# Patient Record
Sex: FEMALE | Race: WHITE | NOT HISPANIC OR LATINO | Employment: FULL TIME | URBAN - METROPOLITAN AREA
[De-identification: names, ages, dates, MRNs, and addresses within clinical notes are randomized per-mention and may not be internally consistent; named-entity substitution may affect disease eponyms.]

---

## 2017-05-11 ENCOUNTER — OFFICE VISIT (OUTPATIENT)
Dept: OBSTETRICS AND GYNECOLOGY | Facility: CLINIC | Age: 37
End: 2017-05-11
Payer: COMMERCIAL

## 2017-05-11 VITALS
BODY MASS INDEX: 27.31 KG/M2 | DIASTOLIC BLOOD PRESSURE: 60 MMHG | HEIGHT: 64 IN | HEART RATE: 72 BPM | SYSTOLIC BLOOD PRESSURE: 118 MMHG | RESPIRATION RATE: 13 BRPM | WEIGHT: 160 LBS

## 2017-05-11 DIAGNOSIS — Z01.419 ENCOUNTER FOR GYNECOLOGICAL EXAMINATION WITHOUT ABNORMAL FINDING: Primary | ICD-10-CM

## 2017-05-11 DIAGNOSIS — R87.610 ATYPICAL SQUAMOUS CELL CHANGES OF UNDETERMINED SIGNIFICANCE (ASCUS) ON CERVICAL CYTOLOGY WITH NEGATIVE HIGH RISK HUMAN PAPILLOMA VIRUS (HPV) TEST RESULT: ICD-10-CM

## 2017-05-11 PROCEDURE — 87624 HPV HI-RISK TYP POOLED RSLT: CPT

## 2017-05-11 PROCEDURE — 99999 PR PBB SHADOW E&M-EST. PATIENT-LVL III: CPT | Mod: PBBFAC,,, | Performed by: OBSTETRICS & GYNECOLOGY

## 2017-05-11 PROCEDURE — 99395 PREV VISIT EST AGE 18-39: CPT | Mod: S$GLB,,, | Performed by: OBSTETRICS & GYNECOLOGY

## 2017-05-11 PROCEDURE — 88175 CYTOPATH C/V AUTO FLUID REDO: CPT

## 2017-05-11 RX ORDER — ERGOCALCIFEROL 1.25 MG/1
50000 CAPSULE ORAL WEEKLY
COMMUNITY
End: 2017-05-11 | Stop reason: SDUPTHER

## 2017-05-11 NOTE — PROGRESS NOTES
Subjective:       Patient ID: Elham Rodas is a 36 y.o. female.    Chief Complaint:  Well Woman      History of Present Illness  Patient presents for annual exam.  She is currently without any GYN complaints.  Patient has had tubal ligation for contraception    Menstrual History:  OB History      Para Term  AB TAB SAB Ectopic Multiple Living    4 4        4         Menarche age:   Patient's last menstrual period was 2017 (approximate).         Review of Systems  Review of Systems   Constitutional: Negative for activity change, appetite change, chills, diaphoresis, fatigue, fever and unexpected weight change.   HENT: Negative for congestion, dental problem, drooling, ear discharge, ear pain, facial swelling, hearing loss, mouth sores, nosebleeds, postnasal drip, rhinorrhea, sinus pressure, sneezing, sore throat, tinnitus, trouble swallowing and voice change.    Eyes: Negative for photophobia, pain, discharge, redness, itching and visual disturbance.   Respiratory: Negative for apnea, cough, choking, chest tightness, shortness of breath, wheezing and stridor.    Cardiovascular: Positive for palpitations. Negative for chest pain and leg swelling.   Gastrointestinal: Negative for abdominal distention, abdominal pain, anal bleeding, blood in stool, constipation, diarrhea, nausea, rectal pain and vomiting.   Endocrine: Negative for cold intolerance, heat intolerance, polydipsia, polyphagia and polyuria.   Genitourinary: Negative for decreased urine volume, difficulty urinating, dyspareunia, dysuria, enuresis, flank pain, frequency, genital sores, hematuria, menstrual problem, pelvic pain, urgency, vaginal bleeding, vaginal discharge and vaginal pain.   Musculoskeletal: Negative for arthralgias, back pain, gait problem, joint swelling, myalgias, neck pain and neck stiffness.   Skin: Negative for color change, pallor, rash and wound.   Allergic/Immunologic: Negative for environmental allergies, food  allergies and immunocompromised state.   Neurological: Negative for dizziness, tremors, seizures, syncope, facial asymmetry, speech difficulty, weakness, light-headedness, numbness and headaches.   Hematological: Negative for adenopathy. Does not bruise/bleed easily.   Psychiatric/Behavioral: Negative for agitation, behavioral problems, confusion, decreased concentration, dysphoric mood, hallucinations, self-injury, sleep disturbance and suicidal ideas. The patient is not nervous/anxious and is not hyperactive.            Objective:    Physical Exam   Constitutional: She is oriented to person, place, and time. She appears well-developed and well-nourished.   Neck: No thyromegaly present.   Cardiovascular: Normal rate and regular rhythm.    Pulmonary/Chest: Effort normal and breath sounds normal. Right breast exhibits no inverted nipple, no mass, no nipple discharge, no skin change and no tenderness. Left breast exhibits no inverted nipple, no mass, no nipple discharge, no skin change and no tenderness. Breasts are symmetrical.   Abdominal: Soft. Bowel sounds are normal. She exhibits no mass. There is no tenderness. Hernia confirmed negative in the right inguinal area and confirmed negative in the left inguinal area.   Genitourinary: Vagina normal and uterus normal. Rectal exam shows no external hemorrhoid. No breast tenderness or discharge. Uterus is not enlarged and not tender. Cervix exhibits no motion tenderness, no discharge and no friability. Right adnexum displays no mass, no tenderness and no fullness. Left adnexum displays no mass, no tenderness and no fullness. No tenderness in the vagina. No foreign body in the vagina. No vaginal discharge found.   Musculoskeletal: Normal range of motion.   Lymphadenopathy:        Right: No inguinal adenopathy present.        Left: No inguinal adenopathy present.   Neurological: She is alert and oriented to person, place, and time. She has normal reflexes.   Skin: Skin is  dry.   Psychiatric: She has a normal mood and affect. Her behavior is normal. Judgment and thought content normal.   Nursing note and vitals reviewed.        Assessment:        1. Encounter for gynecological examination without abnormal finding    2. Atypical squamous cell changes of undetermined significance (ASCUS) on cervical cytology with negative high risk human papilloma virus (HPV) test result                Plan:        Elham was seen today for well woman.    Diagnoses and all orders for this visit:    Encounter for gynecological examination without abnormal finding    Atypical squamous cell changes of undetermined significance (ASCUS) on cervical cytology with negative high risk human papilloma virus (HPV) test result  -     Liquid-based pap smear, diagnostic  -     HPV High Risk Genotypes, PCR

## 2017-05-11 NOTE — MR AVS SNAPSHOT
[facility]  [address] [place] [id number]  Phone: [phone]                  Elham Clark   2017 10:00 AM   Office Visit    Description:  Female : 1980   Provider:  Yoni Oscar MD   Department:  [facility]           Reason for Visit     Well Woman           Diagnoses this Visit        Comments    Encounter for gynecological examination without abnormal finding    -  Primary     Atypical squamous cell changes of undetermined significance (ASCUS) on cervical cytology with negative high risk human papilloma virus (HPV) test result                To Do List           Goals (5 Years of Data)     None      Follow-Up and Disposition     Return in about 1 year (around 2018).      [facility]     [facility] Nurse Care Line -  Assistance  Unless otherwise directed by your provider, please contact [facility], our nurse care line that is available for  assistance.     Registered nurses in the [facility] provide: appointment scheduling, clinical advisement, health education, and other advisory services.  Call: [phone] (toll free)               Medications           Message regarding Medications     Verify the changes and/or additions to your medication regime listed below are the same as discussed with your clinician today.  If any of these changes or additions are incorrect, please notify your healthcare provider.        STOP taking these medications     ergocalciferol (ERGOCALCIFEROL) 50,000 unit Cap Take 50,000 Units by mouth once a week.           Verify that the below list of medications is an accurate representation of the medications you are currently taking.  If none reported, the list may be blank. If incorrect, please contact your healthcare provider. Carry this list with you in case of emergency.           Current Medications     calcium carbonate (TUMS ULTRA) 400 mg (1,000 mg) Chew Take 1 tablet by mouth daily as  "needed.     cholecalciferol, vitamin D3, 5,000 unit Tab Take 10,000 Units by mouth once daily.    hydrochlorothiazide (HYDRODIURIL) 12.5 MG Tab TAKE 1 TABLET BY MOUTH EVERY DAY    levothyroxine (SYNTHROID) 88 MCG tablet Take 1 tablet (88 mcg total) by mouth before breakfast.           Clinical Reference Information           Your Vitals Were     BP Pulse Resp Height Weight Last Period    118/60 72 13 5' 4" (1.626 m) 72.6 kg (160 lb) 04/03/2017 (Approximate)    BMI                27.46 kg/m2          Blood Pressure          Most Recent Value    BP  118/60      Allergies as of 5/11/2017     No Known Allergies      Immunizations Administered on Date of Encounter - 5/11/2017     None      Orders Placed During Today's Visit      Normal Orders This Visit    HPV High Risk Genotypes, PCR     Liquid-based pap smear, diagnostic       Language Assistance Services     ATTENTION: Language assistance services are available, free of charge. Please call [phone].      ATENCIÓN: Si habla dericknelson, tiene a vitale disposición servicios gratuitos de asistencia lingüística. Llame al [phone].     [facility] Ý: N?u b?n nói Ti?ng Vi?t, có các d?ch v? h? tr? ngôn ng? mi?n phí dành cho b?n. G?i s? [phone].         [facility] complies with applicable Federal civil rights laws and does not discriminate on the basis of race, color, national origin, age, disability, or sex.        "

## 2017-05-15 ENCOUNTER — TELEPHONE (OUTPATIENT)
Dept: OBSTETRICS AND GYNECOLOGY | Facility: CLINIC | Age: 37
End: 2017-05-15

## 2017-05-15 NOTE — TELEPHONE ENCOUNTER
----- Message from Huong Holder sent at 5/15/2017 11:44 AM CDT -----  Contact: self  Elham Rodas  MRN: [id number]  : 1980  PCP: Mookie Rausch  Home Phone      [phone]  Work Phone      Not on file.  Mobile          [phone]      MESSAGE:    Needs work excuse for 17.  Please fax to her at [phone].    Phone:  [phone]

## 2017-05-18 LAB
HPV16 DNA SPEC QL NAA+PROBE: NEGATIVE
HPV16+18+H RISK 12 DNA CVX-IMP: NEGATIVE
HPV18 DNA SPEC QL NAA+PROBE: NEGATIVE

## 2018-01-22 ENCOUNTER — OFFICE VISIT (OUTPATIENT)
Dept: URGENT CARE | Facility: CLINIC | Age: 38
End: 2018-01-22
Payer: COMMERCIAL

## 2018-01-22 VITALS
HEIGHT: 64 IN | WEIGHT: 150 LBS | HEART RATE: 74 BPM | RESPIRATION RATE: 18 BRPM | SYSTOLIC BLOOD PRESSURE: 116 MMHG | DIASTOLIC BLOOD PRESSURE: 77 MMHG | TEMPERATURE: 98 F | OXYGEN SATURATION: 98 % | BODY MASS INDEX: 25.61 KG/M2

## 2018-01-22 DIAGNOSIS — R52 BODY ACHES: ICD-10-CM

## 2018-01-22 DIAGNOSIS — J11.1 INFLUENZA: Primary | ICD-10-CM

## 2018-01-22 PROCEDURE — 99203 OFFICE O/P NEW LOW 30 MIN: CPT | Mod: S$GLB,,, | Performed by: PHYSICIAN ASSISTANT

## 2018-01-22 RX ORDER — ONDANSETRON 4 MG/1
4 TABLET, FILM COATED ORAL EVERY 8 HOURS PRN
Qty: 30 TABLET | Refills: 0 | Status: SHIPPED | OUTPATIENT
Start: 2018-01-22 | End: 2018-07-02 | Stop reason: ALTCHOICE

## 2018-01-22 RX ORDER — OSELTAMIVIR PHOSPHATE 75 MG/1
75 CAPSULE ORAL 2 TIMES DAILY
Qty: 10 CAPSULE | Refills: 0 | Status: SHIPPED | OUTPATIENT
Start: 2018-01-22 | End: 2018-01-27

## 2018-01-22 NOTE — PROGRESS NOTES
"Subjective:       Patient ID: Elham Rodas is a 37 y.o. female.    Vitals:  height is 5' 4" (1.626 m) and weight is 68 kg (150 lb). Her temperature is 98.4 °F (36.9 °C). Her blood pressure is 116/77 and her pulse is 74. Her respiration is 18 and oxygen saturation is 98%.     Chief Complaint: Sinus Problem and Generalized Body Aches    Sinus Problem   This is a new problem. The current episode started today. There has been no fever. She is experiencing no pain. Associated symptoms include chills and a sore throat. Pertinent negatives include no coughing, ear pain, headaches, hoarse voice or shortness of breath. Past treatments include nothing.     Review of Systems   Constitution: Positive for chills, weakness and malaise/fatigue. Negative for fever.   HENT: Positive for sore throat. Negative for ear pain and hoarse voice.    Eyes: Negative for discharge and redness.   Cardiovascular: Negative for chest pain, dyspnea on exertion and leg swelling.   Respiratory: Negative for cough, shortness of breath, sputum production and wheezing.    Musculoskeletal: Negative for myalgias.   Gastrointestinal: Negative for abdominal pain and nausea.   Neurological: Negative for headaches.       Objective:      Physical Exam   Constitutional: She is oriented to person, place, and time. She appears well-developed and well-nourished. She is cooperative.  Non-toxic appearance. She does not appear ill. No distress.   HENT:   Head: Normocephalic and atraumatic.   Right Ear: Hearing, tympanic membrane, external ear and ear canal normal.   Left Ear: Hearing, tympanic membrane, external ear and ear canal normal.   Nose: Rhinorrhea present. No mucosal edema or nasal deformity. No epistaxis. Right sinus exhibits no maxillary sinus tenderness and no frontal sinus tenderness. Left sinus exhibits no maxillary sinus tenderness and no frontal sinus tenderness.   Mouth/Throat: Uvula is midline and mucous membranes are normal. No trismus in the jaw. " Normal dentition. No uvula swelling. Posterior oropharyngeal erythema present.   Eyes: Conjunctivae and lids are normal. No scleral icterus.   Sclera clear bilat   Neck: Trachea normal, full passive range of motion without pain and phonation normal. Neck supple.   Cardiovascular: Normal rate, regular rhythm, normal heart sounds, intact distal pulses and normal pulses.    Pulmonary/Chest: Effort normal and breath sounds normal. No respiratory distress.   Abdominal: Soft. Normal appearance and bowel sounds are normal. She exhibits no distension. There is no tenderness.   Musculoskeletal: Normal range of motion. She exhibits no edema or deformity.   Neurological: She is alert and oriented to person, place, and time. She exhibits normal muscle tone. Coordination normal.   Skin: Skin is warm, dry and intact. She is not diaphoretic. No pallor.   Psychiatric: She has a normal mood and affect. Her speech is normal and behavior is normal. Judgment and thought content normal. Cognition and memory are normal.   Nursing note and vitals reviewed.      Assessment:       1. Influenza    2. Body aches        Plan:         Influenza  -     oseltamivir (TAMIFLU) 75 MG capsule; Take 1 capsule (75 mg total) by mouth 2 (two) times daily.  Dispense: 10 capsule; Refill: 0  -     ondansetron (ZOFRAN, AS HYDROCHLORIDE,) 4 MG tablet; Take 1 tablet (4 mg total) by mouth every 8 (eight) hours as needed for Nausea.  Dispense: 30 tablet; Refill: 0    Body aches  -     POCT Influenza A/B        Influenza (Adult)    Influenza is also called the flu. It is a viral illness that affects the air passages of your lungs. It is different from the common cold. The flu can easily be passed from one to person to another. It may be spread through the air by coughing and sneezing. Or it can be spread by touching the sick person and then touching your own eyes, nose, or mouth.  The flu starts 1 to 3 days after you are exposed to the flu virus. It may last for  1 to 2 weeks but many people feel tired or fatigued for many weeks afterward. You usually dont need to take antibiotics unless you have a complication. This might be an ear or sinus infection or pneumonia.  Symptoms of the flu may be mild or severe. They can include extreme tiredness (wanting to stay in bed all day), chills, fevers, muscle aches, soreness with eye movement, headache, and a dry, hacking cough.  Home care  Follow these guidelines when caring for yourself at home:  · Avoid being around cigarette smoke, whether yours or other peoples.  · Acetaminophen or ibuprofen will help ease your fever, muscle aches, and headache. Dont give aspirin to anyone younger than 18 who has the flu. Aspirin can harm the liver.  · Nausea and loss of appetite are common with the flu. Eat light meals. Drink 6 to 8 glasses of liquids every day. Good choices are water, sport drinks, soft drinks without caffeine, juices, tea, and soup. Extra fluids will also help loosen secretions in your nose and lungs.  · Over-the-counter cold medicines will not make the flu go away faster. But the medicines may help with coughing, sore throat, and congestion in your nose and sinuses. Dont use a decongestant if you have high blood pressure.  · Stay home until your fever has been gone for at least 24 hours without using medicine to reduce fever.  Follow-up care  Follow up with your healthcare provider, or as advised, if you are not getting better over the next week.  If you are age 65 or older, talk with your provider about getting a pneumococcal vaccine every 5 years. You should also get this vaccine if you have chronic asthma or COPD. All adults should get a flu vaccine every fall. Ask your provider about this.  When to seek medical advice  Call your healthcare provider right away if any of these occur:  · Cough with lots of colored mucus (sputum) or blood in your mucus  · Chest pain, shortness of breath, wheezing, or trouble  breathing  · Severe headache, or face, neck, or ear pain  · New rash with fever  · Fever of 100.4°F (38°C) or higher, or as directed by your healthcare provider  · Confusion, behavior change, or seizure  · Severe weakness or dizziness  · You get a new fever or cough after getting better for a few days  Date Last Reviewed: 1/1/2017  © 4112-3960 [employer]. [address] [place] [address]. All rights reserved. This information is not intended as a substitute for professional medical care. Always follow your healthcare professional's instructions.      Please follow up with your Primary care provider within 2-5 days if your signs and symptoms have not resolved or worsen.     If your condition worsens or fails to improve we recommend that you receive another evaluation at the emergency room immediately or contact your primary medical clinic to discuss your concerns.   You must understand that you have received an Urgent Care treatment only and that you may be released before all of your medical problems are known or treated. You, the patient, will arrange for follow up care as instructed.

## 2018-01-22 NOTE — PATIENT INSTRUCTIONS
Influenza (Adult)    Influenza is also called the flu. It is a viral illness that affects the air passages of your lungs. It is different from the common cold. The flu can easily be passed from one to person to another. It may be spread through the air by coughing and sneezing. Or it can be spread by touching the sick person and then touching your own eyes, nose, or mouth.  The flu starts 1 to 3 days after you are exposed to the flu virus. It may last for 1 to 2 weeks but many people feel tired or fatigued for many weeks afterward. You usually dont need to take antibiotics unless you have a complication. This might be an ear or sinus infection or pneumonia.  Symptoms of the flu may be mild or severe. They can include extreme tiredness (wanting to stay in bed all day), chills, fevers, muscle aches, soreness with eye movement, headache, and a dry, hacking cough.  Home care  Follow these guidelines when caring for yourself at home:  · Avoid being around cigarette smoke, whether yours or other peoples.  · Acetaminophen or ibuprofen will help ease your fever, muscle aches, and headache. Dont give aspirin to anyone younger than 18 who has the flu. Aspirin can harm the liver.  · Nausea and loss of appetite are common with the flu. Eat light meals. Drink 6 to 8 glasses of liquids every day. Good choices are water, sport drinks, soft drinks without caffeine, juices, tea, and soup. Extra fluids will also help loosen secretions in your nose and lungs.  · Over-the-counter cold medicines will not make the flu go away faster. But the medicines may help with coughing, sore throat, and congestion in your nose and sinuses. Dont use a decongestant if you have high blood pressure.  · Stay home until your fever has been gone for at least 24 hours without using medicine to reduce fever.  Follow-up care  Follow up with your healthcare provider, or as advised, if you are not getting better over the next week.  If you are age 65 or  older, talk with your provider about getting a pneumococcal vaccine every 5 years. You should also get this vaccine if you have chronic asthma or COPD. All adults should get a flu vaccine every fall. Ask your provider about this.  When to seek medical advice  Call your healthcare provider right away if any of these occur:  · Cough with lots of colored mucus (sputum) or blood in your mucus  · Chest pain, shortness of breath, wheezing, or trouble breathing  · Severe headache, or face, neck, or ear pain  · New rash with fever  · Fever of 100.4°F (38°C) or higher, or as directed by your healthcare provider  · Confusion, behavior change, or seizure  · Severe weakness or dizziness  · You get a new fever or cough after getting better for a few days  Date Last Reviewed: 1/1/2017  © 4944-8150 [employer]. [address] [place] [address]. All rights reserved. This information is not intended as a substitute for professional medical care. Always follow your healthcare professional's instructions.      Please follow up with your Primary care provider within 2-5 days if your signs and symptoms have not resolved or worsen.     If your condition worsens or fails to improve we recommend that you receive another evaluation at the emergency room immediately or contact your primary medical clinic to discuss your concerns.   You must understand that you have received an Urgent Care treatment only and that you may be released before all of your medical problems are known or treated. You, the patient, will arrange for follow up care as instructed.

## 2018-01-25 ENCOUNTER — TELEPHONE (OUTPATIENT)
Dept: URGENT CARE | Facility: CLINIC | Age: 38
End: 2018-01-25

## 2018-03-16 ENCOUNTER — HOSPITAL ENCOUNTER (EMERGENCY)
Facility: HOSPITAL | Age: 38
Discharge: HOME | End: 2018-03-17
Attending: EMERGENCY MEDICINE
Payer: COMMERCIAL

## 2018-03-16 DIAGNOSIS — R10.13 EPIGASTRIC ABDOMINAL PAIN: Primary | ICD-10-CM

## 2018-03-16 LAB
B-HCG UR QL: NEGATIVE
CTP QC/QA: YES

## 2018-03-16 PROCEDURE — 81025 URINE PREGNANCY TEST: CPT | Performed by: EMERGENCY MEDICINE

## 2018-03-16 PROCEDURE — 99284 EMERGENCY DEPT VISIT MOD MDM: CPT | Mod: 25

## 2018-03-16 PROCEDURE — 96375 TX/PRO/DX INJ NEW DRUG ADDON: CPT

## 2018-03-16 PROCEDURE — 96374 THER/PROPH/DIAG INJ IV PUSH: CPT

## 2018-03-16 RX ORDER — MORPHINE SULFATE 2 MG/ML
4 INJECTION, SOLUTION INTRAMUSCULAR; INTRAVENOUS
Status: COMPLETED | OUTPATIENT
Start: 2018-03-17 | End: 2018-03-17

## 2018-03-17 VITALS
BODY MASS INDEX: 25.61 KG/M2 | OXYGEN SATURATION: 100 % | WEIGHT: 150 LBS | HEART RATE: 67 BPM | HEIGHT: 64 IN | SYSTOLIC BLOOD PRESSURE: 104 MMHG | TEMPERATURE: 98 F | RESPIRATION RATE: 18 BRPM | DIASTOLIC BLOOD PRESSURE: 59 MMHG

## 2018-03-17 LAB
ALBUMIN SERPL BCP-MCNC: 3.9 G/DL
ALP SERPL-CCNC: 74 U/L
ALT SERPL W/O P-5'-P-CCNC: 20 U/L
ANION GAP SERPL CALC-SCNC: 9 MMOL/L
AST SERPL-CCNC: 17 U/L
BASOPHILS # BLD AUTO: 0.02 K/UL
BASOPHILS NFR BLD: 0.3 %
BILIRUB SERPL-MCNC: 0.3 MG/DL
BILIRUB UR QL STRIP: NEGATIVE
BUN SERPL-MCNC: 11 MG/DL
CALCIUM SERPL-MCNC: 9.1 MG/DL
CHLORIDE SERPL-SCNC: 104 MMOL/L
CLARITY UR: CLEAR
CO2 SERPL-SCNC: 25 MMOL/L
COLOR UR: YELLOW
CREAT SERPL-MCNC: 0.7 MG/DL
DIFFERENTIAL METHOD: ABNORMAL
EOSINOPHIL # BLD AUTO: 0.1 K/UL
EOSINOPHIL NFR BLD: 1.8 %
ERYTHROCYTE [DISTWIDTH] IN BLOOD BY AUTOMATED COUNT: 14.8 %
EST. GFR  (AFRICAN AMERICAN): >60 ML/MIN/1.73 M^2
EST. GFR  (NON AFRICAN AMERICAN): >60 ML/MIN/1.73 M^2
GLUCOSE SERPL-MCNC: 94 MG/DL
GLUCOSE UR QL STRIP: NEGATIVE
HCT VFR BLD AUTO: 35.7 %
HGB BLD-MCNC: 11.7 G/DL
HGB UR QL STRIP: NEGATIVE
KETONES UR QL STRIP: NEGATIVE
LEUKOCYTE ESTERASE UR QL STRIP: NEGATIVE
LIPASE SERPL-CCNC: 47 U/L
LYMPHOCYTES # BLD AUTO: 2.8 K/UL
LYMPHOCYTES NFR BLD: 45.8 %
MCH RBC QN AUTO: 27.4 PG
MCHC RBC AUTO-ENTMCNC: 32.8 G/DL
MCV RBC AUTO: 84 FL
MONOCYTES # BLD AUTO: 0.4 K/UL
MONOCYTES NFR BLD: 6.8 %
NEUTROPHILS # BLD AUTO: 2.7 K/UL
NEUTROPHILS NFR BLD: 45.1 %
NITRITE UR QL STRIP: NEGATIVE
PH UR STRIP: 6 [PH] (ref 5–8)
PLATELET # BLD AUTO: 241 K/UL
PMV BLD AUTO: 11.4 FL
POTASSIUM SERPL-SCNC: 3.5 MMOL/L
PROT SERPL-MCNC: 6.9 G/DL
PROT UR QL STRIP: NEGATIVE
RBC # BLD AUTO: 4.27 M/UL
SODIUM SERPL-SCNC: 138 MMOL/L
SP GR UR STRIP: <=1.005 (ref 1–1.03)
URN SPEC COLLECT METH UR: ABNORMAL
UROBILINOGEN UR STRIP-ACNC: NEGATIVE EU/DL
WBC # BLD AUTO: 6.03 K/UL

## 2018-03-17 PROCEDURE — 25500020 PHARM REV CODE 255: Performed by: EMERGENCY MEDICINE

## 2018-03-17 PROCEDURE — 80053 COMPREHEN METABOLIC PANEL: CPT

## 2018-03-17 PROCEDURE — 25000003 PHARM REV CODE 250: Performed by: EMERGENCY MEDICINE

## 2018-03-17 PROCEDURE — 83690 ASSAY OF LIPASE: CPT

## 2018-03-17 PROCEDURE — 81003 URINALYSIS AUTO W/O SCOPE: CPT

## 2018-03-17 PROCEDURE — 85025 COMPLETE CBC W/AUTO DIFF WBC: CPT

## 2018-03-17 PROCEDURE — 63600175 PHARM REV CODE 636 W HCPCS: Performed by: EMERGENCY MEDICINE

## 2018-03-17 RX ADMIN — PROMETHAZINE HYDROCHLORIDE 12.5 MG: 25 INJECTION INTRAMUSCULAR; INTRAVENOUS at 12:03

## 2018-03-17 RX ADMIN — IOHEXOL 75 ML: 350 INJECTION, SOLUTION INTRAVENOUS at 01:03

## 2018-03-17 RX ADMIN — Medication 4 MG: at 12:03

## 2018-03-17 NOTE — ED PROVIDER NOTES
Encounter Date: 3/16/2018       History     Chief Complaint   Patient presents with    Abdominal Pain     mid abdominal/umbilical abdominal pain/tenderness worsening for last 5hrs. pt. also has palpable nodule to upper umbilical area, very tender to palpation.      Patient is 38 y/o female here with acute onset epigastric abdominal pain that started 5 hours ago.  States that she feels something in her upper abdomen.  Pain is brought on by palpation only.  No radiation, she has not tried to take anything for the pain.  Denies any N/V/D, fever, chills, UTI sx, vaginal sx.            Review of patient's allergies indicates:  No Known Allergies  Past Medical History:   Diagnosis Date    Abnormal Pap smear of cervix 05/2016    ASCUS, - HPV    Hypothyroidism     Palpitations     Syncope and collapse     Thyroid disease     Vitamin D deficiency disease      Past Surgical History:   Procedure Laterality Date    CHOLECYSTECTOMY      THYROIDECTOMY      TUBAL LIGATION       Family History   Problem Relation Age of Onset    Hypertension Mother     Heart attack Mother     Hypertension Father     Breast cancer Neg Hx     Colon cancer Neg Hx     Ovarian cancer Neg Hx      Social History   Substance Use Topics    Smoking status: Never Smoker    Smokeless tobacco: Never Used    Alcohol use 0.0 oz/week      Comment: occ     Review of Systems   Constitutional: Negative for chills and fever.   HENT: Negative for congestion, rhinorrhea and sore throat.    Eyes: Negative for pain and visual disturbance.   Respiratory: Negative for cough and shortness of breath.    Cardiovascular: Negative for chest pain and leg swelling.   Gastrointestinal: Positive for abdominal pain. Negative for diarrhea, nausea and vomiting.   Genitourinary: Negative for dysuria, hematuria, vaginal bleeding and vaginal discharge.   Musculoskeletal: Negative for back pain and neck pain.   Skin: Negative for rash.   Neurological: Negative for  weakness and headaches.   Psychiatric/Behavioral: Negative for confusion.       Physical Exam     Initial Vitals [03/16/18 2251]   BP Pulse Resp Temp SpO2   128/74 70 20 98.6 °F (37 °C) 97 %      MAP       92         Physical Exam    Nursing note and vitals reviewed.  Constitutional: She appears well-developed and well-nourished. She is not diaphoretic. No distress.   HENT:   Head: Normocephalic and atraumatic.   Right Ear: External ear normal.   Left Ear: External ear normal.   Nose: Nose normal.   Eyes: Conjunctivae and EOM are normal. Right eye exhibits no discharge. Left eye exhibits no discharge.   Neck: Normal range of motion. Neck supple.   Cardiovascular: Normal rate, regular rhythm, normal heart sounds and intact distal pulses. Exam reveals no gallop and no friction rub.    No murmur heard.  Pulmonary/Chest: Breath sounds normal. No respiratory distress. She has no wheezes. She has no rhonchi. She has no rales. She exhibits no tenderness.   Abdominal: Soft. Bowel sounds are normal. She exhibits no distension and no mass. There is tenderness. There is no rebound and no guarding.   + TTP in epigastric area, no masses or hernia palpated   Musculoskeletal: Normal range of motion.   Neurological: She is alert and oriented to person, place, and time. No cranial nerve deficit.   Skin: Skin is warm and dry. Capillary refill takes less than 2 seconds.   Psychiatric: She has a normal mood and affect. Thought content normal.         ED Course   Procedures  Labs Reviewed   CBC W/ AUTO DIFFERENTIAL - Abnormal; Notable for the following:        Result Value    Hemoglobin 11.7 (*)     Hematocrit 35.7 (*)     RDW 14.8 (*)     All other components within normal limits   URINALYSIS - Abnormal; Notable for the following:     Specific Gravity, UA <=1.005 (*)     All other components within normal limits   COMPREHENSIVE METABOLIC PANEL   LIPASE   POCT URINE PREGNANCY             Medical Decision Making:   Initial Assessment:    Patient with epigastric pain  Differential Diagnosis:   Pancreatitis, gastritis, hernia, hepatitis  Clinical Tests:   Lab Tests: Ordered and Reviewed  The following lab test(s) were unremarkable: CBC, CMP, UPT, Urinalysis and Lipase  ED Management:  Pain relieved in ED    Nothing acute seen on CT scan.  Pain improved in ED   Patient nontoxic appearing and tolerating PO well.  Stable for discharge.                    ED Course as of Mar 17 0158   Sat Mar 17, 2018   0154 Patient states that pain is better and she is tolerating PO well  [LD]      ED Course User Index  [LD] Maribel Hobbs MD     Clinical Impression:   The encounter diagnosis was Epigastric abdominal pain.    Disposition:   Disposition: Discharged  Condition: Stable                        Maribel Hobbs MD  03/17/18 0202

## 2018-04-01 ENCOUNTER — OFFICE VISIT (OUTPATIENT)
Dept: URGENT CARE | Facility: CLINIC | Age: 38
End: 2018-04-01
Payer: COMMERCIAL

## 2018-04-01 VITALS
RESPIRATION RATE: 18 BRPM | HEIGHT: 64 IN | HEART RATE: 85 BPM | TEMPERATURE: 98 F | OXYGEN SATURATION: 98 % | BODY MASS INDEX: 25.61 KG/M2 | DIASTOLIC BLOOD PRESSURE: 81 MMHG | SYSTOLIC BLOOD PRESSURE: 128 MMHG | WEIGHT: 150 LBS

## 2018-04-01 DIAGNOSIS — J01.90 ACUTE SINUSITIS, RECURRENCE NOT SPECIFIED, UNSPECIFIED LOCATION: Primary | ICD-10-CM

## 2018-04-01 DIAGNOSIS — R05.9 COUGH: ICD-10-CM

## 2018-04-01 DIAGNOSIS — J02.9 ACUTE PHARYNGITIS, UNSPECIFIED ETIOLOGY: ICD-10-CM

## 2018-04-01 PROCEDURE — 99214 OFFICE O/P EST MOD 30 MIN: CPT | Mod: 25,S$GLB,, | Performed by: EMERGENCY MEDICINE

## 2018-04-01 PROCEDURE — 96372 THER/PROPH/DIAG INJ SC/IM: CPT | Mod: S$GLB,,, | Performed by: EMERGENCY MEDICINE

## 2018-04-01 RX ORDER — AZITHROMYCIN 250 MG/1
TABLET, FILM COATED ORAL
Qty: 6 TABLET | Refills: 0 | Status: SHIPPED | OUTPATIENT
Start: 2018-04-01 | End: 2018-05-14

## 2018-04-01 RX ORDER — BETAMETHASONE SODIUM PHOSPHATE AND BETAMETHASONE ACETATE 3; 3 MG/ML; MG/ML
6 INJECTION, SUSPENSION INTRA-ARTICULAR; INTRALESIONAL; INTRAMUSCULAR; SOFT TISSUE
Status: COMPLETED | OUTPATIENT
Start: 2018-04-01 | End: 2018-04-01

## 2018-04-01 RX ORDER — CODEINE PHOSPHATE AND GUAIFENESIN 10; 100 MG/5ML; MG/5ML
5-10 SOLUTION ORAL 3 TIMES DAILY PRN
Qty: 120 ML | Refills: 0 | Status: SHIPPED | OUTPATIENT
Start: 2018-04-01 | End: 2018-04-06

## 2018-04-01 RX ADMIN — BETAMETHASONE SODIUM PHOSPHATE AND BETAMETHASONE ACETATE 6 MG: 3; 3 INJECTION, SUSPENSION INTRA-ARTICULAR; INTRALESIONAL; INTRAMUSCULAR; SOFT TISSUE at 04:04

## 2018-04-01 NOTE — LETTER
April 1, 2018      [facility]  [address] [address], [address]  [place] [address]  Phone: [phone]  Fax: [phone]       Patient: Elham Rodas   YOB: 1980  Date of Visit: 04/01/2018    To Whom It May Concern:    Lorrie Rodas  was at [facility] on 04/01/2018. She may return to work/school on 4/3/18 with no restrictions. If you have any questions or concerns, or if I can be of further assistance, please do not hesitate to contact me.    Sincerely,            Jaylon Kinsey MD

## 2018-04-01 NOTE — PATIENT INSTRUCTIONS
Jaylon Kinsey MD  Go to the Emergency Department for any problems  Call your PCP for follow up next available.    Sinusitis (No Antibiotics)    The sinuses are air-filled spaces within the bones of the face. They connect to the inside of the nose. Sinusitis is an inflammation of the tissue lining the sinus cavity. Sinus inflammation can occur during a cold. It can also be due to allergies to pollens and other particles in the air. It can cause symptoms such as sinus congestion, headache, sore throat, facial swelling and fullness. It may also cause a low-grade fever. No infection is present, and no antibiotic treatment is needed.  Home care  · Drink plenty of water, hot tea, and other liquids. This may help thin mucus. It also may promote sinus drainage.  · Heat may help soothe painful areas of the face. Use a towel soaked in hot water. Or,  the shower and direct the hot spray onto your face. Using a vaporizer along with a menthol rub at night may also help.   · An expectorant containing guaifenesin may help thin the mucus and promote drainage from the sinuses.  · Over-the-counter decongestants may be used unless a similar medicine was prescribed. Nasal sprays work the fastest. Use one that contains phenylephrine or oxymetazoline. First blow the nose gently. Then use the spray. Do not use these medicines more often than directed on the label or symptoms may get worse. You may also use tablets containing pseudoephedrine. Avoid products that combine ingredients, because side effects may be increased. Read labels. You can also ask the pharmacist for help. (NOTE: Persons with high blood pressure should not use decongestants. They can raise blood pressure.)  · Over-the-counter antihistamines may help if allergies contributed to your sinusitis.    · Use acetaminophen or ibuprofen to control pain, unless another pain medicine was prescribed. (If you have chronic liver or kidney disease or ever had a stomach ulcer,  talk with your doctor before using these medicines. Aspirin should never be used in anyone under 18 years of age who is ill with a fever. It may cause severe liver damage.)  · Use nasal rinses or irrigation as instructed by your health care provider.  · Don't smoke. This can worsen symptoms.  Follow-up care  Follow up with your healthcare provider or our staff if you are not improving within the next week.  When to seek medical advice  Call your healthcare provider if any of these occur:  · Green or yellow discharge from the nose or into the throat  · Facial pain or headache becoming more severe  · Stiff neck  · Unusual drowsiness or confusion  · Swelling of the forehead or eyelids  · Vision problems, including blurred or double vision  · Fever of 100.4ºF (38ºC) or higher, or as directed by your healthcare provider  · Seizure  · Breathing problems  · Symptoms not resolving within 10 days  Date Last Reviewed: 4/13/2015  © 0611-1274 [employer]. [address] [place] [address]. All rights reserved. This information is not intended as a substitute for professional medical care. Always follow your healthcare professional's instructions.        Self-Care for Sore Throats    Sore throats happen for many reasons, such as colds, allergies, and infections caused by viruses or bacteria. In any case, your throat becomes red and sore. Your goal for self-care is to reduce your discomfort while giving your throat a chance to heal.  Moisten and soothe your throat  Tips include the following:  · Try a sip of water first thing after waking up.  · Keep your throat moist by drinking 6 or more glasses of clear liquids every day.  · Run a cool-air humidifier in your room overnight.  · Avoid cigarette smoke.   · Suck on throat lozenges, cough drops, hard candy, ice chips, or frozen fruit-juice bars. Use the sugar-free versions if your diet or medical condition requires them.  Gargle to ease irritation  Gargling  every hour or 2 can ease irritation. Try gargling with 1 of these solutions:  · 1/4 teaspoon of salt in 1/2 cup of warm water  · An over-the-counter anesthetic gargle  Use medicine for more relief  Over-the-counter medicine can reduce sore throat symptoms. Ask your pharmacist if you have questions about which medicine to use:  · Ease pain with anesthetic sprays. Aspirin or an aspirin substitute also helps. Remember, never give aspirin to anyone 18 or younger, or if you are already taking blood thinners.   · For sore throats caused by allergies, try antihistamines to block the allergic reaction.  · Remember: unless a sore throat is caused by a bacterial infection, antibiotics wont help you.  Prevent future sore throats  Prevention tips include the following:  · Stop smoking or reduce contact with secondhand smoke. Smoke irritates the tender throat lining.  · Limit contact with pets and with allergy-causing substances, such as pollen and mold.  · When youre around someone with a sore throat or cold, wash your hands often to keep viruses or bacteria from spreading.  · Dont strain your vocal cords.  Call your healthcare provider  Contact your healthcare provider if you have:  · A temperature over 101°F (38.3°C)  · White spots on the throat  · Great difficulty swallowing  · Trouble breathing  · A skin rash  · Recent exposure to someone else with strep bacteria  · Severe hoarseness and swollen glands in the neck or jaw   Date Last Reviewed: 8/1/2016  © 8907-3652 [employer]. [address] [place] [address]. All rights reserved. This information is not intended as a substitute for professional medical care. Always follow your healthcare professional's instructions.        Viral Upper Respiratory Illness (Adult)  You have a viral upper respiratory illness (URI), which is another term for the common cold. This illness is contagious during the first few days. It is spread through the air by coughing  and sneezing. It may also be spread by direct contact (touching the sick person and then touching your own eyes, nose, or mouth). Frequent handwashing will decrease risk of spread. Most viral illnesses go away within 7 to 10 days with rest and simple home remedies. Sometimes the illness may last for several weeks. Antibiotics will not kill a virus, and they are generally not prescribed for this condition.    Home care  · If symptoms are severe, rest at home for the first 2 to 3 days. When you resume activity, don't let yourself get too tired.  · Avoid being exposed to cigarette smoke (yours or others).  · You may use acetaminophen or ibuprofen to control pain and fever, unless another medicine was prescribed. (Note: If you have chronic liver or kidney disease, have ever had a stomach ulcer or gastrointestinal bleeding, or are taking blood-thinning medicines, talk with your healthcare provider before using these medicines.) Aspirin should never be given to anyone under 18 years of age who is ill with a viral infection or fever. It may cause severe liver or brain damage.  · Your appetite may be poor, so a light diet is fine. Avoid dehydration by drinking 6 to 8 glasses of fluids per day (water, soft drinks, juices, tea, or soup). Extra fluids will help loosen secretions in the nose and lungs.  · Over-the-counter cold medicines will not shorten the length of time youre sick, but they may be helpful for the following symptoms: cough, sore throat, and nasal and sinus congestion. (Note: Do not use decongestants if you have high blood pressure.)  Follow-up care  Follow up with your healthcare provider, or as advised.  When to seek medical advice  Call your healthcare provider right away if any of these occur:  · Cough with lots of colored sputum (mucus)  · Severe headache; face, neck, or ear pain  · Difficulty swallowing due to throat pain  · Fever of 100.4°F (38°C)  Call 911, or get immediate medical care  Call  emergency services right away if any of these occur:  · Chest pain, shortness of breath, wheezing, or difficulty breathing  · Coughing up blood  · Inability to swallow due to throat pain  Date Last Reviewed: 9/13/2015  © 8475-3002 [employer]. [address] [place] [address], [place] [address]. All rights reserved. This information is not intended as a substitute for professional medical care. Always follow your healthcare professional's instructions.

## 2018-04-01 NOTE — PROGRESS NOTES
"Subjective:       Patient ID: Elham Rodas is a 37 y.o. female.    Vitals:  height is 5' 4" (1.626 m) and weight is 68 kg (150 lb). Her oral temperature is 98.1 °F (36.7 °C). Her blood pressure is 128/81 and her pulse is 85. Her respiration is 18 and oxygen saturation is 98%.     Chief Complaint: Sinus Problem    3 d hx sinus drainage/congestion/post nasal drip and recent sore throat and mucus prod cough, is not pregnant.      Sinus Problem   This is a new problem. The current episode started in the past 7 days. The problem has been gradually worsening since onset. There has been no fever. Her pain is at a severity of 7/10. The pain is moderate. Associated symptoms include congestion, coughing, headaches and sinus pressure. Pertinent negatives include no chills, ear pain, hoarse voice, shortness of breath or sore throat. Past treatments include oral decongestants. The treatment provided no relief.     Review of Systems   Constitution: Negative for chills, fever and malaise/fatigue.   HENT: Positive for congestion and sinus pressure. Negative for ear pain, hoarse voice and sore throat.    Eyes: Negative for discharge and redness.   Cardiovascular: Negative for chest pain, dyspnea on exertion and leg swelling.   Respiratory: Positive for cough. Negative for shortness of breath, sputum production and wheezing.    Musculoskeletal: Negative for myalgias.   Gastrointestinal: Negative for abdominal pain and nausea.   Neurological: Positive for headaches.       Objective:      Physical Exam   Constitutional: She is oriented to person, place, and time. She appears well-developed and well-nourished.   Occas nonprod cough, sinus congested sounding   HENT:   Head: Normocephalic and atraumatic.   Right Ear: Tympanic membrane, external ear and ear canal normal.   Left Ear: Tympanic membrane, external ear and ear canal normal.   Nose: Mucosal edema and rhinorrhea present. Right sinus exhibits no maxillary sinus tenderness and " no frontal sinus tenderness. Left sinus exhibits no maxillary sinus tenderness and no frontal sinus tenderness.   Mouth/Throat: Uvula is midline and mucous membranes are normal. Posterior oropharyngeal erythema present. No oropharyngeal exudate or posterior oropharyngeal edema.   Eyes: EOM are normal. Pupils are equal, round, and reactive to light.   Neck: Normal range of motion. Neck supple.   Cardiovascular: Normal rate, regular rhythm and normal heart sounds.    Pulmonary/Chest: Breath sounds normal.   Musculoskeletal: Normal range of motion.   Lymphadenopathy:     She has no cervical adenopathy.   Neurological: She is alert and oriented to person, place, and time.   Skin: Skin is warm and dry.   Psychiatric: She has a normal mood and affect. Her behavior is normal.       Assessment:       1. Acute sinusitis, recurrence not specified, unspecified location    2. Acute pharyngitis, unspecified etiology    3. Cough        Plan:         Acute sinusitis, recurrence not specified, unspecified location  -     betamethasone acetate-betamethasone sodium phosphate injection 6 mg; Inject 1 mL (6 mg total) into the muscle one time.  -     azithromycin (ZITHROMAX Z-LAKESHIA) 250 MG tablet; Take 2 tablets (500 mg) on  Day 1,  followed by 1 tablet (250 mg) once daily on Days 2 through 5.  Start in 1-2 days if not improved.  Dispense: 6 tablet; Refill: 0    Acute pharyngitis, unspecified etiology  -     betamethasone acetate-betamethasone sodium phosphate injection 6 mg; Inject 1 mL (6 mg total) into the muscle one time.  -     azithromycin (ZITHROMAX Z-LAKESHIA) 250 MG tablet; Take 2 tablets (500 mg) on  Day 1,  followed by 1 tablet (250 mg) once daily on Days 2 through 5.  Start in 1-2 days if not improved.  Dispense: 6 tablet; Refill: 0    Cough  -     guaifenesin-codeine 100-10 mg/5 ml (TUSSI-ORGANIDIN NR)  mg/5 mL syrup; Take 5-10 mLs by mouth 3 (three) times daily as needed for Cough.  Dispense: 120 mL; Refill: 0      Jaylon F  MD Am[place]ie  Go to the Emergency Department for any problems  Call your PCP for follow up next available.

## 2018-04-04 ENCOUNTER — TELEPHONE (OUTPATIENT)
Dept: OCCUPATIONAL MEDICINE | Facility: CLINIC | Age: 38
End: 2018-04-04

## 2018-05-14 ENCOUNTER — OFFICE VISIT (OUTPATIENT)
Dept: OBSTETRICS AND GYNECOLOGY | Facility: CLINIC | Age: 38
End: 2018-05-14
Payer: COMMERCIAL

## 2018-05-14 VITALS
WEIGHT: 153 LBS | DIASTOLIC BLOOD PRESSURE: 82 MMHG | RESPIRATION RATE: 13 BRPM | HEIGHT: 64 IN | HEART RATE: 76 BPM | SYSTOLIC BLOOD PRESSURE: 122 MMHG | BODY MASS INDEX: 26.12 KG/M2

## 2018-05-14 DIAGNOSIS — Z01.419 ENCOUNTER FOR GYNECOLOGICAL EXAMINATION WITHOUT ABNORMAL FINDING: Primary | ICD-10-CM

## 2018-05-14 DIAGNOSIS — Z12.4 CERVICAL CANCER SCREENING: ICD-10-CM

## 2018-05-14 PROCEDURE — 99999 PR PBB SHADOW E&M-EST. PATIENT-LVL III: CPT | Mod: PBBFAC,,, | Performed by: OBSTETRICS & GYNECOLOGY

## 2018-05-14 PROCEDURE — 99395 PREV VISIT EST AGE 18-39: CPT | Mod: S$GLB,,, | Performed by: OBSTETRICS & GYNECOLOGY

## 2018-05-14 PROCEDURE — 88175 CYTOPATH C/V AUTO FLUID REDO: CPT

## 2018-05-14 NOTE — PROGRESS NOTES
Subjective:       Patient ID: Elham Rodas is a 37 y.o. female.    Chief Complaint:  Well Woman      History of Present Illness  Patient presents for annual exam.  Patient was counseled on beginning mammograms.  She is otherwise without gyn complaints.    Menstrual History:  OB History      Para Term  AB Living    4 4       4    SAB TAB Ectopic Multiple Live Births                      Menarche age:  Patient's last menstrual period was 2018 (approximate).         Review of Systems  Review of Systems   Constitutional: Negative for activity change, appetite change, chills, diaphoresis, fatigue, fever and unexpected weight change.   HENT: Negative for congestion, dental problem, drooling, ear discharge, ear pain, facial swelling, hearing loss, mouth sores, nosebleeds, postnasal drip, rhinorrhea, sinus pressure, sneezing, sore throat, tinnitus, trouble swallowing and voice change.    Eyes: Negative for photophobia, pain, discharge, redness, itching and visual disturbance.   Respiratory: Negative for apnea, cough, choking, chest tightness, shortness of breath, wheezing and stridor.    Cardiovascular: Negative for chest pain, palpitations and leg swelling.   Gastrointestinal: Negative for abdominal distention, abdominal pain, anal bleeding, blood in stool, constipation, diarrhea, nausea, rectal pain and vomiting.   Endocrine: Negative for cold intolerance, heat intolerance, polydipsia, polyphagia and polyuria.   Genitourinary: Negative for decreased urine volume, difficulty urinating, dyspareunia, dysuria, enuresis, flank pain, frequency, genital sores, hematuria, menstrual problem, pelvic pain, urgency, vaginal bleeding, vaginal discharge and vaginal pain.   Musculoskeletal: Negative for arthralgias, back pain, gait problem, joint swelling, myalgias, neck pain and neck stiffness.   Skin: Negative for color change, pallor, rash and wound.   Allergic/Immunologic: Negative for environmental allergies,  food allergies and immunocompromised state.   Neurological: Negative for dizziness, tremors, seizures, syncope, facial asymmetry, speech difficulty, weakness, light-headedness, numbness and headaches.   Hematological: Negative for adenopathy. Does not bruise/bleed easily.   Psychiatric/Behavioral: Negative for agitation, behavioral problems, confusion, decreased concentration, dysphoric mood, hallucinations, self-injury, sleep disturbance and suicidal ideas. The patient is not nervous/anxious and is not hyperactive.            Objective:    Physical Exam   Constitutional: She is oriented to person, place, and time. She appears well-developed and well-nourished.   Neck: No thyromegaly present.   Cardiovascular: Normal rate and regular rhythm.    Pulmonary/Chest: Effort normal and breath sounds normal. Right breast exhibits no inverted nipple, no mass, no nipple discharge, no skin change and no tenderness. Left breast exhibits no inverted nipple, no mass, no nipple discharge, no skin change and no tenderness. Breasts are symmetrical.   Abdominal: Soft. Bowel sounds are normal. She exhibits no mass. There is no tenderness. Hernia confirmed negative in the right inguinal area and confirmed negative in the left inguinal area.   Genitourinary: Vagina normal and uterus normal. Rectal exam shows no external hemorrhoid. No breast tenderness or discharge. Uterus is not enlarged and not tender. Cervix exhibits no motion tenderness, no discharge and no friability. Right adnexum displays no mass, no tenderness and no fullness. Left adnexum displays no mass, no tenderness and no fullness. No tenderness in the vagina. No foreign body in the vagina. No vaginal discharge found.   Musculoskeletal: Normal range of motion.   Lymphadenopathy:        Right: No inguinal adenopathy present.        Left: No inguinal adenopathy present.   Neurological: She is alert and oriented to person, place, and time. She has normal reflexes.   Skin:  Skin is dry.   Psychiatric: She has a normal mood and affect. Her behavior is normal. Judgment and thought content normal.   Nursing note and vitals reviewed.        Assessment:        1. Encounter for gynecological examination without abnormal finding    2. Cervical cancer screening                Plan:         Elham was seen today for well woman.    Diagnoses and all orders for this visit:    Encounter for gynecological examination without abnormal finding    Cervical cancer screening  -     Liquid-based pap smear, screening

## 2018-07-02 ENCOUNTER — HOSPITAL ENCOUNTER (EMERGENCY)
Facility: HOSPITAL | Age: 38
Discharge: HOME | End: 2018-07-02
Attending: EMERGENCY MEDICINE
Payer: COMMERCIAL

## 2018-07-02 VITALS
WEIGHT: 150 LBS | DIASTOLIC BLOOD PRESSURE: 78 MMHG | RESPIRATION RATE: 18 BRPM | SYSTOLIC BLOOD PRESSURE: 136 MMHG | BODY MASS INDEX: 25.61 KG/M2 | OXYGEN SATURATION: 100 % | HEIGHT: 64 IN | TEMPERATURE: 99 F | HEART RATE: 78 BPM

## 2018-07-02 DIAGNOSIS — R20.0 NUMBNESS AND TINGLING IN BOTH HANDS: ICD-10-CM

## 2018-07-02 DIAGNOSIS — R20.2 NUMBNESS AND TINGLING IN BOTH HANDS: ICD-10-CM

## 2018-07-02 PROBLEM — J01.90 ACUTE SINUSITIS: Status: RESOLVED | Noted: 2018-04-01 | Resolved: 2018-07-02

## 2018-07-02 LAB
ALBUMIN SERPL BCP-MCNC: 4 G/DL
ALP SERPL-CCNC: 75 U/L
ALT SERPL W/O P-5'-P-CCNC: 14 U/L
ANION GAP SERPL CALC-SCNC: 7 MMOL/L
AST SERPL-CCNC: 16 U/L
B-HCG UR QL: NEGATIVE
BASOPHILS # BLD AUTO: 0.02 K/UL
BASOPHILS NFR BLD: 0.3 %
BILIRUB SERPL-MCNC: 0.5 MG/DL
BILIRUB UR QL STRIP: NEGATIVE
BUN SERPL-MCNC: 7 MG/DL
CALCIUM SERPL-MCNC: 8.9 MG/DL
CHLORIDE SERPL-SCNC: 105 MMOL/L
CLARITY UR: CLEAR
CO2 SERPL-SCNC: 28 MMOL/L
COLOR UR: YELLOW
CREAT SERPL-MCNC: 0.7 MG/DL
CTP QC/QA: YES
DIFFERENTIAL METHOD: ABNORMAL
EOSINOPHIL # BLD AUTO: 0.1 K/UL
EOSINOPHIL NFR BLD: 0.9 %
ERYTHROCYTE [DISTWIDTH] IN BLOOD BY AUTOMATED COUNT: 16 %
EST. GFR  (AFRICAN AMERICAN): >60 ML/MIN/1.73 M^2
EST. GFR  (NON AFRICAN AMERICAN): >60 ML/MIN/1.73 M^2
GLUCOSE SERPL-MCNC: 91 MG/DL
GLUCOSE UR QL STRIP: NEGATIVE
HCT VFR BLD AUTO: 36.9 %
HGB BLD-MCNC: 11.5 G/DL
HGB UR QL STRIP: ABNORMAL
KETONES UR QL STRIP: NEGATIVE
LEUKOCYTE ESTERASE UR QL STRIP: NEGATIVE
LYMPHOCYTES # BLD AUTO: 2.1 K/UL
LYMPHOCYTES NFR BLD: 36.3 %
MAGNESIUM SERPL-MCNC: 1.9 MG/DL
MCH RBC QN AUTO: 26 PG
MCHC RBC AUTO-ENTMCNC: 31.2 G/DL
MCV RBC AUTO: 83 FL
MONOCYTES # BLD AUTO: 0.5 K/UL
MONOCYTES NFR BLD: 8.1 %
NEUTROPHILS # BLD AUTO: 3.2 K/UL
NEUTROPHILS NFR BLD: 54.2 %
NITRITE UR QL STRIP: NEGATIVE
PH UR STRIP: 7 [PH] (ref 5–8)
PHOSPHATE SERPL-MCNC: 3.5 MG/DL
PLATELET # BLD AUTO: 243 K/UL
PMV BLD AUTO: 11.8 FL
POTASSIUM SERPL-SCNC: 3.7 MMOL/L
PROT SERPL-MCNC: 7.3 G/DL
PROT UR QL STRIP: NEGATIVE
RBC # BLD AUTO: 4.43 M/UL
SODIUM SERPL-SCNC: 140 MMOL/L
SP GR UR STRIP: 1.01 (ref 1–1.03)
URN SPEC COLLECT METH UR: ABNORMAL
UROBILINOGEN UR STRIP-ACNC: NEGATIVE EU/DL
WBC # BLD AUTO: 5.81 K/UL

## 2018-07-02 PROCEDURE — 96360 HYDRATION IV INFUSION INIT: CPT

## 2018-07-02 PROCEDURE — 25000003 PHARM REV CODE 250: Performed by: NURSE PRACTITIONER

## 2018-07-02 PROCEDURE — 93005 ELECTROCARDIOGRAM TRACING: CPT

## 2018-07-02 PROCEDURE — 93010 ELECTROCARDIOGRAM REPORT: CPT | Mod: ,,, | Performed by: INTERNAL MEDICINE

## 2018-07-02 PROCEDURE — 80053 COMPREHEN METABOLIC PANEL: CPT

## 2018-07-02 PROCEDURE — 99284 EMERGENCY DEPT VISIT MOD MDM: CPT | Mod: 25

## 2018-07-02 PROCEDURE — 84100 ASSAY OF PHOSPHORUS: CPT

## 2018-07-02 PROCEDURE — 83735 ASSAY OF MAGNESIUM: CPT

## 2018-07-02 PROCEDURE — 81003 URINALYSIS AUTO W/O SCOPE: CPT

## 2018-07-02 PROCEDURE — 85025 COMPLETE CBC W/AUTO DIFF WBC: CPT

## 2018-07-02 PROCEDURE — 25000003 PHARM REV CODE 250: Performed by: EMERGENCY MEDICINE

## 2018-07-02 PROCEDURE — 81025 URINE PREGNANCY TEST: CPT | Performed by: EMERGENCY MEDICINE

## 2018-07-02 RX ORDER — SODIUM CHLORIDE 9 MG/ML
1000 INJECTION, SOLUTION INTRAVENOUS
Status: COMPLETED | OUTPATIENT
Start: 2018-07-02 | End: 2018-07-02

## 2018-07-02 RX ORDER — POTASSIUM CHLORIDE 20 MEQ/1
40 TABLET, EXTENDED RELEASE ORAL ONCE
Status: COMPLETED | OUTPATIENT
Start: 2018-07-02 | End: 2018-07-02

## 2018-07-02 RX ADMIN — POTASSIUM CHLORIDE 40 MEQ: 1500 TABLET, EXTENDED RELEASE ORAL at 12:07

## 2018-07-02 RX ADMIN — SODIUM CHLORIDE 1000 ML: 0.9 INJECTION, SOLUTION INTRAVENOUS at 10:07

## 2018-07-02 NOTE — ED NOTES
Pt is wearing a knee brace to left knee, pt states she is able to walk fine, she just wears brace for work. Pt ambulated to restroom with a steady gait.

## 2018-07-02 NOTE — ED TRIAGE NOTES
Pt states she woke up this morning with tingling to face and body. Pt states tingly feeling happened before when her electrolytes were low. Pt denies pain, changes loc, or swelling.

## 2018-07-02 NOTE — ED PROVIDER NOTES
"Encounter Date: 7/2/2018       History     Chief Complaint   Patient presents with    Tingling     Tingling around mouth since 7 am today - states similar symptoms in past when calcium and/or magnesium were low. Presents in no distress. Gait WNL     36 y/o female which presents with sudden onset of "numbness" to face, lips and arms and hands. The patient states she does NOT have weakness. She had an episode similar to this last year and her magnesium/potassium level were low. She does complain of fatigue. She denies muscle cramping,       The history is provided by the patient.     Review of patient's allergies indicates:  No Known Allergies  Past Medical History:   Diagnosis Date    Abnormal Pap smear of cervix 05/2016    ASCUS, - HPV    Hypothyroidism     Palpitations     Syncope and collapse     Thyroid disease     Vitamin D deficiency disease      Past Surgical History:   Procedure Laterality Date    CHOLECYSTECTOMY      THYROIDECTOMY      TUBAL LIGATION       Family History   Problem Relation Age of Onset    Hypertension Mother     Heart attack Mother     Hypertension Father     Breast cancer Neg Hx     Colon cancer Neg Hx     Ovarian cancer Neg Hx      Social History   Substance Use Topics    Smoking status: Never Smoker    Smokeless tobacco: Never Used    Alcohol use 0.0 oz/week      Comment: occ     Review of Systems   Constitutional: Positive for fatigue. Negative for chills, diaphoresis and fever.   HENT: Negative.    Respiratory: Negative.  Negative for shortness of breath.    Cardiovascular: Positive for chest pain. Negative for palpitations and leg swelling.   Gastrointestinal: Negative.  Negative for abdominal pain.   Endocrine: Negative for cold intolerance, heat intolerance, polydipsia, polyphagia and polyuria.   Musculoskeletal: Negative.    Skin: Negative.    Neurological: Positive for numbness (tingling to hands, face, and lips). Negative for dizziness, tremors, seizures, " syncope, facial asymmetry, speech difficulty, weakness, light-headedness and headaches.     Physical Exam     Initial Vitals [07/02/18 1021]   BP Pulse Resp Temp SpO2   (!) 145/88 67 15 98.1 °F (36.7 °C) 100 %      MAP       --         Physical Exam    Nursing note and vitals reviewed.  Constitutional: She appears well-developed and well-nourished.   HENT:   Head: Normocephalic and atraumatic.   Eyes: EOM are normal. Pupils are equal, round, and reactive to light.   Neck: Normal range of motion.   Cardiovascular: Normal rate, regular rhythm, normal heart sounds and intact distal pulses. Exam reveals no gallop and no friction rub.    No murmur heard.  Pulmonary/Chest: Breath sounds normal. No respiratory distress. She has no wheezes. She has no rhonchi. She has no rales. She exhibits no tenderness.   Abdominal: Soft. Bowel sounds are normal. She exhibits no distension and no mass. There is no tenderness. There is no rebound and no guarding.   Musculoskeletal: Normal range of motion. She exhibits no edema or tenderness.   Neurological: She is alert and oriented to person, place, and time. She has normal strength and normal reflexes. She displays normal reflexes. No cranial nerve deficit or sensory deficit.   Skin: Skin is warm and dry.   Psychiatric: She has a normal mood and affect. Thought content normal.         ED Course   Procedures  Labs Reviewed   CBC W/ AUTO DIFFERENTIAL - Abnormal; Notable for the following:        Result Value    Hemoglobin 11.5 (*)     Hematocrit 36.9 (*)     MCH 26.0 (*)     MCHC 31.2 (*)     RDW 16.0 (*)     All other components within normal limits   COMPREHENSIVE METABOLIC PANEL - Abnormal; Notable for the following:     Anion Gap 7 (*)     All other components within normal limits   URINALYSIS, REFLEX TO URINE CULTURE - Abnormal; Notable for the following:     Occult Blood UA Trace (*)     All other components within normal limits    Narrative:     Preferred Collection Type->Urine,  Clean Catch   MAGNESIUM   PHOSPHORUS   POCT URINE PREGNANCY          Imaging Results          CT Head Without Contrast (Final result)  Result time 07/02/18 12:35:21    Final result by Matthew Capps MD (07/02/18 12:35:21)                 Impression:      1. No acute intracranial abnormality.  2. Sinus disease.      Electronically signed by: Matthew Capps MD  Date:    07/02/2018  Time:    12:35             Narrative:    EXAMINATION:  CT HEAD WITHOUT CONTRAST    CLINICAL HISTORY:  tingling sensation to mouth/below eyes and hands;    TECHNIQUE:  Low dose axial images were obtained through the head.  Coronal and sagittal reformations were also performed. Contrast was not administered.    COMPARISON:  MRI 08/14/2012    FINDINGS:  The brain is normally formed and exhibits normal density throughout.  There is no evidence of acute major vascular territory infarct, hemorrhage, or mass.  There is no hydrocephalus.  There are no abnormal extra-axial fluid collections.  There are secretions within the right sphenoid sinus, otherwise the visualized paranasal sinuses and mastoid air cells are clear, and there is no evidence of calvarial fracture.  The visualized soft tissues are unremarkable.                                 Medical Decision Making:   Initial Assessment:   36 y/o female which presents with tingling to the hands, face, and mouth and fatigue. The patient had a previous episode like this when her potassium and magnesium were low. She denies any further symptoms.  Differential Diagnosis:   Hypomagnesemia, hypokalemia, allergic reaction  Clinical Tests:   Lab Tests: Ordered and Reviewed       <> Summary of Lab: Lab work unremarkable, trace hematuria in urine- pt currently menstruating; pt with chronic anemia h/h consistent with previous lab work   Radiological Study: Ordered and Reviewed  ED Management:  Pt examined and lab work along with radiological studies were negative. Pt did have hematuria on urinalysis  but is currently menstruating. Pt advised of results and educated on importance of having a high diet in mag/k. Pt voiced understanding and will follow up with PCP as needed. She has also been advised to return to ED with worsening of symptoms.                      Clinical Impression:   The encounter diagnosis was Numbness and tingling in both hands.                             Elham Mayer, SHERON  07/02/18 4811       Elham Mayer, [facility]  07/02/18 1309

## 2018-10-01 ENCOUNTER — OFFICE VISIT (OUTPATIENT)
Dept: URGENT CARE | Facility: CLINIC | Age: 38
End: 2018-10-01
Payer: COMMERCIAL

## 2018-10-01 VITALS
HEIGHT: 64 IN | DIASTOLIC BLOOD PRESSURE: 77 MMHG | HEART RATE: 63 BPM | OXYGEN SATURATION: 99 % | RESPIRATION RATE: 18 BRPM | TEMPERATURE: 98 F | WEIGHT: 153 LBS | BODY MASS INDEX: 26.12 KG/M2 | SYSTOLIC BLOOD PRESSURE: 116 MMHG

## 2018-10-01 DIAGNOSIS — M25.511 ACUTE PAIN OF RIGHT SHOULDER: Primary | ICD-10-CM

## 2018-10-01 PROCEDURE — 96372 THER/PROPH/DIAG INJ SC/IM: CPT | Mod: S$GLB,,, | Performed by: EMERGENCY MEDICINE

## 2018-10-01 PROCEDURE — 99214 OFFICE O/P EST MOD 30 MIN: CPT | Mod: 25,S$GLB,, | Performed by: EMERGENCY MEDICINE

## 2018-10-01 RX ORDER — ERGOCALCIFEROL 1.25 MG/1
50000 CAPSULE ORAL DAILY
COMMUNITY
End: 2020-05-14 | Stop reason: ALTCHOICE

## 2018-10-01 RX ORDER — KETOROLAC TROMETHAMINE 30 MG/ML
60 INJECTION, SOLUTION INTRAMUSCULAR; INTRAVENOUS
Status: COMPLETED | OUTPATIENT
Start: 2018-10-01 | End: 2018-10-01

## 2018-10-01 RX ORDER — BETAMETHASONE SODIUM PHOSPHATE AND BETAMETHASONE ACETATE 3; 3 MG/ML; MG/ML
6 INJECTION, SUSPENSION INTRA-ARTICULAR; INTRALESIONAL; INTRAMUSCULAR; SOFT TISSUE
Status: COMPLETED | OUTPATIENT
Start: 2018-10-01 | End: 2018-10-01

## 2018-10-01 RX ORDER — CALCIUM CARBONATE 200(500)MG
1 TABLET,CHEWABLE ORAL
COMMUNITY
End: 2018-10-18

## 2018-10-01 RX ADMIN — BETAMETHASONE SODIUM PHOSPHATE AND BETAMETHASONE ACETATE 6 MG: 3; 3 INJECTION, SUSPENSION INTRA-ARTICULAR; INTRALESIONAL; INTRAMUSCULAR; SOFT TISSUE at 10:10

## 2018-10-01 RX ADMIN — KETOROLAC TROMETHAMINE 60 MG: 30 INJECTION, SOLUTION INTRAMUSCULAR; INTRAVENOUS at 10:10

## 2018-10-01 NOTE — PROGRESS NOTES
"Subjective:       Patient ID: Elham Rodas is a 38 y.o. female.    Vitals:  height is 5' 4" (1.626 m) and weight is 69.4 kg (153 lb). Her temperature is 98.3 °F (36.8 °C). Her blood pressure is 116/77 and her pulse is 63. Her respiration is 18 and oxygen saturation is 99%.     Chief Complaint: Shoulder Pain    This is a 38 y.o. female who presents today with a chief complaint of right shoulder pain since Saturday, does a lot of manual work at work, no recall of trauma, hurts with movement, no pain when still, no numbness, no prior shoulder problems, NOC.  Right handed.        Shoulder Pain    The pain is present in the right shoulder. This is a new problem. The current episode started in the past 7 days. The problem occurs constantly. The problem has been unchanged. The pain is at a severity of 5/10. The pain is mild. Pertinent negatives include no fever or headaches. The symptoms are aggravated by activity. She has tried heat and NSAIDS for the symptoms. The treatment provided no relief. Family history does not include arthritis.     Review of Systems   Constitution: Negative for chills and fever.   HENT: Negative for sore throat.    Eyes: Negative for blurred vision.   Cardiovascular: Negative for chest pain.   Respiratory: Negative for shortness of breath.    Skin: Negative for rash.   Musculoskeletal: Positive for joint pain. Negative for back pain.   Gastrointestinal: Negative for abdominal pain, diarrhea, nausea and vomiting.   Neurological: Negative for headaches.   Psychiatric/Behavioral: The patient is not nervous/anxious.        Objective:      Physical Exam   Constitutional: She is oriented to person, place, and time. She appears well-developed and well-nourished.   Right shoulder discomfort   HENT:   Head: Atraumatic.   Neck: Normal range of motion. Neck supple.   Cardiovascular: Normal rate, regular rhythm, normal heart sounds and intact distal pulses.   Pulmonary/Chest: Breath sounds normal. She " exhibits no tenderness.   Musculoskeletal:   Right shoulder diffusely tender to palp and ROM, no rash noted, decrease ROM due to discomfort, distal right humerus/elbow/forearm/hand non tender, good righrt hand , remainder neck/back/chest non tender   Neurological: She is alert and oriented to person, place, and time. No sensory deficit.   Skin: Skin is warm and dry.   Psychiatric: She has a normal mood and affect. Her behavior is normal.       Assessment:       1. Acute pain of right shoulder        Plan:         Acute pain of right shoulder  -     betamethasone acetate-betamethasone sodium phosphate injection 6 mg; Inject 1 mL (6 mg total) into the muscle one time.  -     ketorolac injection 60 mg; Inject 2 mLs (60 mg total) into the muscle one time.  -     Ambulatory referral to Orthopedics        Jaylon Kinsey MD  Go to the Emergency Department for any problems  Call your PCP for follow up next available.

## 2018-10-01 NOTE — PATIENT INSTRUCTIONS
Use your sling.    Jaylon Kinsey MD  Go to the Emergency Department for any problems  Call your PCP for follow up next available.    Shoulder Pain with Uncertain Cause  Shoulder pain can have many causes. Pain often comes from the structures that surround the shoulder joint. These are the joint capsule, ligaments, tendons, muscles, and bursa. Pain can also come from cartilage in the joint. Cartilage can become worn out or injured. Its important to know whats causing your pain so the healthcare provider can use the correct treatment. But sometimes its difficult to find the exact cause of shoulder pain. You may need to see a specialist (orthopedist). You may also need special tests such as a CT scan or MRI. The provider may need to use special tools to look inside the joint (arthroscopy).  Shoulder pain can be treated with a sling or a device that keeps your shoulder from moving. You can take an anti-inflammatory medicine such as ibuprofen to ease pain. You may need to do special shoulder exercises. Follow up with a specialist if the pain is severe or doesnt go away after a few weeks.  Home care  Follow these tips when caring for yourself at home:  · If a sling was given to you, leave it in place for the time advised by your healthcare provider. If you arent sure how long to wear it, ask for advice. If the sling becomes loose, adjust it so that your forearm is level with the ground. Your shoulder should feel well supported.  · Put an ice pack on the injured area for 20 minutes every 1 to 2 hours the first day. You can make your own ice pack by putting ice cubes in a plastic bag. Wrap the bag in a thin towel. Continue with ice packs 3 to 4 times a day for the next 2 days. Then use the pack as needed to ease pain and swelling.  · You may use acetaminophen or ibuprofen to control pain, unless another pain medicine was prescribed. If you have chronic liver or kidney disease, talk with your healthcare provider before  using these medicines. Also talk with your provider if youve ever had a stomach ulcer or GI bleeding.  · Shoulder pain may seem worse at night, when there is less to distract you from the pain. If you sleep on your side, try to keep weight off your painful shoulder. Propping pillows behind you may stop you from rolling over onto that shoulder during sleep.   · Shoulder and elbow joints can become stiff if left in a sling for too long. You should start range of motion exercises about 7 to 10 days after the injury. Talk with your provider to find out what type of exercises to do and how soon to start.  · You can take the sling off to shower or bathe.  Follow-up care  Follow up with your healthcare provider if you dont start to get better in the next 5 days.  When to seek medical advice  Call your healthcare provider right away if any of these occur:  · Pain or swelling gets worse or continues for more than a few days  · Your hand or fingers become cold, blue, numb, or tingly  · Large amount of bruising on your shoulder or upper arm  · Difficulty moving your hand or fingers  · Weakness in your hand or fingers  · Your shoulder becomes stiff  · It feels like your shoulder is popping out  · You are less able to do your daily activities  Date Last Reviewed: 10/1/2016  © 8299-9685 [employer]. [address] [place] [address], [place] [address]. All rights reserved. This information is not intended as a substitute for professional medical care. Always follow your healthcare professional's instructions.        Bursitis  You have bursitis. This is an inflammation of the bursa. These are small, fluid-filled sacs that surround the larger joints of the body. The bursa help the muscles and tendons move smoothly over the joints.  Bursitis often happens in the shoulder. But it can also affect the elbows, hips, pelvis, knees, toes, and heels. Bursitis can be caused by injury, overuse of the joint, or infection of the bursa.  Symptoms include pain and tenderness over a joint. Symptoms get worse with movement.  Bursitis is treated with an anti-inflammatory medicine and by resting the joint. More severe cases require injection of medicine directly into the bursa.    Home care  · Rest the painful joint and protect it from movement. This will allow the inflammation to heal faster.  · Apply an ice pack over the injured area for no more than 15 to 20 minutes. Do this every 3 to 6 hours for the first 24 to 48 hours. Keep using ice packs 3 to 4 times a day until the pain and swelling improves.   · To make an ice pack, put ice cubes in a sealed plastic zip-lock bag. Wrap the bag in a clean, thin towel or cloth. Never put ice or an ice pack directly on the skin. As the ice melts, be careful to avoid getting any wrap or splint wet.  · You may take over-the-counter pain medicine to treat pain and inflammation, unless another medicine was prescribed. Anti-inflammatory pain medicines may be more effective. Talk with your provider beforeusing these medicines if you have chronic liver or kidney disease, or ever had a stomach ulcer or GI (gastrointestinal) bleeding.  · As your symptoms improve, slowly begin to move the joint. Do not overuse the joint. This may cause the symptoms to flare up again.  When to seek medical advice  Call your healthcare provider right away if any of these occur:  · Redness over the painful area  · Increasing pain or swelling at the joint  · Fever of 100.4°F (38°C) or above lasting for 24 to 48 hours  Date Last Reviewed: 11/21/2015  © 3981-4641 [employer]. [address] [place] [address], [place] [address]. All rights reserved. This information is not intended as a substitute for professional medical care. Always follow your healthcare professional's instructions.

## 2018-10-01 NOTE — LETTER
October 1, 2018      [facility]  [address] [address], [address]  [place] [address]  Phone: [phone]  Fax: [phone]       Patient: Elham Rodas   YOB: 1980  Date of Visit: 10/01/2018    To Whom It May Concern:    Lorrie Rodas  was at [facility] on 10/01/2018. She may return to work/school on 10/4/18, earlier if feels better with no restrictions. If you have any questions or concerns, or if I can be of further assistance, please do not hesitate to contact me.    Sincerely,          Jaylon Kinsey MD

## 2018-10-02 ENCOUNTER — OFFICE VISIT (OUTPATIENT)
Dept: OBSTETRICS AND GYNECOLOGY | Facility: CLINIC | Age: 38
End: 2018-10-02
Payer: COMMERCIAL

## 2018-10-02 VITALS
WEIGHT: 153.38 LBS | HEART RATE: 72 BPM | HEIGHT: 64 IN | SYSTOLIC BLOOD PRESSURE: 126 MMHG | BODY MASS INDEX: 26.18 KG/M2 | RESPIRATION RATE: 16 BRPM | DIASTOLIC BLOOD PRESSURE: 82 MMHG

## 2018-10-02 DIAGNOSIS — N63.10 MASS OF BREAST, RIGHT: Primary | ICD-10-CM

## 2018-10-02 DIAGNOSIS — Z12.39 BREAST CANCER SCREENING: ICD-10-CM

## 2018-10-02 PROCEDURE — 99214 OFFICE O/P EST MOD 30 MIN: CPT | Mod: S$GLB,,, | Performed by: OBSTETRICS & GYNECOLOGY

## 2018-10-02 PROCEDURE — 99999 PR PBB SHADOW E&M-EST. PATIENT-LVL III: CPT | Mod: PBBFAC,,, | Performed by: OBSTETRICS & GYNECOLOGY

## 2018-10-02 NOTE — PROGRESS NOTES
Subjective:       Patient ID: Elham Rodas is a 38 y.o. female.    Chief Complaint:  Breast Mass (right)      History of Present Illness   patient presents complaining of breast lump in her right breast which she noted 2 days ago.  Patient states that it has gotten slightly smaller.  Since its discovery.  She states it is nontender and has never been tender.  Patient has never had a mammogram.  Patient is now 38 years old.  She denies any nipple discharge or dimpling of the skin.  She denies any family history of breast cancer.    Menstrual History:  OB History      Para Term  AB Living    4 4       4    SAB TAB Ectopic Multiple Live Births                      Menarche age:   Patient's last menstrual period was 2018.         Review of Systems  Review of Systems   Constitutional: Negative for activity change, appetite change, chills, diaphoresis, fatigue, fever and unexpected weight change.   HENT: Negative for congestion, dental problem, drooling, ear discharge, ear pain, facial swelling, hearing loss, mouth sores, nosebleeds, postnasal drip, rhinorrhea, sinus pressure, sneezing, sore throat, tinnitus, trouble swallowing and voice change.    Eyes: Negative for photophobia, pain, discharge, redness, itching and visual disturbance.   Respiratory: Negative for apnea, cough, choking, chest tightness, shortness of breath, wheezing and stridor.    Cardiovascular: Negative for chest pain, palpitations and leg swelling.   Gastrointestinal: Negative for abdominal distention, abdominal pain, anal bleeding, blood in stool, constipation, diarrhea, nausea, rectal pain and vomiting.   Endocrine: Negative for cold intolerance, heat intolerance, polydipsia, polyphagia and polyuria.   Genitourinary: Negative for decreased urine volume, difficulty urinating, dyspareunia, dysuria, enuresis, flank pain, frequency, genital sores, hematuria, menstrual problem, pelvic pain, urgency, vaginal bleeding, vaginal  discharge and vaginal pain.   Musculoskeletal: Negative for arthralgias, back pain, gait problem, joint swelling, myalgias, neck pain and neck stiffness.   Skin: Negative for color change, pallor, rash and wound.   Allergic/Immunologic: Negative for environmental allergies, food allergies and immunocompromised state.   Neurological: Negative for dizziness, tremors, seizures, syncope, facial asymmetry, speech difficulty, weakness, light-headedness, numbness and headaches.   Hematological: Negative for adenopathy. Does not bruise/bleed easily.   Psychiatric/Behavioral: Negative for agitation, behavioral problems, confusion, decreased concentration, dysphoric mood, hallucinations, self-injury, sleep disturbance and suicidal ideas. The patient is not nervous/anxious and is not hyperactive.            Objective:      Physical Exam   Pulmonary/Chest:       Nursing note and vitals reviewed.          Assessment:        1. Mass of breast, right    2. Breast cancer screening                Plan:         Elham was seen today for breast mass.    Diagnoses and all orders for this visit:    Mass of breast, right    Breast cancer screening

## 2018-10-03 ENCOUNTER — HOSPITAL ENCOUNTER (OUTPATIENT)
Dept: RADIOLOGY | Facility: HOSPITAL | Age: 38
Discharge: HOME | End: 2018-10-03
Attending: OBSTETRICS & GYNECOLOGY
Payer: COMMERCIAL

## 2018-10-03 ENCOUNTER — TELEPHONE (OUTPATIENT)
Dept: RADIOLOGY | Facility: HOSPITAL | Age: 38
End: 2018-10-03

## 2018-10-03 DIAGNOSIS — N63.10 MASS OF BREAST, RIGHT: ICD-10-CM

## 2018-10-03 DIAGNOSIS — N63.0 BREAST MASS: ICD-10-CM

## 2018-10-03 DIAGNOSIS — N63.10 MASS OF BREAST, RIGHT: Primary | ICD-10-CM

## 2018-10-03 DIAGNOSIS — R92.8 ABNORMAL MAMMOGRAM: ICD-10-CM

## 2018-10-03 PROCEDURE — 77066 DX MAMMO INCL CAD BI: CPT | Mod: TC

## 2018-10-03 PROCEDURE — 77066 DX MAMMO INCL CAD BI: CPT | Mod: 26,,, | Performed by: RADIOLOGY

## 2018-10-03 PROCEDURE — 76642 ULTRASOUND BREAST LIMITED: CPT | Mod: 26,RT,, | Performed by: RADIOLOGY

## 2018-10-03 PROCEDURE — 77062 BREAST TOMOSYNTHESIS BI: CPT | Mod: 26,,, | Performed by: RADIOLOGY

## 2018-10-03 PROCEDURE — 76642 ULTRASOUND BREAST LIMITED: CPT | Mod: TC,RT

## 2018-10-03 NOTE — TELEPHONE ENCOUNTER
The radiologist has recommended the patient have a breast biopsy.  Please have your staff schedule her with a surgeon or If she chooses to go to [facility], contact Raad Barber for an appointment.  All [facility] Breast Biopsies need to be sent to [facility].      A letter is being mailed to patient explaining findings and recommendations.

## 2018-10-04 ENCOUNTER — TELEPHONE (OUTPATIENT)
Dept: RADIOLOGY | Facility: HOSPITAL | Age: 38
End: 2018-10-04

## 2018-10-04 ENCOUNTER — PATIENT MESSAGE (OUTPATIENT)
Dept: RADIOLOGY | Facility: HOSPITAL | Age: 38
End: 2018-10-04

## 2018-10-04 ENCOUNTER — TELEPHONE (OUTPATIENT)
Dept: URGENT CARE | Facility: CLINIC | Age: 38
End: 2018-10-04

## 2018-10-04 NOTE — TELEPHONE ENCOUNTER
Please cyst patient in setting up appointment with the breast center.  An order has been placed. Patient has been notified.

## 2018-10-04 NOTE — TELEPHONE ENCOUNTER
Spoke with patient. Reviewed breast biopsy procedure and reviewed instructions for breast biopsy. Patient expressed understanding and all questions were answered. Provided patient with my phone number to call for any further concerns or questions.   Patient scheduled breast biopsy at the [facility] on 10/10/18.

## 2018-10-10 ENCOUNTER — HOSPITAL ENCOUNTER (OUTPATIENT)
Dept: RADIOLOGY | Facility: HOSPITAL | Age: 38
Discharge: HOME | End: 2018-10-10
Attending: OBSTETRICS & GYNECOLOGY
Payer: COMMERCIAL

## 2018-10-10 DIAGNOSIS — N63.0 BREAST MASS: ICD-10-CM

## 2018-10-10 DIAGNOSIS — R92.8 ABNORMAL MAMMOGRAM: ICD-10-CM

## 2018-10-10 PROCEDURE — 77065 DX MAMMO INCL CAD UNI: CPT | Mod: 26,RT,, | Performed by: RADIOLOGY

## 2018-10-10 PROCEDURE — 88360 TUMOR IMMUNOHISTOCHEM/MANUAL: CPT | Performed by: PATHOLOGY

## 2018-10-10 PROCEDURE — 27201068 US BREAST BIOPSY WITH IMAGING 1ST SITE RIGHT: Mod: PO

## 2018-10-10 PROCEDURE — 88360 TUMOR IMMUNOHISTOCHEM/MANUAL: CPT | Mod: 26,,, | Performed by: PATHOLOGY

## 2018-10-10 PROCEDURE — 88342 IMHCHEM/IMCYTCHM 1ST ANTB: CPT | Mod: 26,59,, | Performed by: PATHOLOGY

## 2018-10-10 PROCEDURE — 19083 BX BREAST 1ST LESION US IMAG: CPT | Mod: RT,,, | Performed by: RADIOLOGY

## 2018-10-10 PROCEDURE — 77065 DX MAMMO INCL CAD UNI: CPT | Mod: TC,PO,RT

## 2018-10-10 PROCEDURE — 88305 TISSUE EXAM BY PATHOLOGIST: CPT | Mod: 26,,, | Performed by: PATHOLOGY

## 2018-10-10 PROCEDURE — 88341 IMHCHEM/IMCYTCHM EA ADD ANTB: CPT | Mod: 26,59,, | Performed by: PATHOLOGY

## 2018-10-15 ENCOUNTER — TELEPHONE (OUTPATIENT)
Dept: SURGERY | Facility: CLINIC | Age: 38
End: 2018-10-15

## 2018-10-15 NOTE — TELEPHONE ENCOUNTER
Called patient with the results of her breast biopsy from 10/10/18. Explained that biopsy showed invasive mammary carcinoma, we discussed what this means and that the next step is to meet with a breast surgeon. Patient would like a male surgeon, scheduled with Dr. Ayala for Thursday. Reviewed location of the [facility] breast center, patient verbalized understanding to all information

## 2018-10-18 ENCOUNTER — OFFICE VISIT (OUTPATIENT)
Dept: SURGERY | Facility: CLINIC | Age: 38
End: 2018-10-18
Payer: COMMERCIAL

## 2018-10-18 VITALS
DIASTOLIC BLOOD PRESSURE: 82 MMHG | SYSTOLIC BLOOD PRESSURE: 142 MMHG | HEART RATE: 67 BPM | HEIGHT: 64 IN | TEMPERATURE: 98 F | BODY MASS INDEX: 25.44 KG/M2 | WEIGHT: 149 LBS

## 2018-10-18 DIAGNOSIS — Z17.0 MALIGNANT NEOPLASM OF RIGHT BREAST IN FEMALE, ESTROGEN RECEPTOR POSITIVE, UNSPECIFIED SITE OF BREAST: Primary | ICD-10-CM

## 2018-10-18 DIAGNOSIS — C50.911 MALIGNANT NEOPLASM OF RIGHT BREAST IN FEMALE, ESTROGEN RECEPTOR POSITIVE, UNSPECIFIED SITE OF BREAST: Primary | ICD-10-CM

## 2018-10-18 DIAGNOSIS — Z01.818 PRE-OP EVALUATION: Primary | ICD-10-CM

## 2018-10-18 PROCEDURE — 99999 PR PBB SHADOW E&M-EST. PATIENT-LVL III: CPT | Mod: PBBFAC,,, | Performed by: SURGERY

## 2018-10-18 PROCEDURE — 99244 OFF/OP CNSLTJ NEW/EST MOD 40: CPT | Mod: S$GLB,,, | Performed by: SURGERY

## 2018-10-18 NOTE — MEDICAL/APP STUDENT
Breast Surgery  [facility]  Department of Surgery      REFERRING PROVIDER: Yoni Oscar MD  [address] [place] [id number]    Chief Complaint: Consult (New Patient New Right Breast Invasive Mammary Carcinoma .)      Subjective:      Patient ID: Elham Rodas is a 38 y.o. female who presents with Invasive Mammary Carcinoma (ER+ / OR+ / Her2 -) of Right Breast. She initially felt a lump on 9/30/18 on self exam. She then presented to her OBGYN on 10/2/18. She underwent Bilateral MMG where posterior architectural distortion of the right breast at the 9 oclock position was noted. BIRADS assessment of 5 was given. Pt received a Tyrer-Cuzick score of 8.82. This resulted in the Pt undergoing a US guided core biopsy on 10/4/18. Pathology Results were Invasive Mammary Carcinoma (ER+ / OR+/ HER2 -).        Past Medical History:   Diagnosis Date    Abnormal Pap smear of cervix 05/2016    ASCUS, - HPV    Hypothyroidism     Palpitations     Syncope and collapse     Thyroid disease     Vitamin D deficiency disease      Past Surgical History:   Procedure Laterality Date    CHOLECYSTECTOMY      THYROIDECTOMY      TUBAL LIGATION       Current Outpatient Medications on File Prior to Visit   Medication Sig Dispense Refill    ergocalciferol (ERGOCALCIFEROL) 50,000 unit Cap Take 50,000 Units by mouth.      levothyroxine (SYNTHROID) 88 MCG tablet Take 1 tablet (88 mcg total) by mouth before breakfast. Wait at least 4 hours after taking levothyroxine to take calcium. 90 tablet 3    parathyroid hormone (NATPARA) 50 mcg/dose Crtg Inject 75 mcg into the skin once daily. 4 each 5    [DISCONTINUED] calcium carbonate (TUMS ULTRA) 400 mg calcium (1,000 mg) Chew Take 1 tablet (400 mg total) by mouth daily as needed.      [DISCONTINUED] calcium carbonate (TUMS) 200 mg calcium (500 mg) chewable tablet Take 1 tablet by mouth.       No current facility-administered medications on file prior to visit.   "    Social History     Socioeconomic History    Marital status:      Spouse name: Not on file    Number of children: Not on file    Years of education: Not on file    Highest education level: Not on file   Social Needs    Financial resource strain: Not on file    Food insecurity - worry: Not on file    Food insecurity - inability: Not on file    Transportation needs - medical: Not on file    Transportation needs - non-medical: Not on file   Occupational History    Not on file   Tobacco Use    Smoking status: Never Smoker    Smokeless tobacco: Never Used   Substance and Sexual Activity    Alcohol use: Yes     Alcohol/week: 0.0 oz     Comment: occ    Drug use: No    Sexual activity: Yes     Partners: Male     Birth control/protection: See Surgical Hx     Comment:    Other Topics Concern    Not on file   Social History Narrative    Not on file     Family History   Problem Relation Age of Onset    Hypertension Mother     Heart attack Mother     Hypertension Father     Breast cancer Neg Hx     Colon cancer Neg Hx     Ovarian cancer Neg Hx         Review of Systems  Objective:   BP (!) 142/82 (BP Location: Right arm, Patient Position: Sitting, BP Method: Medium (Automatic))   Pulse 67   Temp 98.2 °F (36.8 °C) (Oral)   Ht 5' 4" (1.626 m)   Wt 67.6 kg (149 lb)   LMP 10/03/2018   BMI 25.58 kg/m²     Physical Exam    Radiology review: Images personally reviewed by me in the clinic.     Assessment:       No diagnosis found.    Plan:     Options for management were discussed with the patient and her family. We reviewed the existing data noting the equivalency of breast conserving surgery with radiation therapy and mastectomy. We also reviewed the guidelines of the National Comprehensive Cancer Network for Stage I clinical breast carcinoma. We discussed the need for lumpectomy margins to be negative for carcinoma, the necessity for postoperative radiation therapy after breast " conservation in most cases, the possibility of a failed or false negative sentinel lymph node biopsy and the potential need for complete lymphadenectomy for a failed or positive sentinel lymph node biopsy were fully discussed. In the setting of mastectomy, delayed or immediate reconstruction options are available and were discussed.     In the setting of lumpectomy, radiation therapy would be recommended majority of the time.  The duration and treatment side effects were discussed with the patient.  This will coordinated with the radiation oncologist pending final pathology.    We also discussed the role of systemic therapy in the treatment of early stage breast cancer.  We discussed that this is based on tumor biology and graeme status and will be determined based on final pathology.  We discussed that if the cancer is hormone positive, endocrine therapy would be recommended in most cases and its use can reduce the risk of recurrence as well as improve survival. Side effects of treatment were briefly discussed. We also discussed the potential role for chemotherapy based on a number of factors such as tumor phenotype (ER+ vs. triple negative vs. Qcu0jqk+) and this would be determined in coordination with the medical oncologist.    The patient, in consultation with her family, has elected to proceed with right partial mastectomy and sentinel lymph node biopsy. The operative risks of bleeding, infection, recurrence, scarring, and anesthetic complications and the possibility of requiring further surgery were all noted and informed consent obtained.      See staff dictation

## 2018-10-18 NOTE — LETTER
[facility]  [address]  [facility] [address]  Phone: [phone]  Fax: [phone] October 25, 2018      Yoni Oscar MD  [address] [place] [address]    Patient: Elham Rodas   MR Number: [id number]   YOB: 1980   Date of Visit: 10/18/2018     Dear Dr. Oscar:    Thank you for referring Elham Rodas to me for evaluation. Attached you will find relevant portions of my assessment and plan of care.    Elham Rodas is a 38-year-old female who presents with her boyfriend, daughter and aunt for initial consultation.  The patient felt a mass in her lateral right breast.  She states there was a slight indentation in the lower outer 8 to 9 o'clock region with a pulling sensation.  She felt a mass and lump in that area and saw her gynecologist who also felt the mass.  Diagnostic imaging was performed, which revealed a 13-mm mass in the posterior 9 o'clock region.  She underwent core needle biopsy, which revealed invasive infiltrating mammary carcinoma that was estrogen and progesterone receptor positive, HER-2/jad negative.      Family history is otherwise noncontributory.  Her mother had cervical cancer.      Clinical breast exam reveals the slight indentation in the lateral 9 o'clock region of the right breast.  There are no suspicious or associated skin changes.  There is no edema or erythema of the skin. There is no palpable axillary, cervical or supraclavicular lymphadenopathy.  There is approximately 1.5 cm mass underlying in the breast parenchyma beneath the area of the indentation.  There is no fixation to the chest wall.    The patient was counseled on options of breast conservation surgery and radiotherapy versus mastectomy.  We will obtain an MRI of the breast given the indentation to assess the true extent of this mass-like lesion.  She is highly motivated for breast conservation surgery.  She understands the need for adjuvant radiotherapy.   She understands the need for possible reexcision versus completion mastectomy should she be found to have more locally advanced disease.  We will refer her for genetic counseling and genetic testing since she is 38 years of age and meets criteria based on age alone.  We will await the results of the MRI and surgery has been tentatively scheduled for 11/21/2018, this will be a Magseed a right breast partial mastectomy (lumpectomy for margins with Magseed localization) and simultaneous right axillary sentinel lymph node biopsy for axillary staging.    Time spent with the patient today was greater than 45 minutes with greater than 50% of that time in counseling in terms of treatment options.  She will undoubtedly also be recommended for adjuvant endocrine therapy.     If you have questions, please do not hesitate to call me. I look forward to following Elham Rodas along with you.    Sincerely,    Jann Ayala MD  Medical Director, [facility]  Staff Attending Surgeon - Department of Surgery  [facility]  Associate Professor of Surgery  [employer]  [facility] [employer]    RLC/hcr

## 2018-10-18 NOTE — Clinical Note
October 25, 2018      Yoni Oscar MD  [address] [place] [address] [id number] [place], [place] [address]  [address] [facility] [address]  Phone: [phone]  Fax: [phone]          Patient: Elham Rodas   MR Number: [id number]   YOB: 1980   Date of Visit: 10/18/2018       Dear Dr. Yoni Oscar:    Thank you for referring Elham Rodas to me for evaluation. Attached you will find relevant portions of my assessment and plan of care.    If you have questions, please do not hesitate to call me. I look forward to following Elham Rodas along with you.    Sincerely,    Jann Ayala MD    Enclosure  CC:  No Recipients    If you would like to receive this communication electronically, please contact [email or web address] or [phone] to request more information on [employer] Link access.    For providers and/or their staff who would like to refer a patient to [facility], please contact us through our one-stop-shop provider referral line, [facility] [place], at [phone].    If you feel you have received this communication in error or would no longer like to receive these types of communications, please e-mail [email or web address]

## 2018-10-25 NOTE — PROGRESS NOTES
REFERRING PROVIDER: Yoni Oscar MD  [address] [place] [id number]     Chief Complaint: Consult (New Patient New Right Breast Invasive Mammary Carcinoma .)        Subjective:      Patient ID: Elham Rodas is a 38 y.o. female who presents with Invasive Mammary Carcinoma (ER+ / [place]+ / Her2 -) of Right Breast. She initially felt a lump on 9/30/18 on self exam. She then presented to her OBGYN on 10/2/18. She underwent Bilateral MMG where posterior architectural distortion of the right breast at the 9 oclock position was noted. BIRADS assessment of 5 was given. Pt received a Tyrer-Cuzick score of 8.82. This resulted in the Pt undergoing a US guided core biopsy on 10/4/18. Pathology Results were Invasive Mammary Carcinoma (ER+ / WY+/ HER2 -).                Past Medical History:   Diagnosis Date    Abnormal Pap smear of cervix 05/2016     ASCUS, - HPV    Hypothyroidism      Palpitations      Syncope and collapse      Thyroid disease      Vitamin D deficiency disease              Past Surgical History:   Procedure Laterality Date    CHOLECYSTECTOMY        THYROIDECTOMY        TUBAL LIGATION                 Current Outpatient Medications on File Prior to Visit   Medication Sig Dispense Refill    ergocalciferol (ERGOCALCIFEROL) 50,000 unit Cap Take 50,000 Units by mouth.        levothyroxine (SYNTHROID) 88 MCG tablet Take 1 tablet (88 mcg total) by mouth before breakfast. Wait at least 4 hours after taking levothyroxine to take calcium. 90 tablet 3    parathyroid hormone (NATPARA) 50 mcg/dose Crtg Inject 75 mcg into the skin once daily. 4 each 5    [DISCONTINUED] calcium carbonate (TUMS ULTRA) 400 mg calcium (1,000 mg) Chew Take 1 tablet (400 mg total) by mouth daily as needed.        [DISCONTINUED] calcium carbonate (TUMS) 200 mg calcium (500 mg) chewable tablet Take 1 tablet by mouth.          No current facility-administered medications on file prior to visit.       Social History     "           Socioeconomic History    Marital status:        Spouse name: Not on file    Number of children: Not on file    Years of education: Not on file    Highest education level: Not on file   Social Needs    Financial resource strain: Not on file    Food insecurity - worry: Not on file    Food insecurity - inability: Not on file    Transportation needs - medical: Not on file    Transportation needs - non-medical: Not on file   Occupational History    Not on file   Tobacco Use    Smoking status: Never Smoker    Smokeless tobacco: Never Used   Substance and Sexual Activity    Alcohol use: Yes       Alcohol/week: 0.0 oz       Comment: occ    Drug use: No    Sexual activity: Yes       Partners: Male       Birth control/protection: See Surgical Hx       Comment:    Other Topics Concern    Not on file   Social History Narrative    Not on file               Family History   Problem Relation Age of Onset    Hypertension Mother      Heart attack Mother      Hypertension Father      Breast cancer Neg Hx      Colon cancer Neg Hx      Ovarian cancer Neg Hx           Review of Systems  Objective:   BP (!) 142/82 (BP Location: Right arm, Patient Position: Sitting, BP Method: Medium (Automatic))   Pulse 67   Temp 98.2 °F (36.8 °C) (Oral)   Ht 5' 4" (1.626 m)   Wt 67.6 kg (149 lb)   LMP 10/03/2018   BMI 25.58 kg/m²      Physical Exam     Radiology review: Images personally reviewed by me in the clinic.     Assessment:       No diagnosis found.    Plan:           Elham Rodas is a 38-year-old female who presents with her boyfriend, daughter   and aunt for initial consultation.    The patient felt a mass in her lateral right breast.  She states there was a   slight indentation in the lower outer 8 to 9 o'clock region with a pulling   sensation.  She felt a mass and lump in that area and saw her gynecologist who   also felt the mass.  Diagnostic imaging was performed, which revealed a " 13-mm   mass in the posterior 9 o'clock region.  She underwent core needle biopsy, which   revealed invasive infiltrating mammary carcinoma that was estrogen and   progesterone receptor positive, HER-2/jad negative.  Family history is otherwise   noncontributory.  Her mother had cervical cancer.  Clinical breast exam reveals   the slight indentation in the lateral 9 o'clock region of the right breast.    There are no suspicious or associated skin changes.  There is no edema or   erythema of the skin.  There is no palpable axillary, cervical or   supraclavicular lymphadenopathy.  There is approximately 1.5 cm mass underlying   in the breast parenchyma beneath the area of the indentation.  There is no   fixation to the chest wall.    ASSESSMENT AND PLAN:  The patient was counseled on options of breast   conservation surgery and radiotherapy versus mastectomy.  We will obtain an MRI   of the breast given the indentation to assess the true extent of this mass-like   lesion.  She is highly motivated for breast conservation surgery.  She   understands the need for adjuvant radiotherapy.  She understands the need for   possible reexcision versus completion mastectomy should she be found to have   more locally advanced disease.  We will refer her for genetic counseling and   genetic testing since she is 38 years of age and meets criteria based on age   alone.  We will await the results of the MRI and surgery has been tentatively   scheduled for 11/21/2018, this will be a Magseed a right breast partial   mastectomy (lumpectomy for margins with Magseed localization) and simultaneous   right axillary sentinel lymph node biopsy for axillary staging.    Time spent with the patient today was greater than 45 minutes with greater than   50% of that time in counseling in terms of treatment options.  She will   undoubtedly also be recommended for adjuvant endocrine therapy.  We discussed   ____, which will be ordered based on the  axillary status following the surgery.      RLC/HN  dd: 10/25/2018 09:47:16 (CDT)  td: 10/26/2018 05:40:19 (CDT)  Doc ID   #[id number]  Job ID #[id number]    CC:     Job # [id number]

## 2018-10-29 DIAGNOSIS — C50.911 MALIGNANT NEOPLASM OF RIGHT BREAST IN FEMALE, ESTROGEN RECEPTOR POSITIVE, UNSPECIFIED SITE OF BREAST: ICD-10-CM

## 2018-10-29 DIAGNOSIS — Z91.89 AT RISK FOR LYMPHEDEMA: Primary | ICD-10-CM

## 2018-10-29 DIAGNOSIS — Z17.0 MALIGNANT NEOPLASM OF RIGHT BREAST IN FEMALE, ESTROGEN RECEPTOR POSITIVE, UNSPECIFIED SITE OF BREAST: ICD-10-CM

## 2018-10-31 ENCOUNTER — TELEPHONE (OUTPATIENT)
Dept: SURGERY | Facility: CLINIC | Age: 38
End: 2018-10-31

## 2018-10-31 NOTE — TELEPHONE ENCOUNTER
Called patient to let her know her disability paperwork is ready. Left voicemail explaining it will be at the  of the [facility] for her to . Provided my callback number if she has any questions

## 2018-11-05 ENCOUNTER — CLINICAL SUPPORT (OUTPATIENT)
Dept: REHABILITATION | Facility: HOSPITAL | Age: 38
End: 2018-11-05
Payer: COMMERCIAL

## 2018-11-05 DIAGNOSIS — C50.911 MALIGNANT NEOPLASM OF RIGHT FEMALE BREAST, UNSPECIFIED ESTROGEN RECEPTOR STATUS, UNSPECIFIED SITE OF BREAST: ICD-10-CM

## 2018-11-05 DIAGNOSIS — Z91.89 AT RISK FOR LYMPHEDEMA: ICD-10-CM

## 2018-11-05 PROCEDURE — 97161 PT EVAL LOW COMPLEX 20 MIN: CPT | Mod: PO

## 2018-11-05 NOTE — LETTER
November 5, 2018       [facility]  [phone]       Patient: Elham Rodas   YOB: 1980  Date of Visit: 11/05/2018    To Whom It May Concern:    Lorrie Rodas  was at [facility] for [facility] therapyon 11/05/2018.If you have any questions or concerns, or if I can be of further assistance, please do not hesitate to contact me at [phone].    Sincerely,    Catalina Vargas, PT

## 2018-11-05 NOTE — PLAN OF CARE
OUTPATIENT PHYSICAL THERAPY   EVALUATION    Name: Elham Rodas  Clinic Number: [id number]    Therapy Diagnosis:   Encounter Diagnoses   Name Primary?    At risk for lymphedema     Malignant neoplasm of right female breast, unspecified estrogen receptor status, unspecified site of breast      Physician: Jann Ayala MD    Physician Orders: PT Eval and Treat   Medical Diagnosis: R Breast Ca  Evaluation Date: 11/5/2018  Authorization period Expiration: 10/29/18  Plan of Care Certification Period: 11/5/18  Insurance: Generic Commercial    Visit #: 1/ Visits authorized: 1  Time In:10:15 (pt with late arrival)  Time Out: 11:00  Total Billable Time: 45 minutes    Precautions: Standard  cancer    History   History of Present Illness: Elham is a 38 y.o. female that presents to  [facility] at the [facility] secondary to dx of right breast cancer.    Dx: Invasive Mammary Carcinoma (ER+ / DC+/ HER2 -).  Surgery date: 11/21/18- pt to have a R partial mastectomy with SLNB and likely adjuvant radiation.      Pt presents today for baseline measurements to aid in the early detection of lymphedema, UE muscle testing, postural and ROM assessment along with education of risk of lymphedema and surgical precautions post surgery. Circumferential measurements will be taken today of BL UEs for early detection of lymphedema post surgery. Pt will also be instructed in exercises to perform pre and post-surgery to insure best outcomes.     Past Medical History:   Past Medical History:   Diagnosis Date    Abnormal Pap smear of cervix 05/2016    ASCUS, - HPV    Hypothyroidism     Palpitations     Syncope and collapse     Thyroid disease     Vitamin D deficiency disease        Past Surgical History:   Elham Rodas  has a past surgical history that includes Thyroidectomy; Tubal ligation; and Cholecystectomy.    Medications:  Elham has a current medication list which includes the  "following prescription(s): ergocalciferol, levothyroxine, and parathyroid hormone.    Allergies:  Review of patient's allergies indicates:  No Known Allergies       Hand dominance: Right  Prior Therapy: none  Nutrition:  Normal  Social History: pt lives with her 2 boys (15 and 17)  Place of Residence (Steps/Adaptations): private home, a few stairs to enter  Current functional status:  Completely independent  Exercise routine prior to onset : none  DME owned: none  Work:  Pt works in a warehouse, lots of pushing and pulling                         Subjective   Pt states: pt without complaints   Pain: 0/10 on VAS.     Objective   Mental status :A+O x3, pleasant, appropriate    Posture/Alignment   Postural examination/scapula alignment: rounded shoulders, slight forward head posture  Joint integrity: WFLs  Skin integrity: intact  Edema: none noted    Sensation: Light Touch: Intact           Proprioception: Intact  - appearance: well groomed     ROM:   UPPER EXTREMITY--AROM/PROM  (R) UE: WNLs  (L) UE: WNLs     Shoulder Range of Motion:   ACTIVE ROM LEFT RIGHT   Flexion 180 174   Abduction 176 177   Extension 54 50   IR/90deg 90 90   ER/90deg 78 75     Strength: manual muscle test grades below   Upper Extremity Strength   (L) UE (R) UE   Shoulder flexion: 5/5 5/5   Shoulder Abduction: 5/5 5/5   Shoulder IR 5/5 5/5   Shoulder ER 5/5 5/5   Elbow flexion: 5/5 5/5   Elbow extension: 5/5 5/5   Lower Trap: 5/5 5/5   Middle Trap: 5/5 5/5    5/5 5/5       Baseline Measurements of BL UE's for early detection of Lymphedema:     LANDMARK RIGHT UE LEFT UE DIFFERENCE   E + 8" 33 cm 33.5 cm 0.5 cm   E + 6" 32 cm 29.5 cm 2.5 cm   E + 4" 27.5 cm 27 cm 0.5 cm   E + 2" 23.5 cm 25 cm 1.5 cm   Elbow 23.5 cm 23.5 cm 0 cm   W+ 8" 25 cm 24 cm 1 cm   W +  6" 26 cm 23.5 cm 2.5 cm   W + 4" 20.5 cm 20.5 cm 0 cm   Wrist 16 cm 15 cm 1 cm   DPC 22 cm 20.5 cm 1.5 cm   IP Thumb 6.5 cm 6.5 cm 0 cm       Coordination:   - fine motor: WFL  - UE " coordination: intact     - LE coordination:  Not tested     Functional Mobility (Bed mobility, transfers)  Bed mobility: I =  independent   Roll to left: I  Roll to right: I  Supine to prone: I  Scooting to edge of bed: I  Supine to sit: I  Sit to supine: I  Transfers to bed: I  Transfers to toilet: I  Sit to stand:  I  Stand pivot:  I  Car transfers: I      ADL's:  Feeding: I = independent   Grooming: I  Hygiene: I  UB Dressing: I  LB Dressing: I  Toileting: I  Bathing: I    Gait Assessment:   - AD used: none  - Assistance: independent  - Distance: community distances       Endurance Deficit: none      Patient Education   - role of PT in multi - disciplinary team, goals for PT  - Pt was educated in lymphedema etiology and management plans.    - Pt was provided with written risk reductions and precautions for managing lymphedema.   - Reviewed ISABELA drain care instructions.     ROM/lifting Precautions post surgery discussed -  until drains have been removed:  - do not lift affected arm above 90 degrees of shoulder flexion  - do not lift over 5 lbs  - do not pull or push heavy objects  - do not sleep on your stomach or surgery side     Written Home Exercises Provided and Patient Education: Handouts given   Pt was instructed in and performed therapeutic exercise for postural correction and alignment, stretching and soft tissue mobility, and strengthening.     Exercises included: handout given    - exaggerated deep breathing and relaxation  - scapular retractions  - wrist circles  - elbow flexion/extension  -shoulder rolls      Pt was able to demonstrate and report understanding and performance    Pt has no cultural, educational or language barriers to learning provided.    Functional Limitations Reporting     Category: mobility  Tool: Strength- MMT  Score: no Limitation   Current/ : CH = 0 % impaired, limited or restricted  Goal/ : CH = 0 % impaired, limited or restricted       Modifier  Impairment Limitation  Restriction    CH  0 % impaired, limited or restricted    CI  @ least 1% but less than 20% impaired, limited or restricted    CJ  @ least 20%<40% impaired, limited or restricted    CK  @ least 40%<60% impaired, limited or restricted    CL  @ least 60% <80% impaired, limited or restricted    CM  @ least 80%<100% impaired limited or restricted    CN  100% impaired, limited or restricted     Assessment   This is a 38 y.o. female referred to outpatient physical therapy and presents with a medical diagnosis of R breast cancer and was seen today pre-operatively to assess strength and ROM of BL UEs, to take baseline circumferential measurements of BL UEs to aid in the early detection of lymphedema and provide pt education on exercises/precations post breast surgery. Pt does not exhibit any ROM impairments  Pt educated in lymphedema risks/precautions as well as ROM/lifting precautions post surgery - pt demonstrated/verbalized understanding. No goals established this visit as goals for PT will be established post surgery at follow up.      Anticipated barriers to physical therapy: none     Pt's spiritual, cultural and educational needs considered and pt agreeable to plan of care and goals as stated below:     Medical necessity is demonstrated by the following IMPAIRMENTS/PROMBLEM LIST:  History  Co-morbidities and personal factors that may impact the plan of care Examination  Body Structures and Functions, activity limitations and participation restrictions that may impact the plan of care    Clinical Presentation   Co-morbidities:   prior abdominal surgery        Personal Factors:   no deficits Body Regions:   upper extremities  trunk    Body Systems:   N/A        Participation Restrictions:   None     Activity limitations:   Mobility  no deficits    Self care  no deficits    Domestic Life  no deficits    Interactions/Relationships  no deficits    Life Areas  no deficits    Community and Social Life  no deficits          evolving clinical presentation with changing clinical characteristics                      moderate   moderate  low Decision Making/ Complexity Score:  low       Plan   Schedule patient for follow up with Physical therapy post surgery. Goals for therapy post surgery will be established at that time.     Therapist: Catalina Vargas, PT  11/5/2018

## 2018-11-05 NOTE — PATIENT INSTRUCTIONS
.                                         PRE/POST OP PATIENT EDUCATION    Post Operative Instructions     Range of Motion/lifting Precautions post surgery  The following activities should be avoided until your drain(s) have been removed  - do not lift affected arm above 90 degrees of shoulder flexion  - do not lift over 5 lbs  - do not pull or push heavy objects  - do not sleep on your stomach or surgery side       After surgery, you may begin self-care tasks including grooming, dressing, feeding and simple hygiene as soon as you feel up to it.    Schedule your post-op therapy follow-up after your drains have been removed     When to call your doctor   - if any part of your affected arm or axilla feels hot, is reddened or has increased swelling   - if you develop a temperature over 101 degrees Fahrenheit      Lymphedema - Identification and Prevention     Lymphedema - is the swelling of a body area or extremity caused by the accumulation of lymphatic fluid.  There is a risk for lymphedema with the removal of lymph nodes, trauma or radiation therapy.  Treatment of breast cancer often involves surgery: mastectomy or lumpectomy. Some of the lymph nodes in the underarm (called axillary lymph nodes) may be removed and checked to see if they contain cancer cells.     During breast surgery when axillary lymph nodes are removed (with sentinel node biopsy or axillary dissection) or are treated with radiation therapy, the lymphatic system may become impaired. This may prevent lymphatic fluid from leaving the area therefore, causing lymphedema.     Lymphatic fluid is a normal part of the circulatory system. Its function is to remove waste products and to produce cells vital to fighting infection. Swelling occurs when the vessels become restricted and the lymphatic fluid is unable to freely flow through them.  If lymphedema is left untreated, the affected limb could progressively become more swollen, which could lead to  hardening of the skin, bulkiness in the limb, infection and impaired wound healing.         There are things you can do to decrease the chance of developing lymphedema.                                          [email or web address]                                                                                                                                                  The information presented is intended for general information and educational purposes. It is not intended to replace the  advice of your health care provider. Contact your health care provider if you believe you have a health problem.                                                    POST OP EXERCISES - SAFE TO DO THE FIRST 2 WEEKS AFTER SURGERY UNTIL YOUR FOLLOW UP APPOINTMENT WITH PHYSICAL/OCCUPATIONAL THERAPY    Scapular Retraction (Standing)    With arms at sides, squeeze shoulder blades together. Do not shrug shoulders and do not hold your breath. Hold 5 seconds. Repeat 15 times 3 sessions 1-2 x day.       Exaggerated Breathing and Relaxation      Practice deep breathing frequently in the first few days following surgery even before you begin exercising. This exercise helps with tissue extensibility in the chest wall.  Inhale slowly and deeply through the nose and exhale through pursed lips. Concentrate on relaxing as you let the air out of your lungs. Repeat three (3) to four (4) times, remembering to breath in deeply and then relaxing. This exercise helps to ease the sensation of pulling and discomfort that may be experienced while exercising.      Ball Squeeze OR Hand pumps       Perform this exercise three (3) times a day for 1-3 minutes each time.    The ball squeeze or hand pumps helps to prevent or reduce temporary swelling that may occur in the affected arm. This exercise may be performed standing, sitting or while lying in bed. During this exercise the affected arm should be slightly bent and held upward. Support your arm  with a pillow if you are uncomfortable holding it up.    a. Hold a rubber ball in your hand on the affected side and lift your arm upward.  b. Alternate squeezing and relaxing the ball.            AROM: Elbow Flexion / Extension        With left hand palm up, gently bend elbow as far as possible. Then straighten arm as far as possible. Do this in standing.   Repeat 15 times per set. Do 3 sets per session. Do 1-3 sessions per day.    Shoulder Rolls      Move your shoulders in a circular pattern as  shown so that your are moving in an up,  back and down direction. Perform small  cicles if needed for comfort.    Repeat 10 Times  Hold 2 Seconds  Complete 1 Set  Perform 2 Time(s) a Day

## 2018-11-08 ENCOUNTER — PATIENT MESSAGE (OUTPATIENT)
Dept: ADMINISTRATIVE | Facility: OTHER | Age: 38
End: 2018-11-08

## 2018-11-14 ENCOUNTER — HOSPITAL ENCOUNTER (OUTPATIENT)
Dept: RADIOLOGY | Facility: HOSPITAL | Age: 38
Discharge: HOME | End: 2018-11-14
Attending: SURGERY | Admitting: SURGERY
Payer: COMMERCIAL

## 2018-11-14 ENCOUNTER — TELEPHONE (OUTPATIENT)
Dept: SURGERY | Facility: CLINIC | Age: 38
End: 2018-11-14

## 2018-11-14 DIAGNOSIS — Z01.818 PRE-OP EVALUATION: ICD-10-CM

## 2018-11-14 DIAGNOSIS — Z17.0 MALIGNANT NEOPLASM OF RIGHT BREAST IN FEMALE, ESTROGEN RECEPTOR POSITIVE, UNSPECIFIED SITE OF BREAST: ICD-10-CM

## 2018-11-14 DIAGNOSIS — C50.911 MALIGNANT NEOPLASM OF RIGHT BREAST IN FEMALE, ESTROGEN RECEPTOR POSITIVE, UNSPECIFIED SITE OF BREAST: ICD-10-CM

## 2018-11-14 DIAGNOSIS — R91.1 LUNG NODULE: ICD-10-CM

## 2018-11-14 PROCEDURE — A4648 IMPLANTABLE TISSUE MARKER: HCPCS | Mod: PO

## 2018-11-14 PROCEDURE — C1769 GUIDE WIRE: HCPCS | Mod: PO

## 2018-11-14 PROCEDURE — 77065 DX MAMMO INCL CAD UNI: CPT | Mod: TC,PO,RT

## 2018-11-14 PROCEDURE — 19285 PERQ DEV BREAST 1ST US IMAG: CPT | Mod: RT,,, | Performed by: RADIOLOGY

## 2018-11-14 PROCEDURE — 71046 X-RAY EXAM CHEST 2 VIEWS: CPT | Mod: 26,,, | Performed by: RADIOLOGY

## 2018-11-14 PROCEDURE — 71046 X-RAY EXAM CHEST 2 VIEWS: CPT | Mod: TC

## 2018-11-14 PROCEDURE — 77065 DX MAMMO INCL CAD UNI: CPT | Mod: 26,RT,, | Performed by: RADIOLOGY

## 2018-11-14 NOTE — TELEPHONE ENCOUNTER
Called patient and discussed need for chest CT before surgery if possible due to CXR findings. Patient is agreeable with coming in for test. Scheduled for Saturday at 11:15. Verbalized understanding of all information

## 2018-11-17 ENCOUNTER — HOSPITAL ENCOUNTER (OUTPATIENT)
Dept: RADIOLOGY | Facility: HOSPITAL | Age: 38
Discharge: HOME | End: 2018-11-17
Attending: SURGERY
Payer: COMMERCIAL

## 2018-11-17 DIAGNOSIS — R91.1 LUNG NODULE: ICD-10-CM

## 2018-11-17 PROCEDURE — 25500020 PHARM REV CODE 255: Performed by: SURGERY

## 2018-11-17 PROCEDURE — 71260 CT THORAX DX C+: CPT | Mod: TC

## 2018-11-17 PROCEDURE — 71260 CT THORAX DX C+: CPT | Mod: 26,,, | Performed by: RADIOLOGY

## 2018-11-17 RX ADMIN — IOHEXOL 75 ML: 350 INJECTION, SOLUTION INTRAVENOUS at 11:11

## 2018-11-20 ENCOUNTER — ANESTHESIA EVENT (OUTPATIENT)
Dept: SURGERY | Facility: HOSPITAL | Age: 38
End: 2018-11-20
Payer: COMMERCIAL

## 2018-11-20 ENCOUNTER — TELEPHONE (OUTPATIENT)
Dept: SURGERY | Facility: CLINIC | Age: 38
End: 2018-11-20

## 2018-11-20 NOTE — TELEPHONE ENCOUNTER
Called patient to go over surgery time for tomorrow. Reviewed to arrive at 0800 for 940 surgery. Also reviewed NPO at midnight, bathe with antibacterial soap the night before and morning of surgery, and location of the DOSC. Patient verbalized understanding of all information

## 2018-11-21 ENCOUNTER — HOSPITAL ENCOUNTER (OUTPATIENT)
Facility: HOSPITAL | Age: 38
Discharge: HOME | End: 2018-11-21
Attending: SURGERY | Admitting: SURGERY
Payer: COMMERCIAL

## 2018-11-21 ENCOUNTER — ANESTHESIA (OUTPATIENT)
Dept: SURGERY | Facility: HOSPITAL | Age: 38
End: 2018-11-21
Payer: COMMERCIAL

## 2018-11-21 ENCOUNTER — HOSPITAL ENCOUNTER (OUTPATIENT)
Dept: RADIOLOGY | Facility: HOSPITAL | Age: 38
Discharge: HOME | End: 2018-11-21
Attending: SURGERY | Admitting: SURGERY
Payer: COMMERCIAL

## 2018-11-21 VITALS
HEIGHT: 64 IN | DIASTOLIC BLOOD PRESSURE: 62 MMHG | BODY MASS INDEX: 25.61 KG/M2 | WEIGHT: 150 LBS | RESPIRATION RATE: 18 BRPM | OXYGEN SATURATION: 100 % | TEMPERATURE: 98 F | SYSTOLIC BLOOD PRESSURE: 110 MMHG | HEART RATE: 76 BPM

## 2018-11-21 DIAGNOSIS — Z17.0 MALIGNANT NEOPLASM OF RIGHT BREAST IN FEMALE, ESTROGEN RECEPTOR POSITIVE, UNSPECIFIED SITE OF BREAST: ICD-10-CM

## 2018-11-21 DIAGNOSIS — C50.911 MALIGNANT NEOPLASM OF RIGHT BREAST IN FEMALE, ESTROGEN RECEPTOR POSITIVE, UNSPECIFIED SITE OF BREAST: ICD-10-CM

## 2018-11-21 DIAGNOSIS — Z17.0 CARCINOMA OF RIGHT BREAST IN FEMALE, ESTROGEN RECEPTOR POSITIVE, UNSPECIFIED SITE OF BREAST: Primary | ICD-10-CM

## 2018-11-21 DIAGNOSIS — C50.911 CARCINOMA OF RIGHT BREAST IN FEMALE, ESTROGEN RECEPTOR POSITIVE, UNSPECIFIED SITE OF BREAST: Primary | ICD-10-CM

## 2018-11-21 LAB
B-HCG UR QL: NEGATIVE
CTP QC/QA: YES

## 2018-11-21 PROCEDURE — 25000003 PHARM REV CODE 250: Performed by: STUDENT IN AN ORGANIZED HEALTH CARE EDUCATION/TRAINING PROGRAM

## 2018-11-21 PROCEDURE — 38525 BIOPSY/REMOVAL LYMPH NODES: CPT | Mod: 51,RT,, | Performed by: SURGERY

## 2018-11-21 PROCEDURE — 63600175 PHARM REV CODE 636 W HCPCS: Performed by: NURSE ANESTHETIST, CERTIFIED REGISTERED

## 2018-11-21 PROCEDURE — A9520 TC99 TILMANOCEPT DIAG 0.5MCI: HCPCS

## 2018-11-21 PROCEDURE — 63600175 PHARM REV CODE 636 W HCPCS: Mod: JG | Performed by: SURGERY

## 2018-11-21 PROCEDURE — 36000706: Performed by: SURGERY

## 2018-11-21 PROCEDURE — 81025 URINE PREGNANCY TEST: CPT | Performed by: SURGERY

## 2018-11-21 PROCEDURE — 71000033 HC RECOVERY, INTIAL HOUR: Performed by: SURGERY

## 2018-11-21 PROCEDURE — 88307 TISSUE EXAM BY PATHOLOGIST: CPT | Mod: 26,,, | Performed by: PATHOLOGY

## 2018-11-21 PROCEDURE — 19301 PARTIAL MASTECTOMY: CPT | Mod: RT,,, | Performed by: SURGERY

## 2018-11-21 PROCEDURE — 25000003 PHARM REV CODE 250: Performed by: SURGERY

## 2018-11-21 PROCEDURE — 76098 X-RAY EXAM SURGICAL SPECIMEN: CPT | Mod: TC,PO

## 2018-11-21 PROCEDURE — 88307 TISSUE EXAM BY PATHOLOGIST: CPT | Performed by: PATHOLOGY

## 2018-11-21 PROCEDURE — 76098 X-RAY EXAM SURGICAL SPECIMEN: CPT | Mod: 26,,, | Performed by: RADIOLOGY

## 2018-11-21 PROCEDURE — 27201423 OPTIME MED/SURG SUP & DEVICES STERILE SUPPLY: Performed by: SURGERY

## 2018-11-21 PROCEDURE — 64520 N BLOCK LUMBAR/THORACIC: CPT | Performed by: ANESTHESIOLOGY

## 2018-11-21 PROCEDURE — C1729 CATH, DRAINAGE: HCPCS | Performed by: SURGERY

## 2018-11-21 PROCEDURE — 71000015 HC POSTOP RECOV 1ST HR: Performed by: SURGERY

## 2018-11-21 PROCEDURE — 37000009 HC ANESTHESIA EA ADD 15 MINS: Performed by: SURGERY

## 2018-11-21 PROCEDURE — 94761 N-INVAS EAR/PLS OXIMETRY MLT: CPT

## 2018-11-21 PROCEDURE — 63600175 PHARM REV CODE 636 W HCPCS: Performed by: STUDENT IN AN ORGANIZED HEALTH CARE EDUCATION/TRAINING PROGRAM

## 2018-11-21 PROCEDURE — 36000707: Performed by: SURGERY

## 2018-11-21 PROCEDURE — D9220A PRA ANESTHESIA: Mod: CRNA,,, | Performed by: NURSE ANESTHETIST, CERTIFIED REGISTERED

## 2018-11-21 PROCEDURE — 25000003 PHARM REV CODE 250: Performed by: NURSE ANESTHETIST, CERTIFIED REGISTERED

## 2018-11-21 PROCEDURE — S0028 INJECTION, FAMOTIDINE, 20 MG: HCPCS | Performed by: NURSE ANESTHETIST, CERTIFIED REGISTERED

## 2018-11-21 PROCEDURE — S0020 INJECTION, BUPIVICAINE HYDRO: HCPCS | Performed by: SURGERY

## 2018-11-21 PROCEDURE — D9220A PRA ANESTHESIA: Mod: ANES,,, | Performed by: ANESTHESIOLOGY

## 2018-11-21 PROCEDURE — 27000221 HC OXYGEN, UP TO 24 HOURS

## 2018-11-21 PROCEDURE — 37000008 HC ANESTHESIA 1ST 15 MINUTES: Performed by: SURGERY

## 2018-11-21 PROCEDURE — 38900 IO MAP OF SENT LYMPH NODE: CPT | Mod: RT,,, | Performed by: SURGERY

## 2018-11-21 PROCEDURE — 27100025 HC TUBING, SET FLUID WARMER: Performed by: NURSE ANESTHETIST, CERTIFIED REGISTERED

## 2018-11-21 PROCEDURE — 64461 PVB THORACIC SINGLE INJ SITE: CPT | Mod: 59,RT,, | Performed by: ANESTHESIOLOGY

## 2018-11-21 RX ORDER — SODIUM CHLORIDE 9 MG/ML
INJECTION, SOLUTION INTRAVENOUS CONTINUOUS
Status: DISCONTINUED | OUTPATIENT
Start: 2018-11-21 | End: 2018-11-21 | Stop reason: HOSPADM

## 2018-11-21 RX ORDER — DEXAMETHASONE SODIUM PHOSPHATE 4 MG/ML
INJECTION, SOLUTION INTRA-ARTICULAR; INTRALESIONAL; INTRAMUSCULAR; INTRAVENOUS; SOFT TISSUE
Status: DISCONTINUED | OUTPATIENT
Start: 2018-11-21 | End: 2018-11-21

## 2018-11-21 RX ORDER — LIDOCAINE HCL/PF 100 MG/5ML
SYRINGE (ML) INTRAVENOUS
Status: DISCONTINUED | OUTPATIENT
Start: 2018-11-21 | End: 2018-11-21

## 2018-11-21 RX ORDER — HYDROCODONE BITARTRATE AND ACETAMINOPHEN 5; 325 MG/1; MG/1
1 TABLET ORAL EVERY 4 HOURS PRN
Status: DISCONTINUED | OUTPATIENT
Start: 2018-11-21 | End: 2018-11-21 | Stop reason: HOSPADM

## 2018-11-21 RX ORDER — ROCURONIUM BROMIDE 10 MG/ML
INJECTION, SOLUTION INTRAVENOUS
Status: DISCONTINUED | OUTPATIENT
Start: 2018-11-21 | End: 2018-11-21

## 2018-11-21 RX ORDER — MIDAZOLAM HYDROCHLORIDE 1 MG/ML
INJECTION, SOLUTION INTRAMUSCULAR; INTRAVENOUS
Status: DISCONTINUED | OUTPATIENT
Start: 2018-11-21 | End: 2018-11-21

## 2018-11-21 RX ORDER — ISOSULFAN BLUE 50 MG/5ML
INJECTION, SOLUTION SUBCUTANEOUS
Status: DISCONTINUED | OUTPATIENT
Start: 2018-11-21 | End: 2018-11-21 | Stop reason: HOSPADM

## 2018-11-21 RX ORDER — FENTANYL CITRATE 50 UG/ML
25 INJECTION, SOLUTION INTRAMUSCULAR; INTRAVENOUS EVERY 5 MIN PRN
Status: DISCONTINUED | OUTPATIENT
Start: 2018-11-21 | End: 2018-11-21 | Stop reason: HOSPADM

## 2018-11-21 RX ORDER — FAMOTIDINE 10 MG/ML
INJECTION INTRAVENOUS
Status: DISCONTINUED | OUTPATIENT
Start: 2018-11-21 | End: 2018-11-21

## 2018-11-21 RX ORDER — BUPIVACAINE HYDROCHLORIDE 5 MG/ML
INJECTION, SOLUTION EPIDURAL; INTRACAUDAL
Status: DISCONTINUED | OUTPATIENT
Start: 2018-11-21 | End: 2018-11-21 | Stop reason: HOSPADM

## 2018-11-21 RX ORDER — ROPIVACAINE HYDROCHLORIDE 5 MG/ML
INJECTION, SOLUTION EPIDURAL; INFILTRATION; PERINEURAL
Status: COMPLETED | OUTPATIENT
Start: 2018-11-21 | End: 2018-11-21

## 2018-11-21 RX ORDER — ONDANSETRON 2 MG/ML
INJECTION INTRAMUSCULAR; INTRAVENOUS
Status: DISCONTINUED | OUTPATIENT
Start: 2018-11-21 | End: 2018-11-21

## 2018-11-21 RX ORDER — ROPIVACAINE HYDROCHLORIDE 5 MG/ML
INJECTION, SOLUTION EPIDURAL; INFILTRATION; PERINEURAL
Status: COMPLETED
Start: 2018-11-21 | End: 2018-11-21

## 2018-11-21 RX ORDER — MIDAZOLAM HYDROCHLORIDE 1 MG/ML
0.5 INJECTION INTRAMUSCULAR; INTRAVENOUS
Status: DISCONTINUED | OUTPATIENT
Start: 2018-11-21 | End: 2018-11-21 | Stop reason: HOSPADM

## 2018-11-21 RX ORDER — PHENYLEPHRINE HYDROCHLORIDE 10 MG/ML
INJECTION INTRAVENOUS
Status: DISCONTINUED | OUTPATIENT
Start: 2018-11-21 | End: 2018-11-21

## 2018-11-21 RX ORDER — HYDROMORPHONE HYDROCHLORIDE 1 MG/ML
0.2 INJECTION, SOLUTION INTRAMUSCULAR; INTRAVENOUS; SUBCUTANEOUS EVERY 5 MIN PRN
Status: DISCONTINUED | OUTPATIENT
Start: 2018-11-21 | End: 2018-11-21 | Stop reason: HOSPADM

## 2018-11-21 RX ORDER — LIDOCAINE HYDROCHLORIDE 10 MG/ML
1 INJECTION, SOLUTION EPIDURAL; INFILTRATION; INTRACAUDAL; PERINEURAL ONCE
Status: DISCONTINUED | OUTPATIENT
Start: 2018-11-21 | End: 2018-11-21 | Stop reason: HOSPADM

## 2018-11-21 RX ORDER — ONDANSETRON 2 MG/ML
4 INJECTION INTRAMUSCULAR; INTRAVENOUS ONCE
Status: COMPLETED | OUTPATIENT
Start: 2018-11-21 | End: 2018-11-21

## 2018-11-21 RX ORDER — SCOLOPAMINE TRANSDERMAL SYSTEM 1 MG/1
PATCH, EXTENDED RELEASE TRANSDERMAL
Status: DISCONTINUED | OUTPATIENT
Start: 2018-11-21 | End: 2018-11-21

## 2018-11-21 RX ORDER — HYDROCODONE BITARTRATE AND ACETAMINOPHEN 5; 325 MG/1; MG/1
1 TABLET ORAL EVERY 6 HOURS PRN
Qty: 30 TABLET | Refills: 0 | Status: ON HOLD | OUTPATIENT
Start: 2018-11-21 | End: 2019-01-02 | Stop reason: HOSPADM

## 2018-11-21 RX ORDER — SODIUM CHLORIDE 0.9 % (FLUSH) 0.9 %
3 SYRINGE (ML) INJECTION
Status: DISCONTINUED | OUTPATIENT
Start: 2018-11-21 | End: 2018-11-21 | Stop reason: HOSPADM

## 2018-11-21 RX ORDER — PROPOFOL 10 MG/ML
VIAL (ML) INTRAVENOUS
Status: DISCONTINUED | OUTPATIENT
Start: 2018-11-21 | End: 2018-11-21

## 2018-11-21 RX ORDER — ACETAMINOPHEN 10 MG/ML
INJECTION, SOLUTION INTRAVENOUS
Status: DISCONTINUED | OUTPATIENT
Start: 2018-11-21 | End: 2018-11-21

## 2018-11-21 RX ORDER — CEFAZOLIN SODIUM 1 G/3ML
2 INJECTION, POWDER, FOR SOLUTION INTRAMUSCULAR; INTRAVENOUS
Status: COMPLETED | OUTPATIENT
Start: 2018-11-21 | End: 2018-11-21

## 2018-11-21 RX ORDER — SUCCINYLCHOLINE CHLORIDE 20 MG/ML
INJECTION INTRAMUSCULAR; INTRAVENOUS
Status: DISCONTINUED | OUTPATIENT
Start: 2018-11-21 | End: 2018-11-21

## 2018-11-21 RX ADMIN — PHENYLEPHRINE HYDROCHLORIDE 100 MCG: 10 INJECTION INTRAVENOUS at 10:11

## 2018-11-21 RX ADMIN — MIDAZOLAM HYDROCHLORIDE 2 MG: 1 INJECTION, SOLUTION INTRAMUSCULAR; INTRAVENOUS at 09:11

## 2018-11-21 RX ADMIN — FAMOTIDINE 20 MG: 10 INJECTION, SOLUTION INTRAVENOUS at 09:11

## 2018-11-21 RX ADMIN — PROPOFOL 150 MG: 10 INJECTION, EMULSION INTRAVENOUS at 09:11

## 2018-11-21 RX ADMIN — ONDANSETRON 4 MG: 2 INJECTION INTRAMUSCULAR; INTRAVENOUS at 09:11

## 2018-11-21 RX ADMIN — DEXAMETHASONE SODIUM PHOSPHATE 8 MG: 4 INJECTION, SOLUTION INTRAMUSCULAR; INTRAVENOUS at 10:11

## 2018-11-21 RX ADMIN — ROPIVACAINE HYDROCHLORIDE 30 ML: 5 INJECTION, SOLUTION EPIDURAL; INFILTRATION; PERINEURAL at 09:11

## 2018-11-21 RX ADMIN — SODIUM CHLORIDE, SODIUM GLUCONATE, SODIUM ACETATE, POTASSIUM CHLORIDE, MAGNESIUM CHLORIDE, SODIUM PHOSPHATE, DIBASIC, AND POTASSIUM PHOSPHATE: .53; .5; .37; .037; .03; .012; .00082 INJECTION, SOLUTION INTRAVENOUS at 10:11

## 2018-11-21 RX ADMIN — SCOPALAMINE 1 PATCH: 1 PATCH, EXTENDED RELEASE TRANSDERMAL at 09:11

## 2018-11-21 RX ADMIN — HYDROCODONE BITARTRATE AND ACETAMINOPHEN 1 TABLET: 5; 325 TABLET ORAL at 12:11

## 2018-11-21 RX ADMIN — ROCURONIUM BROMIDE 5 MG: 10 INJECTION, SOLUTION INTRAVENOUS at 09:11

## 2018-11-21 RX ADMIN — ONDANSETRON 4 MG: 2 INJECTION INTRAMUSCULAR; INTRAVENOUS at 11:11

## 2018-11-21 RX ADMIN — SODIUM CHLORIDE: 0.9 INJECTION, SOLUTION INTRAVENOUS at 08:11

## 2018-11-21 RX ADMIN — CEFAZOLIN 2 G: 330 INJECTION, POWDER, FOR SOLUTION INTRAMUSCULAR; INTRAVENOUS at 10:11

## 2018-11-21 RX ADMIN — ACETAMINOPHEN 1000 MG: 10 INJECTION, SOLUTION INTRAVENOUS at 10:11

## 2018-11-21 RX ADMIN — LIDOCAINE HYDROCHLORIDE 60 MG: 20 INJECTION, SOLUTION INTRAVENOUS at 09:11

## 2018-11-21 RX ADMIN — FENTANYL CITRATE 50 MCG: 50 INJECTION INTRAMUSCULAR; INTRAVENOUS at 09:11

## 2018-11-21 RX ADMIN — SUCCINYLCHOLINE CHLORIDE 140 MG: 20 INJECTION, SOLUTION INTRAMUSCULAR; INTRAVENOUS at 09:11

## 2018-11-21 NOTE — ANESTHESIA PREPROCEDURE EVALUATION
11/21/2018  Elhma Rodas is a 38 y.o., female with breast cancer here for mastectomy.    Past Medical History:   Diagnosis Date    Abnormal Pap smear of cervix 05/2016    ASCUS, - HPV    Hypothyroidism     Palpitations     Syncope and collapse     Thyroid disease     Vitamin D deficiency disease      Lab Results   Component Value Date    WBC 6.04 10/23/2018    HGB 12.2 10/23/2018    HCT 37.1 10/23/2018    MCV 85 10/23/2018     10/23/2018       Chemistry        Component Value Date/Time     10/23/2018 0620    K 4.3 10/23/2018 0620     10/23/2018 0620    CO2 22 (L) 10/23/2018 0620    BUN 8 10/23/2018 0620    CREATININE 0.7 10/23/2018 0620     10/23/2018 0620        Component Value Date/Time    CALCIUM 9.5 10/23/2018 0620    ALKPHOS 64 10/23/2018 0620    AST 14 10/23/2018 0620    ALT 10 10/23/2018 0620    BILITOT 0.4 10/23/2018 0620    ESTGFRAFRICA >60.0 10/23/2018 0620    EGFRNONAA >60.0 10/23/2018 0620            Anesthesia Evaluation    I have reviewed the Patient Summary Reports.     I have reviewed the Medications.     Review of Systems  Anesthesia Hx:  Hx of Anesthetic complications PONV Personal Hx of Anesthesia complications, Post-Operative Nausea/Vomiting, with every anesthetic, treatment not known   Hematology/Oncology:        Current/Recent Cancer. Breast right   Cardiovascular:   Exercise tolerance: good    Pulmonary:  Pulmonary Normal    Endocrine:   Hypothyroidism Took synthroid today       Physical Exam  General:  Well nourished    Airway/Jaw/Neck:  Airway Findings: Mouth Opening: Normal Tongue: Normal  General Airway Assessment: Adult  Mallampati: II  Jaw/Neck Findings:  Neck ROM: Normal ROM      Dental:  Dental Findings: In tact   Chest/Lungs:  Chest/Lungs Findings: Clear to auscultation, Normal Respiratory Rate     Heart/Vascular:  Heart Findings: Rate: Normal   Rhythm: Regular Rhythm  Sounds: Normal        Mental Status:  Mental Status Findings:  Cooperative, Alert and Oriented         Anesthesia Plan  Type of Anesthesia, risks & benefits discussed:  Anesthesia Type:  general, regional  Patient's Preference:   Intra-op Monitoring Plan: standard ASA monitors  Intra-op Monitoring Plan Comments:   Post Op Pain Control Plan: multimodal analgesia  Post Op Pain Control Plan Comments:   Induction:   IV  Beta Blocker:  Patient is not currently on a Beta-Blocker (No further documentation required).       Informed Consent: Patient understands risks and agrees with Anesthesia plan.  Questions answered. Anesthesia consent signed with patient.  ASA Score: 2     Day of Surgery Review of History & Physical:    H&P update referred to the surgeon.     Anesthesia Plan Notes: Will have regional block for post-op pain. Endorses history of PONV and motion sickness. Scopolamine patch to be placed pre operatively        Ready For Surgery From Anesthesia Perspective.

## 2018-11-21 NOTE — BRIEF OP NOTE
[facility]  General Surgery  Discharge Summary    [facility]  Brief Operative Note     SUMMARY     Surgery Date: 11/21/2018     Surgeon(s) and Role:     * Jann Ayala MD - Primary    Assisting Surgeon: None    Pre-op Diagnosis:  Malignant neoplasm of right breast in female, estrogen receptor positive, unspecified site of breast [C50.911, Z17.0]    Post-op Diagnosis:  Post-Op Diagnosis Codes:     * Malignant neoplasm of right breast in female, estrogen receptor positive, unspecified site of breast [C50.911, Z17.0]    Procedure(s) (LRB):  MASTECTOMY, PARTIAL - seed localized (Right)  BIOPSY, LYMPH NODE, SENTINEL (Right)  INJECTION, FOR SENTINEL NODE IDENTIFICATION (Right)  LYMPHADENECTOMY, AXILLARY (Right)    Anesthesia: General    Description of the findings of the procedure: Right breast magseed localized lumpectomy and R axillary SLNB    Findings/Key Components: SLN hot and blue 150. Breast marked with inks with good specimen in Xray.     Estimated Blood Loss: Minimal          Specimens:   Specimen (12h ago, onward)    Start     Ordered    11/21/18 1114  Specimen to Pathology - Surgery  Once     Comments:  1. Right axillary sentinel node, hot blue 150-permanent2. Right lumpectomy, green inferior, blue superior, orange lateral, yellow anterior, black posterior, red medial-permanent3. Tissue true deep posterior margin, short stitch superior, long stitch lateral-permanet     Start Status   11/21/18 1114 In process       11/21/18 1123        Patient Name: Elham Rodas  MRN: [id number]  Admission Date: 11/21/2018  Hospital Length of Stay: 0 days  Discharge Date and Time:  11/21/2018 10:18 AM  Attending Physician: Jann Ayala MD   Discharging Provider: Seth Rucker MD  Primary Care Provider: Mookie Rausch MD     HPI: Elham Rodas is a 38 y.o. female who presents with Invasive Mammary Carcinoma (ER+ / VA+ / Her2 -) of Right Breast. She initially felt a  lump on 9/30/18 on self exam. She then presented to her OBGYN on 10/2/18. She underwent Bilateral MMG where posterior architectural distortion of the right breast at the 9 oclock position was noted. BIRADS assessment of 5 was given. Pt received a Tyrer-Cuzick score of 8.82. This resulted in the Pt undergoing a US guided core biopsy on 10/4/18. Pathology Results were Invasive Mammary Carcinoma (ER+ / AL+/ HER2 -).    Procedure(s) (LRB):  MASTECTOMY, PARTIAL - seed localized (Right)  BIOPSY, LYMPH NODE, SENTINEL (Right)  INJECTION, FOR SENTINEL NODE IDENTIFICATION (Right)  LYMPHADENECTOMY, AXILLARY (Right)     Hospital Course: Underwent procedure which she tolerated well. She was discharged home when awake and alert.     Consults: None     Significant Diagnostic Studies: None     Pending Diagnostic Studies:     None        Final Active Diagnoses:    Diagnosis Date Noted POA    PRINCIPAL PROBLEM:  Carcinoma of right breast in female, estrogen receptor positive [C50.911, Z17.0] 11/21/2018 Not Applicable      Problems Resolved During this Admission:      Discharged Condition: good    Disposition: Good     Follow Up:  Follow-up Information     Jann Ayala MD.    Specialty:  General Surgery  Contact information:  [address] [facility] [address]  [phone]                 Patient Instructions:      Diet general     Call MD for:  extreme fatigue     Call MD for:  persistent dizziness or light-headedness     Call MD for:  hives     Call MD for:  redness, tenderness, or signs of infection (pain, swelling, redness, odor or green/yellow discharge around incision site)     Call MD for:  difficulty breathing, headache or visual disturbances     Call MD for:  severe uncontrolled pain     Call MD for:  persistent nausea and vomiting     Call MD for:  temperature >100.4     Remove dressing in 48 hours   Order Comments: Remove dressing in 48 hours. May shower after dressing removed. Steristrips will fall off on  their own.     Activity as tolerated     Medications:  Reconciled Home Medications:      Medication List      START taking these medications    HYDROcodone-acetaminophen 5-325 mg per tablet  Commonly known as:  NORCO  Take 1 tablet by mouth every 6 (six) hours as needed for Pain.        CHANGE how you take these medications    parathyroid hormone 50 mcg/dose Crtg  Commonly known as:  NATPARA  Inject 75 mcg into the skin once daily.  What changed:  when to take this        CONTINUE taking these medications    ergocalciferol 50,000 unit Cap  Commonly known as:  ERGOCALCIFEROL  Take 50,000 Units by mouth once daily.     levothyroxine 88 MCG tablet  Commonly known as:  SYNTHROID  Take 1 tablet (88 mcg total) by mouth before breakfast. Wait at least 4 hours after taking levothyroxine to take calcium.            Seth Rucker MD  General Surgery  [facility]

## 2018-11-21 NOTE — INTERVAL H&P NOTE
The patient has been examined and the H&P has been reviewed:    No acute interval changes.    Anesthesia/Surgery risks, benefits and alternative options discussed and understood by patient/family.          Active Hospital Problems    Diagnosis  POA    Carcinoma of right breast in female, estrogen receptor positive [C50.911, Z17.0]  Not Applicable      Resolved Hospital Problems   No resolved problems to display.

## 2018-11-21 NOTE — H&P
Chief Complaint: Consult (New Patient New Right Breast Invasive Mammary Carcinoma .)        Subjective:      Patient ID: Elham Rodas is a 38 y.o. female who presents with Invasive Mammary Carcinoma (ER+ / WI+ / Her2 -) of Right Breast. She initially felt a lump on 9/30/18 on self exam. She then presented to her OBGYN on 10/2/18. She underwent Bilateral MMG where posterior architectural distortion of the right breast at the 9 oclock position was noted. BIRADS assessment of 5 was given. Pt received a Tyrer-Cuzick score of 8.82. This resulted in the Pt undergoing a US guided core biopsy on 10/4/18. Pathology Results were Invasive Mammary Carcinoma (ER+ / WI+/ HER2 -).                   Past Medical History:   Diagnosis Date    Abnormal Pap smear of cervix 05/2016     ASCUS, - HPV    Hypothyroidism      Palpitations      Syncope and collapse      Thyroid disease      Vitamin D deficiency disease                  Past Surgical History:   Procedure Laterality Date    CHOLECYSTECTOMY        THYROIDECTOMY        TUBAL LIGATION                      Current Outpatient Medications on File Prior to Visit   Medication Sig Dispense Refill    ergocalciferol (ERGOCALCIFEROL) 50,000 unit Cap Take 50,000 Units by mouth.        levothyroxine (SYNTHROID) 88 MCG tablet Take 1 tablet (88 mcg total) by mouth before breakfast. Wait at least 4 hours after taking levothyroxine to take calcium. 90 tablet 3    parathyroid hormone (NATPARA) 50 mcg/dose Crtg Inject 75 mcg into the skin once daily. 4 each 5    [DISCONTINUED] calcium carbonate (TUMS ULTRA) 400 mg calcium (1,000 mg) Chew Take 1 tablet (400 mg total) by mouth daily as needed.        [DISCONTINUED] calcium carbonate (TUMS) 200 mg calcium (500 mg) chewable tablet Take 1 tablet by mouth.          No current facility-administered medications on file prior to visit.           Social History                   Socioeconomic History    Marital status:         "Spouse name: Not on file    Number of children: Not on file    Years of education: Not on file    Highest education level: Not on file   Social Needs    Financial resource strain: Not on file    Food insecurity - worry: Not on file    Food insecurity - inability: Not on file    Transportation needs - medical: Not on file    Transportation needs - non-medical: Not on file   Occupational History    Not on file   Tobacco Use    Smoking status: Never Smoker    Smokeless tobacco: Never Used   Substance and Sexual Activity    Alcohol use: Yes       Alcohol/week: 0.0 oz       Comment: occ    Drug use: No    Sexual activity: Yes       Partners: Male       Birth control/protection: See Surgical Hx       Comment:    Other Topics Concern    Not on file   Social History Narrative    Not on file                    Family History   Problem Relation Age of Onset    Hypertension Mother      Heart attack Mother      Hypertension Father      Breast cancer Neg Hx      Colon cancer Neg Hx      Ovarian cancer Neg Hx           Review of Systems  Objective:   BP (!) 142/82 (BP Location: Right arm, Patient Position: Sitting, BP Method: Medium (Automatic))   Pulse 67   Temp 98.2 °F (36.8 °C) (Oral)   Ht 5' 4" (1.626 m)   Wt 67.6 kg (149 lb)   LMP 10/03/2018   BMI 25.58 kg/m²      Physical Exam     Radiology review: Images personally reviewed by me in the clinic.      Assessment:      No diagnosis found.    Plan:               Elham Rodas is a 38-year-old female who presents with her boyfriend, daughter   and aunt for initial consultation.     The patient felt a mass in her lateral right breast.  She states there was a   slight indentation in the lower outer 8 to 9 o'clock region with a pulling   sensation.  She felt a mass and lump in that area and saw her gynecologist who   also felt the mass.  Diagnostic imaging was performed, which revealed a 13-mm   mass in the posterior 9 o'clock region.  She " underwent core needle biopsy, which   revealed invasive infiltrating mammary carcinoma that was estrogen and   progesterone receptor positive, HER-2/jda negative.  Family history is otherwise   noncontributory.  Her mother had cervical cancer.  Clinical breast exam reveals   the slight indentation in the lateral 9 o'clock region of the right breast.    There are no suspicious or associated skin changes.  There is no edema or   erythema of the skin.  There is no palpable axillary, cervical or   supraclavicular lymphadenopathy.  There is approximately 1.5 cm mass underlying   in the breast parenchyma beneath the area of the indentation.  There is no   fixation to the chest wall.     ASSESSMENT AND PLAN:  The patient was counseled on options of breast   conservation surgery and radiotherapy versus mastectomy.  We will obtain an MRI   of the breast given the indentation to assess the true extent of this mass-like   lesion.  She is highly motivated for breast conservation surgery.  She   understands the need for adjuvant radiotherapy.  She understands the need for   possible reexcision versus completion mastectomy should she be found to have   more locally advanced disease.  We will refer her for genetic counseling and   genetic testing since she is 38 years of age and meets criteria based on age   alone.  Her surgery has been tentatively   scheduled for 11/21/2018, this will be a Magseed a right breast partial   mastectomy (lumpectomy for margins with Magseed localization) and simultaneous   right axillary sentinel lymph node biopsy for axillary staging.     Time spent with the patient today was greater than 45 minutes with greater than   50% of that time in counseling in terms of treatment options.  She will   undoubtedly also be recommended for adjuvant endocrine therapy.  We discussed molecular genomics which will be ordered based on the axillary status following the surgery.     No acute interval changes.

## 2018-11-21 NOTE — TRANSFER OF CARE
"Anesthesia Transfer of Care Note    Patient: Elham Roads    Procedure(s) Performed: Procedure(s) (LRB):  MASTECTOMY, PARTIAL - seed localized (Right)  BIOPSY, LYMPH NODE, SENTINEL (Right)  INJECTION, FOR SENTINEL NODE IDENTIFICATION (Right)  LYMPHADENECTOMY, AXILLARY (Right)    Patient location: PACU    Anesthesia Type: general    Transport from OR: Transported from OR on 6-10 L/min O2 by face mask with adequate spontaneous ventilation    Post pain: adequate analgesia    Post assessment: no apparent anesthetic complications    Post vital signs: stable    Level of consciousness: sedated    Nausea/Vomiting: no nausea/vomiting    Complications: none    Transfer of care protocol was followed      Last vitals:   Visit Vitals  BP (!) 91/52   Pulse 72   Temp 36.4 °C (97.5 °F) (Temporal)   Resp 16   Ht 5' 4" (1.626 m)   Wt 68 kg (150 lb)   LMP 10/20/2018   SpO2 100%   Breastfeeding? No   BMI 25.75 kg/m²     "

## 2018-11-21 NOTE — DISCHARGE INSTRUCTIONS
POSTOPERATIVE INSTRUCTIONS FOLLOWING SENTINEL   LYMPH NODE BIOPSY AND LUMPECTOMY      The following are post-operative instructions that will help you to recover from your surgery.  Please read over these instructions carefully and contact us if we can answer any of your questions or concerns.    Post-op care/dressing/breast binder (surgi-bra)  A surgical bra may be placed around your chest after your surgery.  If you are given the bra, please wear it for the first 48 hours after surgery. After 48 hours you can remove your surgical bra and dressing to shower/cleanse the breast with antibacterial soap and warm water. Do not take a tub bath and do not soak the surgical site for at least 2 weeks. Please do not remove the white strips of tape (steri-strips) that cover your incision- they will be removed at your clinic visit.    The final pathology report will be available approximately 7-10 days after your surgery.  Our office will call you with your pathology report when it becomes available.    If blue dye was used to locate your sentinel lymph nodes, your urine and stool may be blue-green in color for 1 or 2 days.    Dr. Mason patients: please wear the surgical bra as close to 24 hours a day as possible until your post-operative clinic appointment.  If the elastic around the bra irritates your skin, you may wear a soft t-shirt underneath the bra. You may shower AFTER the drains are removed.  Please sponge bathe until then. You may go without wearing the bra long enough to bath, to launder and dry the bra. If you have fluffy filler placed inside the bra, the filler should be removed whenever the bra is taken off. Please reinsert the fluffy filler, or insert the new soft filler, under the bra when you put the bra back on.  If the bra is extremely uncomfortable, you may wear a supportive sports bra instead after 2 days.    Activity    You will be able to do much of your own personal care, such as bathing, dressing,  preparing simple meals, etc.   A short walk each day will help with your recovery   You may find that you need to take rest breaks between activities, but you should not need to stay in bed for prolonged periods of time during the day. A good rule during this time is to listen to your body, do what is comfortable, and stop and rest when your feel tired.  If it hurts, dont do it.   Return to taking your daily medications as prescribed   Please avoid activities that require moderate to heavy lifting (grocery shopping) or pushing/pulling (vacuuming) and repetitive motions (such as washing windows). Do not lift anything heavier than a gallon of milk.   Following a lymph node dissection, dont avoid using your arm, but dont exercise your arm until after your first post-operative visit.  At your first post-op visit, you will be given arm exercises to regain movement and flexibility.  You may be referred to physical therapy if needed.   You may restart driving when you are no longer on narcotics and you feel safe turning the wheel and stopping quickly.   You will need to be out of work approximately 1-2 weeks depending on your particular surgery and how well you are recovering.  We will evaluate how you are doing at the first post-op appointment.  This is a good time to ask when you may return to work and what activities you may do.    Medication for pain  You may find that over the counter pain medications may be sufficient for your pain.  You will be given a prescription for pain medication for more severe pain.  You should not drive or operate machinery while taking these.  Please take prescription pain medication (narcotics) with food.  Narcotics can cause, or worsen, constipation.  You will need to increase your fluid intake, eat high fiber foods (such as fruits and bran) and make sure that you are up and walking. You may need to take an over the counter stool softener for constipation. Short term use of an  icepack may be helpful to decrease discomfort and swelling, particularly to the armpit after lymph node surgery. A small pillow positioned in the armpit may also decrease discomfort after lymph node surgery.      Please report the following:     Temperature greater than 101 degrees   Discharge or bad odor from the wound   Excessive bleeding, such as bloody dressing or extreme bruising   Redness at incision and/or drain sites   Swelling or buildup of fluid around incision   Persistent fevers, chills, nausea, vomiting, or diarrhea    Additional information  Your surgeon will see you approximately 2 weeks following your surgery.  If this follow-up appointment has not been made, please call the office.    If you have any questions or problems, please call my office or my nurse.    Dr. Shwetha Waters, PETEY Currie, PETEY Currie, PETEY Griffiths, PETEY  [phone]    Aileen Phan PA-C  [phone]  Leigha Barney RN  [phone]    After hours and on weekends, you may call the main [facility] line at [phone] and ask to have the general surgery resident paged or have me paged.  How to care for surgical Drain(s)  1) Wash hands--STRIP or milk the drainage tube as it comes out of your body toward the bulb.   a) Beginning where the drain comes out of your body, hold drainage tubing with one hand and with the other, stretch and release tubing an inch at time while moving downward with both hands toward the bulb.  b) Do this 2-3 times before emptying the bulb.  2) Remove the stopper from the bulbs port  (drainage port)  3) Pour the drainage in the measuring cup provided by the nurse  4) Flatten/squeeze the bulb to create a vacuum and replace the stopper before letting go of the bulb.  5) Record the date, time and amount of drainage in ccs (not ounces) each time bulb is emptied. If  you have more than one drain, record each separately.  6) Discard the drainage into the toilet after measuring and then wash hands.  7) Empty bulbs 2-3 times/day or as needed if it fills up before 8 hours.  8) Remember to bring the output record with you to your doctors appointment.      Lymphedema Risk Reduction    Lymphedema is a swelling of a part of the body, caused by an insufficient lymphatic system and an accumulation of fluid in the bodys tissues.  Lymphedema may occur when normal drainage of fluid is disrupted, such as an infection, injury, cancer, scar tissue, or removal of lymph nodes.    If you had a full axillary lymph node dissection procedure, you may be at greater risk for lymphedema.     For those patients having a sentinel lymph node biopsy, these risks may be smaller and the recommendations are provided for your review and consideration.    The following list contains recommendations for reducing your risk of developing lymphedema.    I. Skin Care--avoid trauma/injury to reduce infection risk   Keep the hand and arm on the side of surgery clean and dry   Pay attention to nail care and do not cut cuticles   Avoid punctures, such as injections and blood draws from you on the side of your surgery   Wear gloves while doing activities that may cause skin injury (washing dishes, gardening, etc.)   If scratches or punctures occur, wash area with soap and water, and observe for signs of infections (redness, drainage, swelling)   If a rash, itching, redness, pain, increased skin temperatures, fever, or flu-like symptoms occur, contact your physician immediately for early treatment of a possible infection  II. Activity/Lifestyle   Gradually build up the duration and intensity of any activity or exercise   Take frequent rest periods during activity to allow for arm recovery   Monitor your arm and upper body during and after activity for any change in size, shape, tissue, texture, soreness,  heaviness, or firmness  III. Avoid constriction of your arm on the side of your surgery   Avoid having blood pressure taken on the arm on the side of your surgery   Wear loose fitting jewelry and clothing   Be careful not to rest a heavy purse, luggage, or grocery bags on that arm   When you return to wearing a bra, make sure that it is well fitted and not too tight

## 2018-11-21 NOTE — OP NOTE
DATE OF PROCEDURE:  11/21/2018    PRIMARY SURGEON:  Jann Ayala M.D.    PREOPERATIVE DIAGNOSIS:  Invasive infiltrating carcinoma of the right lateral 9   o'clock region of the right breast posteriorly located approximately a T1c sized   mass.    POSTOPERATIVE DIAGNOSIS:  Invasive infiltrating carcinoma of the right lateral 9   o'clock region of the right breast posteriorly located approximately a T1c   sized mass.    PROCEDURES PERFORMED:  1.  Right breast Magseed localization, partial mastectomy (lumpectomy for   margins with Magseed localization);  2.  Injection of right breast with isosulfan Lymphazurin blue dye and   technetium-labeled radiocolloid for sentinel lymph node mapping and   identification;  3.  Identification and mapping of right deep axillary sentinel lymph nodes;  4.  Right deep axillary excisional sentinel lymph node biopsy x1.    PROCEDURE IN DETAIL:  The patient underwent informed consent.  The history and   physical examination was reviewed and updated.  The right breast was marked.    The Magseed localization films were reviewed.  The patient underwent a regional   paravertebral block by the Regional Anesthesia team in the preop holding area.    She was brought to the Operating Room under general anesthesia.  She was in a   supine position.  The right breast was injected in the anterior subareolar   region with the technetium-labeled radiocolloid using approximately 500 uCi and   it was also then injected in the anterior subareolar region with approximately 3   mL of the isosulfan Lymphazurin blue dye.  This was allowed to migrate to the   right axilla.  The right breast, anterior chest, right arm and axilla were   prepped and draped in a sterile fashion.  We noted a hot spot in the right   axilla.  We made a small transverse inferior axillary incision over the hot   spot, which had been detected by the gamma probe.  The skin was incised sharply.    The subcutaneous tissue was divided with  cautery.  The clavipectoral fascia   was opened.  We identified a blue afferent lymphatic channel leading to the hot   blue level 1 sentinel lymph node.  This was circumferentially dissected and   excised dividing the afferent lymphatic channel with the Harmonic scalpel focus   device.  The level 1 hot blue sentinel lymph node had an ex vivo count of 150.    It was sent to pathology as specimen #1 for permanent sectioning.  Once the   sentinel node was removed, the background residual radioactivity counts were   essentially absent indicating adequate and appropriate sentinel lymph node   mapping and staging and biopsy.  No further palpable nodes were noted in the   axilla and no further blue dye staining was noted in the axilla indicating   adequate and appropriate sentinel lymph node identification mapping and axillary   staging.  With the one node removed, hemostasis was achieved.  The area was   irrigated.  The clavipectoral fascia was reapproximated with 3-0 Vicryl suture.    The deep dermal and subcutaneous layers were also closed with 3-0 Vicryl suture   in a running fashion and the skin closed with a running 4-0 Monocryl   subcuticular skin closure.    Attention was then turned to the right lateral breast.  The Magseed was used to   identify the area of greatest activity in the right lateral breast.  A   periareolar incision was made within a natural skin lines in the lateral right   breast near the lateral mammary fold.  No skin was taken.  We circumferentially   dissected over the Magseed area of greatest activity, intermittently using the   Magseed detector to stay well away from the Magseed activity.  The dissection   was carried posteriorly behind the mass and the mass was taken initially leaving   just the fascia on the pectoralis musculature and the serratus anterior muscle.    The specimen was removed.  It was oriented with the multicolored inks in the   standard fashion and fixed with acetic acid.   Specimen radiograph confirmed the   clip and Magseed marker within the central aspect of the specimen.    Intraoperatively, we had reexcised the true deep posterior margin taking the   remaining thin layer of tissue off of the pectoralis muscle and serratus   anterior muscle, so that the muscle fibers were exposed having removed the   fascia.  This true deep posterior margin was oriented with a short stitch   superiorly and a long stitch laterally and submitted to pathology for permanent   sectioning.  This wound was irrigated.  It was made hemostatic.  With hemostasis   achieved, the deep dermal and subcutaneous tissue was reapproximated with a   running 3-0 Vicryl suture.  The skin was then closed with a running 4-0 Monocryl   subcuticular skin closure.  Dermabond was placed over both incision sites   followed by sterile fluff gauze and post-procedure bra.  Estimated blood loss   was minimal.  All needle, instrument and sponge counts were correct.  Specimen   radiograph confirmed the clip marker and Magseed within the specimen indicating   adequate gross excision.  The patient was turned over to Anesthesia for   extubation and transported to the recovery area in a satisfactory condition.  No   drains were placed.  Estimated blood loss was minimal.  All the specimens were   sent to Pathology for permanent sectioning.      JAVIER/ELSA  dd: 11/21/2018 15:21:57 (CST)  td: 11/21/2018 16:00:02 (CST)  Doc ID   #[id number]  Job ID #[id number]    CC:

## 2018-11-21 NOTE — ANESTHESIA POSTPROCEDURE EVALUATION
"Anesthesia Post Evaluation    Patient: Elham Rodas    Procedure(s) Performed: Procedure(s) (LRB):  MASTECTOMY, PARTIAL - seed localized (Right)  BIOPSY, LYMPH NODE, SENTINEL (Right)  INJECTION, FOR SENTINEL NODE IDENTIFICATION (Right)  LYMPHADENECTOMY, AXILLARY (Right)    Final Anesthesia Type: general  Patient location during evaluation: PACU  Patient participation: Yes- Able to Participate  Level of consciousness: awake and alert and oriented  Post-procedure vital signs: reviewed and stable  Pain management: adequate  Airway patency: patent  PONV status at discharge: No PONV  Anesthetic complications: no      Cardiovascular status: blood pressure returned to baseline and hemodynamically stable  Respiratory status: unassisted and spontaneous ventilation  Hydration status: euvolemic  Follow-up not needed.        Visit Vitals  /62   Pulse 76   Temp 36.8 °C (98.2 °F) (Temporal)   Resp 18   Ht 5' 4" (1.626 m)   Wt 68 kg (150 lb)   LMP 10/20/2018   SpO2 100%   Breastfeeding? No   BMI 25.75 kg/m²       Pain/Solis Score: Pain Assessment Performed: Yes (11/21/2018  1:15 PM)  Presence of Pain: denies (11/21/2018  1:15 PM)  Pain Rating Prior to Med Admin: 6 (11/21/2018 12:57 PM)  Pain Rating Post Med Admin: 2 (11/21/2018  1:15 PM)  Solis Score: 10 (11/21/2018  1:15 PM)  Modified Solis Score: 19 (11/21/2018  1:15 PM)        "

## 2018-11-21 NOTE — ANESTHESIA PROCEDURE NOTES
Paravertebral Single Injection Block(s)    Patient location during procedure: pre-op   Block not for primary anesthetic.  Reason for block: at surgeon's request and post-op pain management   Post-op Pain Location: right breast  Start time: 11/21/2018 9:01 AM  Timeout: 11/21/2018 9:00 AM   End time: 11/21/2018 9:10 AM  Surgery related to: right partial mastectomy  Staffing  Anesthesiologist: Agata Bloom MD  Resident/CRNA: Grover Romo MD  Performed: resident/CRNA   Preanesthetic Checklist  Completed: patient identified, site marked, surgical consent, pre-op evaluation, timeout performed, IV checked, risks and benefits discussed and monitors and equipment checked  Peripheral Block  Patient position: sitting  Prep: ChloraPrep  Patient monitoring: heart rate, cardiac monitor, continuous pulse ox, continuous capnometry and frequent blood pressure checks  Block type: paravertebral - thoracic  Laterality: right  Injection technique: single shot  Needle  Needle type: Tuohy   Needle gauge: 17 G  Needle length: 3.5 in  Needle localization: anatomical landmarks     Assessment  Injection assessment: negative aspiration and negative parasthesia  Paresthesia pain: none  Heart rate change: no  Slow fractionated injection: yes  Additional Notes  T2 os at 4 cm  T4 os at 4 cm  VSS.  [facility] RN monitoring vitals throughout procedure.  Patient tolerated procedure well.

## 2018-11-21 NOTE — PLAN OF CARE
PT is AAOx4. VSS. NAD. IV discontinued. Discharge instructions given, verbalized understanding. States pain and nausea tolerable. Discharged home with family.

## 2018-12-06 ENCOUNTER — OFFICE VISIT (OUTPATIENT)
Dept: SURGERY | Facility: CLINIC | Age: 38
End: 2018-12-06
Payer: COMMERCIAL

## 2018-12-06 ENCOUNTER — LAB VISIT (OUTPATIENT)
Dept: LAB | Facility: HOSPITAL | Age: 38
End: 2018-12-06
Payer: COMMERCIAL

## 2018-12-06 VITALS
DIASTOLIC BLOOD PRESSURE: 92 MMHG | SYSTOLIC BLOOD PRESSURE: 142 MMHG | TEMPERATURE: 98 F | WEIGHT: 156 LBS | BODY MASS INDEX: 26.63 KG/M2 | HEIGHT: 64 IN | HEART RATE: 70 BPM

## 2018-12-06 DIAGNOSIS — R91.1 LUNG NODULE: ICD-10-CM

## 2018-12-06 DIAGNOSIS — Z17.0 MALIGNANT NEOPLASM OF RIGHT BREAST IN FEMALE, ESTROGEN RECEPTOR POSITIVE, UNSPECIFIED SITE OF BREAST: ICD-10-CM

## 2018-12-06 DIAGNOSIS — C50.911 MALIGNANT NEOPLASM OF RIGHT BREAST IN FEMALE, ESTROGEN RECEPTOR POSITIVE, UNSPECIFIED SITE OF BREAST: Primary | ICD-10-CM

## 2018-12-06 DIAGNOSIS — C50.911 MALIGNANT NEOPLASM OF RIGHT BREAST IN FEMALE, ESTROGEN RECEPTOR POSITIVE, UNSPECIFIED SITE OF BREAST: ICD-10-CM

## 2018-12-06 DIAGNOSIS — Z17.0 MALIGNANT NEOPLASM OF RIGHT BREAST IN FEMALE, ESTROGEN RECEPTOR POSITIVE, UNSPECIFIED SITE OF BREAST: Primary | ICD-10-CM

## 2018-12-06 DIAGNOSIS — Z91.89 AT HIGH RISK FOR BREAST CANCER: ICD-10-CM

## 2018-12-06 PROCEDURE — 99024 POSTOP FOLLOW-UP VISIT: CPT | Mod: S$GLB,,, | Performed by: SURGERY

## 2018-12-06 PROCEDURE — 99999 PR PBB SHADOW E&M-EST. PATIENT-LVL III: CPT | Mod: PBBFAC,,, | Performed by: SURGERY

## 2018-12-06 PROCEDURE — 36415 COLL VENOUS BLD VENIPUNCTURE: CPT

## 2018-12-06 NOTE — LETTER
[facility]  [address]  [facility] [address]  Phone: [phone]  Fax: [phone] December 9, 2018      Yoni Oscar MD  [address] [place] [address]    Patient: Elham Rodas   MR Number: [id number]   YOB: 1980   Date of Visit: 12/6/2018     Dear Dr. Oscar:    Thank you for referring Elham Rodas to me for evaluation. Below are the relevant portions of my assessment and plan of care.    Elham Rodas is a very pleasant 38-year-old female who presents for her first routine postoperative visit status post right breast conservation surgery on 11/21/2018, which revealed a T1c invasive infiltrating carcinoma with greater than 1 cm margin with extra deep margin taken at the time of surgery intraoperatively.  She had a positive macrometastasis on her sentinel lymph node biopsy with a 6-mm macrometastatic deposit on her positive sentinel lymph node biopsy on 11/21/2018. She does not require any further surgery since she has surgically clear margins for her T1c breast cancer by greater than 1 cm and given the Z11 data, she will have adjuvant whole breast radiotherapy and right axillary radiation therapy included with her whole breast radiotherapy.  The patient offers no subjective complaints.  She feels better without a bra.  She has an excellent bilateral symmetrical cosmetic result thus far with good symmetry to her breast.  There are no signs of infection, hematoma or seroma.  The Dermabond is intact at the right lateral lumpectomy site as well as the right axilla, which displays no evidence of postoperative axillary seroma or fluid collection.  The symmetry is excellent and she is happy with the results.    In terms of her pathology result, we will order a MammaPrint on the primary tumor. She will be set up with both Medical and Radiation Oncology at the [facility] for consultation for adjuvant therapies.  She will certainly  require adjuvant endocrine therapy as well as radiotherapy for her estrogen receptor positive, HER-2/jad negative tumor.  Given her young age, she will likely receive adjuvant chemotherapy for her node positive disease, but we will send the MammaPrint nonetheless.  In terms of her respiratory chest x-ray and CT scan followup, we will order a followup CT scan as recommended in approximately one year from the prior CT scan of the chest.  The patient will have genetic testing and we will order the Myriad panel today and follow up on the genetic testing result. Since she was diagnosed prior to age 48, we will also use MRIs for future screening and surveillance and we will order a breast MRI in March of 2019 and subsequent bilateral mammography as part of her routine screening and surveillance followup in September of 2019.    Time spent with the patient today postop was 15 minutes discussing the pathology results and future plan.  The patient is comfortable with recommendations and we will proceed as outlined above.    If you have questions, please do not hesitate to call me. I look forward to following Elham along with you.    Sincerely,      Jann Ayala MD   Medical Director, [facility]  Staff Attending Surgeon - Department of Surgery  [facility]  Associate Professor of Surgery  [employer]  [employer]    RLC/hcr    CC  Mookie Rausch MD

## 2018-12-07 DIAGNOSIS — Z17.0 MALIGNANT NEOPLASM OF RIGHT BREAST IN FEMALE, ESTROGEN RECEPTOR POSITIVE, UNSPECIFIED SITE OF BREAST: Primary | ICD-10-CM

## 2018-12-07 DIAGNOSIS — C50.911 MALIGNANT NEOPLASM OF RIGHT BREAST IN FEMALE, ESTROGEN RECEPTOR POSITIVE, UNSPECIFIED SITE OF BREAST: Primary | ICD-10-CM

## 2018-12-09 PROBLEM — R91.1 LUNG NODULE: Status: ACTIVE | Noted: 2018-12-09

## 2018-12-09 PROBLEM — Z91.89 AT HIGH RISK FOR BREAST CANCER: Status: ACTIVE | Noted: 2018-12-09

## 2018-12-10 NOTE — PROGRESS NOTES
Elham Rodas is a very pleasant 38-year-old female who presents for her first   routine postoperative visit status post right breast conservation surgery on   11/21/2018, which revealed a T1c invasive infiltrating carcinoma with greater   than 1 cm margin with extra deep margin taken at the time of surgery   intraoperatively.  She had a positive macrometastasis on her sentinel lymph node   biopsy with a 6-mm macrometastatic deposit on her positive sentinel lymph node   biopsy on 11/21/2018.  She does not require any further surgery since she has   surgically clear margins for her T1c breast cancer by greater than 1 cm and   given the Z11 data, she will have adjuvant whole breast radiotherapy and right   axillary radiation therapy included with her whole breast radiotherapy.  The   patient offers no subjective complaints.  She feels better without a bra.  She   has an excellent bilateral symmetrical cosmetic result thus far with good   symmetry to her breast.  There are no signs of infection, hematoma or seroma.    The Dermabond is intact at the right lateral lumpectomy site as well as the   right axilla, which displays no evidence of postoperative axillary seroma or   fluid collection.  The symmetry is excellent and she is happy with the results.    In terms of her pathology result, we will order a MammaPrint on the primary   tumor.  She will be set up with both Medical and Radiation Oncology [place] [facility] for consultation for adjuvant therapies.  She will   certainly require adjuvant endocrine therapy as well as radiotherapy for her   estrogen receptor positive, HER-2/jad negative tumor.  Given her young age, she   will likely receive adjuvant chemotherapy for her node positive disease, but we   will send the MammaPrint nonetheless.  In terms of her respiratory chest x-ray   and CT scan followup, we will order a followup CT scan as recommended in   approximately one year from the prior  CT scan of the chest.  The patient will   have genetic testing and we will order the Myriad panel today and follow up on   the genetic testing result.  Since she was diagnosed prior to age 48, we will   also use MRIs for future screening and surveillance and we will order a breast   MRI in March of 2019 and subsequent bilateral mammography as part of her routine   screening and surveillance followup in September of 2019.    Time spent with the patient today postop was 15 minutes discussing the pathology   results and future plan.  The patient is comfortable with recommendations and   we will proceed as outlined above.      RLC/ELSA  dd: 12/09/2018 19:51:16 (CST)  td: 12/09/2018 22:32:21 (CST)  Doc ID   #[id number]  Job ID #[id number]    CC:     Job # [id number].

## 2018-12-14 ENCOUNTER — TELEPHONE (OUTPATIENT)
Dept: SURGERY | Facility: CLINIC | Age: 38
End: 2018-12-14

## 2018-12-14 NOTE — TELEPHONE ENCOUNTER
Returned call to patient at this time regarding disability paperwork, advised patient that medical oncologist will need to fill out this paperwork, pt verbalized understanding, all questions answered at this time

## 2018-12-18 ENCOUNTER — TUMOR BOARD CONFERENCE (OUTPATIENT)
Dept: SURGERY | Facility: CLINIC | Age: 38
End: 2018-12-18

## 2018-12-18 ENCOUNTER — OFFICE VISIT (OUTPATIENT)
Dept: SURGERY | Facility: CLINIC | Age: 38
End: 2018-12-18
Payer: COMMERCIAL

## 2018-12-18 VITALS
WEIGHT: 157.63 LBS | SYSTOLIC BLOOD PRESSURE: 132 MMHG | HEART RATE: 72 BPM | DIASTOLIC BLOOD PRESSURE: 78 MMHG | HEIGHT: 64 IN | TEMPERATURE: 98 F | BODY MASS INDEX: 26.91 KG/M2 | RESPIRATION RATE: 16 BRPM

## 2018-12-18 DIAGNOSIS — C50.611 MALIGNANT NEOPLASM OF AXILLARY TAIL OF RIGHT BREAST IN FEMALE, ESTROGEN RECEPTOR POSITIVE: Primary | ICD-10-CM

## 2018-12-18 DIAGNOSIS — Z17.0 MALIGNANT NEOPLASM OF AXILLARY TAIL OF RIGHT BREAST IN FEMALE, ESTROGEN RECEPTOR POSITIVE: Primary | ICD-10-CM

## 2018-12-18 PROCEDURE — 99205 OFFICE O/P NEW HI 60 MIN: CPT | Mod: S$GLB,,, | Performed by: INTERNAL MEDICINE

## 2018-12-18 PROCEDURE — 99999 PR PBB SHADOW E&M-EST. PATIENT-LVL III: CPT | Mod: PBBFAC,,, | Performed by: INTERNAL MEDICINE

## 2018-12-18 NOTE — PROGRESS NOTES
Subjective:       Patient ID: Elham Rodas is a 38 y.o. female.    Chief Complaint: No chief complaint on file.    HPI   REFERRING PHYSICIAN:  Jann Ayala M.D.    REASON FOR REFERRAL:  Breast cancer, consideration for adjuvant treatment.    HISTORY OF PRESENT ILLNESS:  Mrs. Rodas is a 38-year-old premenopausal female who   recently felt a mass in her breast.  That led to a mammogram and subsequently   to a biopsy that showed invasive mammary carcinoma that was ER positive, MN   positive, and HER-2 negative.  The patient was seen by Dr. Ayala and on   2018, she underwent a right lumpectomy with sentinel lymph node biopsy.    The pathology report from the procedure indicates that she had a positive   sentinel lymph node with a metastatic deposit of 6 mm.  There was no extranodal   extension.  On the right partial mastectomy specimen, she had an invasive   lobular carcinoma that was 14 mm in maximum diameter.  It was grade II and   resection margins were clear.  She has made an uneventful recovery and she is   here to discuss adjuvant treatment options.  Of note, a MammaPrint has been   sent; however, results are not currently available.    PAST MEDICAL HISTORY:  As above.  She has a history of prior thyroidectomy and   parathyroidectomy for a nodular goiter.  She also has a history of   cholecystectomy and tubal ligation.    FAMILY HISTORY:  Maternal aunt had cervical cancer.  There is no breast or   ovarian cancer in her immediate family.    SOCIAL HISTORY:  She is .  She does not smoke and does not drink   alcohol.  She works at a [employer].    GYN HISTORY:  Menarche was at 10 and she is still premenopausal.  She has   undergone tubal ligation.  She is .        Review of Systems      Overall she feels well. She is still recovering from her surgery and complains of soreness in the right breast and right axilla.  She denies any anxiety, depression, easy bruising, fevers, chills, night   sweats, weight loss, nausea, vomiting, diarrhea, constipation, diplopia, blurred vision, headache, chest pain, palpitations, shortness of breath, breast pain, abdominal pain, extremity pain, or difficulty ambulating.  The remainder of the ten-point ROS, including general, skin, lymph, H/N, cardiorespiratory, GI, , Neuro, Endocrine, and psychiatric is negative.     Objective:      Physical Exam        She is alert, oriented to time, place, person, pleasant, well      nourished, in no acute physical distress.  She is here with her boyfriend.                                  VITAL SIGNS:  Reviewed                                      HEENT:  Normal.  There are no nasal, oral, lip, gingival, auricular, lid,    or conjunctival lesions.  Mucosae are moist and pink, and there is no        thrush.  Pupils are equal, reactive to light and accommodation.              Extraocular muscle movements are intact.  Dentition is good.  There is no frontal or maxillary tenderness.                                     NECK:  Supple without JVD, adenopathy, or thyromegaly.                       LUNGS:  Clear to auscultation without wheezing, rales, or rhonchi.           CARDIOVASCULAR:  Reveals an S1, S2, no murmurs, no rubs, no gallops.         ABDOMEN:  Soft, nontender, without organomegaly.  Bowel sounds are    present.                                                                     EXTREMITIES:  No cyanosis, clubbing, or edema.                               BREASTS:  She is status post right lumpectomy with a well-healed  Incision in the lower outer quadrant of her right breast.    There are no masses in either breast.     A sentinel node biopsy scar is seen in the right axilla.  It is also well  healed.                                     LYMPHATIC:  There is no cervical, axillary, or supraclavicular adenopathy.   SKIN:  Warm and moist, without petechiae, rashes, induration, or ecchymoses.           NEUROLOGIC:  DTRs are 0-1+  bilaterally, symmetrical, motor function is 5/5,  and cranial nerves are  within normal limits.    Assessment:       1. Malignant neoplasm of axillary tail of right breast in female, estrogen receptor positive        Plan:         I had a long discussion with her; I recommended that we meet again in a week to discuss the mammaprint results.  We went over the significance of the mammaprint score.  She understands that for the mammaprint low risk patients who have positive nodes and therefore a high clinical risk, tehre is a 1.5 % chance of better DFS at 5 years.  She is very apprehensive about missing work.  In view of the above, we will wait for the mammaprint results and I will meet with her again on 12/26/2018.  It appears that she has undergone genetic councelling and testing, however, the results are not available for me to review.  Her multiple questions were answered to her satisfaction.  I spent approximately 60 minutes reviewing the available records and evaluating the patient, out of which over 50% of the time was spent face to face with the patient in counseling and coordinating this patient's care.

## 2018-12-19 ENCOUNTER — TELEPHONE (OUTPATIENT)
Dept: HEMATOLOGY/ONCOLOGY | Facility: CLINIC | Age: 38
End: 2018-12-19

## 2018-12-20 ENCOUNTER — TELEPHONE (OUTPATIENT)
Dept: HEMATOLOGY/ONCOLOGY | Facility: CLINIC | Age: 38
End: 2018-12-20

## 2018-12-20 ENCOUNTER — OFFICE VISIT (OUTPATIENT)
Dept: SURGERY | Facility: CLINIC | Age: 38
End: 2018-12-20
Payer: COMMERCIAL

## 2018-12-20 VITALS
BODY MASS INDEX: 26.91 KG/M2 | TEMPERATURE: 98 F | HEART RATE: 68 BPM | DIASTOLIC BLOOD PRESSURE: 81 MMHG | SYSTOLIC BLOOD PRESSURE: 121 MMHG | HEIGHT: 64 IN | WEIGHT: 157.63 LBS | RESPIRATION RATE: 16 BRPM

## 2018-12-20 DIAGNOSIS — C50.411 CARCINOMA OF UPPER-OUTER QUADRANT OF RIGHT BREAST IN FEMALE, ESTROGEN RECEPTOR POSITIVE: ICD-10-CM

## 2018-12-20 DIAGNOSIS — Z17.0 CARCINOMA OF UPPER-OUTER QUADRANT OF RIGHT BREAST IN FEMALE, ESTROGEN RECEPTOR POSITIVE: ICD-10-CM

## 2018-12-20 DIAGNOSIS — C50.611 MALIGNANT NEOPLASM OF AXILLARY TAIL OF RIGHT BREAST IN FEMALE, ESTROGEN RECEPTOR POSITIVE: Primary | ICD-10-CM

## 2018-12-20 DIAGNOSIS — Z17.0 MALIGNANT NEOPLASM OF AXILLARY TAIL OF RIGHT BREAST IN FEMALE, ESTROGEN RECEPTOR POSITIVE: Primary | ICD-10-CM

## 2018-12-20 PROCEDURE — 99204 OFFICE O/P NEW MOD 45 MIN: CPT | Mod: S$GLB,,, | Performed by: RADIOLOGY

## 2018-12-20 PROCEDURE — 99999 PR PBB SHADOW E&M-EST. PATIENT-LVL III: CPT | Mod: PBBFAC,,, | Performed by: RADIOLOGY

## 2018-12-20 NOTE — TELEPHONE ENCOUNTER
Patient arrived to clinic to discuss disability documents that were brought in to office on initial consult at [facility]. Pt expressed urgency that these needed to be completed asap. Explained to patient that this could not be completely filled out yet, given plan of care was still being determined. Patient provided with what has been completed thus far. Will contact disability office again today to discuss further. Patient voiced understanding.

## 2018-12-20 NOTE — TELEPHONE ENCOUNTER
Left message with Maricarmen Garcia, Employer's disability sepcialist. No answer, voicemail left requesting return call.

## 2018-12-20 NOTE — PROGRESS NOTES
REFERRING PHYSICIAN:   Jann Ayala M.D.    DIAGNOSIS:  pT1c N1a M0 invasive lobular carcinoma of the right breast    HISTORY OF PRESENT ILLNESS:   Ms. Rodas is a 38 year old female who was recently diagnosed with right breast cancer after she presented with a palpable mass in the 9 o'clock position of the right breast with associated skin/nipple retraction.  A mammogram and ultrasound in 2018 revealed an architectural distortion in the right breast at 9:00 oclock in the posterior depth correlating to the palpable mass.  This measured 13 x 9.6 x 8.7 mm on ultrasound.  A core needle biopsy of this lesion on October 10, 2018 revealed grade 1, invasive mammary carcinoma, which was ER positive (greater than 90%), RI positive (80%), and her 2 negative, with Ki-67 index of less than 10%.  On 2018, she underwent lumpectomy and sentinel node biopsy.  The pathology revealed the right breast with 14 mm, invasive lobular carcinoma grade 2, with the closest margin 1.5 mm posteriorly, with additional posterior margin taken, with a final margin of greater than 10 mm posteriorly.   sentinel lymph node is noted to have 6 mm focus of metastatic deposit without extranodal extension.  Her Mammoprint analysis is currently pending.  She is here today for recommendations regarding further treatment.    At present, she is healing from the surgery without any unexpected side effects.  She reports edema and some discomfort in the right breast without skin erythema. She denies right breast erythema, or nipple discharge. She also denies fever, night sweats, or weight loss.    REVIEW OF SYSTEMS:  As above.  In addition, patient denies headaches, visual problems, dizziness, chest pain, shortness of breath, cough, nausea, vomiting, diarrhea, or any new bony pains. Patient also denies easy bruising, skin rashes, or numbness or tingling.    GYN HISTORY:   Menarche at age 10, , LMP mid 2018. She has 2 year  history of oral contraceptive pills.    ECO    PAST MEDICAL HISTORY:  Past Medical History:   Diagnosis Date    Abnormal Pap smear of cervix 2016    ASCUS, - HPV    Hypothyroidism     Palpitations     PONV (postoperative nausea and vomiting)     Syncope and collapse     Thyroid disease     Vitamin D deficiency disease        PAST SURGICAL HISTORY:  Past Surgical History:   Procedure Laterality Date    AXILLARY NODE DISSECTION Right 2018    Procedure: LYMPHADENECTOMY, AXILLARY;  Surgeon: Jann Ayala MD;  Location: [address];  Service: General;  Laterality: Right;    BIOPSY, LYMPH NODE, SENTINEL Right 2018    Performed by Jann Ayala MD at [facility] OR [address] [place] [address]    CHOLECYSTECTOMY      INJECTION FOR SENTINEL NODE IDENTIFICATION Right 2018    Procedure: INJECTION, FOR SENTINEL NODE IDENTIFICATION;  Surgeon: Jann Ayala MD;  Location: [address];  Service: General;  Laterality: Right;    INJECTION, FOR SENTINEL NODE IDENTIFICATION Right 2018    Performed by Jann Ayala MD at [facility] OR [address] [place] [address]    LYMPHADENECTOMY, AXILLARY Right 2018    Performed by Jann Ayala MD at [facility] OR [address] [place] [address]    MASTECTOMY, PARTIAL Right 2018    Procedure: MASTECTOMY, PARTIAL - seed localized;  Surgeon: Jann Ayala MD;  Location: [facility] OR [address] [place] [address];  Service: General;  Laterality: Right;  seed placement     MASTECTOMY, PARTIAL - seed localized Right 2018    Performed by Jann Ayala MD at [facility] OR [address] [place] [address]    SENTINEL LYMPH NODE BIOPSY Right 2018    Procedure: BIOPSY, LYMPH NODE, SENTINEL;  Surgeon: Jann Ayala MD;  Location: [address];  Service: General;  Laterality: Right;    THYROIDECTOMY      TUBAL LIGATION         ALLERGIES:   Review of patient's allergies indicates:  No Known Allergies    MEDICATIONS:  Current Outpatient Medications   Medication Sig    ergocalciferol (ERGOCALCIFEROL) 50,000 unit Cap Take  "50,000 Units by mouth once daily.     levothyroxine (SYNTHROID) 88 MCG tablet Take 1 tablet (88 mcg total) by mouth before breakfast. Wait at least 4 hours after taking levothyroxine to take calcium.    parathyroid hormone (NATPARA) 50 mcg/dose Crtg Inject 75 mcg into the skin once daily. (Patient taking differently: Inject 75 mcg into the skin every evening. )    HYDROcodone-acetaminophen (NORCO) 5-325 mg per tablet Take 1 tablet by mouth every 6 (six) hours as needed for Pain.     No current facility-administered medications for this visit.        SOCIAL HISTORY:  Social History     Socioeconomic History    Marital status:      Spouse name: Not on file    Number of children: Not on file    Years of education: Not on file    Highest education level: Not on file   Social Needs    Financial resource strain: Not on file    Food insecurity - worry: Not on file    Food insecurity - inability: Not on file    Transportation needs - medical: Not on file    Transportation needs - non-medical: Not on file   Occupational History    Not on file   Tobacco Use    Smoking status: Never Smoker    Smokeless tobacco: Never Used   Substance and Sexual Activity    Alcohol use: Yes     Alcohol/week: 0.0 oz     Comment: occ    Drug use: No    Sexual activity: Yes     Partners: Male     Birth control/protection: See Surgical Hx     Comment:    Other Topics Concern    Not on file   Social History Narrative    Not on file       FAMILY HISTORY:  Family History   Problem Relation Age of Onset    Hypertension Mother     Heart attack Mother     Hypertension Father     Ovarian cancer Maternal Aunt     Cancer Paternal Grandfather     Breast cancer Neg Hx     Colon cancer Neg Hx          PHYSICAL EXAMINATION:  Vitals:    12/20/18 0824   BP: 121/81   Pulse: 68   Resp: 16   Temp: 98.1 °F (36.7 °C)   TempSrc: Oral   Weight: 71.5 kg (157 lb 10.1 oz)   Height: 5' 4" (1.626 m)   Body mass index is 27.06 " kg/m².  GENERAL: Patient is alert and oriented, in no acute distress.  HEENT:Extraocular muscles are intact.  Oropharynx is clear without lesions.  There is no cervical or supraclavicular lymphadenopathy palpated.  No thyromegaly noted.  HEART: Regular rate and rhythm.  LUNGS: Clear to auscultation bilaterally.  BREAST EXAM: There is mild edema of the right breast without skin erythema or discharge. The scar secondary to lumpectomy is noted in the lateral aspect of the right breast. The right nipple is inverted.There is also a scar in the right axilla secondary to sentinel node biopsy. No abnormal masses palpated in the right breast or right axilla. The left breast and left axilla are also without palpable masses.  ABDOMEN:Soft, nontender, nondistended, without hepatosplenomegaly.  Normoactive bowel sounds.  EXTREMITIES: No clubbing, cyanosis, or edema.  NEUROLOGICAL: Cranial nerve II through XII grossly intact.  Sensation is intact.  Strength is 5 out of 5 in the upper and lower extremities bilaterally.       ASSESSMENT:   This is a 38-year-old female with p T1c N1a M0, grade 2, invasive lobular carcinoma of the right breast who underwent lumpectomy and sentinel node biopsy on November 21, 2018 with 1.4 cm lesion, with 1/1 sentinel lymph node positive with 6 mm focus of metastatic deposit, ER/LA positive, her 2 negative, Ki-67 less than 10%.    PLAN:   After review of the available pathology and images of the radiological studies, Ms. Rodas is noted to have lobular carcinoma with involvement of 1/1 sentinel lymph node.  She was originally planned to undergo MRI of the bilateral breasts based on notes by Dr. Ayala which I do not see in [facility].  She is also undergoing mammoprint analysis to determine the benefit for chemotherapy.  To reduce her chance of locoregional recurrence, I recommend postoperative radiation to the right breast and draining lymph nodes.  I plan to deliver approximately 5040 cGy +1000 cGy boost.   Based on plans for chemotherapy, she will return for follow up and treatment planning CT.    The risks, benefits, and side effects of radiation were explained in detail to the patient and her .  All questions were answered and informed consent was signed.  I plan to see the patient back for follow up and treatment planning CT based on chemotherapy plans.    Psychosocial Distress screening score of Distress Score: 0 noted and reviewed. No intervention indicated.    I spent approximately 60 minutes reviewing the available records and evaluating the patient, out of which over 50% of the time was spent face to face with the patient in counseling and coordinating this patient's care.

## 2018-12-20 NOTE — PATIENT INSTRUCTIONS
Return for ct/sim based on chemo plans        Radiation Therapy Treatment  Radiation therapy can help you in your fight against cancer. It begins with a session to discuss treatment with your doctor. If you and your doctor decide on radiation, you will return for a simulation. The simulation is a planning session that helps the doctor target your cancer. He or she will design a radiation plan to protect normal tissues. When the simulation and plan are completed, you will begin your daily treatments. Treatment is usually once daily Monday to Friday. It takes less than a half an hour. Sometimes you may need radiation twice a day, with usually 6 hours between treatments. After the course of radiation is complete, you will be scheduled for follow-up appointments. This is to make sure the cancer is under control. The follow-ups will also make sure that any side effects from the treatment are taken care of.  Radiation therapy uses high-energy X-rays to kill cancer cells.   Your treatment planning visit: The simulation  Your radiation therapy team uses a special machine called a simulator to map out your treatment. The simulator is usually an X-ray machine (fluoroscopy), CT scanner, MRI scanner, or PET-CT scanner machine. Laser lights act as guides to help position your body accurately. During this visit:  · The team figures out the best position for your body. They make notes in your chart so youll be placed the same way each time.  · You may use special devices to keep your body correctly positioned and still during treatment. These may include molds, masks, rests, and blocks.  · The team makes ink marks on your skin. These will help you get in the same position for each treatment. Tiny permanent tattoos may also be used.   · Markers such as metal balls or wires may be put on or in your body. Sometime these are taped to the skin to help with the imaging process. These work with the X-rays to position your body. The  markers are removed when the visit is over.  After the team has the imaging and data, the information is sent into the computer planning system. Your doctor and the team of physicists and dosimetrists design a treatment field. The field will best target your cancer and how it might spread. It will also help limit radiation to nearby normal tissues.  Your treatments  Each treatment usually takes 10 to 30 minutes. You may need to change into a hospital gown. The radiation therapist puts you in the correct position on the treatment table, then leaves the room. Sometimes you may need more imaging before each treatment. The machine may take digital X-rays or a CT scan to help make sure you are lined up correctly. During treatment, lie as still as you can and breathe normally. You will hear noises coming from the machine. You can talk to the radiation therapist, who watches you from the control room on a TV monitor. After treatment, the therapist will help you off the table. You can then get dressed and go back to your normal activities.  Date Last Reviewed: 1/14/2016 © 2000-2017 [employer] [address] [place] [address]. All rights reserved. This information is not intended as a substitute for professional medical care. Always follow your healthcare professional's instructions.        Discharge Instructions for Radiation Therapy  Radiation therapy uses high-energy X-rays to kill cancer cells and help you in your fight against cancer. Radiation destroys cancer cells gradually, over time. The goal of therapy is to focus on and kill as many cancer cells as possible. Radiation can also damage or kill some of the normal cells that are closest to the tumor. Damaged normal cells can repair themselves, often within a few days.  Caring for your skin  You should ask your healthcare provider for specific products that he or she recommends for washing and bathing. In general, use a mild nondetergent soap  "and warm (not hot) water to clean the area receiving radiation. Pat the region dry rather than rubbing.  Your healthcare provider may give you products to moisturize the skin and prevent infection. The goal is to prevent cracks or breaks in the skin that may be sensitive from treatment:   · Dont be surprised if your treatment causes skin redness, and a type of "sunburn" over time. Some radiation treatments can cause this.   · Ask your therapy team what lotion to use. Also ask for directions about when and how to apply it.  · Avoid prolonged or direct sunlight on the treated area. Ask your therapy team about using a sunscreen. You do not have to avoid going outside altogether, but must take appropriate precautions.  · Dont remove ink marks unless your radiation therapist says its OK. Dont scrub or use soap on the marks when you wash. Let water run over them and pat them dry.  · Protect your skin from heat or cold. Avoid hot tubs, saunas, heating pads, or ice packs.  · Avoid clothing that causes friction or rubbing on the skin.  Fighting fatigue  Radiation therapy may cause you to feel tired. Your body is working hard to heal and repair itself. To feel better, try these things:  · Do light exercise each day. Take short walks.  · Plan tasks for the times when you tend to have the most energy. Ask for help when you need it.  · Relax before you go to bed. This will help you sleep better. Try reading or listening to soothing music.  Coping with appetite changes  Here are ways to cope:  · Tell your therapy team if you find it hard to eat or you have no appetite. You may be referred to a nutritionist to help you with meal planning.  · Radiation to certain internal sites can cause nausea, depending on the location of treatment. This can affect your appetite. Think of healthy eating as part of your treatment. Try these tips:  ¨ Eat slowly.  ¨ Eat small meals several times a day.  ¨ Eat more food when youre feeling " better.  ¨ Ask others to keep you company when you eat.  ¨ Stock up on easy-to-prepare foods.  ¨ Eat foods high in protein and calories. Your healthcare provider may recommend liquid meal supplements.  ¨ Drink plenty of water and other fluids.  ¨ Ask your healthcare provider before taking any vitamins or over-the-counter supplements. Such products are not regulated by the FDA and can sometimes interfere with your treatments.   Dealing with other side effects  Here are suggestions to deal with other side effects:   · Be prepared for hair loss in the area being treated. The hair loss may be permanent. Be sure to discuss this with your healthcare provider.  · Sip cool water if your mouth or throat becomes dry or sore. Ice chips may also help.  · Tell your healthcare provider if you have diarrhea or constipation. You may be given a special diet.  · If you have trouble swallowing liquids, tell your healthcare provider.  Follow-up  Make a follow-up appointment as directed by your healthcare provider.     When to call your healthcare provider  Call your healthcare provider right away if you have any of the following:  · Unexpected headaches  · Trouble concentrating  · Ongoing fatigue  · Wheezing, shortness of breath, or trouble breathing  · Pain that doesnt go away, especially if its always in the same place  · New or unusual lumps, bumps, or swelling  · Dizziness or lightheadedness  · Unusual rashes, bruises, or bleeding  · Fever of 100.4°F (38°C) or higher, or chills  · Nausea and vomiting  · Diarrhea that doesnt improve with time  · Skin breakdown; significant pain due to skin irritation   Date Last Reviewed: 1/13/2016  © 6309-8668 [employer] [address] [place] [address], [place] [address]. All rights reserved. This information is not intended as a substitute for professional medical care. Always follow your healthcare professional's instructions.        Radiation Therapy: Managing Short-Term Side Effects      Take short walks daily.   Radiation therapy uses high-energy X-rays or particles to kill cancer cells. Some normal cells can also be affected. This causes side effects such as dry skin, tiredness (fatigue), or changes in your appetite. Most side effects go away when your radiation therapy is over.  Having side effects of radiation therapy does not mean that your cancer is getting worse or that therapy isnt working.   Caring for your skin  Skin problems may happen where your body gets radiation. Your skin may become dry, itchy, red, and peeling. It may darken in that spot, like a tan. To care for your skin:  · Dont scrub on the treatment area. Clean that area of the skin every day. Use warm water and mild soap, or as your healthcare provider advises. Pat the skin afterward or let it air dry.  · Ask your therapy team what lotion to use and when to use it.  · Keep the treated area out of the sun. Ask your team about using a sunscreen.  · Don't remove ink marks unless your radiation therapist says you can. Dont scrub the marks when you wash. Let water run over them and pat them dry.  · Protect your skin from heat or cold. Avoid hot tubs, saunas, hot pads, and ice packs.  · Wear soft, loose clothing to avoid rubbing skin.  Fighting tiredness  The cancer itself or the radiation therapy may cause you to feel tired. Your body is working hard to heal and repair itself. To feel better:  · Try light exercise each day. Take short walks.  · Plan tasks for the times when you tend to have the most energy. Ask for help when you need it.  · Relax before you go to bed to sleep better. Try reading or listening to soothing music.  · Be sure to let your cancer care team know if you continue to have fatigue that is not getting better. They may be able to offer ways to help.   Coping with appetite changes  Tell your therapy team if you find it hard to eat or have no appetite. You may need to see a nutritionist. This is a healthcare  provider with special training in meal planning. To keep your strength up, you need to eat well and maintain your weight. Think of healthy eating as part of your treatment. Try these tips:  · Eat slowly.  · Eat small meals several times a day.  · Eat more food when youre feeling better, even if it is not mealtime.  · Ask others to keep you company when you eat.  · Stock up on easy-to-prepare foods.  · Eat foods high in protein and calories.  · Drink plenty of water and other fluids.  · Ask your healthcare provider before taking any vitamins.  Site-specific side effects  These side effects include the following:   · You may lose hair in the area being treated. The hair often grows back after treatment.  · Your mouth or throat can become dry or sore if your head or neck is being treated. Sip cool water to help ease discomfort.  · Nausea and bowel changes can happen with radiation to the pelvic region. Tell your healthcare provider if you have nausea, diarrhea, or constipation. You may need to take medicine or follow a special diet.  Talk with your healthcare team  Radiation therapy can also have other side effects, including some that might not show up until years later. Be sure to talk with your healthcare team about what to expect with the type of radiation therapy you are getting, including when you should call them with concerns.   Date Last Reviewed: 5/1/2016  © 5719-0543 [employer]. [address] [place] [address], [place] [address]. All rights reserved. This information is not intended as a substitute for professional medical care. Always follow your healthcare professional's instructions.

## 2018-12-26 ENCOUNTER — TELEPHONE (OUTPATIENT)
Dept: HEMATOLOGY/ONCOLOGY | Facility: CLINIC | Age: 38
End: 2018-12-26

## 2018-12-26 ENCOUNTER — OFFICE VISIT (OUTPATIENT)
Dept: HEMATOLOGY/ONCOLOGY | Facility: CLINIC | Age: 38
End: 2018-12-26
Payer: COMMERCIAL

## 2018-12-26 VITALS
TEMPERATURE: 98 F | DIASTOLIC BLOOD PRESSURE: 76 MMHG | HEIGHT: 64 IN | OXYGEN SATURATION: 100 % | WEIGHT: 159.63 LBS | RESPIRATION RATE: 18 BRPM | SYSTOLIC BLOOD PRESSURE: 131 MMHG | BODY MASS INDEX: 27.25 KG/M2 | HEART RATE: 81 BPM

## 2018-12-26 DIAGNOSIS — Z51.11 ENCOUNTER FOR ANTINEOPLASTIC CHEMOTHERAPY: Primary | ICD-10-CM

## 2018-12-26 DIAGNOSIS — Z17.0 CARCINOMA OF UPPER-OUTER QUADRANT OF RIGHT BREAST IN FEMALE, ESTROGEN RECEPTOR POSITIVE: Primary | ICD-10-CM

## 2018-12-26 DIAGNOSIS — C50.411 CARCINOMA OF UPPER-OUTER QUADRANT OF RIGHT BREAST IN FEMALE, ESTROGEN RECEPTOR POSITIVE: Primary | ICD-10-CM

## 2018-12-26 PROCEDURE — 99999 PR PBB SHADOW E&M-EST. PATIENT-LVL III: CPT | Mod: PBBFAC,,, | Performed by: INTERNAL MEDICINE

## 2018-12-26 PROCEDURE — 99214 OFFICE O/P EST MOD 30 MIN: CPT | Mod: S$GLB,,, | Performed by: INTERNAL MEDICINE

## 2018-12-26 NOTE — Clinical Note
Elham, we need to see her with labs and an echo on 1/4/2018 to begin chemotherapy that day.Dr. Ayala to insert a port before then.

## 2018-12-26 NOTE — H&P (VIEW-ONLY)
Subjective:       Patient ID: Elham Rodas is a 38 y.o. female.    Chief Complaint: No chief complaint on file.    HPI   Mrs. Rodas returns today for follow up.  Her mammaprint was low risk.  Briefly, she is a 38-year-old premenopausal female who was recently diagnosed with an invasive mammary carcinoma that was ER positive, NC positive, and HER-2 negative.   On 11/21/2018, she underwent a right lumpectomy with sentinel lymph node biopsy.  The pathology report from the procedure indicates that she had a positive sentinel lymph node with a metastatic deposit of 6 mm.  There was no extranodal extension.  On the right partial mastectomy specimen, she had an invasive lobular carcinoma that was 14 mm in maximum diameter.  It was grade II and resection margins were clear.        Review of Systems      Overall she feels well. She has now recovered  from her surgery and has no compalints.  She denies any anxiety, depression, easy bruising, fevers, chills, night  sweats, weight loss, nausea, vomiting, diarrhea, constipation, diplopia, blurred vision, headache, chest pain, palpitations, shortness of breath, breast pain, abdominal pain, extremity pain, or difficulty ambulating.  The remainder of the ten-point ROS, including general, skin, lymph, H/N, cardiorespiratory, GI, , Neuro, Endocrine, and psychiatric is negative.     Objective:      Physical Exam        She is alert, oriented to time, place, person, pleasant, well      nourished, in no acute physical distress.  She is here with her boyfriend.                                  VITAL SIGNS:  Reviewed                                      HEENT:  Normal.  There are no nasal, oral, lip, gingival, auricular, lid,    or conjunctival lesions.  Mucosae are moist and pink, and there is no        thrush.  Pupils are equal, reactive to light and accommodation.              Extraocular muscle movements are intact.  Dentition is good.  There is no frontal or maxillary tenderness.                                      NECK:  Supple without JVD, adenopathy, or thyromegaly.                       LUNGS:  Clear to auscultation without wheezing, rales, or rhonchi.           CARDIOVASCULAR:  Reveals an S1, S2, no murmurs, no rubs, no gallops.         ABDOMEN:  Soft, nontender, without organomegaly.  Bowel sounds are    present.                                                                     EXTREMITIES:  No cyanosis, clubbing, or edema.                               BREASTS:  She is status post right lumpectomy with a well-healed  Incision in the lower outer quadrant of her right breast.    There are no masses in either breast.     A sentinel node biopsy scar is seen in the right axilla.  It is also well  healed.                                     LYMPHATIC:  There is no cervical, axillary, or supraclavicular adenopathy.   SKIN:  Warm and moist, without petechiae, rashes, induration, or ecchymoses.           NEUROLOGIC:  DTRs are 0-1+ bilaterally, symmetrical, motor function is 5/5,  and cranial nerves are  within normal limits.    Assessment:       1. Carcinoma of upper-outer quadrant of right breast in female, estrogen receptor positive        Plan:         I had a long discussion with her.  She understands that for the mammaprint low risk patients who have positive nodes and therefore a high clinical risk, there is a 1.5 % chance of better DFS at 5 years.  She stated in no uncertain terms that for a 1.5 % benefit she would want to undergo chemotherapy.  We will obtain an echocardiogram, labs, and ask Dr. Ayala to insert a port.  [facility] 1/4/2019 to begin chemotherapy.  Her multiple questions were answered to her satisfaction.

## 2018-12-26 NOTE — TELEPHONE ENCOUNTER
----- Message from Artemio Restrepo MD sent at 12/26/2018 10:43 AM CST -----  Can we verify with pathology that it was sent, and if so when?   ----- Message -----  From: Elham Colby RN  Sent: 12/26/2018  10:28 AM  To: Artemio Restrepo MD    I requested someone look into this    ----- Message -----  From: Artemio Restrepo MD  Sent: 12/26/2018   9:35 AM  To: Elham Colby, RN    Rogers,  I do not see the mammaprint report.  Can you please check and see if it was received by the breast center? If not, there is no point seeing her today and we should reschedule for next week.

## 2018-12-26 NOTE — PROGRESS NOTES
Subjective:       Patient ID: Elham Rodas is a 38 y.o. female.    Chief Complaint: No chief complaint on file.    HPI   Mrs. Rodas returns today for follow up.  Her mammaprint was low risk.  Briefly, she is a 38-year-old premenopausal female who was recently diagnosed with an invasive mammary carcinoma that was ER positive, MI positive, and HER-2 negative.   On 11/21/2018, she underwent a right lumpectomy with sentinel lymph node biopsy.  The pathology report from the procedure indicates that she had a positive sentinel lymph node with a metastatic deposit of 6 mm.  There was no extranodal extension.  On the right partial mastectomy specimen, she had an invasive lobular carcinoma that was 14 mm in maximum diameter.  It was grade II and resection margins were clear.        Review of Systems      Overall she feels well. She has now recovered  from her surgery and has no compalints.  She denies any anxiety, depression, easy bruising, fevers, chills, night  sweats, weight loss, nausea, vomiting, diarrhea, constipation, diplopia, blurred vision, headache, chest pain, palpitations, shortness of breath, breast pain, abdominal pain, extremity pain, or difficulty ambulating.  The remainder of the ten-point ROS, including general, skin, lymph, H/N, cardiorespiratory, GI, , Neuro, Endocrine, and psychiatric is negative.     Objective:      Physical Exam        She is alert, oriented to time, place, person, pleasant, well      nourished, in no acute physical distress.  She is here with her boyfriend.                                  VITAL SIGNS:  Reviewed                                      HEENT:  Normal.  There are no nasal, oral, lip, gingival, auricular, lid,    or conjunctival lesions.  Mucosae are moist and pink, and there is no        thrush.  Pupils are equal, reactive to light and accommodation.              Extraocular muscle movements are intact.  Dentition is good.  There is no frontal or maxillary tenderness.                                      NECK:  Supple without JVD, adenopathy, or thyromegaly.                       LUNGS:  Clear to auscultation without wheezing, rales, or rhonchi.           CARDIOVASCULAR:  Reveals an S1, S2, no murmurs, no rubs, no gallops.         ABDOMEN:  Soft, nontender, without organomegaly.  Bowel sounds are    present.                                                                     EXTREMITIES:  No cyanosis, clubbing, or edema.                               BREASTS:  She is status post right lumpectomy with a well-healed  Incision in the lower outer quadrant of her right breast.    There are no masses in either breast.     A sentinel node biopsy scar is seen in the right axilla.  It is also well  healed.                                     LYMPHATIC:  There is no cervical, axillary, or supraclavicular adenopathy.   SKIN:  Warm and moist, without petechiae, rashes, induration, or ecchymoses.           NEUROLOGIC:  DTRs are 0-1+ bilaterally, symmetrical, motor function is 5/5,  and cranial nerves are  within normal limits.    Assessment:       1. Carcinoma of upper-outer quadrant of right breast in female, estrogen receptor positive        Plan:         I had a long discussion with her.  She understands that for the mammaprint low risk patients who have positive nodes and therefore a high clinical risk, there is a 1.5 % chance of better DFS at 5 years.  She stated in no uncertain terms that for a 1.5 % benefit she would want to undergo chemotherapy.  We will obtain an echocardiogram, labs, and ask Dr. Ayala to insert a port.  [facility] 1/4/2019 to begin chemotherapy.  Her multiple questions were answered to her satisfaction.

## 2018-12-26 NOTE — TELEPHONE ENCOUNTER
Contacted patient disability office (number located on documents needing to be filled). Unable to reach Maricarmen at number. Detailed voicemail left requesting that she call back for further assistance. Awaiting call back.

## 2018-12-31 DIAGNOSIS — C50.411 MALIGNANT NEOPLASM OF UPPER-OUTER QUADRANT OF RIGHT BREAST IN FEMALE, ESTROGEN RECEPTOR POSITIVE: Primary | ICD-10-CM

## 2018-12-31 DIAGNOSIS — Z17.0 MALIGNANT NEOPLASM OF UPPER-OUTER QUADRANT OF RIGHT BREAST IN FEMALE, ESTROGEN RECEPTOR POSITIVE: Primary | ICD-10-CM

## 2019-01-02 ENCOUNTER — ANESTHESIA (OUTPATIENT)
Dept: SURGERY | Facility: HOSPITAL | Age: 39
End: 2019-01-02
Payer: COMMERCIAL

## 2019-01-02 ENCOUNTER — ANESTHESIA EVENT (OUTPATIENT)
Dept: SURGERY | Facility: HOSPITAL | Age: 39
End: 2019-01-02
Payer: COMMERCIAL

## 2019-01-02 ENCOUNTER — TELEPHONE (OUTPATIENT)
Dept: HEMATOLOGY/ONCOLOGY | Facility: CLINIC | Age: 39
End: 2019-01-02

## 2019-01-02 ENCOUNTER — HOSPITAL ENCOUNTER (OUTPATIENT)
Facility: HOSPITAL | Age: 39
Discharge: HOME | End: 2019-01-02
Attending: SURGERY | Admitting: SURGERY
Payer: COMMERCIAL

## 2019-01-02 VITALS
RESPIRATION RATE: 15 BRPM | TEMPERATURE: 97 F | OXYGEN SATURATION: 97 % | BODY MASS INDEX: 26.46 KG/M2 | WEIGHT: 155 LBS | DIASTOLIC BLOOD PRESSURE: 65 MMHG | SYSTOLIC BLOOD PRESSURE: 139 MMHG | HEIGHT: 64 IN | HEART RATE: 70 BPM

## 2019-01-02 DIAGNOSIS — C50.911 BREAST CANCER, RIGHT: Primary | ICD-10-CM

## 2019-01-02 PROCEDURE — 63600175 PHARM REV CODE 636 W HCPCS: Performed by: SURGERY

## 2019-01-02 PROCEDURE — 36561 INSERT TUNNELED CV CATH: CPT | Mod: LT,,, | Performed by: SURGERY

## 2019-01-02 PROCEDURE — 37000009 HC ANESTHESIA EA ADD 15 MINS: Performed by: SURGERY

## 2019-01-02 PROCEDURE — 27200651 HC AIRWAY, LMA: Performed by: NURSE ANESTHETIST, CERTIFIED REGISTERED

## 2019-01-02 PROCEDURE — 94761 N-INVAS EAR/PLS OXIMETRY MLT: CPT

## 2019-01-02 PROCEDURE — 71000015 HC POSTOP RECOV 1ST HR: Performed by: SURGERY

## 2019-01-02 PROCEDURE — S0020 INJECTION, BUPIVICAINE HYDRO: HCPCS | Performed by: SURGERY

## 2019-01-02 PROCEDURE — 25000003 PHARM REV CODE 250: Performed by: STUDENT IN AN ORGANIZED HEALTH CARE EDUCATION/TRAINING PROGRAM

## 2019-01-02 PROCEDURE — 63600175 PHARM REV CODE 636 W HCPCS: Performed by: NURSE ANESTHETIST, CERTIFIED REGISTERED

## 2019-01-02 PROCEDURE — 77001 CHG FLUOROGUIDE CNTRL VEN ACCESS,PLACE,REPLACE,REMOVE: ICD-10-PCS | Mod: 26,,, | Performed by: SURGERY

## 2019-01-02 PROCEDURE — 25000003 PHARM REV CODE 250: Performed by: SURGERY

## 2019-01-02 PROCEDURE — 63600175 PHARM REV CODE 636 W HCPCS: Performed by: STUDENT IN AN ORGANIZED HEALTH CARE EDUCATION/TRAINING PROGRAM

## 2019-01-02 PROCEDURE — 36000707: Performed by: SURGERY

## 2019-01-02 PROCEDURE — 77001 FLUOROGUIDE FOR VEIN DEVICE: CPT | Mod: 26,,, | Performed by: SURGERY

## 2019-01-02 PROCEDURE — 36561 PR INSERT TUNNELED CV CATH WITH PORT: ICD-10-PCS | Mod: LT,,, | Performed by: SURGERY

## 2019-01-02 PROCEDURE — 71000033 HC RECOVERY, INTIAL HOUR: Performed by: SURGERY

## 2019-01-02 PROCEDURE — D9220A PRA ANESTHESIA: Mod: CRNA,,, | Performed by: NURSE ANESTHETIST, CERTIFIED REGISTERED

## 2019-01-02 PROCEDURE — D9220A PRA ANESTHESIA: Mod: ANES,,, | Performed by: ANESTHESIOLOGY

## 2019-01-02 PROCEDURE — 27000221 HC OXYGEN, UP TO 24 HOURS

## 2019-01-02 PROCEDURE — 37000008 HC ANESTHESIA 1ST 15 MINUTES: Performed by: SURGERY

## 2019-01-02 PROCEDURE — D9220A PRA ANESTHESIA: ICD-10-PCS | Mod: CRNA,,, | Performed by: NURSE ANESTHETIST, CERTIFIED REGISTERED

## 2019-01-02 PROCEDURE — 63600175 PHARM REV CODE 636 W HCPCS: Performed by: ANESTHESIOLOGY

## 2019-01-02 PROCEDURE — C1788 PORT, INDWELLING, IMP: HCPCS | Performed by: SURGERY

## 2019-01-02 PROCEDURE — 36000706: Performed by: SURGERY

## 2019-01-02 PROCEDURE — D9220A PRA ANESTHESIA: ICD-10-PCS | Mod: ANES,,, | Performed by: ANESTHESIOLOGY

## 2019-01-02 PROCEDURE — 25000003 PHARM REV CODE 250: Performed by: NURSE ANESTHETIST, CERTIFIED REGISTERED

## 2019-01-02 DEVICE — KIT POWERPORT SINGLE 8FR: Type: IMPLANTABLE DEVICE | Site: SUBCLAVIAN | Status: FUNCTIONAL

## 2019-01-02 RX ORDER — OXYCODONE HYDROCHLORIDE 5 MG/1
5 TABLET ORAL EVERY 4 HOURS PRN
Status: DISCONTINUED | OUTPATIENT
Start: 2019-01-02 | End: 2019-01-02 | Stop reason: HOSPADM

## 2019-01-02 RX ORDER — MIDAZOLAM HYDROCHLORIDE 1 MG/ML
INJECTION, SOLUTION INTRAMUSCULAR; INTRAVENOUS
Status: DISCONTINUED | OUTPATIENT
Start: 2019-01-02 | End: 2019-01-02

## 2019-01-02 RX ORDER — LIDOCAINE HCL/PF 100 MG/5ML
SYRINGE (ML) INTRAVENOUS
Status: DISCONTINUED | OUTPATIENT
Start: 2019-01-02 | End: 2019-01-02

## 2019-01-02 RX ORDER — DEXAMETHASONE SODIUM PHOSPHATE 4 MG/ML
INJECTION, SOLUTION INTRA-ARTICULAR; INTRALESIONAL; INTRAMUSCULAR; INTRAVENOUS; SOFT TISSUE
Status: DISCONTINUED | OUTPATIENT
Start: 2019-01-02 | End: 2019-01-02

## 2019-01-02 RX ORDER — HEPARIN SODIUM (PORCINE) LOCK FLUSH IV SOLN 100 UNIT/ML 100 UNIT/ML
SOLUTION INTRAVENOUS
Status: DISCONTINUED | OUTPATIENT
Start: 2019-01-02 | End: 2019-01-02 | Stop reason: HOSPADM

## 2019-01-02 RX ORDER — PROPOFOL 10 MG/ML
VIAL (ML) INTRAVENOUS
Status: DISCONTINUED | OUTPATIENT
Start: 2019-01-02 | End: 2019-01-02

## 2019-01-02 RX ORDER — SODIUM CHLORIDE 9 MG/ML
INJECTION, SOLUTION INTRAVENOUS CONTINUOUS
Status: DISCONTINUED | OUTPATIENT
Start: 2019-01-02 | End: 2019-01-25

## 2019-01-02 RX ORDER — FENTANYL CITRATE 50 UG/ML
INJECTION, SOLUTION INTRAMUSCULAR; INTRAVENOUS
Status: DISCONTINUED | OUTPATIENT
Start: 2019-01-02 | End: 2019-01-02

## 2019-01-02 RX ORDER — LIDOCAINE HYDROCHLORIDE 10 MG/ML
1 INJECTION, SOLUTION EPIDURAL; INFILTRATION; INTRACAUDAL; PERINEURAL ONCE
Status: ACTIVE | OUTPATIENT
Start: 2019-01-02

## 2019-01-02 RX ORDER — SODIUM CHLORIDE 0.9 % (FLUSH) 0.9 %
3 SYRINGE (ML) INJECTION
Status: DISCONTINUED | OUTPATIENT
Start: 2019-01-02 | End: 2019-01-02 | Stop reason: HOSPADM

## 2019-01-02 RX ORDER — FENTANYL CITRATE 50 UG/ML
25 INJECTION, SOLUTION INTRAMUSCULAR; INTRAVENOUS EVERY 5 MIN PRN
Status: DISCONTINUED | OUTPATIENT
Start: 2019-01-02 | End: 2019-01-02 | Stop reason: HOSPADM

## 2019-01-02 RX ORDER — ONDANSETRON 2 MG/ML
INJECTION INTRAMUSCULAR; INTRAVENOUS
Status: DISCONTINUED | OUTPATIENT
Start: 2019-01-02 | End: 2019-01-02

## 2019-01-02 RX ORDER — CEFAZOLIN SODIUM 1 G/3ML
2 INJECTION, POWDER, FOR SOLUTION INTRAMUSCULAR; INTRAVENOUS
Status: COMPLETED | OUTPATIENT
Start: 2019-01-02 | End: 2019-01-02

## 2019-01-02 RX ORDER — BUPIVACAINE HYDROCHLORIDE 5 MG/ML
INJECTION, SOLUTION EPIDURAL; INTRACAUDAL
Status: DISCONTINUED | OUTPATIENT
Start: 2019-01-02 | End: 2019-01-02 | Stop reason: HOSPADM

## 2019-01-02 RX ORDER — GLYCOPYRROLATE 0.2 MG/ML
INJECTION INTRAMUSCULAR; INTRAVENOUS
Status: DISCONTINUED | OUTPATIENT
Start: 2019-01-02 | End: 2019-01-02

## 2019-01-02 RX ADMIN — GLYCOPYRROLATE 0.1 MG: 0.2 INJECTION, SOLUTION INTRAMUSCULAR; INTRAVENOUS at 03:01

## 2019-01-02 RX ADMIN — ONDANSETRON 4 MG: 2 INJECTION INTRAMUSCULAR; INTRAVENOUS at 03:01

## 2019-01-02 RX ADMIN — OXYCODONE HYDROCHLORIDE 5 MG: 5 TABLET ORAL at 04:01

## 2019-01-02 RX ADMIN — PROPOFOL 100 MG: 10 INJECTION, EMULSION INTRAVENOUS at 03:01

## 2019-01-02 RX ADMIN — LIDOCAINE HYDROCHLORIDE 100 MG: 20 INJECTION, SOLUTION INTRAVENOUS at 03:01

## 2019-01-02 RX ADMIN — FENTANYL CITRATE 25 MCG: 50 INJECTION INTRAMUSCULAR; INTRAVENOUS at 04:01

## 2019-01-02 RX ADMIN — MIDAZOLAM HYDROCHLORIDE 2 MG: 1 INJECTION, SOLUTION INTRAMUSCULAR; INTRAVENOUS at 02:01

## 2019-01-02 RX ADMIN — FENTANYL CITRATE 50 MCG: 50 INJECTION, SOLUTION INTRAMUSCULAR; INTRAVENOUS at 03:01

## 2019-01-02 RX ADMIN — DEXAMETHASONE SODIUM PHOSPHATE 8 MG: 4 INJECTION, SOLUTION INTRAMUSCULAR; INTRAVENOUS at 03:01

## 2019-01-02 RX ADMIN — SODIUM CHLORIDE, SODIUM GLUCONATE, SODIUM ACETATE, POTASSIUM CHLORIDE, MAGNESIUM CHLORIDE, SODIUM PHOSPHATE, DIBASIC, AND POTASSIUM PHOSPHATE: .53; .5; .37; .037; .03; .012; .00082 INJECTION, SOLUTION INTRAVENOUS at 03:01

## 2019-01-02 RX ADMIN — SODIUM CHLORIDE: 0.9 INJECTION, SOLUTION INTRAVENOUS at 02:01

## 2019-01-02 RX ADMIN — CEFAZOLIN 2 G: 330 INJECTION, POWDER, FOR SOLUTION INTRAMUSCULAR; INTRAVENOUS at 02:01

## 2019-01-02 NOTE — TELEPHONE ENCOUNTER
Returned call to Maricarmen Wilcox with [facility] to discuss patient's disability documents. Information unable to be completed at this time given patient needing completed plan of care. Some information already submitted. Maricarmen voiced understanding and complete additional information as soon as able and submit.     New Chemo to start 1/4/2019

## 2019-01-02 NOTE — BRIEF OP NOTE
[facility]  Brief Operative Note     SUMMARY     Surgery Date: 1/2/2019     Surgeon(s) and Role:     * Jann Ayala MD - Primary     * Cain Mcgarry MD - Resident - Assisting      Pre-op Diagnosis:  Malignant neoplasm of upper-outer quadrant of right breast in female, estrogen receptor positive [C50.411, Z17.0]    Post-op Diagnosis:  Post-Op Diagnosis Codes:     * Malignant neoplasm of upper-outer quadrant of right breast in female, estrogen receptor positive [C50.411, Z17.0]    Procedure(s) (LRB):  [id number] (Left)    Anesthesia: General    Description of the findings of the procedure: left subclavian port-a-cath placement    Findings/Key Components: port placement verified with fluoroscopy; aspirated and flushed without resistance    Estimated Blood Loss: 5 mL         Specimens:   Specimen (12h ago, onward)    None          Discharge Note    SUMMARY     Admit Date: 1/2/2019    Discharge Date and Time:  01/02/2019 4:16 PM    Hospital Course (synopsis of major diagnoses, care, treatment, and services provided during the course of the hospital stay): Pt was brought to OR for a port a cath placement. Pt tolerated the procedure well and was brought to PACU for recovery. Once patient recovered from anesthesia and met discharge criteria, she was discharged home.         Final Diagnosis: Post-Op Diagnosis Codes:     * Malignant neoplasm of upper-outer quadrant of right breast in female, estrogen receptor positive [C50.411, Z17.0]    Disposition: Home or Self Care    Follow Up/Patient Instructions:     Medications:  Reconciled Home Medications:      Medication List      CONTINUE taking these medications    ergocalciferol 50,000 unit Cap  Commonly known as:  ERGOCALCIFEROL  Take 50,000 Units by mouth once daily.     levothyroxine 88 MCG tablet  Commonly known as:  SYNTHROID  Take 1 tablet (88 mcg total) by mouth before breakfast. Wait at least 4 hours after taking  levothyroxine to take calcium.     parathyroid hormone 50 mcg/dose Crtg  Commonly known as:  NATPARA  Inject 75 mcg into the skin once daily.        STOP taking these medications    HYDROcodone-acetaminophen 5-325 mg per tablet  Commonly known as:  NORCO          Discharge Procedure Orders   Diet general     Other restrictions (specify):   Order Comments: May resume diet as tolerated.   No heavy lifting or strenuous exercise until cleared by physician.  May shower in 48 hours; no baths or submerging in water (swimming,etc) for at least 2 weeks  Keep incision clean with soap and water.  If you have steri strips in place they will fall off on their own  Monitor for temp > 101.4, bleeding, redness, purulent drainage, or any extreme pain. If any occur, please call MD or go to ER.  Please resume all home meds and take newly prescribed meds.  You may find that over the counter pain medications (aleve or ibuprofen) may be sufficient for your pain. If you're taking prescription narcotics do not drive or operate heavy machinery. You should also take a stool softener with narcotics.  Follow up with Dr. Ayala as needed following port placement.     Follow-up Information     Jann Ayala MD.    Specialty:  General Surgery  Why:  As needed  Contact information:  [address]  [facility] [address] [phone]

## 2019-01-02 NOTE — INTERVAL H&P NOTE
The patient has been examined and the H&P has been reviewed:    I concur with the findings and no changes have occurred since H&P was written.    Anesthesia/Surgery risks, benefits and alternative options discussed and understood by patient/family.          Active Hospital Problems    Diagnosis  POA    Breast cancer, right [C50.911]  Yes      Resolved Hospital Problems   No resolved problems to display.

## 2019-01-02 NOTE — TELEPHONE ENCOUNTER
----- Message from Faviola Mccullough sent at 12/26/2018  3:00 PM CST -----  Regarding: pending chemo  Pending chemo      MD Vianey Kam, we need to see her with labs and an echo on 1/4/2018 to begin chemotherapy that day.   Dr. Ayala to insert a port before then.

## 2019-01-02 NOTE — DISCHARGE INSTRUCTIONS
Discharge Instructions: Caring for Your Central Line  You are going home with a central line. Its also called a central venous access device (CVAD) or central venous catheter (CVC). A small, soft tube (catheter) has been put in a vein that leads to your heart. This provides medicine during your treatment. It is taken out when you no longer need it. At home, you need to take care of your central line to keep it working. A central line has a high infection risk. So you must take extra care washing your hands and preventing the spread of germs. This sheet will help you remember what to do at home.    Understanding your role  · A nurse or other healthcare provider will teach you and your caregivers how to care for the central line. Before leaving the hospital, make sure you understand what to do at home, how long you may need the central line, and when to have a follow-up visit.  · You will likely be told to flush the central line with saline or heparin solution. You may also be told to change the catheters injection caps and change the bandage (dressing). Or, a nurse may do this for you during a follow-up visit. Only do these things if youre told to do so. Follow the instructions you were given.    Protecting the central line  If the central line gets damaged, it wont work right and could raise your chance of infection. Call your healthcare team right away if any damage occurs. To protect the central line at home:  · Prevent infection. Use good hand hygiene by following the guidelines on this sheet. Dont touch the catheter or dressing unless you need to. And always clean your hands before and after you come in contact with any part of the central line. Your caregivers, family members, and any visitors should use good hand hygiene, too.  · Keep the central line dry. The catheter and dressing must stay dry. Dont take baths, go swimming, use a hot tub, or do other activities that could get the central line wet.  Take a sponge bath to avoid getting the central line wet, unless your healthcare provider tells you otherwise. Ask your provider about the best way to keep the line dry when bathing or showering. If the dressing does get wet, change it only if you have been shown how. Otherwise, call your healthcare team right away for help.  · Avoid damage. Dont use any sharp or pointy objects around the catheter. This includes scissors, pins, knives, razors, or anything else that could cut it or put a hole in it (puncture it). Also, dont let anything pull or rub on the catheter, such as clothing.  · Watch for signs of problems. Pay attention to how much of the catheter sticks out from your skin. If this changes at all, let your healthcare provider know. Also watch for cracks, leaks, or other damage. If the dressing becomes dirty, loose, or wet, change it (if you have been instructed to). Or call your healthcare team right away.  · Avoid lowering your chest below your waist. This includes bending at the waist to do things like tying your shoes. When your chest is below your waist, especially for a long time, the catheters internal tip could slip out of place in the vein.  · Tell your healthcare team if you vomit or have severe coughing. This can also make the catheter slip out of place.    Risk of blood clot  If a blood clot forms it can block blood flow through the vein where the catheter is placed. Signs of a blood clot include pain or swelling in your neck, face, chest or arm. If you have any of these symptoms, call your healthcare provider right away. You may need an ultrasound exam to find the blood clot. You may also be treated with a blood thinner.      Prevent infection with good hand hygiene  A central line can let germs into your body. This can lead to serious and sometimes deadly infections. To prevent infection, its very important that you, your caregivers, and others around you use good hand hygiene. This means  washing your hands well with soap and water, and cleaning them with alcohol-based hand gel as directed. Never touch the central line or dressing without first using one of these methods.    To wash your hands with soap and water:  1. Wet your hands with warm water. (Avoid hot water. It can cause skin irritation when you wash your hands often.)  2. Apply enough soap to cover the entire surface of your hands, including your fingers.  3. Rub your hands together briskly for at least 15 seconds. Make sure to rub the front and back of each hand up to the wrist, your fingers and fingernails, between the fingers, and each thumb.  4. Rinse your hands with warm water.  5. Dry your hands completely with a new, unused paper towel. Dont use a cloth towel or other reusable towel. These can harbor germs.  6. Use the paper towel to turn off the faucet, then throw it away. If youre in a bathroom, also use a paper towel to open the door instead of touching the handle.    When you dont have access to soap and water: Use alcohol-based hand gel to clean your hands. The gel should have at least 60% alcohol. Follow the instructions on the package. Your healthcare team can answer any questions you have about when to use hand gel, or when its better to wash with soap and water.     When to seek medical care  Call your healthcare provider right away if you have any of the following:  · Pain or burning in your shoulder, chest, back, arm, or leg  · Fever of 100.4°F (38.0°C) or higher  · Chills  · Signs of infection at the catheter site (pain, redness, drainage, burning, or stinging)  · Coughing, wheezing, or shortness of breath  · A racing or irregular heartbeat  · Muscle stiffness or trouble moving  · Gurgling noises coming from the catheter  · The catheter falls out, breaks, cracks, leaks, or has other damage       Reducing Your Risk for Lymphedema After Breast Cancer Treatment     Be sure to have blood drawn only from the non-affected  arm (except in the case of an emergency).   After breast surgery or radiation, fluid may collect and cause swelling in the arm and chest on the side of the treatment. This is called lymphedema. The lymphatic system helps the body fight infection. It's made up of a system of small vessels throughout the body. Lymph fluid travels through these vessels. Many tiny organs called lymph nodes are scattered through the system. These nodes filter lymph fluid. Lymphedema can happen after lymph nodes and vessels under the arm are removed or the underarm area is treated with radiation therapy. The lymph nodes and vessels are damaged and the normal flow of fluids in the arm is reduced. To help make developing lymphedema less likely, follow the tips on this handout. You are at risk for lymphedema for the rest of your life, so make these tips part of your regular habits.     Be careful of how much you use the affected arm  · Carry purses and heavy packages with the arm on your non-operated side, or use both arms.  · Dont avoid using your affected arm. Use it like you normally would, but try not to over do it. Also, avoid heavy lifting, pulling, and repetitive motions.  · Build strength in the affected arm with gentle moves such as making a fist. Talk to your healthcare provider about exercises that are safe to do and what your limits should be.    Keep fluid moving to prevent swelling  · If at all possible, don't let anyone draw blood from your affected arm. Do not have injections or blood pressure tests on that arm.  · Raise your affected arm above your heart when you sit or lie down.  · Dont wear tight sleeves, elastic cuffs, bracelets, wristwatches, or rings on your affected arm or hand.    Protect your arm and skin from infection  · Wear gloves when you garden, use harsh chemicals, or handle garbage.  · Wash, treat, and cover even the smallest cut.  · Use insect repellent to help prevent bug bites.  · Use clean razor on  clean skin  if you shave under your arms.  · Dont pick at, bite,  or cut the skin around your fingernails. Use a cuticle stick to push your cuticles back.  · Be careful to prevent sunburns and burns from cooking.    When to seek medical advice  Call your healthcare provider if your arm, hand, or chest is:  · Swollen or red  · Hot  · Painful

## 2019-01-02 NOTE — TRANSFER OF CARE
"Anesthesia Transfer of Care Note    Patient: Elham Rodas    Procedure(s) Performed: Procedure(s) (LRB):  [id number] (Left)    Patient location: PACU    Anesthesia Type: general    Transport from OR: Transported from OR on 6-10 L/min O2 by face mask with adequate spontaneous ventilation    Post pain: adequate analgesia    Post vital signs: stable    Level of consciousness: awake, alert and oriented    Nausea/Vomiting: no nausea/vomiting    Complications: none    Transfer of care protocol was followed      Last vitals:   Visit Vitals  BP (!) 129/58   Pulse 85   Temp 36.5 °C (97.7 °F) (Temporal)   Resp 16   Ht 5' 4" (1.626 m)   Wt 70.3 kg (155 lb)   SpO2 100%   Breastfeeding? No   BMI 26.61 kg/m²     "

## 2019-01-02 NOTE — TELEPHONE ENCOUNTER
Called patient to confirm apt's for Friday 1/4 for echo, labs, to see Dr. Mcintosh and start her chemo. No answer left message asking pt to call back

## 2019-01-02 NOTE — ANESTHESIA PREPROCEDURE EVALUATION
01/02/2019  Elham Rodas is a 38 y.o., female.  Pre-operative evaluation for Procedure(s) (LRB):  [id number] flouro needed (Left)    Elham Rodas is a 38 y.o. female     Patient Active Problem List   Diagnosis    Postsurgical hypothyroidism    Vitamin D deficiency    Iatrogenic hypocalcemia    Syncope and collapse    Family history of diabetes mellitus    Cough    Acute pharyngitis    Numbness and tingling in both hands    Acute pain of right shoulder    At risk for lymphedema    Malignant neoplasm of right female breast    Carcinoma of right breast in female, estrogen receptor positive    At high risk for breast cancer    Lung nodule    Breast cancer, right       Review of patient's allergies indicates:  No Known Allergies    No current facility-administered medications on file prior to encounter.      Current Outpatient Medications on File Prior to Encounter   Medication Sig Dispense Refill    ergocalciferol (ERGOCALCIFEROL) 50,000 unit Cap Take 50,000 Units by mouth once daily.       HYDROcodone-acetaminophen (NORCO) 5-325 mg per tablet Take 1 tablet by mouth every 6 (six) hours as needed for Pain. 30 tablet 0    levothyroxine (SYNTHROID) 88 MCG tablet Take 1 tablet (88 mcg total) by mouth before breakfast. Wait at least 4 hours after taking levothyroxine to take calcium. 90 tablet 3    parathyroid hormone (NATPARA) 50 mcg/dose Crtg Inject 75 mcg into the skin once daily. 2 each 10       Past Surgical History:   Procedure Laterality Date    BIOPSY, LYMPH NODE, SENTINEL Right 11/21/2018    Performed by [facility] at [facility] OR 2ND FLR    CHOLECYSTECTOMY      INJECTION, FOR SENTINEL NODE IDENTIFICATION Right 11/21/2018    Performed by [facility] at [facility] OR 2ND FLR    LYMPHADENECTOMY, AXILLARY Right 11/21/2018    Performed [facility] at  [facility] OR 2ND FLR    MASTECTOMY, PARTIAL - seed localized Right 2018    Performed by [facility] at [facility] OR 2ND FLR    THYROIDECTOMY      TUBAL LIGATION         Social History     Socioeconomic History    Marital status:      Spouse name: Not on file    Number of children: Not on file    Years of education: Not on file    Highest education level: Not on file   Social Needs    Financial resource strain: Not on file    Food insecurity - worry: Not on file    Food insecurity - inability: Not on file    Transportation needs - medical: Not on file    Transportation needs - non-medical: Not on file   Occupational History    Not on file   Tobacco Use    Smoking status: Never Smoker    Smokeless tobacco: Never Used   Substance and Sexual Activity    Alcohol use: Yes     Alcohol/week: 0.0 oz     Comment: occ    Drug use: No    Sexual activity: Yes     Partners: Male     Birth control/protection: See Surgical Hx     Comment:    Other Topics Concern    Not on file   Social History Narrative    Not on file         CBC: No results for input(s): WBC, RBC, HGB, HCT, PLT, MCV, MCH, MCHC in the last 72 hours.    CMP: No results for input(s): NA, K, CL, CO2, BUN, CREATININE, GLU, MG, PHOS, CALCIUM, ALBUMIN, PROT, ALKPHOS, ALT, AST, BILITOT in the last 72 hours.    INR  No results for input(s): PT, INR, PROTIME, APTT in the last 72 hours.        Diagnostic Studies:      EKD Echo:  Results for orders placed or performed during the hospital encounter of 01/08/15   2D Echo w/ Color Flow Doppler   Result Value Ref Range    QEF 55 55 - 65    Diastolic Dysfunction No     Tricuspid Valve Regurgitation TRIVIAL          Anesthesia Evaluation    I have reviewed the Patient Summary Reports.     I have reviewed the Medications.     Review of Systems  Anesthesia Hx:  Hx of Anesthetic complications  History of prior surgery of interest to airway management or planning: Previous anesthesia:  General Denies Family Hx of Anesthesia complications.   Denies Personal Hx of Anesthesia complications.   Hematology/Oncology:        Current/Recent Cancer. Breast right axillary node dissection surgery   Cardiovascular:   Exercise tolerance: good        Physical Exam  General:  Well nourished, Obesity    Airway/Jaw/Neck:  Airway Findings: Mouth Opening: Normal Tongue: Normal  General Airway Assessment: Adult  Mallampati: II  Improves to II with phonation.  TM Distance: Normal, at least 6 cm  Jaw/Neck Findings:  Neck ROM: Normal ROM      Dental:  Dental Findings: In tact   Chest/Lungs:  Chest/Lungs Findings: Clear to auscultation, Normal Respiratory Rate     Heart/Vascular:  Heart Findings: Rate: Normal  Rhythm: Regular Rhythm  Sounds: Normal        Mental Status:  Mental Status Findings:  Cooperative, Alert and Oriented         Anesthesia Plan  Type of Anesthesia, risks & benefits discussed:  Anesthesia Type:  general  Patient's Preference:   Intra-op Monitoring Plan: standard ASA monitors  Intra-op Monitoring Plan Comments:   Post Op Pain Control Plan: multimodal analgesia  Post Op Pain Control Plan Comments:   Induction:   IV  Beta Blocker:         Informed Consent: Patient understands risks and agrees with Anesthesia plan.  Questions answered. Anesthesia consent signed with patient.  ASA Score: 3     Day of Surgery Review of History & Physical:    H&P update referred to the surgeon.         Ready For Surgery From Anesthesia Perspective.

## 2019-01-03 ENCOUNTER — TELEPHONE (OUTPATIENT)
Dept: HEMATOLOGY/ONCOLOGY | Facility: CLINIC | Age: 39
End: 2019-01-03

## 2019-01-03 NOTE — TELEPHONE ENCOUNTER
Attempted to reach patient to discuss upcoming scheduled appointments, and to check on status given recent port placement. Patient with no answer. Office number provided, requesting return call.

## 2019-01-03 NOTE — TELEPHONE ENCOUNTER
----- Message from Effie Brand sent at 1/3/2019 11:29 AM CST -----  Contact: Pt   Pt called to speak with nurse quirino have some questions   Callback#[phone]  Thank You  LESTER Brand

## 2019-01-03 NOTE — TELEPHONE ENCOUNTER
Attempted to return call to patient at this time to discuss her questions/concerns. No answer. Detailed voicemail left once again, will await return call.

## 2019-01-04 ENCOUNTER — TELEPHONE (OUTPATIENT)
Dept: HEMATOLOGY/ONCOLOGY | Facility: CLINIC | Age: 39
End: 2019-01-04

## 2019-01-04 NOTE — ANESTHESIA POSTPROCEDURE EVALUATION
"Anesthesia Post Evaluation    Patient: Elham Rodas    Procedure(s) Performed: Procedure(s) (LRB):  [id number] (Left)    Final Anesthesia Type: general  Patient location during evaluation: PACU  Patient participation: Yes- Able to Participate  Level of consciousness: awake and alert and oriented  Post-procedure vital signs: reviewed and stable  Pain management: adequate  Airway patency: patent  PONV status at discharge: No PONV  Anesthetic complications: no      Cardiovascular status: hemodynamically stable  Respiratory status: unassisted  Hydration status: euvolemic  Follow-up not needed.        Visit Vitals  /65   Pulse 70   Temp 36.1 °C (97 °F) (Temporal)   Resp 15   Ht 5' 4" (1.626 m)   Wt 70.3 kg (155 lb)   SpO2 97%   Breastfeeding? No   BMI 26.61 kg/m²       Pain/Solis Score: No Data Recorded      "

## 2019-01-04 NOTE — TELEPHONE ENCOUNTER
Contacted patient to inform that unfortunately today's appointments were being delayed for next week given since her insurance changed at the first of the year, the chemo has to be reauthorized at this time, which can take up to two weeks. Tentatively scheduled for next Friday, if chemo approved. If not approved will plan to reschedule. Patient aware of plan of care. Patient to  her disability documents and her appointment slip this afternoon with the 3rd floor .

## 2019-01-06 ENCOUNTER — HOSPITAL ENCOUNTER (EMERGENCY)
Facility: HOSPITAL | Age: 39
Discharge: HOME | End: 2019-01-06
Attending: EMERGENCY MEDICINE
Payer: COMMERCIAL

## 2019-01-06 VITALS
WEIGHT: 155 LBS | HEIGHT: 64 IN | TEMPERATURE: 98 F | BODY MASS INDEX: 26.46 KG/M2 | SYSTOLIC BLOOD PRESSURE: 133 MMHG | RESPIRATION RATE: 17 BRPM | HEART RATE: 78 BPM | OXYGEN SATURATION: 97 % | DIASTOLIC BLOOD PRESSURE: 88 MMHG

## 2019-01-06 DIAGNOSIS — M54.2 NECK PAIN ON LEFT SIDE: Primary | ICD-10-CM

## 2019-01-06 DIAGNOSIS — M25.512 LEFT SHOULDER PAIN: ICD-10-CM

## 2019-01-06 LAB
B-HCG UR QL: NEGATIVE
CTP QC/QA: YES

## 2019-01-06 PROCEDURE — 99284 PR EMERGENCY DEPT VISIT,LEVEL IV: ICD-10-PCS | Mod: ,,, | Performed by: PHYSICIAN ASSISTANT

## 2019-01-06 PROCEDURE — 99284 EMERGENCY DEPT VISIT MOD MDM: CPT | Mod: ,,, | Performed by: PHYSICIAN ASSISTANT

## 2019-01-06 PROCEDURE — 93005 ELECTROCARDIOGRAM TRACING: CPT

## 2019-01-06 PROCEDURE — 93010 EKG 12-LEAD: ICD-10-PCS | Mod: ,,, | Performed by: INTERNAL MEDICINE

## 2019-01-06 PROCEDURE — 99284 EMERGENCY DEPT VISIT MOD MDM: CPT | Mod: 25

## 2019-01-06 PROCEDURE — 63600175 PHARM REV CODE 636 W HCPCS: Performed by: PHYSICIAN ASSISTANT

## 2019-01-06 PROCEDURE — 93010 ELECTROCARDIOGRAM REPORT: CPT | Mod: ,,, | Performed by: INTERNAL MEDICINE

## 2019-01-06 PROCEDURE — 25000003 PHARM REV CODE 250: Performed by: PHYSICIAN ASSISTANT

## 2019-01-06 PROCEDURE — 81025 URINE PREGNANCY TEST: CPT | Performed by: PHYSICIAN ASSISTANT

## 2019-01-06 RX ORDER — HEPARIN 100 UNIT/ML
100 SYRINGE INTRAVENOUS
Status: COMPLETED | OUTPATIENT
Start: 2019-01-06 | End: 2019-01-06

## 2019-01-06 RX ORDER — HYDROCODONE BITARTRATE AND ACETAMINOPHEN 5; 325 MG/1; MG/1
1 TABLET ORAL
Status: COMPLETED | OUTPATIENT
Start: 2019-01-06 | End: 2019-01-06

## 2019-01-06 RX ORDER — METHOCARBAMOL 500 MG/1
500 TABLET, FILM COATED ORAL
Status: COMPLETED | OUTPATIENT
Start: 2019-01-06 | End: 2019-01-06

## 2019-01-06 RX ORDER — LIDOCAINE AND PRILOCAINE 25; 25 MG/G; MG/G
CREAM TOPICAL
Status: COMPLETED | OUTPATIENT
Start: 2019-01-06 | End: 2019-01-06

## 2019-01-06 RX ORDER — HYDROCODONE BITARTRATE AND ACETAMINOPHEN 5; 325 MG/1; MG/1
1 TABLET ORAL EVERY 4 HOURS PRN
Qty: 11 TABLET | Refills: 0 | Status: SHIPPED | OUTPATIENT
Start: 2019-01-06 | End: 2019-08-27

## 2019-01-06 RX ORDER — METHOCARBAMOL 500 MG/1
1000 TABLET, FILM COATED ORAL 3 TIMES DAILY
Qty: 20 TABLET | Refills: 0 | Status: SHIPPED | OUTPATIENT
Start: 2019-01-06 | End: 2019-01-11

## 2019-01-06 RX ADMIN — HYDROCODONE BITARTRATE AND ACETAMINOPHEN 1 TABLET: 5; 325 TABLET ORAL at 06:01

## 2019-01-06 RX ADMIN — LIDOCAINE AND PRILOCAINE: 25; 25 CREAM TOPICAL at 09:01

## 2019-01-06 RX ADMIN — HEPARIN 100 UNITS: 100 SYRINGE at 09:01

## 2019-01-06 RX ADMIN — METHOCARBAMOL TABLETS 500 MG: 500 TABLET, COATED ORAL at 09:01

## 2019-01-06 NOTE — OP NOTE
DATE OF PROCEDURE:  01/02/2019.    PRIMARY SURGEON:  Jann Ayala M.D.    ASSISTANT:  Cain Mcgarry M.D. (RES).    PREOPERATIVE DIAGNOSIS:  Invasive infiltrating carcinoma of the right breast   with a positive right axillary sentinel lymph node with need for central venous   access for anticipated adjuvant cytotoxic chemotherapy.    POSTOPERATIVE DIAGNOSIS:  Invasive infiltrating carcinoma of the right breast   with a positive right axillary sentinel lymph node with need for central venous   access for anticipated adjuvant cytotoxic chemotherapy.    PROCEDURE:  Insertion of left subclavian vein 8-Malay MRI compatible PowerPort   Port-A-Cath with intraoperative fluoroscopy by Seldinger technique.    PROCEDURE IN DETAIL:  The patient underwent informed consent.  The history and   physical examination was reviewed and updated.  The left anterior chest was   marked for Port-A-Cath insertion.  The patient was brought to the operating   room.  She was in a supine position.  A vertical roll was placed parallel to her   spine between her shoulder blades.  Both arms were tucked.  The left anterior   neck and chest were prepped and draped in a sterile fashion.  Using standard   landmarks at the midclavicular line in the infraclavicular region, we cannulated   the left subclavian vein percutaneously with good venous blood return.  The   guidewire was advanced into the central venous system and right heart system   under fluoroscopic guidance.  The tract was enlarged for 3 to 4 cm to create a   subcutaneous pocket at the percutaneous access site in the infraclavicular   region on the left chest wall.  The port was anchored to the catheter with the   locking hub and flushed with injectable saline.  The port was anchored into the   pocket with interrupted 2-0 Vicryl sutures.  The catheter tip was cut to the   appropriate length at approximately 15 cm so that the tip of the catheter would   be in the superior vena  cava.  The dilator and peel-away sheath were advanced   over the guidewire under fluoroscopic guidance.  The dilator and guidewire were   removed.  The catheter was advanced through the peel-away sheath without   difficulty.  The peel-away sheath was withdrawn.  Fluoroscopy confirmed the   catheter tip was in the appropriate position.  The port was accessed with the   Smith needle.  It flushed and aspirated easily and was flushed with a final   solution of 100 units of heparin per mL of saline using 3 mL.  The deep dermal   and subcutaneous layers were closed with 3-0 Vicryl suture.  The skin was closed   with a running 4-0 Monocryl subcuticular skin closure.  Sterile skin dressing   was applied.  Estimated blood loss was minimal.  All needle, instrument and   sponge counts were correct.  Postop chest x-ray would be pending.  The patient   tolerated the procedure well without complication.  She was turned over to   Anesthesia for transfer to the recovery area in a satisfactory condition.    Estimated blood loss was minimal.  No specimens were submitted.      JAVIER/ELSA  dd: 01/06/2019 09:28:36 (CST)  td: 01/06/2019 15:51:44 (CST)  Doc ID   #[id number]  Job ID #[id number]    CC:

## 2019-01-07 NOTE — DISCHARGE INSTRUCTIONS
Please take the prescribed Norco and Robaxin as directed for ongoing management of your pain  Please follow-up with your PCP and Oncologist for further evaluation  Please return to the ED for new, worsening, or concerning symptoms    Our goal in the emergency department is to always give you outstanding care and exceptional service. You may receive a survey by mail or e-mail in the next week regarding your experience in our ED. We would greatly appreciate your completing and returning the survey. Your feedback provides us with a way to recognize our staff who give very good care and it helps us learn how to improve when your experience was below our aspiration of excellence.

## 2019-01-07 NOTE — ED TRIAGE NOTES
Presents to ER with complaint of pain to her left shoulder and neck since having a port a cath placed last Wednesday.  Incision site, left subclavian, shows no sign of redness, swelling or drainage.  Patient's name and date of birth checked and is correct.    LOC: The patient is awake, alert, and oriented to place, time, situation. Affect is appropriate.  Speech is appropriate and clear.      APPEARANCE: Patient resting comfortably, and is  in no acute distress.  Patient is clean and well groomed.     SKIN: The skin is warm and dry; color consistent with ethnicity.  Patient has normal skin turgor and moist mucus membranes.  Skin intact; no breakdown or bruising noted.      MUSCULOSKELETAL: Patient moving lower extremities without difficulty.  Limited ROM LUE due to pain with movement.     RESPIRATORY: Airway is open and patent. Respirations spontaneous, even, easy, and non-labored.  Patient has a normal effort and rate.  No accessory muscle use noted. Denies cough.  BS clear.     CARDIAC:  No peripheral edema noted. No complaints of chest pain.       ABDOMEN: Soft and non tender to palpation.  No distention noted.      NEUROLOGIC: Eyes open spontaneously.  Behavior appropriate to situation.  Follows commands; facial expression symmetrical.  Purposeful motor response noted; normal sensation in all extremities.

## 2019-01-07 NOTE — ED PROVIDER NOTES
Encounter Date: 1/6/2019    SCRIBE #1 NOTE: I, Will Curtis, am scribing for, and in the presence of,  Junito Betancourt M.D. I have scribed the following portions of the note - the APC attestation.       History     Chief Complaint   Patient presents with    portacath proble     Had a portacath inserted 4 days ago- having pain in  leftneck radiating to rt shoulder.      38 year old female with medical history of recent diagnosis of invasive mammary carcinoma, thyroid disease presenting to the ED with the chief complaint of portacath problem. Patient recently had a left subclavian port placed 5 days ago in preparation of her chemotherapy regimen that begins next week. She reports developing left sided neck pain and left shoulder pain after returning home from having the port placed. She describes the pain as a constant, tight, pinching sensation that increases whenever she lays down. She reports taking Tylenol for pain currently. She denies overlying skin changes to the port site. She denies fever, chest pain, SOB, unilateral extremity weakness, numbness, paresthesias, lightheadedness, dizziness.           Review of patient's allergies indicates:  No Known Allergies  Past Medical History:   Diagnosis Date    Abnormal Pap smear of cervix 05/2016    ASCUS, - HPV    Cancer     Hypothyroidism     Palpitations     PONV (postoperative nausea and vomiting)     Syncope and collapse     Thyroid disease     Vitamin D deficiency disease      Past Surgical History:   Procedure Laterality Date    BIOPSY, LYMPH NODE, SENTINEL Right 11/21/2018    [facility] at [facility] 2ND FLR    CHOLECYSTECTOMY      INJECTION, FOR SENTINEL NODE IDENTIFICATION Right 11/21/2018    Performed by [facility] at [facility] OR 2ND FLR    [id number]-[id number]-I-CATH Left 1/2/2019    Performed by [facility] at [facility] OR 2ND FLR    LYMPHADENECTOMY, AXILLARY Right 11/21/2018    Performed by [facility]  at [facility] OR 2ND FLR    MASTECTOMY, PARTIAL - seed localized Right 11/21/2018    Performed by [facility] at [facility] OR 2ND FLR    THYROIDECTOMY      TUBAL LIGATION       Family History   Problem Relation Age of Onset    Hypertension Mother     Heart attack Mother     Hypertension Father     Ovarian cancer Maternal Aunt     Cancer Paternal Grandfather     Breast cancer Neg Hx     Colon cancer Neg Hx      Social History     Tobacco Use    Smoking status: Never Smoker    Smokeless tobacco: Never Used   Substance Use Topics    Alcohol use: Yes     Alcohol/week: 0.0 oz     Comment: occ    Drug use: No     Review of Systems   Constitutional: Negative for chills, diaphoresis and fever.   HENT: Negative for sore throat.    Respiratory: Negative for shortness of breath.    Cardiovascular: Negative for chest pain.   Gastrointestinal: Negative for nausea.   Genitourinary: Negative for dysuria.   Musculoskeletal: Positive for arthralgias (left shoulder) and neck pain. Negative for back pain.   Skin: Positive for wound (recent L subclavian port placed). Negative for rash.   Neurological: Negative for weakness.   Hematological: Does not bruise/bleed easily.       Physical Exam     Initial Vitals   BP Pulse Resp Temp SpO2   01/06/19 1740 01/06/19 1740 01/06/19 2330 01/06/19 1740 01/06/19 1740   126/82 83 17 99.1 °F (37.3 °C) 98 %      MAP       --                Physical Exam    Constitutional: She appears well-developed and well-nourished. She is not diaphoretic. No distress.   HENT:   Head: Normocephalic and atraumatic.   Mouth/Throat: Oropharynx is clear and moist. No oropharyngeal exudate.   Eyes: EOM are normal. Pupils are equal, round, and reactive to light.   Neck: Normal range of motion. Neck supple.   Cardiovascular: Normal rate and regular rhythm.   Pulmonary/Chest: Breath sounds normal. No respiratory distress. She has no wheezes.   Surgical incision in L upper chest from recent port placement.  Surgical incision c/d/i. No surrounding erythema, edema, or expressible discharge. Mild TTP.    Abdominal: Soft. There is no tenderness.   Musculoskeletal: Normal range of motion. She exhibits no edema or tenderness.        Back:    Neurological: She is alert and oriented to person, place, and time. She has normal strength. No cranial nerve deficit or sensory deficit.   Skin: Skin is warm and dry.       ED Course   Procedures  Labs Reviewed   POCT URINE PREGNANCY     EKG Readings: (Independently Interpreted)   Initial Reading: No STEMI. Rhythm: Normal Sinus Rhythm. Heart Rate: 63 bpm .       Imaging Results          X-Ray Chest PA And Lateral (Final result)  Result time 01/06/19 18:56:02    Final result by Leonid Christie MD (01/06/19 18:56:02)                 Impression:      Lateral left lung base subtle nodular density which could represent a nipple shadow but remains indeterminate.    Otherwise no significant change.      Electronically signed by: Leonid Christie MD  Date:    01/06/2019  Time:    18:56             Narrative:    EXAMINATION:  XR CHEST PA AND LATERAL    CLINICAL HISTORY:  Pain in left shoulder    TECHNIQUE:  PA and lateral views of the chest were performed.    COMPARISON:  Chest radiograph 01/02/2019    FINDINGS:  No detrimental change.  Left upper chest Port-A-Cath is stable.  Cardiomediastinal silhouette is midline and within normal limits.  Subtle nodular density projected over the lateral left lung base on the frontal view.  The lungs are otherwise symmetrically well expanded without large consolidation.  No pleural effusion or pneumothorax.  Osseous structures appear intact.  Cholecystectomy clips.                                       APC / Resident Notes:   38 year old female with medical history of recent diagnosis of invasive mammary carcinoma, thyroid disease presenting to the ED c/o portacatheter problem. Recently had L subclavian port placed 5 days ago for initiation of chemotherapy next  week. DDx includes but not limited to muscular strain, post-operative pain, catheter misplacement, thrombosis, cellulitis. Will obtain CXR to verify placement. Will give analgesia.     CXR shows Port-A-Cath positioning stable. No focal consolidations. Indeterminate left lateral lung base nodular density. ECG shows NSR at 63 bpm without ST ischemic changes.     Discussed patient with General Surgery who agreed port access would be appropriate to rule out thrombosis. Emla creamed applied to port and successfully accessed by nurse. Blood return achieved and flushed with Heparin.     Patient reports her pain has moderately improved on reassessment. Suspect muscular etiology as pain is reproducible with body movements and palpation. Patient stable for discharge with outpatient management. Brief RX for Norco provided.  reviewed. Instructed patient to follow-up with her oncologist and PCP for ongoing management. Return to ED precautions given for new, worsening, or concerning symptoms. I have discussed the care of this patient with my supervising physician.        Scribe Attestation:   Scribe #1: I performed the above scribed service and the documentation accurately describes the services I performed. I attest to the accuracy of the note.    Attending Attestation:     Physician Attestation Statement for NP/PA:   I discussed this assessment and plan of this patient with the NP/PA, but I did not personally examine the patient. The face to face encounter was performed by the NP/PA.          I, Dr. Maggy Betancourt, personally performed the services described in this documentation. All medical record entries made by the scribe were at my direction and in my presence.  I have reviewed the chart and agree that the record reflects my personal performance and is accurate and complete. Maggy Betancourt MD.  7:12 PM 01/11/2019             Clinical Impression:   The primary encounter diagnosis was Neck pain on left side.  Diagnoses of Left shoulder pain and Left shoulder pain were also pertinent to this visit.      Disposition:   Disposition: Discharged  Condition: Stable                        Leonid Hayward PA-C  01/07/19 0228       Junito Betancourt MD  01/11/19 3060

## 2019-01-11 ENCOUNTER — TELEPHONE (OUTPATIENT)
Dept: HEMATOLOGY/ONCOLOGY | Facility: CLINIC | Age: 39
End: 2019-01-11

## 2019-01-11 ENCOUNTER — LAB VISIT (OUTPATIENT)
Dept: LAB | Facility: HOSPITAL | Age: 39
End: 2019-01-11
Attending: INTERNAL MEDICINE
Payer: COMMERCIAL

## 2019-01-11 ENCOUNTER — OFFICE VISIT (OUTPATIENT)
Dept: HEMATOLOGY/ONCOLOGY | Facility: CLINIC | Age: 39
End: 2019-01-11
Payer: COMMERCIAL

## 2019-01-11 ENCOUNTER — HOSPITAL ENCOUNTER (OUTPATIENT)
Dept: CARDIOLOGY | Facility: CLINIC | Age: 39
Discharge: HOME | End: 2019-01-11
Attending: INTERNAL MEDICINE
Payer: COMMERCIAL

## 2019-01-11 VITALS
WEIGHT: 155 LBS | HEIGHT: 64 IN | DIASTOLIC BLOOD PRESSURE: 88 MMHG | SYSTOLIC BLOOD PRESSURE: 133 MMHG | BODY MASS INDEX: 26.46 KG/M2 | HEART RATE: 62 BPM

## 2019-01-11 VITALS
DIASTOLIC BLOOD PRESSURE: 75 MMHG | RESPIRATION RATE: 18 BRPM | OXYGEN SATURATION: 100 % | HEIGHT: 64 IN | HEART RATE: 80 BPM | TEMPERATURE: 98 F | WEIGHT: 159.19 LBS | BODY MASS INDEX: 27.18 KG/M2 | SYSTOLIC BLOOD PRESSURE: 129 MMHG

## 2019-01-11 DIAGNOSIS — C50.611 CARCINOMA OF AXILLARY TAIL OF RIGHT BREAST IN FEMALE, ESTROGEN RECEPTOR POSITIVE: Primary | ICD-10-CM

## 2019-01-11 DIAGNOSIS — Z51.11 ENCOUNTER FOR ANTINEOPLASTIC CHEMOTHERAPY: ICD-10-CM

## 2019-01-11 DIAGNOSIS — Z17.0 CARCINOMA OF UPPER-OUTER QUADRANT OF RIGHT BREAST IN FEMALE, ESTROGEN RECEPTOR POSITIVE: ICD-10-CM

## 2019-01-11 DIAGNOSIS — Z17.0 CARCINOMA OF AXILLARY TAIL OF RIGHT BREAST IN FEMALE, ESTROGEN RECEPTOR POSITIVE: Primary | ICD-10-CM

## 2019-01-11 DIAGNOSIS — C50.411 CARCINOMA OF UPPER-OUTER QUADRANT OF RIGHT BREAST IN FEMALE, ESTROGEN RECEPTOR POSITIVE: ICD-10-CM

## 2019-01-11 LAB
ALBUMIN SERPL BCP-MCNC: 3.6 G/DL
ALP SERPL-CCNC: 78 U/L
ALT SERPL W/O P-5'-P-CCNC: 14 U/L
ANION GAP SERPL CALC-SCNC: 7 MMOL/L
ASCENDING AORTA: 2.59 CM
AST SERPL-CCNC: 14 U/L
AV INDEX (PROSTH): 0.77
AV MEAN GRADIENT: 2.33 MMHG
AV PEAK GRADIENT: 4.75 MMHG
AV VALVE AREA: 3 CM2
BILIRUB SERPL-MCNC: 0.2 MG/DL
BSA FOR ECHO PROCEDURE: 1.78 M2
BUN SERPL-MCNC: 6 MG/DL
CALCIUM SERPL-MCNC: 9 MG/DL
CHLORIDE SERPL-SCNC: 111 MMOL/L
CO2 SERPL-SCNC: 24 MMOL/L
CREAT SERPL-MCNC: 0.8 MG/DL
CV ECHO LV RWT: 0.36 CM
DOP CALC AO PEAK VEL: 1.09 M/S
DOP CALC AO VTI: 20.79 CM
DOP CALC LVOT AREA: 3.9 CM2
DOP CALC LVOT DIAMETER: 2.23 CM
DOP CALC LVOT STROKE VOLUME: 62.3 CM3
DOP CALCLVOT PEAK VEL VTI: 15.96 CM
E WAVE DECELERATION TIME: 191.2 MSEC
E/A RATIO: 0.93
E/E' RATIO: 4.22
ECHO LV POSTERIOR WALL: 0.8 CM (ref 0.6–1.1)
ERYTHROCYTE [DISTWIDTH] IN BLOOD BY AUTOMATED COUNT: 14.7 %
EST. GFR  (AFRICAN AMERICAN): >60 ML/MIN/1.73 M^2
EST. GFR  (NON AFRICAN AMERICAN): >60 ML/MIN/1.73 M^2
FRACTIONAL SHORTENING: 37 % (ref 28–44)
GLUCOSE SERPL-MCNC: 122 MG/DL
HCT VFR BLD AUTO: 37.2 %
HGB BLD-MCNC: 12.1 G/DL
IMM GRANULOCYTES # BLD AUTO: 0.05 K/UL
INTERVENTRICULAR SEPTUM: 0.83 CM (ref 0.6–1.1)
IVRT: 0.09 MSEC
LA MAJOR: 5.17 CM
LA MINOR: 4.59 CM
LA WIDTH: 3.78 CM
LEFT ATRIUM SIZE: 3.94 CM
LEFT ATRIUM VOLUME INDEX: 35.1 ML/M2
LEFT ATRIUM VOLUME: 61.56 CM3
LEFT INTERNAL DIMENSION IN SYSTOLE: 2.79 CM (ref 2.1–4)
LEFT VENTRICLE DIASTOLIC VOLUME INDEX: 50.59 ML/M2
LEFT VENTRICLE DIASTOLIC VOLUME: 88.8 ML
LEFT VENTRICLE MASS INDEX: 64.4 G/M2
LEFT VENTRICLE SYSTOLIC VOLUME INDEX: 16.7 ML/M2
LEFT VENTRICLE SYSTOLIC VOLUME: 29.38 ML
LEFT VENTRICULAR INTERNAL DIMENSION IN DIASTOLE: 4.42 CM (ref 3.5–6)
LEFT VENTRICULAR MASS: 113 G
LV LATERAL E/E' RATIO: 4.07
LV SEPTAL E/E' RATIO: 4.38
MCH RBC QN AUTO: 27.6 PG
MCHC RBC AUTO-ENTMCNC: 32.5 G/DL
MCV RBC AUTO: 85 FL
MV PEAK A VEL: 0.61 M/S
MV PEAK E VEL: 0.57 M/S
NEUTROPHILS # BLD AUTO: 4.4 K/UL
PISA TR MAX VEL: 1.84 M/S
PLATELET # BLD AUTO: 240 K/UL
PMV BLD AUTO: 12.2 FL
POTASSIUM SERPL-SCNC: 4 MMOL/L
PROT SERPL-MCNC: 7.1 G/DL
PULM VEIN S/D RATIO: 1.63
PV PEAK D VEL: 0.38 M/S
PV PEAK S VEL: 0.62 M/S
RA MAJOR: 4.35 CM
RA PRESSURE: 3 MMHG
RA WIDTH: 3.51 CM
RBC # BLD AUTO: 4.38 M/UL
RETIRED EF AND QEF - SEE NOTES: 54 %
RIGHT VENTRICULAR END-DIASTOLIC DIMENSION: 3.35 CM
RV TISSUE DOPPLER FREE WALL SYSTOLIC VELOCITY 1 (APICAL 4 CHAMBER VIEW): 11.37 M/S
SINUS: 2.65 CM
SODIUM SERPL-SCNC: 142 MMOL/L
STJ: 2.29 CM
TDI LATERAL: 0.14
TDI SEPTAL: 0.13
TDI: 0.14
TR MAX PG: 13.54 MMHG
TRICUSPID ANNULAR PLANE SYSTOLIC EXCURSION: 2.28 CM
TV REST PULMONARY ARTERY PRESSURE: 17 MMHG
WBC # BLD AUTO: 7.48 K/UL

## 2019-01-11 PROCEDURE — 85027 COMPLETE CBC AUTOMATED: CPT

## 2019-01-11 PROCEDURE — 3008F BODY MASS INDEX DOCD: CPT | Mod: CPTII,S$GLB,, | Performed by: INTERNAL MEDICINE

## 2019-01-11 PROCEDURE — 99215 OFFICE O/P EST HI 40 MIN: CPT | Mod: S$GLB,,, | Performed by: INTERNAL MEDICINE

## 2019-01-11 PROCEDURE — 80053 COMPREHEN METABOLIC PANEL: CPT

## 2019-01-11 PROCEDURE — 36415 COLL VENOUS BLD VENIPUNCTURE: CPT

## 2019-01-11 PROCEDURE — 99999 PR PBB SHADOW E&M-EST. PATIENT-LVL III: ICD-10-PCS | Mod: PBBFAC,,, | Performed by: INTERNAL MEDICINE

## 2019-01-11 PROCEDURE — 99999 PR PBB SHADOW E&M-EST. PATIENT-LVL III: CPT | Mod: PBBFAC,,, | Performed by: INTERNAL MEDICINE

## 2019-01-11 PROCEDURE — 99215 PR OFFICE/OUTPT VISIT, EST, LEVL V, 40-54 MIN: ICD-10-PCS | Mod: S$GLB,,, | Performed by: INTERNAL MEDICINE

## 2019-01-11 PROCEDURE — 93306 TRANSTHORACIC ECHO (TTE) COMPLETE (CUPID ONLY): ICD-10-PCS | Mod: S$GLB,,, | Performed by: INTERNAL MEDICINE

## 2019-01-11 PROCEDURE — 3008F PR BODY MASS INDEX (BMI) DOCUMENTED: ICD-10-PCS | Mod: CPTII,S$GLB,, | Performed by: INTERNAL MEDICINE

## 2019-01-11 PROCEDURE — 93306 TTE W/DOPPLER COMPLETE: CPT | Mod: S$GLB,,, | Performed by: INTERNAL MEDICINE

## 2019-01-11 RX ORDER — SODIUM CHLORIDE 0.9 % (FLUSH) 0.9 %
10 SYRINGE (ML) INJECTION
Status: CANCELLED | OUTPATIENT
Start: 2019-01-14

## 2019-01-11 RX ORDER — HEPARIN 100 UNIT/ML
500 SYRINGE INTRAVENOUS
Status: CANCELLED | OUTPATIENT
Start: 2019-01-14

## 2019-01-11 RX ORDER — DEXAMETHASONE 4 MG/1
TABLET ORAL
Qty: 12 TABLET | Refills: 3 | Status: SHIPPED | OUTPATIENT
Start: 2019-01-11 | End: 2019-08-27 | Stop reason: ALTCHOICE

## 2019-01-11 RX ORDER — ONDANSETRON 4 MG/1
TABLET, FILM COATED ORAL
Qty: 40 TABLET | Refills: 3 | Status: SHIPPED | OUTPATIENT
Start: 2019-01-11 | End: 2019-08-27 | Stop reason: ALTCHOICE

## 2019-01-11 RX ORDER — DOXORUBICIN HYDROCHLORIDE 2 MG/ML
60 INJECTION, SOLUTION INTRAVENOUS
Status: CANCELLED | OUTPATIENT
Start: 2019-01-14

## 2019-01-11 NOTE — PROGRESS NOTES
Subjective:       Patient ID: Elham Rodas is a 38 y.o. female.    Chief Complaint: No chief complaint on file.    HPI   Mrs. Rodas returns today for follow up.  Her mammaprint was low risk, however, she has decided to proceed with chemotherapy,.  Briefly, she is a 38-year-old premenopausal female who was recently diagnosed with an invasive mammary carcinoma that was ER positive, [place] positive, and HER-2 negative.   On 11/21/2018, she underwent a right lumpectomy with sentinel lymph node biopsy.  The pathology report from the procedure indicates that she had a positive sentinel lymph node with a metastatic deposit of 6 mm.  There was no extranodal extension.  On the right partial mastectomy specimen, she had an invasive lobular carcinoma that was 14 mm in maximum diameter.  It was grade II and resection margins were clear.    We still do not have approval from her new insurance company; of note she had changed insurtance companies on January 1st, hence the lack of approval.    Her EF is normal at 58 %.   Her CBC today is normal, and so are her LFTs.       Review of Systems      Overall she feels well. She has now recovered  from her surgery and has no complaints.  She denies any anxiety, depression, easy bruising, fevers, chills, night  sweats, weight loss, nausea, vomiting, diarrhea, constipation, diplopia, blurred vision, headache, chest pain, palpitations, shortness of breath, breast pain, abdominal pain, extremity pain, or difficulty ambulating.  The remainder of the ten-point ROS, including general, skin, lymph, H/N, cardiorespiratory, GI, , Neuro, Endocrine, and psychiatric is negative.     Objective:      Physical Exam        She is alert, oriented to time, place, person, pleasant, well      nourished, in no acute physical distress.  She is here with her boyfriend.                                  VITAL SIGNS:  Reviewed                                      HEENT:  Normal.  There are no nasal, oral, lip,  gingival, auricular, lid,    or conjunctival lesions.  Mucosae are moist and pink, and there is no        thrush.  Pupils are equal, reactive to light and accommodation.              Extraocular muscle movements are intact.  Dentition is good.  There is no frontal or maxillary tenderness.                                     NECK:  Supple without JVD, adenopathy, or thyromegaly.                       LUNGS:  Clear to auscultation without wheezing, rales, or rhonchi.           CARDIOVASCULAR:  Reveals an S1, S2, no murmurs, no rubs, no gallops.         ABDOMEN:  Soft, nontender, without organomegaly.  Bowel sounds are    present.                                                                     EXTREMITIES:  No cyanosis, clubbing, or edema.                               BREASTS:  She is status post right lumpectomy with a well-healed  Incision in the lower outer quadrant of her right breast.    There are no masses in either breast.     A sentinel node biopsy scar is seen in the right axilla.  It is also well  Healed.   A left sided portacath is now identified.                                    LYMPHATIC:  There is no cervical, axillary, or supraclavicular adenopathy.   SKIN:  Warm and moist, without petechiae, rashes, induration, or ecchymoses.           NEUROLOGIC:  DTRs are 0-1+ bilaterally, symmetrical, motor function is 5/5,  and cranial nerves are  within normal limits.    Assessment:       1. Carcinoma of axillary tail of right breast in female, estrogen receptor positive    2. Encounter for antineoplastic chemotherapy        Plan:         I had a long discussion with her.  She understands that for the mammaprint low risk patients who have positive nodes and therefore a high clinical risk, there is a 1.5 % chance of better DFS at 5 years.  She has previously stated in no uncertain terms that for a 1.5 % benefit she would want to undergo chemotherapy.  She will begin with AC next week, as soon as we have  approval.  She will observe neutropenic precautions, and will return in two weeks  for cycle 2.  Consent form signed.  Her multiple questions were answered to her satisfaction.

## 2019-01-11 NOTE — TELEPHONE ENCOUNTER
----- Message from Latanya Wells sent at 1/11/2019  2:58 PM CST -----  Contact: Pt can be reached at [phone]  Pt is calling to speak with Elham please give pt a call back      Thank you!

## 2019-01-14 ENCOUNTER — INFUSION (OUTPATIENT)
Dept: INFUSION THERAPY | Facility: HOSPITAL | Age: 39
End: 2019-01-14
Attending: INTERNAL MEDICINE
Payer: COMMERCIAL

## 2019-01-14 ENCOUNTER — TELEPHONE (OUTPATIENT)
Dept: SURGERY | Facility: CLINIC | Age: 39
End: 2019-01-14

## 2019-01-14 VITALS
HEIGHT: 64 IN | RESPIRATION RATE: 18 BRPM | SYSTOLIC BLOOD PRESSURE: 120 MMHG | DIASTOLIC BLOOD PRESSURE: 68 MMHG | WEIGHT: 159.19 LBS | HEART RATE: 81 BPM | BODY MASS INDEX: 27.18 KG/M2 | TEMPERATURE: 98 F

## 2019-01-14 DIAGNOSIS — Z17.0 CARCINOMA OF AXILLARY TAIL OF RIGHT BREAST IN FEMALE, ESTROGEN RECEPTOR POSITIVE: Primary | ICD-10-CM

## 2019-01-14 DIAGNOSIS — C50.611 CARCINOMA OF AXILLARY TAIL OF RIGHT BREAST IN FEMALE, ESTROGEN RECEPTOR POSITIVE: Primary | ICD-10-CM

## 2019-01-14 PROCEDURE — 96413 CHEMO IV INFUSION 1 HR: CPT

## 2019-01-14 PROCEDURE — 63600175 PHARM REV CODE 636 W HCPCS: Performed by: INTERNAL MEDICINE

## 2019-01-14 PROCEDURE — A4216 STERILE WATER/SALINE, 10 ML: HCPCS | Performed by: INTERNAL MEDICINE

## 2019-01-14 PROCEDURE — 25000003 PHARM REV CODE 250: Performed by: INTERNAL MEDICINE

## 2019-01-14 PROCEDURE — 96411 CHEMO IV PUSH ADDL DRUG: CPT

## 2019-01-14 PROCEDURE — 96367 TX/PROPH/DG ADDL SEQ IV INF: CPT

## 2019-01-14 RX ORDER — HEPARIN 100 UNIT/ML
500 SYRINGE INTRAVENOUS
Status: DISCONTINUED | OUTPATIENT
Start: 2019-01-14 | End: 2019-01-14 | Stop reason: HOSPADM

## 2019-01-14 RX ORDER — SODIUM CHLORIDE 0.9 % (FLUSH) 0.9 %
10 SYRINGE (ML) INJECTION
Status: DISCONTINUED | OUTPATIENT
Start: 2019-01-14 | End: 2019-01-14 | Stop reason: HOSPADM

## 2019-01-14 RX ORDER — DOXORUBICIN HYDROCHLORIDE 2 MG/ML
60 INJECTION, SOLUTION INTRAVENOUS
Status: COMPLETED | OUTPATIENT
Start: 2019-01-14 | End: 2019-01-14

## 2019-01-14 RX ADMIN — Medication 10 ML: at 05:01

## 2019-01-14 RX ADMIN — CYCLOPHOSPHAMIDE 1085 MG: 1 INJECTION, POWDER, FOR SOLUTION INTRAVENOUS; ORAL at 04:01

## 2019-01-14 RX ADMIN — HEPARIN SODIUM (PORCINE) LOCK FLUSH IV SOLN 100 UNIT/ML 500 UNITS: 100 SOLUTION at 05:01

## 2019-01-14 RX ADMIN — DEXAMETHASONE SODIUM PHOSPHATE: 4 INJECTION, SOLUTION INTRA-ARTICULAR; INTRALESIONAL; INTRAMUSCULAR; INTRAVENOUS; SOFT TISSUE at 02:01

## 2019-01-14 RX ADMIN — DOXORUBICIN HYDROCHLORIDE 108 MG: 2 INJECTION, SOLUTION INTRAVENOUS at 03:01

## 2019-01-14 RX ADMIN — APREPITANT 130 MG: 130 INJECTION, EMULSION INTRAVENOUS at 02:01

## 2019-01-14 NOTE — TELEPHONE ENCOUNTER
Spoke with patient regarding results of genetic testing, [facility] genetic test results negative, all questions answered at this time

## 2019-01-14 NOTE — PLAN OF CARE
"Problem: Adult Inpatient Plan of Care  Goal: Plan of Care Review  Outcome: Ongoing (interventions implemented as appropriate)  Patient c/o "chest heaviness" at completion of Cytoxan infusion with no other reactions noted, denies any other c/o.  BP @ 125/78.  Pt stated "heaviness is gone, feeling back to normal".  No questions or concerns at this time.  Ambulated off unit unassisted in NAD.      "

## 2019-01-14 NOTE — PLAN OF CARE
Problem: Nausea and Vomiting (Chemotherapy Effects)  Goal: Fluid and Electrolyte Balance  Outcome: Ongoing (interventions implemented as appropriate)  Patient here for D1C1 of A/C.  Assessment completed and labs reviewed.  VSS.  Side effects discussed with pt/so, pt watched chemo video, handouts on chemo given, answered questions as needed, verbalized understanding.  No needs at this time.  Chair reclined and blanket offered.  Will continue to monitor.

## 2019-01-15 ENCOUNTER — INFUSION (OUTPATIENT)
Dept: INFUSION THERAPY | Facility: HOSPITAL | Age: 39
End: 2019-01-15
Attending: INTERNAL MEDICINE
Payer: COMMERCIAL

## 2019-01-15 ENCOUNTER — TELEPHONE (OUTPATIENT)
Dept: HEMATOLOGY/ONCOLOGY | Facility: CLINIC | Age: 39
End: 2019-01-15

## 2019-01-15 DIAGNOSIS — Z17.0 CARCINOMA OF AXILLARY TAIL OF RIGHT BREAST IN FEMALE, ESTROGEN RECEPTOR POSITIVE: Primary | ICD-10-CM

## 2019-01-15 DIAGNOSIS — C50.611 CARCINOMA OF AXILLARY TAIL OF RIGHT BREAST IN FEMALE, ESTROGEN RECEPTOR POSITIVE: Primary | ICD-10-CM

## 2019-01-15 PROCEDURE — 96372 THER/PROPH/DIAG INJ SC/IM: CPT

## 2019-01-15 PROCEDURE — 63600175 PHARM REV CODE 636 W HCPCS: Mod: JG | Performed by: INTERNAL MEDICINE

## 2019-01-15 RX ADMIN — PEGFILGRASTIM 6 MG: 6 INJECTION SUBCUTANEOUS at 03:01

## 2019-01-15 NOTE — TELEPHONE ENCOUNTER
Patient requesting return to work letter. Requests needed discussed. Letter made up, patient to receive when she comes to receive her neulasta injection.

## 2019-01-15 NOTE — TELEPHONE ENCOUNTER
----- Message from Diya Watson sent at 1/15/2019 12:56 PM CST -----  Contact: self  She is needing to speak to the nurse about getting a release to go back to work tomorrow. Please call her back to discuss as she wants to get this from you today.

## 2019-01-15 NOTE — NURSING
Patient here for neulasta injection-teaching to patient on use and possible side effects of neulasta-tolerated well.

## 2019-01-16 ENCOUNTER — TELEPHONE (OUTPATIENT)
Dept: HEMATOLOGY/ONCOLOGY | Facility: CLINIC | Age: 39
End: 2019-01-16

## 2019-01-16 NOTE — TELEPHONE ENCOUNTER
Returned call to patient to discuss reaction to neulasta injection. Redness/burning/hot/swollen to face and chest after neulasta injection yesterday. Took benadryl for reaction per Dr. mcintosh. Verbalizes that the redness/puffiness has resolved.     Patient reports feeling lightheaded/dizziness and shaking. Unsure if this is due to the benadryl. Evaluate patient's appetite status, in which she claims to be hydrating well, however has no appetite and gets queasy at the thought of food. Patient concerned on how to manage this. Patient also wants to know if for future she will continue with neulasta injection. Explained to patient this would have to be discussed with MD mcintosh, however she will see him before her second round. Explained that she may continue, but just may need pre medication.    Will relay information to Dr. Mcintosh.

## 2019-01-16 NOTE — TELEPHONE ENCOUNTER
----- Message from Ruba Zhang sent at 1/16/2019 10:10 AM CST -----  Contact: Pt  Pt took medicine and had a allergic reaction to it. Was advised to follow up about current status. Pt is better now. Please call [phone]

## 2019-01-18 ENCOUNTER — HOSPITAL ENCOUNTER (EMERGENCY)
Facility: HOSPITAL | Age: 39
Discharge: HOME | End: 2019-01-18
Attending: EMERGENCY MEDICINE
Payer: COMMERCIAL

## 2019-01-18 ENCOUNTER — TELEPHONE (OUTPATIENT)
Dept: HEMATOLOGY/ONCOLOGY | Facility: CLINIC | Age: 39
End: 2019-01-18

## 2019-01-18 VITALS
RESPIRATION RATE: 18 BRPM | BODY MASS INDEX: 26.46 KG/M2 | WEIGHT: 155 LBS | SYSTOLIC BLOOD PRESSURE: 136 MMHG | DIASTOLIC BLOOD PRESSURE: 72 MMHG | OXYGEN SATURATION: 99 % | HEIGHT: 64 IN | HEART RATE: 67 BPM | TEMPERATURE: 98 F

## 2019-01-18 DIAGNOSIS — G44.209 TENSION HEADACHE: Primary | ICD-10-CM

## 2019-01-18 LAB
ALBUMIN SERPL BCP-MCNC: 3.4 G/DL
ALP SERPL-CCNC: 134 U/L
ALT SERPL W/O P-5'-P-CCNC: 14 U/L
ANION GAP SERPL CALC-SCNC: 8 MMOL/L
ANISOCYTOSIS BLD QL SMEAR: SLIGHT
AST SERPL-CCNC: 14 U/L
B-HCG UR QL: NEGATIVE
BASOPHILS NFR BLD: 0 %
BILIRUB SERPL-MCNC: 0.3 MG/DL
BUN SERPL-MCNC: 11 MG/DL
CALCIUM SERPL-MCNC: 8.5 MG/DL
CHLORIDE SERPL-SCNC: 110 MMOL/L
CO2 SERPL-SCNC: 21 MMOL/L
CREAT SERPL-MCNC: 0.7 MG/DL
CTP QC/QA: YES
DIFFERENTIAL METHOD: ABNORMAL
EOSINOPHIL NFR BLD: 3 %
ERYTHROCYTE [DISTWIDTH] IN BLOOD BY AUTOMATED COUNT: 15.4 %
EST. GFR  (AFRICAN AMERICAN): >60 ML/MIN/1.73 M^2
EST. GFR  (NON AFRICAN AMERICAN): >60 ML/MIN/1.73 M^2
GLUCOSE SERPL-MCNC: 85 MG/DL
HCT VFR BLD AUTO: 35.2 %
HGB BLD-MCNC: 11.2 G/DL
HYPOCHROMIA BLD QL SMEAR: ABNORMAL
IMM GRANULOCYTES # BLD AUTO: ABNORMAL K/UL
IMM GRANULOCYTES NFR BLD AUTO: ABNORMAL %
LYMPHOCYTES NFR BLD: 8 %
MCH RBC QN AUTO: 27.5 PG
MCHC RBC AUTO-ENTMCNC: 31.8 G/DL
MCV RBC AUTO: 87 FL
MONOCYTES NFR BLD: 0 %
NEUTROPHILS NFR BLD: 89 %
NRBC BLD-RTO: 0 /100 WBC
OVALOCYTES BLD QL SMEAR: ABNORMAL
PLATELET # BLD AUTO: 185 K/UL
PLATELET BLD QL SMEAR: ABNORMAL
PMV BLD AUTO: 12.3 FL
POIKILOCYTOSIS BLD QL SMEAR: SLIGHT
POLYCHROMASIA BLD QL SMEAR: ABNORMAL
POTASSIUM SERPL-SCNC: 3.5 MMOL/L
PROT SERPL-MCNC: 6.4 G/DL
RBC # BLD AUTO: 4.07 M/UL
SODIUM SERPL-SCNC: 139 MMOL/L
SPHEROCYTES BLD QL SMEAR: ABNORMAL
WBC # BLD AUTO: 17.47 K/UL

## 2019-01-18 PROCEDURE — 99285 EMERGENCY DEPT VISIT HI MDM: CPT | Mod: 25

## 2019-01-18 PROCEDURE — 96361 HYDRATE IV INFUSION ADD-ON: CPT

## 2019-01-18 PROCEDURE — 25000003 PHARM REV CODE 250: Performed by: EMERGENCY MEDICINE

## 2019-01-18 PROCEDURE — 96374 THER/PROPH/DIAG INJ IV PUSH: CPT

## 2019-01-18 PROCEDURE — 81025 URINE PREGNANCY TEST: CPT | Performed by: EMERGENCY MEDICINE

## 2019-01-18 PROCEDURE — 85027 COMPLETE CBC AUTOMATED: CPT

## 2019-01-18 PROCEDURE — 99284 EMERGENCY DEPT VISIT MOD MDM: CPT | Mod: ,,, | Performed by: EMERGENCY MEDICINE

## 2019-01-18 PROCEDURE — 80053 COMPREHEN METABOLIC PANEL: CPT

## 2019-01-18 PROCEDURE — 99284 PR EMERGENCY DEPT VISIT,LEVEL IV: ICD-10-PCS | Mod: ,,, | Performed by: EMERGENCY MEDICINE

## 2019-01-18 PROCEDURE — 63600175 PHARM REV CODE 636 W HCPCS: Performed by: EMERGENCY MEDICINE

## 2019-01-18 PROCEDURE — 96375 TX/PRO/DX INJ NEW DRUG ADDON: CPT

## 2019-01-18 PROCEDURE — 85007 BL SMEAR W/DIFF WBC COUNT: CPT

## 2019-01-18 RX ORDER — ORPHENADRINE CITRATE 30 MG/ML
60 INJECTION INTRAMUSCULAR; INTRAVENOUS
Status: COMPLETED | OUTPATIENT
Start: 2019-01-18 | End: 2019-01-18

## 2019-01-18 RX ORDER — BUPIVACAINE HYDROCHLORIDE 5 MG/ML
5 INJECTION, SOLUTION EPIDURAL; INTRACAUDAL
Status: DISCONTINUED | OUTPATIENT
Start: 2019-01-18 | End: 2019-01-18

## 2019-01-18 RX ORDER — PROMETHAZINE HYDROCHLORIDE 25 MG/1
25 TABLET ORAL EVERY 6 HOURS PRN
Qty: 15 TABLET | Refills: 0 | Status: ON HOLD | OUTPATIENT
Start: 2019-01-18 | End: 2019-01-31 | Stop reason: HOSPADM

## 2019-01-18 RX ORDER — METHOCARBAMOL 500 MG/1
1000 TABLET, FILM COATED ORAL 3 TIMES DAILY
Qty: 31 TABLET | Refills: 0 | Status: SHIPPED | OUTPATIENT
Start: 2019-01-18 | End: 2019-01-23

## 2019-01-18 RX ORDER — KETOROLAC TROMETHAMINE 30 MG/ML
15 INJECTION, SOLUTION INTRAMUSCULAR; INTRAVENOUS
Status: COMPLETED | OUTPATIENT
Start: 2019-01-18 | End: 2019-01-18

## 2019-01-18 RX ORDER — ONDANSETRON 2 MG/ML
4 INJECTION INTRAMUSCULAR; INTRAVENOUS
Status: COMPLETED | OUTPATIENT
Start: 2019-01-18 | End: 2019-01-18

## 2019-01-18 RX ORDER — TRAZODONE HYDROCHLORIDE 50 MG/1
50 TABLET ORAL NIGHTLY PRN
Qty: 15 TABLET | Refills: 0 | Status: SHIPPED | OUTPATIENT
Start: 2019-01-18 | End: 2020-01-14

## 2019-01-18 RX ORDER — BUTALBITAL, ACETAMINOPHEN AND CAFFEINE 50; 325; 40 MG/1; MG/1; MG/1
1 TABLET ORAL EVERY 4 HOURS PRN
Qty: 16 TABLET | Refills: 0 | Status: SHIPPED | OUTPATIENT
Start: 2019-01-18 | End: 2019-02-17

## 2019-01-18 RX ADMIN — ONDANSETRON 4 MG: 2 INJECTION INTRAMUSCULAR; INTRAVENOUS at 09:01

## 2019-01-18 RX ADMIN — SODIUM CHLORIDE, SODIUM LACTATE, POTASSIUM CHLORIDE, AND CALCIUM CHLORIDE 1000 ML: .6; .31; .03; .02 INJECTION, SOLUTION INTRAVENOUS at 08:01

## 2019-01-18 RX ADMIN — ORPHENADRINE CITRATE 60 MG: 30 INJECTION INTRAMUSCULAR; INTRAVENOUS at 09:01

## 2019-01-18 RX ADMIN — KETOROLAC TROMETHAMINE 15 MG: 30 INJECTION, SOLUTION INTRAMUSCULAR at 07:01

## 2019-01-18 NOTE — TELEPHONE ENCOUNTER
Returned call to patient to discuss her symptoms. Patient reporting headache that has been persistent since Tuesday. She reports that the pain is so severe that she can't even lie on it. She also reports dizziness/lightheadedness. Recently developed a reaction to the neulasta. Patient reported that she is taking tylenol 1000 mg every 6 hours, with no response. Explained to patient that she is nearly exceeding the daily recommended dose and that if her pain is that severe that she should have this addressed in the ED. LAst head imaging completed in 7/2018, negative for any findings. Discussed case with Nohemi Barrios, who recommended that she report to ED for evaluation. Made it clear to patient that would follow up with her on Monday. Explained that this could be a reaction the cheom, potentially a reaction to the high dose steroids. Unsure, but to rule out any underlying condition and get her on a more appropriate pain regimen. She voiced understanding.

## 2019-01-18 NOTE — TELEPHONE ENCOUNTER
----- Message from Clarence Bill sent at 1/18/2019  4:23 PM CST -----  Contact: Patient  Pt has been having a headache since Tuesday, wants to know what she can take for the pain.    Contact:: [phone]

## 2019-01-19 NOTE — ED PROVIDER NOTES
Encounter Date: 1/18/2019    SCRIBE #1 NOTE: I, Will Curtis , am scribing for, and in the presence of,  Linus Clements M.D. I have scribed the entire note.       History     Chief Complaint   Patient presents with    Headache     had chemo monday, with reaction, cont headache since tues     Pt is a 39 yo F w pmhx of cancer, thyroid dz, and hypothyroidism presents to the ED for evaluation of headache. States that she had chemo infusion on Monday and afterwards began experiencing swelling of the face and chest, redness of the skin, eye redness, and nausea. After taking benadryl the symptoms went away, but returned again on Tuesday and since then the symptoms have remained. Denies any neck soreness, v/d, SOB, fever, or any other complaint at this time.       The history is provided by the patient and medical records.     Review of patient's allergies indicates:  No Known Allergies  Past Medical History:   Diagnosis Date    Abnormal Pap smear of cervix 05/2016    ASCUS, - HPV    Cancer     Hypothyroidism     Palpitations     PONV (postoperative nausea and vomiting)     Syncope and collapse     Thyroid disease     Vitamin D deficiency disease      Past Surgical History:   Procedure Laterality Date    BIOPSY, LYMPH NODE, SENTINEL Right 11/21/2018    [facility]formed [facility] at [facility] OR 2ND FLR    CHOLECYSTECTOMY      INJECTION, FOR SENTINEL NODE IDENTIFICATION Right 11/21/2018    Performed [facility] at [facility] OR 2ND FLR    [id number]-CATH Left 1/2/2019    Performed by [facility] at [facility] OR 2ND FLR    LYMPHADENECTOMY, AXILLARY Right 11/21/2018    [facility]formed [facility] at [facility] OR 2ND FLR    MASTECTOMY, PARTIAL - seed localized Right 11/21/2018    [facility] at [facility] OR 2ND FLR    THYROIDECTOMY      TUBAL LIGATION       Family History   Problem Relation Age of Onset    Hypertension Mother     Heart attack Mother      Hypertension Father     Ovarian cancer Maternal Aunt     Cancer Paternal Grandfather     Breast cancer Neg Hx     Colon cancer Neg Hx      Social History     Tobacco Use    Smoking status: Never Smoker    Smokeless tobacco: Never Used   Substance Use Topics    Alcohol use: Yes     Alcohol/week: 0.0 oz     Comment: occ    Drug use: No     Review of Systems   Constitutional: Negative for fever.   HENT: Negative for nosebleeds.    Eyes: Positive for redness.   Respiratory: Negative for shortness of breath.    Cardiovascular: Negative for chest pain.   Gastrointestinal: Positive for nausea. Negative for diarrhea and vomiting.   Genitourinary: Negative for flank pain.   Musculoskeletal: Negative for neck pain.   Skin: Positive for color change.   Neurological: Positive for headaches.   Psychiatric/Behavioral: Negative for confusion.       Physical Exam     Initial Vitals [01/18/19 1757]   BP Pulse Resp Temp SpO2   127/76 79 18 98.4 °F (36.9 °C) 98 %      MAP       --         Physical Exam    Vitals reviewed.  Constitutional: She appears well-developed and well-nourished.   38-year-old woman, no acute distress noted, mildly anxious   HENT:   Head: Normocephalic and atraumatic.   No intraoral lesions identified.   Eyes: EOM are normal. Pupils are equal, round, and reactive to light.   Neck: No tracheal deviation present. No JVD present.   There is mild-to-moderate tenderness of the occipetalis well as bilateral cervical paraspinous musculature and bilateral trapezii without midline tenderness or deformity   Cardiovascular: Normal rate, regular rhythm, normal heart sounds and intact distal pulses.   Pulmonary/Chest: Breath sounds normal. No stridor. No respiratory distress.   Abdominal: Soft. She exhibits no distension. There is no tenderness.   Musculoskeletal: She exhibits no edema.   Moving all 4 extremities   Neurological: She is alert and oriented to person, place, and time.   Skin: Skin is warm and dry.    Psychiatric: Her behavior is normal. Thought content normal.         ED Course   Procedures  Labs Reviewed   CBC W/ AUTO DIFFERENTIAL - Abnormal; Notable for the following components:       Result Value    WBC 17.47 (*)     Hemoglobin 11.2 (*)     Hematocrit 35.2 (*)     MCHC 31.8 (*)     RDW 15.4 (*)     All other components within normal limits   COMPREHENSIVE METABOLIC PANEL - Abnormal; Notable for the following components:    CO2 21 (*)     Calcium 8.5 (*)     Albumin 3.4 (*)     All other components within normal limits   POCT URINE PREGNANCY          Imaging Results          CT Head Without Contrast (Final result)  Result time 01/18/19 20:55:28    Final result by Leonid Christie MD (01/18/19 20:55:28)                 Impression:      No acute intracranial hemorrhage or major vascular distribution infarct.  No parenchymal mass, noting evaluation is limited due to lack of intravenous contrast.    Paranasal sinus disease as above.  Correlate clinically.    Electronically signed by resident: Renny Rothman  Date:    01/18/2019  Time:    20:39    Electronically signed by: Leonid Christie MD  Date:    01/18/2019  Time:    20:55             Narrative:    EXAMINATION:  CT HEAD WITHOUT CONTRAST    CLINICAL HISTORY:  headache with breast malignancy;    TECHNIQUE:  Low dose axial CT images obtained throughout the head without intravenous contrast. Sagittal and coronal reconstructions were performed.    COMPARISON:  CT head 07/02/2018.    FINDINGS:  Intracranial compartment:    Ventricles are stable in size and configuration evidence of hydrocephalus. No extra-axial blood or fluid collections.    The brain parenchyma appears normal. No parenchymal mass, hemorrhage, edema or major vascular distribution infarct.    Skull/extracranial contents (limited evaluation): No fracture.  Air-fluid level within the right sphenoid sinus and aerated secretions within the left sphenoid sinus which may reflect acute sinusitis.  Partially  opacified right anterior ethmoidal air cell.  Mastoid air cells and remaining paranasal sinuses are essentially clear.  Visualized portions of the orbits are within normal limits.                                 Medical Decision Making:   History:   Old Medical Records: I decided to obtain old medical records.  Differential Diagnosis:   Differential diagnosis includes but is not limited to:  Tension headache, migraine headache, occipital neuralgia, ICH, dehydration, neutropenia, metastatic disease  Clinical Tests:   Lab Tests: Ordered and Reviewed            Scribe Attestation:   Scribe #1: I performed the above scribed service and the documentation accurately describes the services I performed. I attest to the accuracy of the note.    Attending Attestation:             Attending ED Notes:   Imaging without evidence of acute injury. Patient reports improvement of presenting symptoms with ED therapy. Symptoms likely secondary to tension headache in setting of chemotherapy induction. Patient tolerating oral fluids in ED. Will be discharged in improved condition with Rx robaxin as well as fiorcet as needed for recurrent symptoms and Oncology follow-up.             Clinical Impression:   The encounter diagnosis was Tension headache.      Disposition:   Disposition: Discharged  Condition: Stable                        Linus Clements MD  01/22/19 0721

## 2019-01-19 NOTE — ED TRIAGE NOTES
Pt had chemo on Monday and nulesta shot on Tuesday. Pt developed a headache on Tuesday that has not gone away. Pt was advised by on call nurse to come to ER for evaluation

## 2019-01-19 NOTE — ED NOTES
Patient identifiers verified and correct for Elham Rodas.   LOC: The patient is awake, alert and aware of environment with an appropriate affect, the patient is oriented x 3 and speaking appropriately. Pt complains of a headache  APPEARANCE: Patient appears comfortable and in no acute distress, patient is clean and well groomed.  SKIN: The skin is warm and dry, color consistent with ethnicity, patient has normal skin turgor and moist mucus membranes, skin intact, no breakdown or bruising noted.   MUSCULOSKELETAL: Patient moving all extremities spontaneously, no swelling noted.  RESPIRATORY: Airway is open and patent, respirations are spontaneous, patient has a normal effort and rate, no accessory muscle use noted, pt placed on continuous pulse ox with O2 sats noted at 97% on room air.  CARDIAC: Pt placed on cardiac monitor. Patient has a normal rate and regular rhythm, no edema noted, capillary refill < 3 seconds.   GASTRO: Soft and non tender to palpation, no distention noted, normoactive bowel sounds present in all four quadrants. Pt states bowel movements have been regular.  : Pt denies any pain or frequency with urination.  NEURO: Pt opens eyes spontaneously, behavior appropriate to situation, follows commands, facial expression symmetrical, bilateral hand grasp equal and even, purposeful motor response noted, normal sensation in all extremities when touched with a finger.

## 2019-01-21 ENCOUNTER — TELEPHONE (OUTPATIENT)
Dept: HEMATOLOGY/ONCOLOGY | Facility: CLINIC | Age: 39
End: 2019-01-21

## 2019-01-21 NOTE — TELEPHONE ENCOUNTER
Returned call to patient to discuss her medications. She recently went to ED on Friday, for evaluation of severe headache. Patient called to confirm if medications that she was prescribed are safe to take. MD contributed the headache to tension. He prescribed robaxin (PRN TID), fioricet, trazodone for sleep, and phenergan. Explained to patient that all of these medications are safe to take, however advised that she does not take them simultaneously. Explained that she should take zofran initially for nausea that was prescribed by Dr. Mcintosh, since phenergan can cause drowsiness as well as trazodone and muscle relaxer. Explained to her that she should alternate the foricet and the robaxin. In addition, recommended patient establish appointment with Dr. Rausch her PCP, given CT imaging showing paranasal sinus disease, in case ENT Referral necessary. Noted patient not on any allergy reduction mediation and mentioned that she should trial some flonase. Explained that part of this headache and pressure she is experiencing may certainly be caused from this issue. She voiced understanding. No further questions or concerns at this time.

## 2019-01-25 ENCOUNTER — LAB VISIT (OUTPATIENT)
Dept: LAB | Facility: HOSPITAL | Age: 39
End: 2019-01-25
Attending: FAMILY MEDICINE
Payer: COMMERCIAL

## 2019-01-25 ENCOUNTER — TELEPHONE (OUTPATIENT)
Dept: HEMATOLOGY/ONCOLOGY | Facility: CLINIC | Age: 39
End: 2019-01-25

## 2019-01-25 ENCOUNTER — OFFICE VISIT (OUTPATIENT)
Dept: HEMATOLOGY/ONCOLOGY | Facility: CLINIC | Age: 39
End: 2019-01-25
Payer: COMMERCIAL

## 2019-01-25 VITALS
HEIGHT: 64 IN | TEMPERATURE: 98 F | BODY MASS INDEX: 27.18 KG/M2 | OXYGEN SATURATION: 99 % | SYSTOLIC BLOOD PRESSURE: 123 MMHG | HEART RATE: 101 BPM | RESPIRATION RATE: 18 BRPM | WEIGHT: 159.19 LBS | DIASTOLIC BLOOD PRESSURE: 85 MMHG

## 2019-01-25 DIAGNOSIS — Z51.11 ENCOUNTER FOR ANTINEOPLASTIC CHEMOTHERAPY: Primary | ICD-10-CM

## 2019-01-25 DIAGNOSIS — J32.9 SINUSITIS, UNSPECIFIED CHRONICITY, UNSPECIFIED LOCATION: Primary | ICD-10-CM

## 2019-01-25 DIAGNOSIS — Z17.0 CARCINOMA OF AXILLARY TAIL OF RIGHT BREAST IN FEMALE, ESTROGEN RECEPTOR POSITIVE: ICD-10-CM

## 2019-01-25 DIAGNOSIS — Z51.11 ENCOUNTER FOR ANTINEOPLASTIC CHEMOTHERAPY: ICD-10-CM

## 2019-01-25 DIAGNOSIS — J01.81 OTHER ACUTE RECURRENT SINUSITIS: ICD-10-CM

## 2019-01-25 DIAGNOSIS — C50.611 CARCINOMA OF AXILLARY TAIL OF RIGHT BREAST IN FEMALE, ESTROGEN RECEPTOR POSITIVE: ICD-10-CM

## 2019-01-25 PROBLEM — R50.9 FEVER: Status: ACTIVE | Noted: 2019-01-25

## 2019-01-25 LAB
ALBUMIN SERPL BCP-MCNC: 3.7 G/DL
ALP SERPL-CCNC: 109 U/L
ALT SERPL W/O P-5'-P-CCNC: 44 U/L
ANION GAP SERPL CALC-SCNC: 8 MMOL/L
AST SERPL-CCNC: 38 U/L
BILIRUB SERPL-MCNC: 0.1 MG/DL
BUN SERPL-MCNC: 6 MG/DL
CALCIUM SERPL-MCNC: 8.7 MG/DL
CHLORIDE SERPL-SCNC: 106 MMOL/L
CO2 SERPL-SCNC: 25 MMOL/L
CREAT SERPL-MCNC: 0.7 MG/DL
ERYTHROCYTE [DISTWIDTH] IN BLOOD BY AUTOMATED COUNT: 15.3 %
EST. GFR  (AFRICAN AMERICAN): >60 ML/MIN/1.73 M^2
EST. GFR  (NON AFRICAN AMERICAN): >60 ML/MIN/1.73 M^2
GLUCOSE SERPL-MCNC: 101 MG/DL
HCT VFR BLD AUTO: 36.5 %
HGB BLD-MCNC: 11.7 G/DL
IMM GRANULOCYTES # BLD AUTO: 1.51 K/UL
MCH RBC QN AUTO: 27.1 PG
MCHC RBC AUTO-ENTMCNC: 32.1 G/DL
MCV RBC AUTO: 85 FL
NEUTROPHILS # BLD AUTO: 4.9 K/UL
PLATELET # BLD AUTO: 187 K/UL
PMV BLD AUTO: 12.1 FL
POTASSIUM SERPL-SCNC: 4.5 MMOL/L
PROT SERPL-MCNC: 6.9 G/DL
RBC # BLD AUTO: 4.31 M/UL
SODIUM SERPL-SCNC: 139 MMOL/L
WBC # BLD AUTO: 9.05 K/UL

## 2019-01-25 PROCEDURE — 99214 PR OFFICE/OUTPT VISIT, EST, LEVL IV, 30-39 MIN: ICD-10-PCS | Mod: S$GLB,,, | Performed by: PHYSICIAN ASSISTANT

## 2019-01-25 PROCEDURE — 99999 PR PBB SHADOW E&M-EST. PATIENT-LVL III: ICD-10-PCS | Mod: PBBFAC,,, | Performed by: PHYSICIAN ASSISTANT

## 2019-01-25 PROCEDURE — 3008F PR BODY MASS INDEX (BMI) DOCUMENTED: ICD-10-PCS | Mod: CPTII,S$GLB,, | Performed by: PHYSICIAN ASSISTANT

## 2019-01-25 PROCEDURE — 80053 COMPREHEN METABOLIC PANEL: CPT

## 2019-01-25 PROCEDURE — 3008F BODY MASS INDEX DOCD: CPT | Mod: CPTII,S$GLB,, | Performed by: PHYSICIAN ASSISTANT

## 2019-01-25 PROCEDURE — 85027 COMPLETE CBC AUTOMATED: CPT

## 2019-01-25 PROCEDURE — 36415 COLL VENOUS BLD VENIPUNCTURE: CPT

## 2019-01-25 PROCEDURE — 99214 OFFICE O/P EST MOD 30 MIN: CPT | Mod: S$GLB,,, | Performed by: PHYSICIAN ASSISTANT

## 2019-01-25 PROCEDURE — 99999 PR PBB SHADOW E&M-EST. PATIENT-LVL III: CPT | Mod: PBBFAC,,, | Performed by: PHYSICIAN ASSISTANT

## 2019-01-25 RX ORDER — AZITHROMYCIN 250 MG/1
TABLET, FILM COATED ORAL
Qty: 2 TABLET | Refills: 0 | Status: ON HOLD | OUTPATIENT
Start: 2019-01-25 | End: 2019-01-31 | Stop reason: HOSPADM

## 2019-01-25 NOTE — Clinical Note
She had labs done today so you can cancel that for Monday but keep her appt for evens and chemo and neulasta, thanks

## 2019-01-25 NOTE — TELEPHONE ENCOUNTER
Contacted patient provided her complaints. Concern patient is developing a sinus infection. Patient started with constant productive cough yesterday. After sleep upon awakening this morning, reports that she is feeling bad (temp 99.9 and throat sore.) patient recently seen in ED requiring head imaging. Also had reaction to neulasta injection. Unsure what is source. Patient to come to  appointment with Lisa this morning. Requested patient to head here now, so labs could be received at 930 and appointment will be for 1030. She voiced understanding. Patient to head here now.

## 2019-01-25 NOTE — TELEPHONE ENCOUNTER
----- Message from Lisa Francisco PA-C sent at 1/25/2019  8:41 AM CST -----  Contact: Patient  Can she come for the 10:30 urgent care? Or she can come for the 3 pm urgent care (put her in that slot) but just ask her to come at 2 pm?  ----- Message -----  From: Elham Colby RN  Sent: 1/25/2019   8:27 AM  To: Lisa Francisco PA-C    Would you be okay with me offering her an urgent care clinic slot if she is able to attend. Since her first round of chemo she has had several calls with different complaints.   She was recently seen in ED on 1/18 and prescribed several medications for a tension HA, though noted on CT showing paranasal sinus disease... Not sure if this is now contributing to her throat pain. Possible sinus infection?    ----- Message -----  From: Clarence Bill  Sent: 1/25/2019   8:05 AM  To: La[facility] Ull[facility] Staff    Pt woke up this morning with a sore throat, bad cough, and a temp of 99.9. Wants to know what she is able to take for this.    Contact:: [phone]

## 2019-01-25 NOTE — PROGRESS NOTES
"Subjective:       Patient ID: Elham Rodas is a 38 y.o. female.    Chief Complaint: Carcinoma of axillary tail of right breast in female, estrog    Urgent Care visit.    38 year old female with right breast cancer who received adjuvant AC #1 on 1/14/19.    Patient reports reaction to neulasta shot, flushing of face, swelling around eyes. She took benadryl and that helped with symptoms.   Subsequently on day 3 she had recurrence of these symptoms and also had some "shakiness". She then had bad headaches.  She has also had significant indigestion, started protonix with relief of symptoms.  She has also had significant bony pain after neulasta.   That lasted for one day and was more pronounced at hips.    This past Wednesday she developed a dry cough, she also has a sore throat.  She has had a low grade fever, checked this morning at home at 99.9 F.   No rhinorrhea.  She has had some occasional nausea.    She was seen in the ED on 1/18/19 with complaints of headache.  CT brain without contrast:  No acute intracranial hemorrhage or major vascular distribution infarct.  No parenchymal mass, noting evaluation is limited due to lack of intravenous contrast.  Paranasal sinus disease as above.  Correlate clinically.      Onc History:  Briefly, she is a 38-year-old premenopausal female who was recently diagnosed with an invasive mammary carcinoma that was ER positive, [place] positive, and HER-2 negative.   On 11/21/2018, she underwent a right lumpectomy with sentinel lymph node biopsy.  The pathology report from the procedure indicates that she had a positive sentinel lymph node with a metastatic deposit of 6 mm.  There was no extranodal extension.  On the right partial mastectomy specimen, she had an invasive lobular carcinoma that was 14 mm in maximum diameter.  It was grade II and resection margins were clear.    We still do not have approval from her new insurance company; of note she had changed insurtance companies on " January 1st, hence the lack of approval.             Review of Systems   Constitutional: Positive for activity change, fatigue and fever. Negative for appetite change, chills, diaphoresis and unexpected weight change.   HENT: Positive for sinus pain and sore throat. Negative for congestion, ear discharge, ear pain, rhinorrhea and trouble swallowing.    Eyes: Negative for visual disturbance.   Respiratory: Negative for cough, choking, chest tightness and shortness of breath.    Cardiovascular: Negative for chest pain, palpitations and leg swelling.   Gastrointestinal: Negative for abdominal pain, blood in stool, constipation, diarrhea, nausea and vomiting.   Genitourinary: Negative for dysuria and hematuria.   Musculoskeletal: Positive for arthralgias (with neulasta). Negative for back pain.   Skin: Negative for pallor and rash.   Neurological: Positive for headaches. Negative for dizziness, weakness and numbness.   Hematological: Negative for adenopathy. Does not bruise/bleed easily.   Psychiatric/Behavioral: Negative for dysphoric mood. The patient is not nervous/anxious.        Objective:      Physical Exam   Constitutional: She is oriented to person, place, and time. She appears well-developed and well-nourished. No distress.   Presents with   ECOG 0   HENT:   Head: Normocephalic.   Right Ear: External ear normal.   Left Ear: External ear normal.   Mouth/Throat: Oropharynx is clear and moist. No oropharyngeal exudate.   Normal light reflex at bilateral tympanic membranes  No tenderness to palpation over frontal and maxillary sinuses     Eyes: Conjunctivae are normal. Pupils are equal, round, and reactive to light. No scleral icterus.   Neck: Normal range of motion. Neck supple. No thyromegaly present.   Cardiovascular: Normal rate and regular rhythm.   Pulmonary/Chest: Effort normal and breath sounds normal. No respiratory distress.   Lungs clear   Abdominal: Soft. Bowel sounds are normal. She exhibits no  distension and no mass. There is no tenderness.   Musculoskeletal: Normal range of motion. She exhibits no edema or tenderness.   No spinal or paraspinal tenderness to palpation     Lymphadenopathy:     She has no cervical adenopathy.   Neurological: She is alert and oriented to person, place, and time. No cranial nerve deficit.   Skin: Skin is warm and dry.   Psychiatric: She has a normal mood and affect. Her behavior is normal. Thought content normal.   Vitals reviewed.      Assessment:       1. Sinusitis, unspecified chronicity, unspecified location    2. Carcinoma of axillary tail of right breast in female, estrogen receptor positive        Plan:       1)RX for Zithromax given sinusitis seen on recent CT head and low grade fever.  2)labs today acceptable to continue with treatment on Monday. She will return to clinic at that time to see Dr. Restrepo prior to adjuvant AC #2.

## 2019-01-25 NOTE — LETTER
January 25, 2019    Elham Rodas  [address] [place] [address]             [facility]  [address]  [place] [address]  Phone: [phone] To Whom it may concern:    Ms. Rodas is under our care for breast cancer and currently undergoing chemotherapy. She has had significant side effects from this treatment which will preclude her from working during the duration of chemotherapy which will last approximately 5 months.     If you have any questions or concerns, please don't hesitate to call.    Sincerely,        Lisa Francisco PA-C

## 2019-01-26 PROBLEM — R79.89 ELEVATED LFTS: Status: ACTIVE | Noted: 2019-01-26

## 2019-01-26 PROBLEM — A41.9 SEPSIS: Status: ACTIVE | Noted: 2019-01-25

## 2019-01-28 PROBLEM — M25.511 ACUTE PAIN OF RIGHT SHOULDER: Status: RESOLVED | Noted: 2018-10-01 | Resolved: 2019-01-28

## 2019-01-30 PROBLEM — J18.9 PNEUMONIA OF RIGHT LOWER LOBE DUE TO INFECTIOUS ORGANISM: Status: ACTIVE | Noted: 2019-01-25

## 2019-01-31 ENCOUNTER — TELEPHONE (OUTPATIENT)
Dept: HEMATOLOGY/ONCOLOGY | Facility: CLINIC | Age: 39
End: 2019-01-31

## 2019-01-31 NOTE — TELEPHONE ENCOUNTER
Contacted patient to inform her that hospital follow up and missed chemo follow up with dr. horner has been rescheduled for tomorrow at 9 am for labs and visit at 10 a.m. Chemo appointment held at this time, as patient will discuss plan of care going forward with MD at appointment tomorrow. She voiced understanding.

## 2019-01-31 NOTE — PROGRESS NOTES
Subjective:       Patient ID: Elham Rodas is a 38 y.o. female.    Chief Complaint: No chief complaint on file.    HPI   Mrs. Rodas returns today for follow up.  She is due for her second cycle of AC, hwoever, earlier this week she was [facility].  A Ct scan was performed, which I had a chance to review with Dr. Hamilton.  It showed a RLL pneumonia.  She was finally discharged home yesterday and she rpesents today to receive chemotherapy     Briefly, she is a 38-year-old premenopausal female who was recently diagnosed with an invasive mammary carcinoma that was ER positive, [place] positive, and HER-2 negative.   On 11/21/2018, she underwent a right lumpectomy with sentinel lymph node biopsy.  The pathology report from the procedure indicates that she had a positive sentinel lymph node with a metastatic deposit of 6 mm.  There was no extranodal extension.  On the right partial mastectomy specimen, she had an invasive lobular carcinoma that was 14 mm in maximum diameter.  It was grade II and resection margins were clear.     Her CBC yesterday showed a WBC count of 4,200 /mm3, Hg 10.7 gr/dl, Ht 33.3 5 and platelets 231,000 /mm3.    Review of Systems      Overall she feels fair. She is being treated with oral antibiotics.    She appears anxious but she denies any depression, easy bruising, fevers, chills, night  sweats, weight loss, nausea, vomiting, diarrhea, constipation, diplopia, blurred vision, headache, chest pain, palpitations, shortness of breath, breast pain, abdominal pain, extremity pain, or difficulty ambulating.  The remainder of the ten-point ROS, including general, skin, lymph, H/N, cardiorespiratory, GI, , Neuro, Endocrine, and psychiatric is negative.     Objective:      Physical Exam        She is alert, oriented to time, place, person, pleasant, well      nourished, in no acute physical distress.  She is here with her boyfriend.                                  VITAL  SIGNS:  Reviewed                                      HEENT:  Alopecia is noted.  There are no nasal, oral, lip, gingival, auricular, lid,    or conjunctival lesions.  Mucosae are moist and pink, and there is no        thrush.  Pupils are equal, reactive to light and accommodation.              Extraocular muscle movements are intact.  Dentition is good.  There is no frontal or maxillary tenderness.                                     NECK:  Supple without JVD, adenopathy, or thyromegaly.                       LUNGS:  Clear to auscultation without wheezing, rales, with only few rhonchi at the right base..           CARDIOVASCULAR:  Reveals an S1, S2, no murmurs, no rubs, no gallops.         ABDOMEN:  Soft, nontender, without organomegaly.  Bowel sounds are    present.                                                                     EXTREMITIES:  No cyanosis, clubbing, or edema.                               BREASTS:  She is status post right lumpectomy with a well-healed  Incision in the lower outer quadrant of her right breast.    There are no masses in either breast.     A sentinel node biopsy scar is seen in the right axilla.  It is also well  Healed.   A left sided portacath is now identified.                                    LYMPHATIC:  There is no cervical, axillary, or supraclavicular adenopathy.   SKIN:  Warm and moist, without petechiae, rashes, induration, or ecchymoses.           NEUROLOGIC:  DTRs are 0-1+ bilaterally, symmetrical, motor function is 5/5,  and cranial nerves are  within normal limits.    Assessment:       1. Pneumonia of right lower lobe due to infectious organism    2. Carcinoma of axillary tail of right breast in female, estrogen receptor positive    3. Encounter for antineoplastic chemotherapy        Plan:         I had a long discussion with her.  Given her underlying infection, we will delay her second cycle, and I will see her in 6 days for follow up.  Hopefully she will receive  cycle 2 at that time.  Her multiple questions were answered to her satisfaction.

## 2019-02-01 ENCOUNTER — OFFICE VISIT (OUTPATIENT)
Dept: HEMATOLOGY/ONCOLOGY | Facility: CLINIC | Age: 39
End: 2019-02-01
Payer: COMMERCIAL

## 2019-02-01 ENCOUNTER — LAB VISIT (OUTPATIENT)
Dept: LAB | Facility: HOSPITAL | Age: 39
End: 2019-02-01
Attending: INTERNAL MEDICINE
Payer: COMMERCIAL

## 2019-02-01 VITALS
BODY MASS INDEX: 26.79 KG/M2 | RESPIRATION RATE: 18 BRPM | SYSTOLIC BLOOD PRESSURE: 116 MMHG | HEART RATE: 93 BPM | WEIGHT: 156.94 LBS | HEIGHT: 64 IN | DIASTOLIC BLOOD PRESSURE: 81 MMHG | TEMPERATURE: 98 F | OXYGEN SATURATION: 97 %

## 2019-02-01 DIAGNOSIS — Z17.0 CARCINOMA OF AXILLARY TAIL OF RIGHT BREAST IN FEMALE, ESTROGEN RECEPTOR POSITIVE: ICD-10-CM

## 2019-02-01 DIAGNOSIS — C50.611 CARCINOMA OF AXILLARY TAIL OF RIGHT BREAST IN FEMALE, ESTROGEN RECEPTOR POSITIVE: ICD-10-CM

## 2019-02-01 DIAGNOSIS — Z51.11 ENCOUNTER FOR ANTINEOPLASTIC CHEMOTHERAPY: ICD-10-CM

## 2019-02-01 DIAGNOSIS — J18.9 PNEUMONIA OF RIGHT LOWER LOBE DUE TO INFECTIOUS ORGANISM: Primary | ICD-10-CM

## 2019-02-01 LAB
ALBUMIN SERPL BCP-MCNC: 3.5 G/DL
ALP SERPL-CCNC: 93 U/L
ALT SERPL W/O P-5'-P-CCNC: 58 U/L
ANION GAP SERPL CALC-SCNC: 9 MMOL/L
AST SERPL-CCNC: 27 U/L
BILIRUB SERPL-MCNC: 0.3 MG/DL
BUN SERPL-MCNC: 6 MG/DL
CALCIUM SERPL-MCNC: 9.7 MG/DL
CHLORIDE SERPL-SCNC: 107 MMOL/L
CO2 SERPL-SCNC: 25 MMOL/L
CREAT SERPL-MCNC: 0.7 MG/DL
ERYTHROCYTE [DISTWIDTH] IN BLOOD BY AUTOMATED COUNT: 15.9 %
EST. GFR  (AFRICAN AMERICAN): >60 ML/MIN/1.73 M^2
EST. GFR  (NON AFRICAN AMERICAN): >60 ML/MIN/1.73 M^2
GLUCOSE SERPL-MCNC: 97 MG/DL
HCT VFR BLD AUTO: 35.7 %
HGB BLD-MCNC: 11.5 G/DL
IMM GRANULOCYTES # BLD AUTO: 0.24 K/UL
MCH RBC QN AUTO: 27.1 PG
MCHC RBC AUTO-ENTMCNC: 32.2 G/DL
MCV RBC AUTO: 84 FL
NEUTROPHILS # BLD AUTO: 3.3 K/UL
PLATELET # BLD AUTO: 306 K/UL
PMV BLD AUTO: 11.8 FL
POTASSIUM SERPL-SCNC: 4.4 MMOL/L
PROT SERPL-MCNC: 7.4 G/DL
RBC # BLD AUTO: 4.25 M/UL
SODIUM SERPL-SCNC: 141 MMOL/L
WBC # BLD AUTO: 6.47 K/UL

## 2019-02-01 PROCEDURE — 99999 PR PBB SHADOW E&M-EST. PATIENT-LVL IV: CPT | Mod: PBBFAC,,, | Performed by: INTERNAL MEDICINE

## 2019-02-01 PROCEDURE — 80053 COMPREHEN METABOLIC PANEL: CPT

## 2019-02-01 PROCEDURE — 99999 PR PBB SHADOW E&M-EST. PATIENT-LVL IV: ICD-10-PCS | Mod: PBBFAC,,, | Performed by: INTERNAL MEDICINE

## 2019-02-01 PROCEDURE — 3008F BODY MASS INDEX DOCD: CPT | Mod: CPTII,S$GLB,, | Performed by: INTERNAL MEDICINE

## 2019-02-01 PROCEDURE — 3008F PR BODY MASS INDEX (BMI) DOCUMENTED: ICD-10-PCS | Mod: CPTII,S$GLB,, | Performed by: INTERNAL MEDICINE

## 2019-02-01 PROCEDURE — 99214 OFFICE O/P EST MOD 30 MIN: CPT | Mod: S$GLB,,, | Performed by: INTERNAL MEDICINE

## 2019-02-01 PROCEDURE — 99214 PR OFFICE/OUTPT VISIT, EST, LEVL IV, 30-39 MIN: ICD-10-PCS | Mod: S$GLB,,, | Performed by: INTERNAL MEDICINE

## 2019-02-01 PROCEDURE — 85027 COMPLETE CBC AUTOMATED: CPT

## 2019-02-01 RX ORDER — PROMETHAZINE HYDROCHLORIDE 25 MG/1
TABLET ORAL
COMMUNITY
End: 2020-01-14

## 2019-02-01 RX ORDER — PANTOPRAZOLE SODIUM 40 MG/1
TABLET, DELAYED RELEASE ORAL
COMMUNITY
End: 2020-06-09

## 2019-02-01 RX ORDER — METHOCARBAMOL 500 MG/1
TABLET, FILM COATED ORAL
COMMUNITY
End: 2019-08-27

## 2019-02-07 ENCOUNTER — INFUSION (OUTPATIENT)
Dept: INFUSION THERAPY | Facility: OTHER | Age: 39
End: 2019-02-07
Attending: INTERNAL MEDICINE
Payer: COMMERCIAL

## 2019-02-07 ENCOUNTER — OFFICE VISIT (OUTPATIENT)
Dept: HEMATOLOGY/ONCOLOGY | Facility: CLINIC | Age: 39
End: 2019-02-07
Payer: COMMERCIAL

## 2019-02-07 VITALS
BODY MASS INDEX: 26.84 KG/M2 | HEART RATE: 101 BPM | WEIGHT: 157.19 LBS | OXYGEN SATURATION: 98 % | TEMPERATURE: 99 F | HEIGHT: 64 IN | RESPIRATION RATE: 18 BRPM | SYSTOLIC BLOOD PRESSURE: 120 MMHG | DIASTOLIC BLOOD PRESSURE: 78 MMHG

## 2019-02-07 VITALS
HEIGHT: 64 IN | HEART RATE: 94 BPM | WEIGHT: 158.94 LBS | RESPIRATION RATE: 18 BRPM | TEMPERATURE: 98 F | SYSTOLIC BLOOD PRESSURE: 118 MMHG | DIASTOLIC BLOOD PRESSURE: 77 MMHG | OXYGEN SATURATION: 97 % | BODY MASS INDEX: 27.13 KG/M2

## 2019-02-07 DIAGNOSIS — Z51.11 ENCOUNTER FOR ANTINEOPLASTIC CHEMOTHERAPY: ICD-10-CM

## 2019-02-07 DIAGNOSIS — C50.611 CARCINOMA OF AXILLARY TAIL OF RIGHT BREAST IN FEMALE, ESTROGEN RECEPTOR POSITIVE: Primary | ICD-10-CM

## 2019-02-07 DIAGNOSIS — Z17.0 CARCINOMA OF AXILLARY TAIL OF RIGHT BREAST IN FEMALE, ESTROGEN RECEPTOR POSITIVE: Primary | ICD-10-CM

## 2019-02-07 DIAGNOSIS — D64.81 ANEMIA ASSOCIATED WITH CHEMOTHERAPY: ICD-10-CM

## 2019-02-07 DIAGNOSIS — T45.1X5A ANEMIA ASSOCIATED WITH CHEMOTHERAPY: ICD-10-CM

## 2019-02-07 PROCEDURE — 99215 PR OFFICE/OUTPT VISIT, EST, LEVL V, 40-54 MIN: ICD-10-PCS | Mod: S$GLB,,, | Performed by: INTERNAL MEDICINE

## 2019-02-07 PROCEDURE — 25000003 PHARM REV CODE 250: Performed by: INTERNAL MEDICINE

## 2019-02-07 PROCEDURE — 3008F PR BODY MASS INDEX (BMI) DOCUMENTED: ICD-10-PCS | Mod: CPTII,S$GLB,, | Performed by: INTERNAL MEDICINE

## 2019-02-07 PROCEDURE — 99999 PR PBB SHADOW E&M-EST. PATIENT-LVL IV: ICD-10-PCS | Mod: PBBFAC,,, | Performed by: INTERNAL MEDICINE

## 2019-02-07 PROCEDURE — 99999 PR PBB SHADOW E&M-EST. PATIENT-LVL IV: CPT | Mod: PBBFAC,,, | Performed by: INTERNAL MEDICINE

## 2019-02-07 PROCEDURE — 99215 OFFICE O/P EST HI 40 MIN: CPT | Mod: S$GLB,,, | Performed by: INTERNAL MEDICINE

## 2019-02-07 PROCEDURE — 96413 CHEMO IV INFUSION 1 HR: CPT

## 2019-02-07 PROCEDURE — 63600175 PHARM REV CODE 636 W HCPCS: Mod: TB | Performed by: INTERNAL MEDICINE

## 2019-02-07 PROCEDURE — 96377 APPLICATON ON-BODY INJECTOR: CPT

## 2019-02-07 PROCEDURE — 96411 CHEMO IV PUSH ADDL DRUG: CPT

## 2019-02-07 PROCEDURE — 96367 TX/PROPH/DG ADDL SEQ IV INF: CPT

## 2019-02-07 PROCEDURE — 3008F BODY MASS INDEX DOCD: CPT | Mod: CPTII,S$GLB,, | Performed by: INTERNAL MEDICINE

## 2019-02-07 RX ORDER — SODIUM CHLORIDE 0.9 % (FLUSH) 0.9 %
10 SYRINGE (ML) INJECTION
Status: CANCELLED | OUTPATIENT
Start: 2019-02-07

## 2019-02-07 RX ORDER — HEPARIN 100 UNIT/ML
500 SYRINGE INTRAVENOUS
Status: CANCELLED | OUTPATIENT
Start: 2019-02-07

## 2019-02-07 RX ORDER — DOXORUBICIN HYDROCHLORIDE 2 MG/ML
60 INJECTION, SOLUTION INTRAVENOUS
Status: COMPLETED | OUTPATIENT
Start: 2019-02-07 | End: 2019-02-07

## 2019-02-07 RX ORDER — SODIUM CHLORIDE 0.9 % (FLUSH) 0.9 %
10 SYRINGE (ML) INJECTION
Status: DISCONTINUED | OUTPATIENT
Start: 2019-02-07 | End: 2019-02-07 | Stop reason: HOSPADM

## 2019-02-07 RX ORDER — HEPARIN 100 UNIT/ML
500 SYRINGE INTRAVENOUS
Status: DISCONTINUED | OUTPATIENT
Start: 2019-02-07 | End: 2019-02-07 | Stop reason: HOSPADM

## 2019-02-07 RX ORDER — DOXORUBICIN HYDROCHLORIDE 2 MG/ML
60 INJECTION, SOLUTION INTRAVENOUS
Status: CANCELLED | OUTPATIENT
Start: 2019-02-07

## 2019-02-07 RX ADMIN — PEGFILGRASTIM 6 MG: KIT SUBCUTANEOUS at 01:02

## 2019-02-07 RX ADMIN — HEPARIN SODIUM (PORCINE) LOCK FLUSH IV SOLN 100 UNIT/ML 500 UNITS: 100 SOLUTION at 01:02

## 2019-02-07 RX ADMIN — SODIUM CHLORIDE: 9 INJECTION, SOLUTION INTRAVENOUS at 11:02

## 2019-02-07 RX ADMIN — DOXORUBICIN HYDROCHLORIDE 108 MG: 2 INJECTION, SOLUTION INTRAVENOUS at 12:02

## 2019-02-07 RX ADMIN — CYCLOPHOSPHAMIDE 1085 MG: 1 INJECTION, POWDER, FOR SOLUTION INTRAVENOUS; ORAL at 12:02

## 2019-02-07 RX ADMIN — PALONOSETRON HYDROCHLORIDE: 0.25 INJECTION INTRAVENOUS at 11:02

## 2019-02-07 RX ADMIN — APREPITANT 130 MG: 130 INJECTION, EMULSION INTRAVENOUS at 12:02

## 2019-02-07 NOTE — PROGRESS NOTES
"Subjective:       Patient ID: Elham Rodas is a 38 y.o. female.    Chief Complaint: No chief complaint on file.    HPI   Mrs. Rodas returns today for follow up.  She was due for her second cycle of AC last week, however she was admitted with a RLL pneumonia.  She was discharged home on antibiotics.  She has thre more days of antibiotics to complete her course.    Briefly, she is a 38-year-old premenopausal female who was recently diagnosed with an invasive mammary carcinoma that was ER positive, [place] positive, and HER-2 negative.   On 11/21/2018, she underwent a right lumpectomy with sentinel lymph node biopsy.  The pathology report from the procedure indicates that she had a positive sentinel lymph node with a metastatic deposit of 6 mm.  There was no extranodal extension.  On the right partial mastectomy specimen, she had an invasive lobular carcinoma that was 14 mm in maximum diameter.  It was grade II and resection margins were clear.     Her CBC yesterday showed a WBC count of 4,200 /mm3, Hg 10.7 gr/dl, Ht 33.3 5 and platelets 231,000 /mm3.    Review of Systems      Overall she feels "much better". She is being treated with oral antibiotics and will complete her course in three days from now.   She denies any depression, easy bruising, fevers, chills, night  sweats, weight loss, nausea, vomiting, diarrhea, constipation, diplopia, blurred vision, headache, chest pain, palpitations, shortness of breath, breast pain, abdominal pain, extremity pain, or difficulty ambulating.  The remainder of the ten-point ROS, including general, skin, lymph, H/N, cardiorespiratory, GI, , Neuro, Endocrine, and psychiatric is negative.     Objective:      Physical Exam        She is alert, oriented to time, place, person, pleasant, well      nourished, in no acute physical distress.  She is here with her boyfriend.                                  VITAL SIGNS:  Reviewed                                      HEENT:  Alopecia is " noted.  There are no nasal, oral, lip, gingival, auricular, lid,    or conjunctival lesions.  Mucosae are moist and pink, and there is no        thrush.  Pupils are equal, reactive to light and accommodation.              Extraocular muscle movements are intact.  Dentition is good.  There is no frontal or maxillary tenderness.                                     NECK:  Supple without JVD, adenopathy, or thyromegaly.                       LUNGS:  Clear to auscultation without wheezing, rales, or ronchi.           CARDIOVASCULAR:  Reveals an S1, S2, no murmurs, no rubs, no gallops.         ABDOMEN:  Soft, nontender, without organomegaly.  Bowel sounds are    present.                                                                     EXTREMITIES:  No cyanosis, clubbing, or edema.                               BREASTS:  She is status post right lumpectomy with a well-healed  Incision in the lower outer quadrant of her right breast.    There are no masses in either breast.     A sentinel node biopsy scar is seen in the right axilla.  It is also well  Healed.   A left sided portacath is now identified.                                    LYMPHATIC:  There is no cervical, axillary, or supraclavicular adenopathy.   SKIN:  Warm and moist, without petechiae, rashes, induration, or ecchymoses.           NEUROLOGIC:  DTRs are 0-1+ bilaterally, symmetrical, motor function is 5/5,  and cranial nerves are  within normal limits.    Assessment:       1. Carcinoma of axillary tail of right breast in female, estrogen receptor positive    2. Encounter for antineoplastic chemotherapy    3. Anemia associated with chemotherapy      4.    RLL pneumonia, clinically imrpoved  Plan:         I had a long discussion with her.  She may proceed with cycle 2 today followed by Neulasta and will return to clinic in two weeks with labs for cycle 3.  She will continue her oral antibiotics for three more days.  Her multiple questions were answered to  her satisfaction.

## 2019-02-07 NOTE — PLAN OF CARE
Problem: Adult Inpatient Plan of Care  Goal: Plan of Care Review  Outcome: Ongoing (interventions implemented as appropriate)  C2 AC administered, patient tolerated well. VSS, NAD. D/C'd home with self.

## 2019-02-11 ENCOUNTER — PATIENT MESSAGE (OUTPATIENT)
Dept: SURGERY | Facility: CLINIC | Age: 39
End: 2019-02-11

## 2019-02-21 ENCOUNTER — OFFICE VISIT (OUTPATIENT)
Dept: HEMATOLOGY/ONCOLOGY | Facility: CLINIC | Age: 39
End: 2019-02-21
Payer: COMMERCIAL

## 2019-02-21 ENCOUNTER — INFUSION (OUTPATIENT)
Dept: INFUSION THERAPY | Facility: HOSPITAL | Age: 39
End: 2019-02-21
Attending: INTERNAL MEDICINE
Payer: COMMERCIAL

## 2019-02-21 VITALS
SYSTOLIC BLOOD PRESSURE: 118 MMHG | TEMPERATURE: 99 F | HEIGHT: 64 IN | DIASTOLIC BLOOD PRESSURE: 72 MMHG | BODY MASS INDEX: 27.36 KG/M2 | WEIGHT: 160.25 LBS | HEART RATE: 85 BPM | RESPIRATION RATE: 18 BRPM

## 2019-02-21 VITALS
TEMPERATURE: 99 F | BODY MASS INDEX: 27.36 KG/M2 | SYSTOLIC BLOOD PRESSURE: 110 MMHG | RESPIRATION RATE: 18 BRPM | WEIGHT: 160.25 LBS | HEART RATE: 95 BPM | OXYGEN SATURATION: 98 % | HEIGHT: 64 IN | DIASTOLIC BLOOD PRESSURE: 71 MMHG

## 2019-02-21 DIAGNOSIS — C50.611 CARCINOMA OF AXILLARY TAIL OF RIGHT BREAST IN FEMALE, ESTROGEN RECEPTOR POSITIVE: Primary | ICD-10-CM

## 2019-02-21 DIAGNOSIS — T45.1X5A ANEMIA ASSOCIATED WITH CHEMOTHERAPY: ICD-10-CM

## 2019-02-21 DIAGNOSIS — D64.81 ANEMIA ASSOCIATED WITH CHEMOTHERAPY: ICD-10-CM

## 2019-02-21 DIAGNOSIS — Z17.0 CARCINOMA OF AXILLARY TAIL OF RIGHT BREAST IN FEMALE, ESTROGEN RECEPTOR POSITIVE: Primary | ICD-10-CM

## 2019-02-21 DIAGNOSIS — Z51.11 ENCOUNTER FOR ANTINEOPLASTIC CHEMOTHERAPY: ICD-10-CM

## 2019-02-21 PROCEDURE — 3008F PR BODY MASS INDEX (BMI) DOCUMENTED: ICD-10-PCS | Mod: CPTII,S$GLB,, | Performed by: INTERNAL MEDICINE

## 2019-02-21 PROCEDURE — 99999 PR PBB SHADOW E&M-EST. PATIENT-LVL IV: CPT | Mod: PBBFAC,,, | Performed by: INTERNAL MEDICINE

## 2019-02-21 PROCEDURE — 3008F BODY MASS INDEX DOCD: CPT | Mod: CPTII,S$GLB,, | Performed by: INTERNAL MEDICINE

## 2019-02-21 PROCEDURE — 99215 PR OFFICE/OUTPT VISIT, EST, LEVL V, 40-54 MIN: ICD-10-PCS | Mod: S$GLB,,, | Performed by: INTERNAL MEDICINE

## 2019-02-21 PROCEDURE — 96377 APPLICATON ON-BODY INJECTOR: CPT

## 2019-02-21 PROCEDURE — 63600175 PHARM REV CODE 636 W HCPCS: Performed by: INTERNAL MEDICINE

## 2019-02-21 PROCEDURE — 99999 PR PBB SHADOW E&M-EST. PATIENT-LVL IV: ICD-10-PCS | Mod: PBBFAC,,, | Performed by: INTERNAL MEDICINE

## 2019-02-21 PROCEDURE — 96413 CHEMO IV INFUSION 1 HR: CPT

## 2019-02-21 PROCEDURE — 25000003 PHARM REV CODE 250: Performed by: INTERNAL MEDICINE

## 2019-02-21 PROCEDURE — 99215 OFFICE O/P EST HI 40 MIN: CPT | Mod: S$GLB,,, | Performed by: INTERNAL MEDICINE

## 2019-02-21 PROCEDURE — 96411 CHEMO IV PUSH ADDL DRUG: CPT

## 2019-02-21 PROCEDURE — A4216 STERILE WATER/SALINE, 10 ML: HCPCS | Performed by: INTERNAL MEDICINE

## 2019-02-21 PROCEDURE — 96367 TX/PROPH/DG ADDL SEQ IV INF: CPT

## 2019-02-21 RX ORDER — PROMETHAZINE HYDROCHLORIDE AND CODEINE PHOSPHATE 6.25; 1 MG/5ML; MG/5ML
SOLUTION ORAL
COMMUNITY
End: 2019-08-27 | Stop reason: ALTCHOICE

## 2019-02-21 RX ORDER — HEPARIN 100 UNIT/ML
500 SYRINGE INTRAVENOUS
Status: DISCONTINUED | OUTPATIENT
Start: 2019-02-21 | End: 2019-02-21 | Stop reason: HOSPADM

## 2019-02-21 RX ORDER — DOXORUBICIN HYDROCHLORIDE 2 MG/ML
60 INJECTION, SOLUTION INTRAVENOUS
Status: COMPLETED | OUTPATIENT
Start: 2019-02-21 | End: 2019-02-21

## 2019-02-21 RX ORDER — HEPARIN 100 UNIT/ML
500 SYRINGE INTRAVENOUS
Status: CANCELLED | OUTPATIENT
Start: 2019-02-21

## 2019-02-21 RX ORDER — SODIUM CHLORIDE 0.9 % (FLUSH) 0.9 %
10 SYRINGE (ML) INJECTION
Status: DISCONTINUED | OUTPATIENT
Start: 2019-02-21 | End: 2019-02-21 | Stop reason: HOSPADM

## 2019-02-21 RX ORDER — BENZONATATE 100 MG/1
CAPSULE ORAL
COMMUNITY
End: 2019-08-27 | Stop reason: ALTCHOICE

## 2019-02-21 RX ORDER — SODIUM CHLORIDE 0.9 % (FLUSH) 0.9 %
10 SYRINGE (ML) INJECTION
Status: CANCELLED | OUTPATIENT
Start: 2019-02-21

## 2019-02-21 RX ORDER — DOXORUBICIN HYDROCHLORIDE 2 MG/ML
60 INJECTION, SOLUTION INTRAVENOUS
Status: CANCELLED | OUTPATIENT
Start: 2019-02-21

## 2019-02-21 RX ADMIN — DEXAMETHASONE SODIUM PHOSPHATE: 4 INJECTION, SOLUTION INTRA-ARTICULAR; INTRALESIONAL; INTRAMUSCULAR; INTRAVENOUS; SOFT TISSUE at 02:02

## 2019-02-21 RX ADMIN — HEPARIN SODIUM (PORCINE) LOCK FLUSH IV SOLN 100 UNIT/ML 500 UNITS: 100 SOLUTION at 04:02

## 2019-02-21 RX ADMIN — PEGFILGRASTIM 6 MG: KIT SUBCUTANEOUS at 04:02

## 2019-02-21 RX ADMIN — DOXORUBICIN HYDROCHLORIDE 108 MG: 2 INJECTION, SOLUTION INTRAVENOUS at 03:02

## 2019-02-21 RX ADMIN — APREPITANT 130 MG: 130 INJECTION, EMULSION INTRAVENOUS at 02:02

## 2019-02-21 RX ADMIN — CYCLOPHOSPHAMIDE 1085 MG: 1 INJECTION, POWDER, FOR SOLUTION INTRAVENOUS; ORAL at 03:02

## 2019-02-21 RX ADMIN — SODIUM CHLORIDE: 0.9 INJECTION, SOLUTION INTRAVENOUS at 02:02

## 2019-02-21 RX ADMIN — Medication 10 ML: at 04:02

## 2019-02-21 NOTE — PROGRESS NOTES
Subjective:       Patient ID: Elham Rodas is a 38 y.o. female.    Chief Complaint: No chief complaint on file.    HPI   Mrs. Rodas returns today for follow up.  She was due for her third cycle of AC today.  She has now completed her course of antibiotics for her pneumonia.     Briefly, she is a 38-year-old premenopausal female who was recently diagnosed with an invasive mammary carcinoma that was ER positive, [place] positive, and HER-2 negative.   On 11/21/2018, she underwent a right lumpectomy with sentinel lymph node biopsy.  The pathology report from the procedure indicates that she had a positive sentinel lymph node with a metastatic deposit of 6 mm.  There was no extranodal extension.  On the right partial mastectomy specimen, she had an invasive lobular carcinoma that was 14 mm in maximum diameter.  It was grade II and resection margins were clear.    She is being treated in the adjuvant setting.     Her CBC yesterday showed a WBC count of 4,200 /mm3, Hg 10.7 gr/dl, Ht 33.3 5 and platelets 231,000 /mm3.    Review of Systems      Overall she feels OK.  She tolerated cycle 2 well.  ECOG PS is 1. She denies any depression, easy bruising, fevers, chills, night  sweats, weight loss, nausea, vomiting, diarrhea, constipation, diplopia, blurred vision, headache, chest pain, palpitations, shortness of breath, breast pain, abdominal pain, extremity pain, or difficulty ambulating.  The remainder of the ten-point ROS, including general, skin, lymph, H/N, cardiorespiratory, GI, , Neuro, Endocrine, and psychiatric is negative.     Objective:      Physical Exam        She is alert, oriented to time, place, person, pleasant, well      nourished, in no acute physical distress.  She is here with her boyfriend.                                  VITAL SIGNS:  Reviewed                                      HEENT:  Alopecia is noted.  There are no nasal, oral, lip, gingival, auricular, lid,    or conjunctival lesions.  Mucosae are  moist and pink, and there is no        thrush.  Pupils are equal, reactive to light and accommodation.              Extraocular muscle movements are intact.  Dentition is good.  There is no frontal or maxillary tenderness.                                     NECK:  Supple without JVD, adenopathy, or thyromegaly.                       LUNGS:  Clear to auscultation without wheezing, rales, or ronchi.           CARDIOVASCULAR:  Reveals an S1, S2, no murmurs, no rubs, no gallops.         ABDOMEN:  Soft, nontender, without organomegaly.  Bowel sounds are    present.                                                                     EXTREMITIES:  No cyanosis, clubbing, or edema.                               BREASTS:  She is status post right lumpectomy with a well-healed  Incision in the lower outer quadrant of her right breast.    There are no masses in either breast.     A sentinel node biopsy scar is seen in the right axilla.  It is also well  Healed.   A left sided portacath is now identified.                                    LYMPHATIC:  There is no cervical, axillary, or supraclavicular adenopathy.   SKIN:  Warm and moist, without petechiae, rashes, induration, or ecchymoses.           NEUROLOGIC:  DTRs are 0-1+ bilaterally, symmetrical, motor function is 5/5,  and cranial nerves are  within normal limits.    Assessment:       1. Carcinoma of axillary tail of right breast in female, estrogen receptor positive    2. Encounter for antineoplastic chemotherapy    3. Anemia associated with chemotherapy      4.    RLL pneumonia, clinically resolved  Plan:         I had a long discussion with her.  She may proceed with cycle 3 today followed by Neulasta and will return to clinic in two weeks with labs for cycle 4.    Her multiple questions were answered to her satisfaction.

## 2019-02-21 NOTE — PLAN OF CARE
Problem: Adult Inpatient Plan of Care  Goal: Plan of Care Review  Outcome: Ongoing (interventions implemented as appropriate)  Pt tolerated A/C without complications. VSS. No s/s of reaction. OBI placed to abd.  Instructed to contact MD with any questions. PAC heparin locked and de-accessed. AVS given to patient.

## 2019-03-07 ENCOUNTER — OFFICE VISIT (OUTPATIENT)
Dept: HEMATOLOGY/ONCOLOGY | Facility: CLINIC | Age: 39
End: 2019-03-07
Payer: COMMERCIAL

## 2019-03-07 ENCOUNTER — INFUSION (OUTPATIENT)
Dept: INFUSION THERAPY | Facility: HOSPITAL | Age: 39
End: 2019-03-07
Attending: INTERNAL MEDICINE
Payer: COMMERCIAL

## 2019-03-07 ENCOUNTER — LAB VISIT (OUTPATIENT)
Dept: LAB | Facility: HOSPITAL | Age: 39
End: 2019-03-07
Attending: INTERNAL MEDICINE
Payer: COMMERCIAL

## 2019-03-07 VITALS
SYSTOLIC BLOOD PRESSURE: 132 MMHG | WEIGHT: 164 LBS | RESPIRATION RATE: 18 BRPM | OXYGEN SATURATION: 99 % | TEMPERATURE: 98 F | DIASTOLIC BLOOD PRESSURE: 82 MMHG | HEART RATE: 85 BPM | BODY MASS INDEX: 28 KG/M2 | HEIGHT: 64 IN

## 2019-03-07 VITALS — HEART RATE: 87 BPM | RESPIRATION RATE: 18 BRPM | SYSTOLIC BLOOD PRESSURE: 127 MMHG | DIASTOLIC BLOOD PRESSURE: 84 MMHG

## 2019-03-07 DIAGNOSIS — Z17.0 CARCINOMA OF AXILLARY TAIL OF RIGHT BREAST IN FEMALE, ESTROGEN RECEPTOR POSITIVE: Primary | ICD-10-CM

## 2019-03-07 DIAGNOSIS — Z51.11 ENCOUNTER FOR ANTINEOPLASTIC CHEMOTHERAPY: ICD-10-CM

## 2019-03-07 DIAGNOSIS — C50.611 CARCINOMA OF AXILLARY TAIL OF RIGHT BREAST IN FEMALE, ESTROGEN RECEPTOR POSITIVE: Primary | ICD-10-CM

## 2019-03-07 DIAGNOSIS — D64.81 ANEMIA ASSOCIATED WITH CHEMOTHERAPY: ICD-10-CM

## 2019-03-07 DIAGNOSIS — T45.1X5A ANEMIA ASSOCIATED WITH CHEMOTHERAPY: ICD-10-CM

## 2019-03-07 LAB
ALBUMIN SERPL BCP-MCNC: 3.8 G/DL
ALP SERPL-CCNC: 100 U/L
ALT SERPL W/O P-5'-P-CCNC: 27 U/L
ANION GAP SERPL CALC-SCNC: 10 MMOL/L
AST SERPL-CCNC: 18 U/L
BILIRUB SERPL-MCNC: 0.3 MG/DL
BUN SERPL-MCNC: 8 MG/DL
CALCIUM SERPL-MCNC: 9.2 MG/DL
CHLORIDE SERPL-SCNC: 107 MMOL/L
CO2 SERPL-SCNC: 22 MMOL/L
CREAT SERPL-MCNC: 0.7 MG/DL
ERYTHROCYTE [DISTWIDTH] IN BLOOD BY AUTOMATED COUNT: 18.8 %
EST. GFR  (AFRICAN AMERICAN): >60 ML/MIN/1.73 M^2
EST. GFR  (NON AFRICAN AMERICAN): >60 ML/MIN/1.73 M^2
GLUCOSE SERPL-MCNC: 111 MG/DL
HCT VFR BLD AUTO: 33.2 %
HGB BLD-MCNC: 10.7 G/DL
IMM GRANULOCYTES # BLD AUTO: 0.58 K/UL
INTEGRATED BRAC ANALYSIS: NORMAL
MCH RBC QN AUTO: 27.4 PG
MCHC RBC AUTO-ENTMCNC: 32.2 G/DL
MCV RBC AUTO: 85 FL
NEUTROPHILS # BLD AUTO: 4.1 K/UL
PLATELET # BLD AUTO: 191 K/UL
PMV BLD AUTO: 12.2 FL
POTASSIUM SERPL-SCNC: 3.9 MMOL/L
PROT SERPL-MCNC: 6.9 G/DL
RBC # BLD AUTO: 3.91 M/UL
SODIUM SERPL-SCNC: 139 MMOL/L
WBC # BLD AUTO: 7.08 K/UL

## 2019-03-07 PROCEDURE — 80053 COMPREHEN METABOLIC PANEL: CPT

## 2019-03-07 PROCEDURE — 25000003 PHARM REV CODE 250: Performed by: INTERNAL MEDICINE

## 2019-03-07 PROCEDURE — 99215 PR OFFICE/OUTPT VISIT, EST, LEVL V, 40-54 MIN: ICD-10-PCS | Mod: S$GLB,,, | Performed by: INTERNAL MEDICINE

## 2019-03-07 PROCEDURE — 96377 APPLICATON ON-BODY INJECTOR: CPT

## 2019-03-07 PROCEDURE — 99999 PR PBB SHADOW E&M-EST. PATIENT-LVL IV: CPT | Mod: PBBFAC,,, | Performed by: INTERNAL MEDICINE

## 2019-03-07 PROCEDURE — 96367 TX/PROPH/DG ADDL SEQ IV INF: CPT

## 2019-03-07 PROCEDURE — 99215 OFFICE O/P EST HI 40 MIN: CPT | Mod: S$GLB,,, | Performed by: INTERNAL MEDICINE

## 2019-03-07 PROCEDURE — 99999 PR PBB SHADOW E&M-EST. PATIENT-LVL IV: ICD-10-PCS | Mod: PBBFAC,,, | Performed by: INTERNAL MEDICINE

## 2019-03-07 PROCEDURE — 36415 COLL VENOUS BLD VENIPUNCTURE: CPT

## 2019-03-07 PROCEDURE — 63600175 PHARM REV CODE 636 W HCPCS: Mod: JG | Performed by: INTERNAL MEDICINE

## 2019-03-07 PROCEDURE — 96411 CHEMO IV PUSH ADDL DRUG: CPT

## 2019-03-07 PROCEDURE — 85027 COMPLETE CBC AUTOMATED: CPT

## 2019-03-07 PROCEDURE — 96413 CHEMO IV INFUSION 1 HR: CPT

## 2019-03-07 RX ORDER — HEPARIN 100 UNIT/ML
500 SYRINGE INTRAVENOUS
Status: CANCELLED | OUTPATIENT
Start: 2019-03-07

## 2019-03-07 RX ORDER — DOXORUBICIN HYDROCHLORIDE 2 MG/ML
60 INJECTION, SOLUTION INTRAVENOUS
Status: COMPLETED | OUTPATIENT
Start: 2019-03-07 | End: 2019-03-07

## 2019-03-07 RX ORDER — DOXORUBICIN HYDROCHLORIDE 2 MG/ML
60 INJECTION, SOLUTION INTRAVENOUS
Status: CANCELLED | OUTPATIENT
Start: 2019-03-07

## 2019-03-07 RX ORDER — SODIUM CHLORIDE 0.9 % (FLUSH) 0.9 %
10 SYRINGE (ML) INJECTION
Status: DISCONTINUED | OUTPATIENT
Start: 2019-03-07 | End: 2019-03-07 | Stop reason: HOSPADM

## 2019-03-07 RX ORDER — CALCIUM CARBONATE 200(500)MG
1 TABLET,CHEWABLE ORAL
COMMUNITY

## 2019-03-07 RX ORDER — HEPARIN 100 UNIT/ML
500 SYRINGE INTRAVENOUS
Status: DISCONTINUED | OUTPATIENT
Start: 2019-03-07 | End: 2019-03-07 | Stop reason: HOSPADM

## 2019-03-07 RX ORDER — SODIUM CHLORIDE 0.9 % (FLUSH) 0.9 %
10 SYRINGE (ML) INJECTION
Status: CANCELLED | OUTPATIENT
Start: 2019-03-07

## 2019-03-07 RX ORDER — DEXAMETHASONE 4 MG/1
TABLET ORAL
Qty: 40 TABLET | Refills: 0 | Status: SHIPPED | OUTPATIENT
Start: 2019-03-07 | End: 2019-08-27 | Stop reason: SDUPTHER

## 2019-03-07 RX ADMIN — PEGFILGRASTIM 6 MG: KIT SUBCUTANEOUS at 04:03

## 2019-03-07 RX ADMIN — DEXAMETHASONE SODIUM PHOSPHATE: 4 INJECTION, SOLUTION INTRA-ARTICULAR; INTRALESIONAL; INTRAMUSCULAR; INTRAVENOUS; SOFT TISSUE at 02:03

## 2019-03-07 RX ADMIN — APREPITANT 130 MG: 130 INJECTION, EMULSION INTRAVENOUS at 02:03

## 2019-03-07 RX ADMIN — CYCLOPHOSPHAMIDE 1085 MG: 1 INJECTION, POWDER, FOR SOLUTION INTRAVENOUS; ORAL at 03:03

## 2019-03-07 RX ADMIN — DOXORUBICIN HYDROCHLORIDE 108 MG: 2 INJECTION, SOLUTION INTRAVENOUS at 03:03

## 2019-03-07 NOTE — PROGRESS NOTES
Subjective:       Patient ID: Elham Rodas is a 38 y.o. female.    Chief Complaint: No chief complaint on file.    HPI   Mrs. Rodas returns today for follow up.  She was due for her fourth cycle of AC today.      Briefly, she is a 38-year-old premenopausal female who was recently diagnosed with an invasive mammary carcinoma that was ER positive, IA positive, and HER-2 negative.   On 11/21/2018, she underwent a right lumpectomy with sentinel lymph node biopsy.  The pathology report from the procedure indicates that she had a positive sentinel lymph node with a metastatic deposit of 6 mm.  There was no extranodal extension.  On the right partial mastectomy specimen, she had an invasive lobular carcinoma that was 14 mm in maximum diameter.  It was grade II and resection margins were clear.    She is being treated in the adjuvant setting.     Her CBC today shows a WBC count of 7,000 /mm3, Hg 10.7 gr/dl, Ht 33.2 % and platelets 191,000 /mm3.  Bilirubin is 0.3 mg/dl.    Review of Systems      Overall she feels OK.  She tolerated cycle 3 well.  ECOG PS is 1.  She denies any depression, easy bruising, fevers, chills, night  sweats, weight loss, nausea, vomiting, diarrhea, constipation, diplopia, blurred vision, headache, chest pain, palpitations, shortness of breath, breast pain, abdominal pain, extremity pain, or difficulty ambulating.  The remainder of the ten-point ROS, including general, skin, lymph, H/N, cardiorespiratory, GI, , Neuro, Endocrine, and psychiatric is negative.     Objective:      Physical Exam        She is alert, oriented to time, place, person, pleasant, well      nourished, in no acute physical distress.  She is here with her boyfriend.                                  VITAL SIGNS:  Reviewed                                      HEENT:  Alopecia is again noted.  There are no nasal, oral, lip, gingival, auricular, lid,    or conjunctival lesions.  Mucosae are moist and pink, and there is no         thrush.  Pupils are equal, reactive to light and accommodation.              Extraocular muscle movements are intact.  Dentition is good.  There is no frontal or maxillary tenderness.                                     NECK:  Supple without JVD, adenopathy, or thyromegaly.                       LUNGS:  Clear to auscultation without wheezing, rales, or ronchi.           CARDIOVASCULAR:  Reveals an S1, S2, no murmurs, no rubs, no gallops.         ABDOMEN:  Soft, nontender, without organomegaly.  Bowel sounds are    present.                                                                     EXTREMITIES:  No cyanosis, clubbing, or edema.                               BREASTS:  She is status post right lumpectomy with a well-healed  Incision in the lower outer quadrant of her right breast.    There are no masses in either breast.     A sentinel node biopsy scar is seen in the right axilla.  It is also well  Healed.   A left sided portacath is now identified.                                    LYMPHATIC:  There is no cervical, axillary, or supraclavicular adenopathy.   SKIN:  Warm and moist, without petechiae, rashes, induration, or ecchymoses.           NEUROLOGIC:  DTRs are 0-1+ bilaterally, symmetrical, motor function is 5/5,  and cranial nerves are  within normal limits.    Assessment:       1. Carcinoma of axillary tail of right breast in female, estrogen receptor positive    2. Encounter for antineoplastic chemotherapy    3. Anemia associated with chemotherapy       Plan:         I had a long discussion with her.  She may proceed with cycle 4 today followed by Neulasta and will return to clinic in two weeks with labs for cycle 1 of taxol.  She will be premedicated with dexamethasone.  Upon completion of her chemotherapy she will be referred for XRT    Her multiple questions were answered to her satisfaction.

## 2019-03-07 NOTE — PLAN OF CARE
Problem: Adult Inpatient Plan of Care  Goal: Plan of Care Review  Outcome: Ongoing (interventions implemented as appropriate)  Pt tolerated AC well.  No s/s of reaction. Vitals stable, NAD.

## 2019-03-21 ENCOUNTER — INFUSION (OUTPATIENT)
Dept: INFUSION THERAPY | Facility: HOSPITAL | Age: 39
End: 2019-03-21
Attending: INTERNAL MEDICINE
Payer: COMMERCIAL

## 2019-03-21 ENCOUNTER — OFFICE VISIT (OUTPATIENT)
Dept: HEMATOLOGY/ONCOLOGY | Facility: CLINIC | Age: 39
End: 2019-03-21
Payer: COMMERCIAL

## 2019-03-21 VITALS
RESPIRATION RATE: 18 BRPM | HEIGHT: 64 IN | BODY MASS INDEX: 28.17 KG/M2 | DIASTOLIC BLOOD PRESSURE: 80 MMHG | HEART RATE: 88 BPM | TEMPERATURE: 98 F | SYSTOLIC BLOOD PRESSURE: 135 MMHG | WEIGHT: 165 LBS

## 2019-03-21 VITALS
TEMPERATURE: 98 F | HEART RATE: 93 BPM | HEIGHT: 64 IN | OXYGEN SATURATION: 98 % | WEIGHT: 165.56 LBS | RESPIRATION RATE: 18 BRPM | BODY MASS INDEX: 28.27 KG/M2 | DIASTOLIC BLOOD PRESSURE: 90 MMHG | SYSTOLIC BLOOD PRESSURE: 140 MMHG

## 2019-03-21 DIAGNOSIS — Z51.11 ENCOUNTER FOR ANTINEOPLASTIC CHEMOTHERAPY: Primary | ICD-10-CM

## 2019-03-21 DIAGNOSIS — C50.611 CARCINOMA OF AXILLARY TAIL OF RIGHT BREAST IN FEMALE, ESTROGEN RECEPTOR POSITIVE: ICD-10-CM

## 2019-03-21 DIAGNOSIS — Z17.0 CARCINOMA OF AXILLARY TAIL OF RIGHT BREAST IN FEMALE, ESTROGEN RECEPTOR POSITIVE: Primary | ICD-10-CM

## 2019-03-21 DIAGNOSIS — Z17.0 CARCINOMA OF AXILLARY TAIL OF RIGHT BREAST IN FEMALE, ESTROGEN RECEPTOR POSITIVE: ICD-10-CM

## 2019-03-21 DIAGNOSIS — T45.1X5A ANEMIA ASSOCIATED WITH CHEMOTHERAPY: ICD-10-CM

## 2019-03-21 DIAGNOSIS — D64.81 ANEMIA ASSOCIATED WITH CHEMOTHERAPY: ICD-10-CM

## 2019-03-21 DIAGNOSIS — C50.611 CARCINOMA OF AXILLARY TAIL OF RIGHT BREAST IN FEMALE, ESTROGEN RECEPTOR POSITIVE: Primary | ICD-10-CM

## 2019-03-21 PROCEDURE — A4216 STERILE WATER/SALINE, 10 ML: HCPCS | Performed by: INTERNAL MEDICINE

## 2019-03-21 PROCEDURE — 96415 CHEMO IV INFUSION ADDL HR: CPT

## 2019-03-21 PROCEDURE — 99999 PR PBB SHADOW E&M-EST. PATIENT-LVL IV: CPT | Mod: PBBFAC,,, | Performed by: INTERNAL MEDICINE

## 2019-03-21 PROCEDURE — 99215 PR OFFICE/OUTPT VISIT, EST, LEVL V, 40-54 MIN: ICD-10-PCS | Mod: S$GLB,,, | Performed by: INTERNAL MEDICINE

## 2019-03-21 PROCEDURE — 96377 APPLICATON ON-BODY INJECTOR: CPT

## 2019-03-21 PROCEDURE — 25000003 PHARM REV CODE 250: Performed by: INTERNAL MEDICINE

## 2019-03-21 PROCEDURE — 99215 OFFICE O/P EST HI 40 MIN: CPT | Mod: S$GLB,,, | Performed by: INTERNAL MEDICINE

## 2019-03-21 PROCEDURE — 96375 TX/PRO/DX INJ NEW DRUG ADDON: CPT

## 2019-03-21 PROCEDURE — 96367 TX/PROPH/DG ADDL SEQ IV INF: CPT

## 2019-03-21 PROCEDURE — 96413 CHEMO IV INFUSION 1 HR: CPT

## 2019-03-21 PROCEDURE — 99999 PR PBB SHADOW E&M-EST. PATIENT-LVL IV: ICD-10-PCS | Mod: PBBFAC,,, | Performed by: INTERNAL MEDICINE

## 2019-03-21 PROCEDURE — 3008F BODY MASS INDEX DOCD: CPT | Mod: CPTII,S$GLB,, | Performed by: INTERNAL MEDICINE

## 2019-03-21 PROCEDURE — 63600175 PHARM REV CODE 636 W HCPCS: Performed by: INTERNAL MEDICINE

## 2019-03-21 PROCEDURE — S0028 INJECTION, FAMOTIDINE, 20 MG: HCPCS | Performed by: INTERNAL MEDICINE

## 2019-03-21 PROCEDURE — 3008F PR BODY MASS INDEX (BMI) DOCUMENTED: ICD-10-PCS | Mod: CPTII,S$GLB,, | Performed by: INTERNAL MEDICINE

## 2019-03-21 RX ORDER — FAMOTIDINE 10 MG/ML
20 INJECTION INTRAVENOUS
Status: COMPLETED | OUTPATIENT
Start: 2019-03-21 | End: 2019-03-21

## 2019-03-21 RX ORDER — HEPARIN 100 UNIT/ML
500 SYRINGE INTRAVENOUS
Status: DISCONTINUED | OUTPATIENT
Start: 2019-03-21 | End: 2019-03-21 | Stop reason: HOSPADM

## 2019-03-21 RX ORDER — SODIUM CHLORIDE 0.9 % (FLUSH) 0.9 %
10 SYRINGE (ML) INJECTION
Status: CANCELLED | OUTPATIENT
Start: 2019-03-21

## 2019-03-21 RX ORDER — EPINEPHRINE 0.3 MG/.3ML
0.3 INJECTION SUBCUTANEOUS ONCE AS NEEDED
Status: DISCONTINUED | OUTPATIENT
Start: 2019-03-21 | End: 2019-03-21 | Stop reason: HOSPADM

## 2019-03-21 RX ORDER — HEPARIN 100 UNIT/ML
500 SYRINGE INTRAVENOUS
Status: CANCELLED | OUTPATIENT
Start: 2019-03-21

## 2019-03-21 RX ORDER — SODIUM CHLORIDE 0.9 % (FLUSH) 0.9 %
10 SYRINGE (ML) INJECTION
Status: DISCONTINUED | OUTPATIENT
Start: 2019-03-21 | End: 2019-03-21 | Stop reason: HOSPADM

## 2019-03-21 RX ORDER — DIPHENHYDRAMINE HYDROCHLORIDE 50 MG/ML
50 INJECTION INTRAMUSCULAR; INTRAVENOUS ONCE AS NEEDED
Status: CANCELLED | OUTPATIENT
Start: 2019-03-21 | End: 2019-03-21

## 2019-03-21 RX ORDER — DIPHENHYDRAMINE HYDROCHLORIDE 50 MG/ML
50 INJECTION INTRAMUSCULAR; INTRAVENOUS ONCE AS NEEDED
Status: DISCONTINUED | OUTPATIENT
Start: 2019-03-21 | End: 2019-03-21 | Stop reason: HOSPADM

## 2019-03-21 RX ORDER — EPINEPHRINE 0.3 MG/.3ML
0.3 INJECTION SUBCUTANEOUS ONCE AS NEEDED
Status: CANCELLED | OUTPATIENT
Start: 2019-03-21 | End: 2019-03-21

## 2019-03-21 RX ORDER — FAMOTIDINE 10 MG/ML
20 INJECTION INTRAVENOUS
Status: CANCELLED | OUTPATIENT
Start: 2019-03-21

## 2019-03-21 RX ADMIN — HEPARIN SODIUM (PORCINE) LOCK FLUSH IV SOLN 100 UNIT/ML 500 UNITS: 100 SOLUTION at 01:03

## 2019-03-21 RX ADMIN — DIPHENHYDRAMINE HYDROCHLORIDE 50 MG: 50 INJECTION, SOLUTION INTRAMUSCULAR; INTRAVENOUS at 09:03

## 2019-03-21 RX ADMIN — SODIUM CHLORIDE: 9 INJECTION, SOLUTION INTRAVENOUS at 09:03

## 2019-03-21 RX ADMIN — PEGFILGRASTIM 6 MG: KIT SUBCUTANEOUS at 01:03

## 2019-03-21 RX ADMIN — PACLITAXEL 318 MG: 6 INJECTION, SOLUTION INTRAVENOUS at 09:03

## 2019-03-21 RX ADMIN — FAMOTIDINE 20 MG: 10 INJECTION, SOLUTION INTRAVENOUS at 09:03

## 2019-03-21 RX ADMIN — Medication 10 ML: at 01:03

## 2019-03-21 RX ADMIN — DEXAMETHASONE SODIUM PHOSPHATE 20 MG: 4 INJECTION, SOLUTION INTRAMUSCULAR; INTRAVENOUS at 09:03

## 2019-03-21 NOTE — PLAN OF CARE
Problem: Adult Inpatient Plan of Care  Goal: Plan of Care Review  Outcome: Ongoing (interventions implemented as appropriate)  Pt tolerated taxol infusion without issue, pt has no upcoming appts scheduled at this time, no distress noted upon d/c to home

## 2019-03-21 NOTE — PLAN OF CARE
Problem: Adult Inpatient Plan of Care  Goal: Plan of Care Review  Outcome: Ongoing (interventions implemented as appropriate)  OBI placed to left arm and blinking green prior to d/c

## 2019-03-21 NOTE — PROGRESS NOTES
Subjective:       Patient ID: Elham Rodas is a 38 y.o. female.    Chief Complaint: Carcinoma of axillary tail of right breast in female, estrog    HPI   Mrs. Rodas returns today for follow up.  She was due for her first cycle of taxol today.  She ahs been rpemedicated withd examethasone.    Briefly, she is a 38-year-old premenopausal female who was recently diagnosed with an invasive mammary carcinoma that was ER positive, IL positive, and HER-2 negative.   On 11/21/2018, she underwent a right lumpectomy with sentinel lymph node biopsy.  The pathology report from the procedure indicates that she had a positive sentinel lymph node with a metastatic deposit of 6 mm.  There was no extranodal extension.  On the right partial mastectomy specimen, she had an invasive lobular carcinoma that was 14 mm in maximum diameter.  It was grade II and resection margins were clear.    She is being treated in the adjuvant setting.     Her CBC today shows a WBC count of13,5000 /mm3, Hg 10.6 gr/dl, Ht 32.6 % and platelets 179,000 /mm3.  Bilirubin is 0.3 mg/dl.    Review of Systems      Overall she feels OK.  She tolerated cycle 4 well.  ECOG PS is 1.  She denies any depression, easy bruising, fevers, chills, night  sweats, weight loss, nausea, vomiting, diarrhea, constipation, diplopia, blurred vision, headache, chest pain, palpitations, shortness of breath, breast pain, abdominal pain, extremity pain, or difficulty ambulating.  The remainder of the ten-point ROS, including general, skin, lymph, H/N, cardiorespiratory, GI, , Neuro, Endocrine, and psychiatric is negative.     Objective:      Physical Exam        She is alert, oriented to time, place, person, pleasant, well      nourished, in no acute physical distress.  She is here with her boyfriend.                                  VITAL SIGNS:  Reviewed                                      HEENT:  Alopecia is again noted.  There are no nasal, oral, lip, gingival, auricular, lid,     or conjunctival lesions.  Mucosae are moist and pink, and there is no        thrush.  Pupils are equal, reactive to light and accommodation.              Extraocular muscle movements are intact.  Dentition is good.  There is no frontal or maxillary tenderness.                                     NECK:  Supple without JVD, adenopathy, or thyromegaly.                       LUNGS:  Clear to auscultation without wheezing, rales, or ronchi.           CARDIOVASCULAR:  Reveals an S1, S2, no murmurs, no rubs, no gallops.         ABDOMEN:  Soft, nontender, without organomegaly.  Bowel sounds are    present.                                                                     EXTREMITIES:  No cyanosis, clubbing, or edema.                               BREASTS:  She is status post right lumpectomy with a well-healed  Incision in the lower outer quadrant of her right breast.    There are no masses in either breast.     A sentinel node biopsy scar is seen in the right axilla.  It is also well  Healed.   A left sided portacath is now identified.                                    LYMPHATIC:  There is no cervical, axillary, or supraclavicular adenopathy.   SKIN:  Warm and moist, without petechiae, rashes, induration, or ecchymoses.           NEUROLOGIC:  DTRs are 0-1+ bilaterally, symmetrical, motor function is 5/5,  and cranial nerves are  within normal limits.    Assessment:       1. Encounter for antineoplastic chemotherapy    2. Carcinoma of axillary tail of right breast in female, estrogen receptor positive    3. Anemia associated with chemotherapy       Plan:         I had a long discussion with her.  She may proceed with cycle 1 of taxol today followed by Neulasta and will return to clinic in two weeks with labs for cycle 2.    Upon completion of her chemotherapy she will be referred for XRT    Her multiple questions were answered to her satisfaction.

## 2019-03-21 NOTE — PLAN OF CARE
Problem: Adult Inpatient Plan of Care  Goal: Plan of Care Review  Outcome: Ongoing (interventions implemented as appropriate)  Pt here for Taxol (D1C1), labs, hx, meds, allergies reviewed, pt with no complaints at this time, reclined in chair, continue to monitor

## 2019-03-25 ENCOUNTER — TELEPHONE (OUTPATIENT)
Dept: HEMATOLOGY/ONCOLOGY | Facility: CLINIC | Age: 39
End: 2019-03-25

## 2019-03-25 DIAGNOSIS — R52 PAIN: Primary | ICD-10-CM

## 2019-03-25 RX ORDER — OXYCODONE HYDROCHLORIDE 5 MG/1
5 TABLET ORAL EVERY 4 HOURS PRN
Qty: 30 TABLET | Refills: 0 | Status: SHIPPED | OUTPATIENT
Start: 2019-03-25 | End: 2019-08-27 | Stop reason: SDUPTHER

## 2019-03-25 NOTE — TELEPHONE ENCOUNTER
----- Message from Effie Brand sent at 3/25/2019  3:12 PM CDT -----  Contact: pt   Pt called to speak with nurse Elham have some questions about some pain meds   Callback#[phone]  Thank You  LESTER Brand

## 2019-03-25 NOTE — TELEPHONE ENCOUNTER
Contacted patient to discuss her pains. Patient reports that she is experiencing bone pains that started since the neulasta. Patient says they are generalized but specifically from waist down.   Patient currently taking claritin, tylenol and ibuprofen 800 mg without adequate response. Explained to patient that pain should resolve in a couple days. However, in the meantime plan to discuss if Dr. Mcintosh could call in some short acting pain management. She expressed understanding. Pharmacy confirmed.

## 2019-04-01 ENCOUNTER — TELEPHONE (OUTPATIENT)
Dept: HEMATOLOGY/ONCOLOGY | Facility: CLINIC | Age: 39
End: 2019-04-01

## 2019-04-01 NOTE — TELEPHONE ENCOUNTER
Contacted patient to confirm that her labs were stable and she was cleared to proceed with the tooth filing for the next 3-4 days. She expressed understanding.

## 2019-04-01 NOTE — TELEPHONE ENCOUNTER
Returned call to patient to let her know that per Dr. Mcintosh he would prefer anything elective be held off until completion of chemo. She states that her back tooth was chipped and there is a sharp edge that is rubbing against and irritating her tongue. She was requesting if she could go have it filed today possibly. Explained to her that prior to proceeding MD would like her to have a pre clearance CBC. [facility] Will trend results and if adequate notify her.     Also at this time addressed insurance. Patient did not realize that her insurance recently . Patient in the process of getting new insurance through [employer].. Unsure if this is accepted here. Advised patient that as soon as she has any information including a medical number if she can give us the information needed to assure that her insurance is accepted and to reauthorize her chemo. Explained to her that this would most likely delay her chemotherapy treatment.   She expressed understanding, and stated that she is still waiting to receive information regarding the insurance.

## 2019-04-01 NOTE — TELEPHONE ENCOUNTER
----- Message from Artemio Restrepo MD sent at 4/1/2019  1:02 PM CDT -----  She is cleared for dental work for the next 3-4 days

## 2019-04-01 NOTE — TELEPHONE ENCOUNTER
----- Message from Ruba Zhang sent at 4/1/2019 10:31 AM CDT -----  Contact: Pt   Pt calling she has questions about rather or not she could have a dental procedure done. Please call her at [phone]. Thank you

## 2019-04-03 NOTE — PROGRESS NOTES
Subjective:       Patient ID: Elham Rodas is a 38 y.o. female.    Chief Complaint: No chief complaint on file.    HPI   Mrs. Rodas returns today for follow up.  She was due for her second cycle of taxol today.  She has been premedicated with dexamethasone.    Briefly, she is a 38-year-old premenopausal female who was recently diagnosed with an invasive mammary carcinoma that was ER positive, ME positive, and HER-2 negative.   On 11/21/2018, she underwent a right lumpectomy with sentinel lymph node biopsy.  The pathology report from the procedure indicates that she had a positive sentinel lymph node with a metastatic deposit of 6 mm.  There was no extranodal extension.  On the right partial mastectomy specimen, she had an invasive lobular carcinoma that was 14 mm in maximum diameter.  It was grade II and resection margins were clear.    She is being treated in the adjuvant setting.     Her CBC today shows a WBC count of 18,7000 /mm3, Hg 10.8 gr/dl, Ht 33.0 % and platelets 163,000 /mm3.  Bilirubin is 0.5 mg/dl.    Review of Systems      Overall she feels OK.  She tolerated cycle 1 of taxol rather poorly and experienced significant bone pains that lasted for 4-5 days.  She took NSAIDs without relief. today she feels well, and other than mild fatigue she has no additional complaints.  ECOG PS is 1.  She denies any depression, easy bruising, fevers, chills, night  sweats, weight loss, nausea, vomiting, diarrhea, constipation, diplopia, blurred vision, headache, chest pain, palpitations, shortness of breath, breast pain, abdominal pain, extremity pain, or difficulty ambulating.  The remainder of the ten-point ROS, including general, skin, lymph, H/N, cardiorespiratory, GI, , Neuro, Endocrine, and psychiatric is negative.     Objective:      Physical Exam        She is alert, oriented to time, place, person, pleasant, well      nourished, in no acute physical distress.  She is here with her boyfriend.                                   VITAL SIGNS:  Reviewed                                      HEENT:  Alopecia is again noted.  There are no nasal, oral, lip, gingival, auricular, lid,    or conjunctival lesions.  Mucosae are moist and pink, and there is no        thrush.  Pupils are equal, reactive to light and accommodation.              Extraocular muscle movements are intact.  Dentition is good.  There is no frontal or maxillary tenderness.                                     NECK:  Supple without JVD, adenopathy, or thyromegaly.                       LUNGS:  Clear to auscultation without wheezing, rales, or ronchi.           CARDIOVASCULAR:  Reveals an S1, S2, no murmurs, no rubs, no gallops.         ABDOMEN:  Soft, nontender, without organomegaly.  Bowel sounds are    present.                                                                     EXTREMITIES:  No cyanosis, clubbing, or edema.                               BREASTS:  She is status post right lumpectomy with a well-healed  Incision in the lower outer quadrant of her right breast.    There are no masses in either breast.     A sentinel node biopsy scar is seen in the right axilla.  It is also well  Healed.   A left sided portacath is now identified.                                    LYMPHATIC:  There is no cervical, axillary, or supraclavicular adenopathy.   SKIN:  Warm and moist, without petechiae, rashes, induration, or ecchymoses.           NEUROLOGIC:  DTRs are 0-1+ bilaterally, symmetrical, motor function is 5/5,  and cranial nerves are  within normal limits.    Assessment:       1. Carcinoma of axillary tail of right breast in female, estrogen receptor positive    2. Encounter for antineoplastic chemotherapy    3. Anemia associated with chemotherapy       Plan:         I had a long discussion with her.  She may proceed with cycle 2 of taxol today followed by Neulasta and will return to clinic in 13 days with labs for cycle 2.    She was given a prescription for  oxycodone to take prn for pain after her chemotherapy.  Upon completion of 4 cycles of taxol she will be referred for XRT.    Her multiple questions were answered to her satisfaction.

## 2019-04-05 ENCOUNTER — LAB VISIT (OUTPATIENT)
Dept: LAB | Facility: HOSPITAL | Age: 39
End: 2019-04-05
Attending: INTERNAL MEDICINE
Payer: MEDICAID

## 2019-04-05 ENCOUNTER — OFFICE VISIT (OUTPATIENT)
Dept: HEMATOLOGY/ONCOLOGY | Facility: CLINIC | Age: 39
End: 2019-04-05
Payer: COMMERCIAL

## 2019-04-05 ENCOUNTER — INFUSION (OUTPATIENT)
Dept: INFUSION THERAPY | Facility: HOSPITAL | Age: 39
End: 2019-04-05
Attending: INTERNAL MEDICINE
Payer: MEDICAID

## 2019-04-05 VITALS
TEMPERATURE: 98 F | HEART RATE: 93 BPM | DIASTOLIC BLOOD PRESSURE: 76 MMHG | RESPIRATION RATE: 18 BRPM | BODY MASS INDEX: 28.46 KG/M2 | SYSTOLIC BLOOD PRESSURE: 129 MMHG | HEIGHT: 64 IN | WEIGHT: 166.69 LBS

## 2019-04-05 VITALS
WEIGHT: 166.69 LBS | RESPIRATION RATE: 20 BRPM | HEART RATE: 90 BPM | DIASTOLIC BLOOD PRESSURE: 89 MMHG | TEMPERATURE: 98 F | BODY MASS INDEX: 28.61 KG/M2 | OXYGEN SATURATION: 96 % | SYSTOLIC BLOOD PRESSURE: 131 MMHG

## 2019-04-05 DIAGNOSIS — D64.81 ANEMIA ASSOCIATED WITH CHEMOTHERAPY: ICD-10-CM

## 2019-04-05 DIAGNOSIS — C50.611 CARCINOMA OF AXILLARY TAIL OF RIGHT BREAST IN FEMALE, ESTROGEN RECEPTOR POSITIVE: Primary | ICD-10-CM

## 2019-04-05 DIAGNOSIS — Z17.0 CARCINOMA OF AXILLARY TAIL OF RIGHT BREAST IN FEMALE, ESTROGEN RECEPTOR POSITIVE: ICD-10-CM

## 2019-04-05 DIAGNOSIS — T45.1X5A ANEMIA ASSOCIATED WITH CHEMOTHERAPY: ICD-10-CM

## 2019-04-05 DIAGNOSIS — Z17.0 CARCINOMA OF AXILLARY TAIL OF RIGHT BREAST IN FEMALE, ESTROGEN RECEPTOR POSITIVE: Primary | ICD-10-CM

## 2019-04-05 DIAGNOSIS — Z51.11 ENCOUNTER FOR ANTINEOPLASTIC CHEMOTHERAPY: ICD-10-CM

## 2019-04-05 DIAGNOSIS — C50.611 CARCINOMA OF AXILLARY TAIL OF RIGHT BREAST IN FEMALE, ESTROGEN RECEPTOR POSITIVE: ICD-10-CM

## 2019-04-05 LAB
ALBUMIN SERPL BCP-MCNC: 4 G/DL (ref 3.5–5.2)
ALP SERPL-CCNC: 170 U/L (ref 55–135)
ALT SERPL W/O P-5'-P-CCNC: 40 U/L (ref 10–44)
ANION GAP SERPL CALC-SCNC: 10 MMOL/L (ref 8–16)
AST SERPL-CCNC: 19 U/L (ref 10–40)
BILIRUB SERPL-MCNC: 0.5 MG/DL (ref 0.1–1)
BUN SERPL-MCNC: 7 MG/DL (ref 6–20)
CALCIUM SERPL-MCNC: 9.7 MG/DL (ref 8.7–10.5)
CHLORIDE SERPL-SCNC: 106 MMOL/L (ref 95–110)
CO2 SERPL-SCNC: 21 MMOL/L (ref 23–29)
CREAT SERPL-MCNC: 0.8 MG/DL (ref 0.5–1.4)
ERYTHROCYTE [DISTWIDTH] IN BLOOD BY AUTOMATED COUNT: 20.3 % (ref 11.5–14.5)
EST. GFR  (AFRICAN AMERICAN): >60 ML/MIN/1.73 M^2
EST. GFR  (NON AFRICAN AMERICAN): >60 ML/MIN/1.73 M^2
GLUCOSE SERPL-MCNC: 215 MG/DL (ref 70–110)
HCT VFR BLD AUTO: 33 % (ref 37–48.5)
HGB BLD-MCNC: 10.8 G/DL (ref 12–16)
IMM GRANULOCYTES # BLD AUTO: 0.87 K/UL (ref 0–0.04)
MCH RBC QN AUTO: 27.8 PG (ref 27–31)
MCHC RBC AUTO-ENTMCNC: 32.7 G/DL (ref 32–36)
MCV RBC AUTO: 85 FL (ref 82–98)
NEUTROPHILS # BLD AUTO: 16.5 K/UL (ref 1.8–7.7)
PLATELET # BLD AUTO: 163 K/UL (ref 150–350)
PMV BLD AUTO: 12.3 FL (ref 9.2–12.9)
POTASSIUM SERPL-SCNC: 4.3 MMOL/L (ref 3.5–5.1)
PROT SERPL-MCNC: 7.4 G/DL (ref 6–8.4)
RBC # BLD AUTO: 3.89 M/UL (ref 4–5.4)
SODIUM SERPL-SCNC: 137 MMOL/L (ref 136–145)
WBC # BLD AUTO: 18.73 K/UL (ref 3.9–12.7)

## 2019-04-05 PROCEDURE — 96413 CHEMO IV INFUSION 1 HR: CPT

## 2019-04-05 PROCEDURE — 80053 COMPREHEN METABOLIC PANEL: CPT

## 2019-04-05 PROCEDURE — 3008F BODY MASS INDEX DOCD: CPT | Mod: CPTII,S$GLB,, | Performed by: INTERNAL MEDICINE

## 2019-04-05 PROCEDURE — 96375 TX/PRO/DX INJ NEW DRUG ADDON: CPT

## 2019-04-05 PROCEDURE — 99215 PR OFFICE/OUTPT VISIT, EST, LEVL V, 40-54 MIN: ICD-10-PCS | Mod: S$GLB,,, | Performed by: INTERNAL MEDICINE

## 2019-04-05 PROCEDURE — 85027 COMPLETE CBC AUTOMATED: CPT

## 2019-04-05 PROCEDURE — 96367 TX/PROPH/DG ADDL SEQ IV INF: CPT

## 2019-04-05 PROCEDURE — 99999 PR PBB SHADOW E&M-EST. PATIENT-LVL IV: CPT | Mod: PBBFAC,,, | Performed by: INTERNAL MEDICINE

## 2019-04-05 PROCEDURE — 96415 CHEMO IV INFUSION ADDL HR: CPT

## 2019-04-05 PROCEDURE — 96377 APPLICATON ON-BODY INJECTOR: CPT

## 2019-04-05 PROCEDURE — 36415 COLL VENOUS BLD VENIPUNCTURE: CPT

## 2019-04-05 PROCEDURE — A4216 STERILE WATER/SALINE, 10 ML: HCPCS | Performed by: INTERNAL MEDICINE

## 2019-04-05 PROCEDURE — 99999 PR PBB SHADOW E&M-EST. PATIENT-LVL IV: ICD-10-PCS | Mod: PBBFAC,,, | Performed by: INTERNAL MEDICINE

## 2019-04-05 PROCEDURE — S0028 INJECTION, FAMOTIDINE, 20 MG: HCPCS | Performed by: INTERNAL MEDICINE

## 2019-04-05 PROCEDURE — 3008F PR BODY MASS INDEX (BMI) DOCUMENTED: ICD-10-PCS | Mod: CPTII,S$GLB,, | Performed by: INTERNAL MEDICINE

## 2019-04-05 PROCEDURE — 25000003 PHARM REV CODE 250: Performed by: INTERNAL MEDICINE

## 2019-04-05 PROCEDURE — 63600175 PHARM REV CODE 636 W HCPCS: Performed by: INTERNAL MEDICINE

## 2019-04-05 PROCEDURE — 99215 OFFICE O/P EST HI 40 MIN: CPT | Mod: S$GLB,,, | Performed by: INTERNAL MEDICINE

## 2019-04-05 RX ORDER — DIPHENHYDRAMINE HYDROCHLORIDE 50 MG/ML
50 INJECTION INTRAMUSCULAR; INTRAVENOUS ONCE AS NEEDED
Status: CANCELLED | OUTPATIENT
Start: 2019-04-05

## 2019-04-05 RX ORDER — EPINEPHRINE 0.3 MG/.3ML
0.3 INJECTION SUBCUTANEOUS ONCE AS NEEDED
Status: CANCELLED | OUTPATIENT
Start: 2019-04-05

## 2019-04-05 RX ORDER — SODIUM CHLORIDE 0.9 % (FLUSH) 0.9 %
10 SYRINGE (ML) INJECTION
Status: CANCELLED | OUTPATIENT
Start: 2019-04-05

## 2019-04-05 RX ORDER — FAMOTIDINE 10 MG/ML
20 INJECTION INTRAVENOUS
Status: COMPLETED | OUTPATIENT
Start: 2019-04-05 | End: 2019-04-05

## 2019-04-05 RX ORDER — FAMOTIDINE 10 MG/ML
20 INJECTION INTRAVENOUS
Status: CANCELLED | OUTPATIENT
Start: 2019-04-05

## 2019-04-05 RX ORDER — HEPARIN 100 UNIT/ML
500 SYRINGE INTRAVENOUS
Status: CANCELLED | OUTPATIENT
Start: 2019-04-05

## 2019-04-05 RX ORDER — OXYCODONE HYDROCHLORIDE 5 MG/1
5 TABLET ORAL EVERY 8 HOURS PRN
Qty: 40 TABLET | Refills: 0 | Status: SHIPPED | OUTPATIENT
Start: 2019-04-05 | End: 2020-01-14

## 2019-04-05 RX ORDER — DIPHENHYDRAMINE HYDROCHLORIDE 50 MG/ML
50 INJECTION INTRAMUSCULAR; INTRAVENOUS ONCE AS NEEDED
Status: DISCONTINUED | OUTPATIENT
Start: 2019-04-05 | End: 2019-04-05 | Stop reason: HOSPADM

## 2019-04-05 RX ORDER — EPINEPHRINE 0.3 MG/.3ML
0.3 INJECTION SUBCUTANEOUS ONCE AS NEEDED
Status: DISCONTINUED | OUTPATIENT
Start: 2019-04-05 | End: 2019-04-05 | Stop reason: HOSPADM

## 2019-04-05 RX ORDER — HEPARIN 100 UNIT/ML
500 SYRINGE INTRAVENOUS
Status: DISCONTINUED | OUTPATIENT
Start: 2019-04-05 | End: 2019-04-05 | Stop reason: HOSPADM

## 2019-04-05 RX ORDER — SODIUM CHLORIDE 0.9 % (FLUSH) 0.9 %
10 SYRINGE (ML) INJECTION
Status: DISCONTINUED | OUTPATIENT
Start: 2019-04-05 | End: 2019-04-05 | Stop reason: HOSPADM

## 2019-04-05 RX ADMIN — PEGFILGRASTIM 6 MG: KIT SUBCUTANEOUS at 01:04

## 2019-04-05 RX ADMIN — FAMOTIDINE 20 MG: 10 INJECTION, SOLUTION INTRAVENOUS at 09:04

## 2019-04-05 RX ADMIN — PACLITAXEL 318 MG: 6 INJECTION, SOLUTION INTRAVENOUS at 10:04

## 2019-04-05 RX ADMIN — DIPHENHYDRAMINE HYDROCHLORIDE 50 MG: 50 INJECTION, SOLUTION INTRAMUSCULAR; INTRAVENOUS at 09:04

## 2019-04-05 RX ADMIN — SODIUM CHLORIDE: 0.9 INJECTION, SOLUTION INTRAVENOUS at 09:04

## 2019-04-05 RX ADMIN — DEXAMETHASONE SODIUM PHOSPHATE 20 MG: 4 INJECTION, SOLUTION INTRA-ARTICULAR; INTRALESIONAL; INTRAMUSCULAR; INTRAVENOUS; SOFT TISSUE at 09:04

## 2019-04-05 RX ADMIN — Medication 10 ML: at 01:04

## 2019-04-05 RX ADMIN — HEPARIN 500 UNITS: 100 SYRINGE at 01:04

## 2019-04-05 NOTE — PLAN OF CARE
Problem: Adult Inpatient Plan of Care  Goal: Patient-Specific Goal (Individualization)  Outcome: Ongoing (interventions implemented as appropriate)  0930-Labs , hx, and medications reviewed, patient was seen by Md prior to arrival. Assessment completed. Discussed plan of care with patient. Patient in agreement. Chair reclined and warm blanket and snack offered.

## 2019-04-05 NOTE — PLAN OF CARE
Problem: Adult Inpatient Plan of Care  Goal: Plan of Care Review  Outcome: Ongoing (interventions implemented as appropriate)  1320-Patient tolerated treatment well. Discharged without complaints or S/S of adverse event. AVS given.  Instructed to call provider for any questions or concerns. Patient has neulasta onbody device applied to back of L arm, verbalized understanding monitoring and use of device prior to discharge.

## 2019-04-20 NOTE — PROGRESS NOTES
Subjective:       Patient ID: Elham Rodas is a 38 y.o. female.    Chief Complaint: No chief complaint on file.    HPI   Mrs. Rodas returns today for follow up.  She was due for her third cycle of dose dense adjuvant taxol today.  She has been premedicated with dexamethasone.    Briefly, she is a 38-year-old premenopausal female who was recently diagnosed with an invasive mammary carcinoma that was ER positive, CA positive, and HER-2 negative.   On 11/21/2018, she underwent a right lumpectomy with sentinel lymph node biopsy.  The pathology report from the procedure indicates that she had a positive sentinel lymph node with a metastatic deposit of 6 mm.  There was no extranodal extension.  On the right partial mastectomy specimen, she had an invasive lobular carcinoma that was 14 mm in maximum diameter.  It was grade II and resection margins were clear.    She is being treated in the adjuvant setting.     Her CBC today shows a WBC count of 3,800 /mm3, Hg 10.9 gr/dl, Ht 34.5 % and platelets 2892919 /mm3.  Bilirubin is 0.7 mg/dl, while her blood glucose is 219 mg/dl.    Review of Systems      Overall she feels OK.  She tolerated cycle 2 of taxol somewhat better but she experienced significant bone pains that lasted for 4-5 days.  She took NSAIDs without relief. today she feels well, and other than mild fatigue she has no additional complaints.  ECOG PS is 1.  She denies any depression, easy bruising, fevers, chills, night  sweats, weight loss, nausea, vomiting, diarrhea, constipation, diplopia, blurred vision, headache, chest pain, palpitations, shortness of breath, breast pain, abdominal pain, extremity pain, or difficulty ambulating.  The remainder of the ten-point ROS, including general, skin, lymph, H/N, cardiorespiratory, GI, , Neuro, Endocrine, and psychiatric is negative.     Objective:      Physical Exam        She is alert, oriented to time, place, person, pleasant, well      nourished, in no acute  physical distress.  She is here with her boyfriend.                                  VITAL SIGNS:  Reviewed                                      HEENT:  Alopecia is again noted.  There are no nasal, oral, lip, gingival, auricular, lid,    or conjunctival lesions.  Mucosae are moist and pink, and there is no        thrush.  Pupils are equal, reactive to light and accommodation.              Extraocular muscle movements are intact.  Dentition is good.  There is no frontal or maxillary tenderness.                                     NECK:  Supple without JVD, adenopathy, or thyromegaly.                       LUNGS:  Clear to auscultation without wheezing, rales, or ronchi.           CARDIOVASCULAR:  Reveals an S1, S2, no murmurs, no rubs, no gallops.         ABDOMEN:  Soft, nontender, without organomegaly.  Bowel sounds are    present.                                                                     EXTREMITIES:  No cyanosis, clubbing, or edema.                               BREASTS:  She is status post right lumpectomy with a well-healed  Incision in the lower outer quadrant of her right breast.    There are no masses in either breast.     A sentinel node biopsy scar is seen in the right axilla.  It is also well  Healed.   A left sided portacath is now identified.                                    LYMPHATIC:  There is no cervical, axillary, or supraclavicular adenopathy.   SKIN:  Warm and moist, without petechiae, rashes, induration, or ecchymoses.           NEUROLOGIC:  DTRs are 0-1+ bilaterally, symmetrical, motor function is 5/5,  and cranial nerves are  within normal limits.    Assessment:       1. Carcinoma of lower-inner quadrant of right breast in female, estrogen receptor positive    2. Encounter for antineoplastic chemotherapy    3. Anemia associated with chemotherapy       Plan:         I had a long discussion with her.  She may proceed with cycle 3 of taxol today followed by Neulasta and will return to  clinic in 14 days with labs for cycle 4.    She will take oxycodone prn for pain after her chemotherapy.  Upon completion of 4 cycles of taxol she will be referred for XRT.    Her multiple questions were answered to her satisfaction.

## 2019-04-22 ENCOUNTER — INFUSION (OUTPATIENT)
Dept: INFUSION THERAPY | Facility: HOSPITAL | Age: 39
End: 2019-04-22
Attending: INTERNAL MEDICINE
Payer: MEDICAID

## 2019-04-22 ENCOUNTER — OFFICE VISIT (OUTPATIENT)
Dept: HEMATOLOGY/ONCOLOGY | Facility: CLINIC | Age: 39
End: 2019-04-22
Payer: MEDICAID

## 2019-04-22 VITALS
WEIGHT: 165.13 LBS | SYSTOLIC BLOOD PRESSURE: 133 MMHG | BODY MASS INDEX: 28.19 KG/M2 | OXYGEN SATURATION: 99 % | HEART RATE: 107 BPM | DIASTOLIC BLOOD PRESSURE: 82 MMHG | RESPIRATION RATE: 18 BRPM | HEIGHT: 64 IN | TEMPERATURE: 98 F

## 2019-04-22 VITALS
DIASTOLIC BLOOD PRESSURE: 79 MMHG | HEIGHT: 64 IN | TEMPERATURE: 98 F | RESPIRATION RATE: 18 BRPM | SYSTOLIC BLOOD PRESSURE: 134 MMHG | HEART RATE: 84 BPM | BODY MASS INDEX: 28.17 KG/M2 | WEIGHT: 165 LBS

## 2019-04-22 DIAGNOSIS — Z51.11 ENCOUNTER FOR ANTINEOPLASTIC CHEMOTHERAPY: ICD-10-CM

## 2019-04-22 DIAGNOSIS — T45.1X5A ANEMIA ASSOCIATED WITH CHEMOTHERAPY: ICD-10-CM

## 2019-04-22 DIAGNOSIS — C50.611 CARCINOMA OF AXILLARY TAIL OF RIGHT BREAST IN FEMALE, ESTROGEN RECEPTOR POSITIVE: Primary | ICD-10-CM

## 2019-04-22 DIAGNOSIS — D64.81 ANEMIA ASSOCIATED WITH CHEMOTHERAPY: ICD-10-CM

## 2019-04-22 DIAGNOSIS — Z17.0 CARCINOMA OF AXILLARY TAIL OF RIGHT BREAST IN FEMALE, ESTROGEN RECEPTOR POSITIVE: Primary | ICD-10-CM

## 2019-04-22 DIAGNOSIS — C50.311 CARCINOMA OF LOWER-INNER QUADRANT OF RIGHT BREAST IN FEMALE, ESTROGEN RECEPTOR POSITIVE: Primary | ICD-10-CM

## 2019-04-22 DIAGNOSIS — Z17.0 CARCINOMA OF LOWER-INNER QUADRANT OF RIGHT BREAST IN FEMALE, ESTROGEN RECEPTOR POSITIVE: Primary | ICD-10-CM

## 2019-04-22 PROCEDURE — 99215 OFFICE O/P EST HI 40 MIN: CPT | Mod: S$PBB,,, | Performed by: INTERNAL MEDICINE

## 2019-04-22 PROCEDURE — 96377 APPLICATON ON-BODY INJECTOR: CPT | Mod: 59

## 2019-04-22 PROCEDURE — A4216 STERILE WATER/SALINE, 10 ML: HCPCS | Performed by: INTERNAL MEDICINE

## 2019-04-22 PROCEDURE — S0028 INJECTION, FAMOTIDINE, 20 MG: HCPCS | Performed by: INTERNAL MEDICINE

## 2019-04-22 PROCEDURE — 99213 OFFICE O/P EST LOW 20 MIN: CPT | Mod: PBBFAC,25 | Performed by: INTERNAL MEDICINE

## 2019-04-22 PROCEDURE — 99215 PR OFFICE/OUTPT VISIT, EST, LEVL V, 40-54 MIN: ICD-10-PCS | Mod: S$PBB,,, | Performed by: INTERNAL MEDICINE

## 2019-04-22 PROCEDURE — 96415 CHEMO IV INFUSION ADDL HR: CPT

## 2019-04-22 PROCEDURE — 99999 PR PBB SHADOW E&M-EST. PATIENT-LVL III: ICD-10-PCS | Mod: PBBFAC,,, | Performed by: INTERNAL MEDICINE

## 2019-04-22 PROCEDURE — 25000003 PHARM REV CODE 250: Performed by: INTERNAL MEDICINE

## 2019-04-22 PROCEDURE — 96367 TX/PROPH/DG ADDL SEQ IV INF: CPT

## 2019-04-22 PROCEDURE — 96413 CHEMO IV INFUSION 1 HR: CPT

## 2019-04-22 PROCEDURE — 99999 PR PBB SHADOW E&M-EST. PATIENT-LVL III: CPT | Mod: PBBFAC,,, | Performed by: INTERNAL MEDICINE

## 2019-04-22 PROCEDURE — 63600175 PHARM REV CODE 636 W HCPCS: Performed by: INTERNAL MEDICINE

## 2019-04-22 PROCEDURE — 96375 TX/PRO/DX INJ NEW DRUG ADDON: CPT

## 2019-04-22 RX ORDER — DIPHENHYDRAMINE HYDROCHLORIDE 50 MG/ML
50 INJECTION INTRAMUSCULAR; INTRAVENOUS ONCE AS NEEDED
Status: CANCELLED | OUTPATIENT
Start: 2019-04-22

## 2019-04-22 RX ORDER — HEPARIN 100 UNIT/ML
500 SYRINGE INTRAVENOUS
Status: CANCELLED | OUTPATIENT
Start: 2019-04-22

## 2019-04-22 RX ORDER — EPINEPHRINE 0.3 MG/.3ML
0.3 INJECTION SUBCUTANEOUS ONCE AS NEEDED
Status: DISCONTINUED | OUTPATIENT
Start: 2019-04-22 | End: 2019-04-22 | Stop reason: HOSPADM

## 2019-04-22 RX ORDER — SODIUM CHLORIDE 0.9 % (FLUSH) 0.9 %
10 SYRINGE (ML) INJECTION
Status: CANCELLED | OUTPATIENT
Start: 2019-04-22

## 2019-04-22 RX ORDER — FAMOTIDINE 10 MG/ML
20 INJECTION INTRAVENOUS
Status: COMPLETED | OUTPATIENT
Start: 2019-04-22 | End: 2019-04-22

## 2019-04-22 RX ORDER — EPINEPHRINE 0.3 MG/.3ML
0.3 INJECTION SUBCUTANEOUS ONCE AS NEEDED
Status: CANCELLED | OUTPATIENT
Start: 2019-04-22

## 2019-04-22 RX ORDER — SODIUM CHLORIDE 0.9 % (FLUSH) 0.9 %
10 SYRINGE (ML) INJECTION
Status: DISCONTINUED | OUTPATIENT
Start: 2019-04-22 | End: 2019-04-22 | Stop reason: HOSPADM

## 2019-04-22 RX ORDER — FAMOTIDINE 10 MG/ML
20 INJECTION INTRAVENOUS
Status: CANCELLED | OUTPATIENT
Start: 2019-04-22

## 2019-04-22 RX ORDER — DIPHENHYDRAMINE HYDROCHLORIDE 50 MG/ML
50 INJECTION INTRAMUSCULAR; INTRAVENOUS ONCE AS NEEDED
Status: DISCONTINUED | OUTPATIENT
Start: 2019-04-22 | End: 2019-04-22 | Stop reason: HOSPADM

## 2019-04-22 RX ORDER — HEPARIN 100 UNIT/ML
500 SYRINGE INTRAVENOUS
Status: DISCONTINUED | OUTPATIENT
Start: 2019-04-22 | End: 2019-04-22 | Stop reason: HOSPADM

## 2019-04-22 RX ADMIN — FAMOTIDINE 20 MG: 10 INJECTION, SOLUTION INTRAVENOUS at 12:04

## 2019-04-22 RX ADMIN — PACLITAXEL 318 MG: 6 INJECTION, SOLUTION INTRAVENOUS at 01:04

## 2019-04-22 RX ADMIN — HEPARIN 500 UNITS: 100 SYRINGE at 04:04

## 2019-04-22 RX ADMIN — DIPHENHYDRAMINE HYDROCHLORIDE 50 MG: 50 INJECTION, SOLUTION INTRAMUSCULAR; INTRAVENOUS at 12:04

## 2019-04-22 RX ADMIN — Medication 10 ML: at 04:04

## 2019-04-22 RX ADMIN — SODIUM CHLORIDE: 0.9 INJECTION, SOLUTION INTRAVENOUS at 12:04

## 2019-04-22 RX ADMIN — DEXAMETHASONE SODIUM PHOSPHATE 20 MG: 4 INJECTION, SOLUTION INTRAMUSCULAR; INTRAVENOUS at 12:04

## 2019-04-22 RX ADMIN — PEGFILGRASTIM 6 MG: KIT SUBCUTANEOUS at 04:04

## 2019-04-22 NOTE — PLAN OF CARE
Problem: Adult Inpatient Plan of Care  Goal: Plan of Care Review  Outcome: Ongoing (interventions implemented as appropriate)  Pt tolerated taxol infusion without issue, pt to rtc 5/6/19, OBI placed to left upper arm activated blinking green prior to d/c, no distress noted upon d/c to home

## 2019-04-22 NOTE — PLAN OF CARE
Problem: Adult Inpatient Plan of Care  Goal: Plan of Care Review  Outcome: Ongoing (interventions implemented as appropriate)  Pt here for Taxol infusion, labs, hx, meds, allergies reviewed, pt with no complaints at this time, reclined in chair, continue to monitor

## 2019-04-29 ENCOUNTER — TELEPHONE (OUTPATIENT)
Dept: HEMATOLOGY/ONCOLOGY | Facility: CLINIC | Age: 39
End: 2019-04-29

## 2019-04-29 NOTE — TELEPHONE ENCOUNTER
Returned call, spoke with patient and explained Dr. Mcintosh unfortunately did not have anything earlier on 5/6 as he is on service and will be in the hospital during the morning. She voiced understanding

## 2019-04-29 NOTE — TELEPHONE ENCOUNTER
----- Message from Clarence Bill sent at 4/29/2019  3:01 PM CDT -----  Contact: Patient  Pt would like to reschedule 05/06 lab, chemo, and appt with dr Mcintosh, wants to move the times up if avail.      Contact:: [phone]

## 2019-05-06 ENCOUNTER — TELEPHONE (OUTPATIENT)
Dept: RADIATION ONCOLOGY | Facility: CLINIC | Age: 39
End: 2019-05-06

## 2019-05-06 ENCOUNTER — LAB VISIT (OUTPATIENT)
Dept: LAB | Facility: HOSPITAL | Age: 39
End: 2019-05-06
Attending: INTERNAL MEDICINE
Payer: MEDICAID

## 2019-05-06 ENCOUNTER — OFFICE VISIT (OUTPATIENT)
Dept: HEMATOLOGY/ONCOLOGY | Facility: CLINIC | Age: 39
End: 2019-05-06
Payer: MEDICAID

## 2019-05-06 ENCOUNTER — INFUSION (OUTPATIENT)
Dept: INFUSION THERAPY | Facility: HOSPITAL | Age: 39
End: 2019-05-06
Attending: INTERNAL MEDICINE
Payer: MEDICAID

## 2019-05-06 ENCOUNTER — TELEPHONE (OUTPATIENT)
Dept: SURGERY | Facility: CLINIC | Age: 39
End: 2019-05-06

## 2019-05-06 VITALS
DIASTOLIC BLOOD PRESSURE: 79 MMHG | SYSTOLIC BLOOD PRESSURE: 123 MMHG | WEIGHT: 161.81 LBS | RESPIRATION RATE: 20 BRPM | BODY MASS INDEX: 27.78 KG/M2 | HEART RATE: 97 BPM | OXYGEN SATURATION: 99 %

## 2019-05-06 VITALS
TEMPERATURE: 98 F | SYSTOLIC BLOOD PRESSURE: 127 MMHG | DIASTOLIC BLOOD PRESSURE: 80 MMHG | HEART RATE: 95 BPM | RESPIRATION RATE: 18 BRPM

## 2019-05-06 DIAGNOSIS — D64.81 ANEMIA ASSOCIATED WITH CHEMOTHERAPY: ICD-10-CM

## 2019-05-06 DIAGNOSIS — C50.611 CARCINOMA OF AXILLARY TAIL OF RIGHT BREAST IN FEMALE, ESTROGEN RECEPTOR POSITIVE: Primary | ICD-10-CM

## 2019-05-06 DIAGNOSIS — Z51.11 ENCOUNTER FOR ANTINEOPLASTIC CHEMOTHERAPY: ICD-10-CM

## 2019-05-06 DIAGNOSIS — T45.1X5A ANEMIA ASSOCIATED WITH CHEMOTHERAPY: ICD-10-CM

## 2019-05-06 DIAGNOSIS — Z17.0 CARCINOMA OF AXILLARY TAIL OF RIGHT BREAST IN FEMALE, ESTROGEN RECEPTOR POSITIVE: Primary | ICD-10-CM

## 2019-05-06 LAB
ALBUMIN SERPL BCP-MCNC: 4.5 G/DL (ref 3.5–5.2)
ALP SERPL-CCNC: 135 U/L (ref 55–135)
ALT SERPL W/O P-5'-P-CCNC: 47 U/L (ref 10–44)
ANION GAP SERPL CALC-SCNC: 12 MMOL/L (ref 8–16)
AST SERPL-CCNC: 22 U/L (ref 10–40)
BILIRUB SERPL-MCNC: 0.7 MG/DL (ref 0.1–1)
BUN SERPL-MCNC: 10 MG/DL (ref 6–20)
CALCIUM SERPL-MCNC: 10 MG/DL (ref 8.7–10.5)
CHLORIDE SERPL-SCNC: 107 MMOL/L (ref 95–110)
CO2 SERPL-SCNC: 19 MMOL/L (ref 23–29)
CREAT SERPL-MCNC: 0.7 MG/DL (ref 0.5–1.4)
ERYTHROCYTE [DISTWIDTH] IN BLOOD BY AUTOMATED COUNT: 18.5 % (ref 11.5–14.5)
EST. GFR  (AFRICAN AMERICAN): >60 ML/MIN/1.73 M^2
EST. GFR  (NON AFRICAN AMERICAN): >60 ML/MIN/1.73 M^2
GLUCOSE SERPL-MCNC: 136 MG/DL (ref 70–110)
HCT VFR BLD AUTO: 35.6 % (ref 37–48.5)
HGB BLD-MCNC: 11.6 G/DL (ref 12–16)
IMM GRANULOCYTES # BLD AUTO: 0.05 K/UL (ref 0–0.04)
MCH RBC QN AUTO: 28.2 PG (ref 27–31)
MCHC RBC AUTO-ENTMCNC: 32.6 G/DL (ref 32–36)
MCV RBC AUTO: 86 FL (ref 82–98)
NEUTROPHILS # BLD AUTO: 6.2 K/UL (ref 1.8–7.7)
PLATELET # BLD AUTO: 203 K/UL (ref 150–350)
PMV BLD AUTO: 12.8 FL (ref 9.2–12.9)
POTASSIUM SERPL-SCNC: 4.2 MMOL/L (ref 3.5–5.1)
PROT SERPL-MCNC: 7.8 G/DL (ref 6–8.4)
RBC # BLD AUTO: 4.12 M/UL (ref 4–5.4)
SODIUM SERPL-SCNC: 138 MMOL/L (ref 136–145)
WBC # BLD AUTO: 6.98 K/UL (ref 3.9–12.7)

## 2019-05-06 PROCEDURE — 25000003 PHARM REV CODE 250: Performed by: INTERNAL MEDICINE

## 2019-05-06 PROCEDURE — 96415 CHEMO IV INFUSION ADDL HR: CPT

## 2019-05-06 PROCEDURE — 96367 TX/PROPH/DG ADDL SEQ IV INF: CPT

## 2019-05-06 PROCEDURE — 99215 OFFICE O/P EST HI 40 MIN: CPT | Mod: S$PBB,,, | Performed by: INTERNAL MEDICINE

## 2019-05-06 PROCEDURE — 36415 COLL VENOUS BLD VENIPUNCTURE: CPT

## 2019-05-06 PROCEDURE — 99215 PR OFFICE/OUTPT VISIT, EST, LEVL V, 40-54 MIN: ICD-10-PCS | Mod: S$PBB,,, | Performed by: INTERNAL MEDICINE

## 2019-05-06 PROCEDURE — 63600175 PHARM REV CODE 636 W HCPCS: Performed by: INTERNAL MEDICINE

## 2019-05-06 PROCEDURE — 99999 PR PBB SHADOW E&M-EST. PATIENT-LVL IV: ICD-10-PCS | Mod: PBBFAC,,, | Performed by: INTERNAL MEDICINE

## 2019-05-06 PROCEDURE — 96413 CHEMO IV INFUSION 1 HR: CPT

## 2019-05-06 PROCEDURE — 96375 TX/PRO/DX INJ NEW DRUG ADDON: CPT

## 2019-05-06 PROCEDURE — 96377 APPLICATON ON-BODY INJECTOR: CPT | Mod: 59

## 2019-05-06 PROCEDURE — 99214 OFFICE O/P EST MOD 30 MIN: CPT | Mod: PBBFAC,25 | Performed by: INTERNAL MEDICINE

## 2019-05-06 PROCEDURE — S0028 INJECTION, FAMOTIDINE, 20 MG: HCPCS | Performed by: INTERNAL MEDICINE

## 2019-05-06 PROCEDURE — 80053 COMPREHEN METABOLIC PANEL: CPT

## 2019-05-06 PROCEDURE — 99999 PR PBB SHADOW E&M-EST. PATIENT-LVL IV: CPT | Mod: PBBFAC,,, | Performed by: INTERNAL MEDICINE

## 2019-05-06 PROCEDURE — 85027 COMPLETE CBC AUTOMATED: CPT

## 2019-05-06 RX ORDER — HEPARIN 100 UNIT/ML
500 SYRINGE INTRAVENOUS
Status: CANCELLED | OUTPATIENT
Start: 2019-05-06

## 2019-05-06 RX ORDER — SODIUM CHLORIDE 0.9 % (FLUSH) 0.9 %
10 SYRINGE (ML) INJECTION
Status: DISCONTINUED | OUTPATIENT
Start: 2019-05-06 | End: 2019-05-06 | Stop reason: HOSPADM

## 2019-05-06 RX ORDER — DIPHENHYDRAMINE HYDROCHLORIDE 50 MG/ML
50 INJECTION INTRAMUSCULAR; INTRAVENOUS ONCE AS NEEDED
Status: DISCONTINUED | OUTPATIENT
Start: 2019-05-06 | End: 2019-05-06 | Stop reason: HOSPADM

## 2019-05-06 RX ORDER — EPINEPHRINE 0.3 MG/.3ML
0.3 INJECTION SUBCUTANEOUS ONCE AS NEEDED
Status: CANCELLED | OUTPATIENT
Start: 2019-05-06

## 2019-05-06 RX ORDER — FAMOTIDINE 10 MG/ML
20 INJECTION INTRAVENOUS
Status: COMPLETED | OUTPATIENT
Start: 2019-05-06 | End: 2019-05-06

## 2019-05-06 RX ORDER — DIPHENHYDRAMINE HYDROCHLORIDE 50 MG/ML
50 INJECTION INTRAMUSCULAR; INTRAVENOUS ONCE AS NEEDED
Status: CANCELLED | OUTPATIENT
Start: 2019-05-06

## 2019-05-06 RX ORDER — FAMOTIDINE 10 MG/ML
20 INJECTION INTRAVENOUS
Status: CANCELLED | OUTPATIENT
Start: 2019-05-06

## 2019-05-06 RX ORDER — HEPARIN 100 UNIT/ML
500 SYRINGE INTRAVENOUS
Status: DISCONTINUED | OUTPATIENT
Start: 2019-05-06 | End: 2019-05-06 | Stop reason: HOSPADM

## 2019-05-06 RX ORDER — SODIUM CHLORIDE 0.9 % (FLUSH) 0.9 %
10 SYRINGE (ML) INJECTION
Status: CANCELLED | OUTPATIENT
Start: 2019-05-06

## 2019-05-06 RX ORDER — EPINEPHRINE 0.3 MG/.3ML
0.3 INJECTION SUBCUTANEOUS ONCE AS NEEDED
Status: DISCONTINUED | OUTPATIENT
Start: 2019-05-06 | End: 2019-05-06 | Stop reason: HOSPADM

## 2019-05-06 RX ADMIN — HEPARIN 500 UNITS: 100 SYRINGE at 06:05

## 2019-05-06 RX ADMIN — DIPHENHYDRAMINE HYDROCHLORIDE 50 MG: 50 INJECTION, SOLUTION INTRAMUSCULAR; INTRAVENOUS at 02:05

## 2019-05-06 RX ADMIN — FAMOTIDINE 20 MG: 10 INJECTION, SOLUTION INTRAVENOUS at 02:05

## 2019-05-06 RX ADMIN — PEGFILGRASTIM 6 MG: KIT SUBCUTANEOUS at 06:05

## 2019-05-06 RX ADMIN — DEXAMETHASONE SODIUM PHOSPHATE 20 MG: 4 INJECTION, SOLUTION INTRA-ARTICULAR; INTRALESIONAL; INTRAMUSCULAR; INTRAVENOUS; SOFT TISSUE at 02:05

## 2019-05-06 RX ADMIN — PACLITAXEL 318 MG: 6 INJECTION, SOLUTION INTRAVENOUS at 03:05

## 2019-05-06 RX ADMIN — SODIUM CHLORIDE: 0.9 INJECTION, SOLUTION INTRAVENOUS at 02:05

## 2019-05-06 NOTE — PROGRESS NOTES
Subjective:       Patient ID: Elham Rodas is a 38 y.o. female.    Chief Complaint: No chief complaint on file.    HPI   Mrs. Rodas returns today for follow up.  She was due for her fourth cycle of dose dense adjuvant taxol today.  She has been premedicated with dexamethasone.    Briefly, she is a 38-year-old premenopausal female who was recently diagnosed with an invasive mammary carcinoma that was ER positive, HI positive, and HER-2 negative.   On 11/21/2018, she underwent a right lumpectomy with sentinel lymph node biopsy.  The pathology report from the procedure indicates that she had a positive sentinel lymph node with a metastatic deposit of 6 mm.  There was no extranodal extension.  On the right partial mastectomy specimen, she had an invasive lobular carcinoma that was 14 mm in maximum diameter.  It was grade II and resection margins were clear.    She is being treated in the adjuvant setting.     Her CBC today shows a WBC count of 6,900 /mm3, Hg 11.6 gr/dl, Ht 35.6 % and platelets 202,000 /mm3.  Bilirubin is 0.7 mg/dl, while her blood glucose is 136 mg/dl.    Review of Systems      Overall she feels OK.  She tolerated cycle 3 of taxol somewhat better but she experienced significant bone pains that lasted for 4-5 days.  She took NSAIDs without relief. today she feels well, and other than mild fatigue she has no additional complaints.  ECOG PS is 1.  She denies any depression, easy bruising, fevers, chills, night  sweats, weight loss, nausea, vomiting, diarrhea, constipation, diplopia, blurred vision, headache, chest pain, palpitations, shortness of breath, breast pain, abdominal pain, extremity pain, or difficulty ambulating.  The remainder of the ten-point ROS, including general, skin, lymph, H/N, cardiorespiratory, GI, , Neuro, Endocrine, and psychiatric is negative.     Objective:      Physical Exam        She is alert, oriented to time, place, person, pleasant, well      nourished, in no acute  physical distress.  She is here with her boyfriend.                                  VITAL SIGNS:  Reviewed                                      HEENT:  Alopecia is again noted.  There are no nasal, oral, lip, gingival, auricular, lid,    or conjunctival lesions.  Mucosae are moist and pink, and there is no        thrush.  Pupils are equal, reactive to light and accommodation.              Extraocular muscle movements are intact.  Dentition is good.  There is no frontal or maxillary tenderness.                                     NECK:  Supple without JVD, adenopathy, or thyromegaly.                       LUNGS:  Clear to auscultation without wheezing, rales, or ronchi.           CARDIOVASCULAR:  Reveals an S1, S2, no murmurs, no rubs, no gallops.         ABDOMEN:  Soft, nontender, without organomegaly.  Bowel sounds are    present.                                                                     EXTREMITIES:  No cyanosis, clubbing, or edema.                               BREASTS:  She is status post right lumpectomy with a well-healed  Incision in the lower outer quadrant of her right breast.    There are no masses in either breast.     A sentinel node biopsy scar is seen in the right axilla.  It is also well  Healed.   A left sided portacath is now identified.                                    LYMPHATIC:  There is no cervical, axillary, or supraclavicular adenopathy.   SKIN:  Warm and moist, without petechiae, rashes, induration, or ecchymoses.           NEUROLOGIC:  DTRs are 0-1+ bilaterally, symmetrical, motor function is 5/5,  and cranial nerves are  within normal limits.    Assessment:       1. Carcinoma of axillary tail of right breast in female, estrogen receptor positive    2. Anemia associated with chemotherapy    3. Encounter for antineoplastic chemotherapy       Plan:         I had a long discussion with her.  She may proceed with cycle 4 of taxol today and we will intiate a referral for XRT.  We  will also arrange for her port to be removed.  I have e mailed Surya Arce and Lucy to alert them.  I will see her again in early July.  Her multiple questions were answered to her satisfaction.

## 2019-05-06 NOTE — TELEPHONE ENCOUNTER
----- Message from Elham Colby RN sent at 5/6/2019  1:00 PM CDT -----  Patient is completing her chemotherapy today and will be due for port removal. Can you all reach out to the patient and let me know when this is coordinated    -PETEY Lizarraga

## 2019-05-06 NOTE — PLAN OF CARE
Problem: Anemia (Chemotherapy Effects)  Goal: Anemia Symptom Improvement    Intervention: Monitor and Manage Anemia  Tolerated taxol infusion well no adverse reactions, vitals stable, neulasta obi applied to EZE, PAC flushed hep locked de accessed, site covered, leaves clinic ambulatory accompanied by  in stable condition, instructed to contact MD office with questions or concerns avs printed.

## 2019-05-06 NOTE — TELEPHONE ENCOUNTER
Made appt., pt. Agreed to same.      ----- Message from Marci Arce MD sent at 5/6/2019  1:28 PM CDT -----  We will get her scheduled for xrt     April Pickett see below    ----- Message -----  From: Artemio Restrepo MD  Sent: 5/6/2019   1:08 PM  To: Marci Arce MD, MD Jann Bee,  Last cycle of adjuvant taxol is today.  She will be ready for port removal by the end of the month.Aristides, she needs XRT...  lAst cycle of taxol is today.  Thank you both,    Artemio

## 2019-05-09 ENCOUNTER — TELEPHONE (OUTPATIENT)
Dept: SURGERY | Facility: CLINIC | Age: 39
End: 2019-05-09

## 2019-05-09 NOTE — TELEPHONE ENCOUNTER
Called to schedule port removal with Dr. travis tentatively 6/21/19. Left message with call back number.

## 2019-05-13 ENCOUNTER — TELEPHONE (OUTPATIENT)
Dept: SURGERY | Facility: CLINIC | Age: 39
End: 2019-05-13

## 2019-05-13 NOTE — TELEPHONE ENCOUNTER
Spoke to patient. Scheduled and confirmed port removal in [facility] with Dr. Ayala at 2:00 on 6/17/19. Instructions given. Patient verbalized understanding.

## 2019-06-07 ENCOUNTER — OFFICE VISIT (OUTPATIENT)
Dept: OBSTETRICS AND GYNECOLOGY | Facility: CLINIC | Age: 39
End: 2019-06-07
Payer: MEDICAID

## 2019-06-07 VITALS
WEIGHT: 163 LBS | RESPIRATION RATE: 12 BRPM | DIASTOLIC BLOOD PRESSURE: 67 MMHG | HEIGHT: 64 IN | BODY MASS INDEX: 27.83 KG/M2 | HEART RATE: 79 BPM | SYSTOLIC BLOOD PRESSURE: 122 MMHG

## 2019-06-07 DIAGNOSIS — Z85.3 PERSONAL HISTORY OF BREAST CANCER: ICD-10-CM

## 2019-06-07 DIAGNOSIS — Z12.4 CERVICAL CANCER SCREENING: Primary | ICD-10-CM

## 2019-06-07 DIAGNOSIS — Z01.419 ENCOUNTER FOR GYNECOLOGICAL EXAMINATION WITHOUT ABNORMAL FINDING: ICD-10-CM

## 2019-06-07 PROCEDURE — 99396 PREV VISIT EST AGE 40-64: CPT | Mod: S$PBB,,, | Performed by: OBSTETRICS & GYNECOLOGY

## 2019-06-07 PROCEDURE — 99999 PR PBB SHADOW E&M-EST. PATIENT-LVL III: ICD-10-PCS | Mod: PBBFAC,,, | Performed by: OBSTETRICS & GYNECOLOGY

## 2019-06-07 PROCEDURE — 99213 OFFICE O/P EST LOW 20 MIN: CPT | Mod: PBBFAC | Performed by: OBSTETRICS & GYNECOLOGY

## 2019-06-07 PROCEDURE — 88175 CYTOPATH C/V AUTO FLUID REDO: CPT

## 2019-06-07 PROCEDURE — 99396 PR PREVENTIVE VISIT,EST,40-64: ICD-10-PCS | Mod: S$PBB,,, | Performed by: OBSTETRICS & GYNECOLOGY

## 2019-06-07 PROCEDURE — 99999 PR PBB SHADOW E&M-EST. PATIENT-LVL III: CPT | Mod: PBBFAC,,, | Performed by: OBSTETRICS & GYNECOLOGY

## 2019-06-07 NOTE — PROGRESS NOTES
Subjective:       Patient ID: Elham Rodas is a 38 y.o. female.    Chief Complaint:  Well Woman      History of Present Illness  Patient presents for annual exam.  Patient has a history of breast cancer in her right breast diagnosed this year.  She is currently undergoing chemotherapy.  Patient continues to have close follow-up.  Patient states she has not had a menstrual cycle since beginning chemotherapy.  She is otherwise without gyn complaints.    Menstrual History:  OB History        4    Para   4    Term   4            AB        Living   4       SAB        TAB        Ectopic        Multiple        Live Births                    Menarche age:  Patient's last menstrual period was 2019 (approximate).         Review of Systems  Review of Systems   Constitutional: Negative for activity change, appetite change, chills, diaphoresis, fatigue, fever and unexpected weight change.   HENT: Negative for congestion, dental problem, drooling, ear discharge, ear pain, facial swelling, hearing loss, mouth sores, nosebleeds, postnasal drip, rhinorrhea, sinus pressure, sneezing, sore throat, tinnitus, trouble swallowing and voice change.    Eyes: Negative for photophobia, pain, discharge, redness, itching and visual disturbance.   Respiratory: Negative for apnea, cough, choking, chest tightness, shortness of breath, wheezing and stridor.    Cardiovascular: Negative for chest pain, palpitations and leg swelling.   Gastrointestinal: Negative for abdominal distention, abdominal pain, anal bleeding, blood in stool, constipation, diarrhea, nausea, rectal pain and vomiting.   Endocrine: Negative for cold intolerance, heat intolerance, polydipsia, polyphagia and polyuria.   Genitourinary: Negative for decreased urine volume, difficulty urinating, dyspareunia, dysuria, enuresis, flank pain, frequency, genital sores, hematuria, menstrual problem, pelvic pain, urgency, vaginal bleeding, vaginal discharge and vaginal  pain.   Musculoskeletal: Negative for arthralgias, back pain, gait problem, joint swelling, myalgias, neck pain and neck stiffness.   Skin: Negative for color change, pallor, rash and wound.   Allergic/Immunologic: Negative for environmental allergies, food allergies and immunocompromised state.   Neurological: Negative for dizziness, tremors, seizures, syncope, facial asymmetry, speech difficulty, weakness, light-headedness, numbness and headaches.   Hematological: Negative for adenopathy. Does not bruise/bleed easily.   Psychiatric/Behavioral: Negative for agitation, behavioral problems, confusion, decreased concentration, dysphoric mood, hallucinations, self-injury, sleep disturbance and suicidal ideas. The patient is not nervous/anxious and is not hyperactive.            Objective:      Physical Exam   Constitutional: She is oriented to person, place, and time. She appears well-developed and well-nourished.   Neck: No thyromegaly present.   Cardiovascular: Normal rate and regular rhythm.   Pulmonary/Chest: Effort normal and breath sounds normal. No breast tenderness or discharge.   Abdominal: Soft. Bowel sounds are normal. She exhibits no mass. There is no tenderness. Hernia confirmed negative in the right inguinal area and confirmed negative in the left inguinal area.   Genitourinary: Vagina normal and uterus normal. Rectal exam shows no external hemorrhoid. No breast tenderness or discharge. Uterus is not enlarged and not tender. Cervix exhibits no motion tenderness, no discharge and no friability. Right adnexum displays no mass, no tenderness and no fullness. Left adnexum displays no mass, no tenderness and no fullness. No tenderness in the vagina. No foreign body in the vagina. No vaginal discharge found.   Musculoskeletal: Normal range of motion.   Lymphadenopathy:        Right: No inguinal adenopathy present.        Left: No inguinal adenopathy present.   Neurological: She is alert and oriented to person,  place, and time. She has normal reflexes.   Skin: Skin is dry.   Psychiatric: She has a normal mood and affect. Her behavior is normal. Judgment and thought content normal.   Nursing note and vitals reviewed.          Assessment:        1. Cervical cancer screening    2. Encounter for gynecological examination without abnormal finding                Plan:         Elham was seen today for well woman.    Diagnoses and all orders for this visit:    Cervical cancer screening  -     Liquid-based pap smear, screening    Encounter for gynecological examination without abnormal finding

## 2019-06-12 ENCOUNTER — HOSPITAL ENCOUNTER (OUTPATIENT)
Dept: RADIATION THERAPY | Facility: HOSPITAL | Age: 39
Discharge: HOME | End: 2019-06-12
Attending: RADIOLOGY
Payer: MEDICAID

## 2019-06-12 ENCOUNTER — OFFICE VISIT (OUTPATIENT)
Dept: RADIATION ONCOLOGY | Facility: CLINIC | Age: 39
End: 2019-06-12
Payer: MEDICAID

## 2019-06-12 VITALS
WEIGHT: 167 LBS | DIASTOLIC BLOOD PRESSURE: 95 MMHG | RESPIRATION RATE: 14 BRPM | HEIGHT: 64 IN | HEART RATE: 73 BPM | BODY MASS INDEX: 28.51 KG/M2 | TEMPERATURE: 98 F | SYSTOLIC BLOOD PRESSURE: 155 MMHG

## 2019-06-12 DIAGNOSIS — C50.611 CARCINOMA OF AXILLARY TAIL OF RIGHT BREAST IN FEMALE, ESTROGEN RECEPTOR POSITIVE: Primary | ICD-10-CM

## 2019-06-12 DIAGNOSIS — Z17.0 CARCINOMA OF AXILLARY TAIL OF RIGHT BREAST IN FEMALE, ESTROGEN RECEPTOR POSITIVE: Primary | ICD-10-CM

## 2019-06-12 PROCEDURE — 77334 PR  RADN TREATMENT AID(S) COMPLX: ICD-10-PCS | Mod: 26,,, | Performed by: RADIOLOGY

## 2019-06-12 PROCEDURE — 77263 THER RADIOLOGY TX PLNG CPLX: CPT | Mod: ,,, | Performed by: RADIOLOGY

## 2019-06-12 PROCEDURE — 99213 OFFICE O/P EST LOW 20 MIN: CPT | Mod: S$PBB,25,, | Performed by: RADIOLOGY

## 2019-06-12 PROCEDURE — 77334 RADIATION TREATMENT AID(S): CPT | Mod: TC | Performed by: RADIOLOGY

## 2019-06-12 PROCEDURE — 77334 RADIATION TREATMENT AID(S): CPT | Mod: 26,,, | Performed by: RADIOLOGY

## 2019-06-12 PROCEDURE — 99213 OFFICE O/P EST LOW 20 MIN: CPT | Mod: PBBFAC,25 | Performed by: RADIOLOGY

## 2019-06-12 PROCEDURE — 77290 THER RAD SIMULAJ FIELD CPLX: CPT | Mod: TC | Performed by: RADIOLOGY

## 2019-06-12 PROCEDURE — 99999 PR PBB SHADOW E&M-EST. PATIENT-LVL III: ICD-10-PCS | Mod: PBBFAC,,, | Performed by: RADIOLOGY

## 2019-06-12 PROCEDURE — 77263 PR  RADIATION THERAPY PLAN COMPLEX: ICD-10-PCS | Mod: ,,, | Performed by: RADIOLOGY

## 2019-06-12 PROCEDURE — 77014 HC CT GUIDANCE RADIATION THERAPY FLDS PLACEMENT: CPT | Mod: TC | Performed by: RADIOLOGY

## 2019-06-12 PROCEDURE — 77290 PR  SET RADN THERAPY FIELD COMPLEX: ICD-10-PCS | Mod: 26,,, | Performed by: RADIOLOGY

## 2019-06-12 PROCEDURE — 77290 THER RAD SIMULAJ FIELD CPLX: CPT | Mod: 26,,, | Performed by: RADIOLOGY

## 2019-06-12 PROCEDURE — 99999 PR PBB SHADOW E&M-EST. PATIENT-LVL III: CPT | Mod: PBBFAC,,, | Performed by: RADIOLOGY

## 2019-06-12 PROCEDURE — 99213 PR OFFICE/OUTPT VISIT, EST, LEVL III, 20-29 MIN: ICD-10-PCS | Mod: S$PBB,25,, | Performed by: RADIOLOGY

## 2019-06-12 NOTE — PROGRESS NOTES
REFERRING PHYSICIAN: Jann Ayala M.D., Artemio Restrepo MD    DIAGNOSIS: pT1c N1a M0 invasive lobular carcinoma of the right breast    INTERVAL HISTORY:  Ms. Rodas is a 38 year old female who is well known to me after consultation on December 20, 2018.  Please see full consultation note on that date for details.  She returns today after completion of adjuvant chemotherapy to initiate postoperative radiation.    Briefly, she was diagnosed with right breast cancer after she presented with a palpable mass in the 9 o'clock position of the right breast with associated skin/nipple retraction. A mammogram and ultrasound in October 2018 revealed an architectural distortion in the right breast at 9:00 oclock in the posterior depth correlating to the palpable mass. This measured 13 x 9.6 x 8.7 mm on ultrasound. A core needle biopsy of this lesion on October 10, 2018 revealed grade 1, invasive mammary carcinoma, which was ER positive (greater than 90%), MA positive (80%), and her 2 negative, with Ki-67 index of less than 10%. On November 21, 2018, she underwent lumpectomy and sentinel node biopsy. The pathology revealed the right breast with 14 mm, invasive lobular carcinoma grade 2, with the closest margin 1.5 mm posteriorly, with additional posterior margin taken, with a final margin of greater than 10 mm posteriorly. 1/1 sentinel lymph node is noted to have 6 mm focus of metastatic deposit without extranodal extension. Her Mammoprint analysis revealed low risk of recurrence.  However, after discussion with Dr. Restrepo, patient elected to undergo adjuvant chemotherapy.  She returns today for treatment planning CT prior to start of radiation.      Since I saw her last, she completed 4 cycles of AC followed by 4 cycles of Taxol which ended on May 6, 2019.  She reports nausea and fatigue associated with that.  She denies right breast pain, edema, erythema, or nipple discharge. She also denies fever, night sweats, or  "weight loss.    PHYSICAL EXAMINATION:  VITAL SINGS: BP (!) 155/95   Pulse 73   Temp 97.9 °F (36.6 °C) (Oral)   Resp 14   Ht 5' 4" (1.626 m)   Wt 75.8 kg (167 lb)   BMI 28.67 kg/m²   GENERAL: Patient is alert and oriented, in no acute distress.  HEENT:  Alopecia noted.  Extraocular muscles are intact.  Oropharynx is clear without lesions.  There is no cervical or supraclavicular adenopathy palpated.    CHEST: Breath sounds clear bilaterally.  No rales.  No rhonchi.  Unlabored respirations.  CARDIOVASCULAR: Normal S1, S2.  Normal rate.  Regular rhythm.  BREAST EXAM: The scar secondary to lumpectomy is noted in the lateral aspect of the right breast. The right nipple is inverted and pulled laterally secondary to surgery.There is also a scar in the right axilla secondary to sentinel node biopsy. No abnormal masses palpated in the right breast or right axilla. The left breast and left axilla are also without palpable masses.  ABDOMEN: Bowel sounds normal.  No tenderness.  No abdominal distention.  No hepatomegaly.  No splenomegaly.  EXTREMITIES:  There is mild edema of the right arm.  No clubbing or cyanosis.  NEUROLOGIC: Cranial nerves II through XII are grossly intact.  Sensation is intact.  Strength is 5 out of 5 in the upper and lower extremities bilaterally.    ASSESSMENT:   This is a 38-year-old female with p T1c N1a M0, grade 2, invasive lobular carcinoma of the right breast who underwent lumpectomy and sentinel node biopsy on November 21, 2018 followed by adjuvant chemotherapy, with 1.4 cm lesion, with 1/1 sentinel lymph node positive with 6 mm focus of metastatic deposit, ER/OK positive, her 2 negative, Ki-67 less than 10%.    PLAN:   Ms. Rodas is recommended to undergo postoperative radiation to the right breast and draining lymph nodes including the right axillary, right supraclavicular, and right internal mammary nodes to reduce her chance of recurrence.  I plan to deliver approximately 5040 cGy +1000 " cGy boost.  The risks, benefits, and side effects of radiation were again explained in detail to the patient.  All of her questions were answered.  She is noted to have edema of the right arm.  She has already seen physical therapy after surgery.  She is recommended to follow up with them prior to start of radiation.  She is scheduled for treatment planning CT today.

## 2019-06-13 DIAGNOSIS — Z91.89 AT RISK FOR LYMPHEDEMA: Primary | ICD-10-CM

## 2019-06-13 DIAGNOSIS — C50.411 MALIGNANT NEOPLASM OF UPPER-OUTER QUADRANT OF RIGHT BREAST IN FEMALE, ESTROGEN RECEPTOR POSITIVE: ICD-10-CM

## 2019-06-13 DIAGNOSIS — Z17.0 MALIGNANT NEOPLASM OF UPPER-OUTER QUADRANT OF RIGHT BREAST IN FEMALE, ESTROGEN RECEPTOR POSITIVE: ICD-10-CM

## 2019-06-14 PROCEDURE — 77300 RADIATION THERAPY DOSE PLAN: CPT | Mod: 26,,, | Performed by: RADIOLOGY

## 2019-06-14 PROCEDURE — 77334 PR  RADN TREATMENT AID(S) COMPLX: ICD-10-PCS | Mod: 26,,, | Performed by: RADIOLOGY

## 2019-06-14 PROCEDURE — 77295 3-D RADIOTHERAPY PLAN: CPT | Mod: TC | Performed by: RADIOLOGY

## 2019-06-14 PROCEDURE — 77300 RADIATION THERAPY DOSE PLAN: CPT | Mod: TC | Performed by: RADIOLOGY

## 2019-06-14 PROCEDURE — 77295 PR 3D RADIOTHERAPY PLAN: ICD-10-PCS | Mod: 26,,, | Performed by: RADIOLOGY

## 2019-06-14 PROCEDURE — 77334 RADIATION TREATMENT AID(S): CPT | Mod: 26,,, | Performed by: RADIOLOGY

## 2019-06-14 PROCEDURE — 77334 RADIATION TREATMENT AID(S): CPT | Mod: TC | Performed by: RADIOLOGY

## 2019-06-14 PROCEDURE — 77300 PR RADIATION THERAPY,DOSIMETRY PLAN: ICD-10-PCS | Mod: 26,,, | Performed by: RADIOLOGY

## 2019-06-14 PROCEDURE — 77295 3-D RADIOTHERAPY PLAN: CPT | Mod: 26,,, | Performed by: RADIOLOGY

## 2019-06-17 ENCOUNTER — PROCEDURE VISIT (OUTPATIENT)
Dept: SURGERY | Facility: CLINIC | Age: 39
End: 2019-06-17
Payer: MEDICAID

## 2019-06-17 VITALS
HEIGHT: 64 IN | TEMPERATURE: 98 F | HEART RATE: 91 BPM | BODY MASS INDEX: 27.21 KG/M2 | WEIGHT: 159.38 LBS | DIASTOLIC BLOOD PRESSURE: 80 MMHG | SYSTOLIC BLOOD PRESSURE: 121 MMHG

## 2019-06-17 DIAGNOSIS — Z17.0 MALIGNANT NEOPLASM OF NIPPLE OF RIGHT BREAST IN FEMALE, ESTROGEN RECEPTOR POSITIVE: ICD-10-CM

## 2019-06-17 DIAGNOSIS — C50.011 MALIGNANT NEOPLASM OF NIPPLE OF RIGHT BREAST IN FEMALE, ESTROGEN RECEPTOR POSITIVE: ICD-10-CM

## 2019-06-17 DIAGNOSIS — Z95.828 PORT-A-CATH IN PLACE: Primary | ICD-10-CM

## 2019-06-17 PROCEDURE — 36590 REMOVAL TUNNELED CV CATH: CPT | Mod: PBBFAC | Performed by: SURGERY

## 2019-06-17 PROCEDURE — 36590 REMOVAL TUNNELED CV CATH: CPT | Mod: S$PBB,,, | Performed by: SURGERY

## 2019-06-17 PROCEDURE — 36590 PR REMOVAL TUNNELED CV CATH W SUBQ PORT OR PUMP: ICD-10-PCS | Mod: S$PBB,,, | Performed by: SURGERY

## 2019-06-17 NOTE — PROGRESS NOTES
"[facility]  Physical Therapy Initial Evaluation    Name: Elham Rodas  Clinic Number: [id number]    Therapy Diagnosis:   Encounter Diagnoses   Name Primary?   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 female that presents to  [facility] at the [facility] s/p right breast surgery.   Diagnosis: Right breast IMC and ILC, Grade II, ER (+), NE (+), HER2 (-), 1/1 (+) Lymph node  Chief complaint: "I think my right arm looks like it is swollen"   Surgical History:1/2/19 insertion of Port-A Cath; 11/21/18 right breast lumpectomy with SLNB  Elham Rodas  has a past surgical history that includes Thyroidectomy; Tubal ligation; Cholecystectomy; Mastectomy, partial (Right, 11/21/2018); [place] lymph node biopsy (Right, 11/21/2018); Injection for sentinel node identification (Right, 11/21/2018); Axillary node dissection (Right, 11/21/2018); Insertion of tunneled central venous catheter (CVC) with subcutaneous port (Left, 1/2/2019); and Breast lumpectomy (Right).    Chemotherapy: Adjuvant chemotherapy with AC and Taxol from 1/19 to 5/19  Radiation: pending--starts 6/19/19 to right breast and right axilla    Past Medical " "History:   Diagnosis Date    Abnormal Pap smear of cervix 05/2016    ASCUS, - HPV    Breast cancer 10/2018    right    Hypothyroidism     Palpitations     PONV (postoperative nausea and vomiting)     Syncope and collapse     Thyroid disease     Vitamin D deficiency disease           Medications:  Elham has a current medication list which includes the following prescription(s): benzonatate, calcium carbonate, dexamethasone, dexamethasone, ergocalciferol, fluticasone propionate, hydrocodone-acetaminophen, levothyroxine, methocarbamol, ondansetron, oxycodone, oxycodone, pantoprazole, parathyroid hormone, promethazine, promethazine-codeine 6.25-10 mg/5 ml, and trazodone, and the following Facility-Administered Medications: lidocaine (pf) 10 mg/ml (1%).    Allergies:  Review of patient's allergies indicates:  No Known Allergies     Prior Therapy: None  Social History:  lives with their family  Occupation: Works in [employer]  Prior Level of Function: Independent  Current Level of Function: Independent with all ADL's    Other Past Medical History: See above    Patient's Goals: "I want to be sure I don't have lymphedema"    Hand dominance: Right handed    Subjective   Pt states: not having any pain  Pain: 0/10 on VAS currently.   Best: 0/10  Worst: 0/10   Pain location: NA   Objective   Mental status :oriented    Postural examination/scapula alignment: Rounded shoulder and Head forward    Skin Integrity:   Scar Location: right breast and left anterior chest  Appearance: healed, healing  Signs of infection: No  Drainage:None  Color:NA    Edema: None  Location: NA    Axillary Web Syndrome/Cording:   Location: None  Degree of Cording: NA (mild, moderate etc...)   Number of cords present: NA    Sensation: WNL RUE         Range of Motion:      Shoulder Range of Motion:   Active /Passive ROM Right Left   Flexion 170 170   Abduction 180 180   Extension 70 70   IR/90deg 85 85   ER/90deg 90 90          Strength: manual " "muscle test grades below   Upper Extremity Strength   (R) UE (L) UE   Shoulder flexion: 5/5 5/5   Shoulder Abduction: 5/5 5/5   Shoulder IR 5/5 5/5   Shoulder ER 5/5 5/5   Elbow flexion: 5/5 5/5   Elbow extension: 5/5 5/5   Wrist flexion: 5/5 5/5   Wrist extension: 5/5 5/5    5/5 5/5       Baseline Measurements of BL UE's for early detection of Lymphedema:     LANDMARK RIGHT UE LEFT UE DIFFERENCE   E + 8" 34 cm 34 cm 0 cm   E + 6" 30.5 cm 30.5 cm 0 cm   E + 4" 27.5 cm 27.5 cm 0 cm   E + 2" 26 cm 25.5 cm 0.5 cm   Elbow 24 cm 24 cm 0 cm   W+ 8" 25.5 cm 25 cm 0.5 cm   W +  6" 24.5 cm 24 cm 0.5 cm   W + 4" 21.5 cm 21.5 cm 0 cm   Wrist 16 cm 15.5 cm 0.5 cm   DPC 20.5 cm 20.5 cm 0 cm   IP Thumb 7 cm 6.5 cm 0.5 cm       Baseline Measurements of BL UE's for early detection of Lymphedema: 11/5/18     LANDMARK RIGHT UE LEFT UE DIFFERENCE   E + 8" 33 cm 33.5 cm 0.5 cm   E + 6" 32 cm 29.5 cm 2.5 cm   E + 4" 27.5 cm 27 cm 0.5 cm   E + 2" 23.5 cm 25 cm 1.5 cm   Elbow 23.5 cm 23.5 cm 0 cm   W+ 8" 25 cm 24 cm 1 cm   W +  6" 26 cm 23.5 cm 2.5 cm   W + 4" 20.5 cm 20.5 cm 0 cm   Wrist 16 cm 15 cm 1 cm   DPC 22 cm 20.5 cm 1.5 cm   IP Thumb 6.5 cm 6.5 cm 0 cm            Functional Mobility (Bed mobility, transfers)  Independent    ADL's:  Independent with all ADL's    Gait Assessment:   - AD used: none  - Assistance: independent  - Distance: community distances     Patient Education Provided   - role of therapy in multi - disciplinary team, goals for therapy  - Pt was educated in lymphedema etiology and management plans--given pre-op visit 11/5/18   - Pt was provided with written risk reductions and precautions for managing lymphedema--given pre-op visit 11/5/18     Pt has no cultural, educational or language barriers to learning provided.    Treatment and Instruction of Home Exercise Program      Patient seen for initial evaluation only today.      Functional Limitations Reporting      CMS Impairment/Limitation/Restriction for FOTO " Outcomes Shoulder Survey    Therapist reviewed FOTO scores for Elham Rodas on 6/18/2019.   FOTO documents entered into [email or web address] - see Media section.    Limitations Score: 0%  Category: Carrying           Assessment   This is a 38 y.o. female referred to outpatient physical therapy secondary to diagnosis of right breast cancer and was seen today post-surgery and post-adjuvant chemotherapy.  Patient displays AROM and Strength WNL for RUE and does not display swelling/lymphedema of RUE.  No physical therapy required at this time.    Anticipated barriers to physical therapy: None     Pt's spiritual, cultural and educational needs considered and pt agreeable to plan of care and goals as stated below:     Medical necessity is demonstrated by the following IMPAIRMENTS/PROMBLEM LIST:    History  Co-morbidities and personal factors that may impact the plan of care Co-morbidities:   history of cancer    Personal Factors:   no deficits     moderate   Examination  Body Structures and Functions, activity limitations and participation restrictions that may impact the plan of care Body Regions:   None    Body Systems:    None    Participation Restrictions:   No Deficits    Activity limitations:   Learning and applying knowledge  no deficits    General Tasks and Commands  no deficits    Communication  no deficits    Mobility  no deficits    Self care  no deficits    Domestic Life  no deficits    Interactions/Relationships  no deficits    Life Areas  no deficits    Community and Social Life  no deficits         low   Clinical Presentation stable and uncomplicated low   Decision Making/ Complexity Score: low       No Goals as patient does not require physical therapy at this time.      Plan   Outpatient physical therapy is not required at this time  Patient is discharged from physical therapy.    Plan of care Certification Period: 6/18/2019 to 6/18/19.    Therapist: Nohemi Meléndez, PT  6/18/2019

## 2019-06-17 NOTE — PROCEDURES
"Elham Rodas is a 38 y.o. female patient.    Temp: 98.3 °F (36.8 °C) (06/17/19 1345)  Pulse: 91 (06/17/19 1345)  BP: 121/80 (06/17/19 1345)  Weight: 72.3 kg (159 lb 6.3 oz) (06/17/19 1345)  Height: 5' 4" (162.6 cm) (06/17/19 1345)       Procedures     Port removal    Resident: Malena    Indication: completed chemotherapy treated via left subclavian port     Procedure:  Patient was brought back to the minors room. Patient was prepped and draped in sterile fashion. She was anesthestized with intradermal lidocaine, 6 cc. Prior left sided chest incision was opened. Minimal adhesions to port and catheter. Taken down with bovey cautery. Easily removed with figure of 8 3-0 vicryl around the catheter tract. 3 point of fixation clsoure with 3-0 vicryl deep dermal sutures. Running 4-0 subcuticular monocryl to close skin. Dermabond applied. No immediate complications. Dr. Ayala was present for all key portions and immediately available otherwise.     Fatou Wallace  6/17/2019    "

## 2019-06-18 ENCOUNTER — CLINICAL SUPPORT (OUTPATIENT)
Dept: REHABILITATION | Facility: HOSPITAL | Age: 39
End: 2019-06-18
Attending: SURGERY
Payer: MEDICAID

## 2019-06-18 DIAGNOSIS — M25.611 DECREASED SHOULDER MOBILITY, RIGHT: ICD-10-CM

## 2019-06-18 DIAGNOSIS — C50.011 MALIGNANT NEOPLASM OF NIPPLE OF RIGHT BREAST IN FEMALE, ESTROGEN RECEPTOR POSITIVE: ICD-10-CM

## 2019-06-18 DIAGNOSIS — C50.211 CARCINOMA OF UPPER-INNER QUADRANT OF RIGHT BREAST IN FEMALE, ESTROGEN RECEPTOR POSITIVE: ICD-10-CM

## 2019-06-18 DIAGNOSIS — Z85.3 PERSONAL HISTORY OF BREAST CANCER: Primary | ICD-10-CM

## 2019-06-18 DIAGNOSIS — Z91.89 AT RISK FOR LYMPHEDEMA: ICD-10-CM

## 2019-06-18 DIAGNOSIS — I89.0 LYMPHEDEMA OF ARM: ICD-10-CM

## 2019-06-18 DIAGNOSIS — Z17.0 CARCINOMA OF UPPER-INNER QUADRANT OF RIGHT BREAST IN FEMALE, ESTROGEN RECEPTOR POSITIVE: ICD-10-CM

## 2019-06-18 DIAGNOSIS — Z17.0 MALIGNANT NEOPLASM OF NIPPLE OF RIGHT BREAST IN FEMALE, ESTROGEN RECEPTOR POSITIVE: ICD-10-CM

## 2019-06-18 PROCEDURE — 97161 PT EVAL LOW COMPLEX 20 MIN: CPT | Mod: PO | Performed by: PHYSICAL MEDICINE & REHABILITATION

## 2019-06-18 NOTE — PLAN OF CARE
"[facility]  Physical Therapy Initial Evaluation    Name: Elham Rodas  Clinic Number: [id number]    Therapy Diagnosis:   Encounter Diagnoses   Name Primary?   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 female that presents to  [facility] at the [facility] s/p right breast surgery.   Diagnosis: Right breast IMC and ILC, Grade II, ER (+), NJ (+), HER2 (-), 1/1 (+) Lymph node  Chief complaint: "I think my right arm looks like it is swollen"   Surgical History:1/2/19 insertion of Port-A Cath; 11/21/18 right breast lumpectomy with SLNB  Elham Rodas  has a past surgical history that includes Thyroidectomy; Tubal ligation; Cholecystectomy; Mastectomy, partial (Right, 11/21/2018); [place] lymph node biopsy (Right, 11/21/2018); Injection for sentinel node identification (Right, 11/21/2018); Axillary node dissection (Right, 11/21/2018); Insertion of tunneled central venous catheter (CVC) with subcutaneous port (Left, 1/2/2019); and Breast lumpectomy (Right).    Chemotherapy: Adjuvant chemotherapy with AC and Taxol from 1/19 to 5/19  Radiation: pending--starts 6/19/19 to right breast and right axilla    Past Medical " "History:   Diagnosis Date    Abnormal Pap smear of cervix 05/2016    ASCUS, - HPV    Breast cancer 10/2018    right    Hypothyroidism     Palpitations     PONV (postoperative nausea and vomiting)     Syncope and collapse     Thyroid disease     Vitamin D deficiency disease           Medications:  Elham has a current medication list which includes the following prescription(s): benzonatate, calcium carbonate, dexamethasone, dexamethasone, ergocalciferol, fluticasone propionate, hydrocodone-acetaminophen, levothyroxine, methocarbamol, ondansetron, oxycodone, oxycodone, pantoprazole, parathyroid hormone, promethazine, promethazine-codeine 6.25-10 mg/5 ml, and trazodone, and the following Facility-Administered Medications: lidocaine (pf) 10 mg/ml (1%).    Allergies:  Review of patient's allergies indicates:  No Known Allergies     Prior Therapy: None  Social History:  lives with their family  Occupation: Works in [employer]  Prior Level of Function: Independent  Current Level of Function: Independent with all ADL's    Other Past Medical History: See above    Patient's Goals: "I want to be sure I don't have lymphedema"    Hand dominance: Right handed    Subjective   Pt states: not having any pain  Pain: 0/10 on VAS currently.   Best: 0/10  Worst: 0/10   Pain location: NA   Objective   Mental status :oriented    Postural examination/scapula alignment: Rounded shoulder and Head forward    Skin Integrity:   Scar Location: right breast and left anterior chest  Appearance: healed, healing  Signs of infection: No  Drainage:None  Color:NA    Edema: None  Location: NA    Axillary Web Syndrome/Cording:   Location: None  Degree of Cording: NA (mild, moderate etc...)   Number of cords present: NA    Sensation: WNL RUE         Range of Motion:      Shoulder Range of Motion:   Active /Passive ROM Right Left   Flexion 170 170   Abduction 180 180   Extension 70 70   IR/90deg 85 85   ER/90deg 90 90          Strength: manual " "muscle test grades below   Upper Extremity Strength   (R) UE (L) UE   Shoulder flexion: 5/5 5/5   Shoulder Abduction: 5/5 5/5   Shoulder IR 5/5 5/5   Shoulder ER 5/5 5/5   Elbow flexion: 5/5 5/5   Elbow extension: 5/5 5/5   Wrist flexion: 5/5 5/5   Wrist extension: 5/5 5/5    5/5 5/5       Baseline Measurements of BL UE's for early detection of Lymphedema:     LANDMARK RIGHT UE LEFT UE DIFFERENCE   E + 8" 34 cm 34 cm 0 cm   E + 6" 30.5 cm 30.5 cm 0 cm   E + 4" 27.5 cm 27.5 cm 0 cm   E + 2" 26 cm 25.5 cm 0.5 cm   Elbow 24 cm 24 cm 0 cm   W+ 8" 25.5 cm 25 cm 0.5 cm   W +  6" 24.5 cm 24 cm 0.5 cm   W + 4" 21.5 cm 21.5 cm 0 cm   Wrist 16 cm 15.5 cm 0.5 cm   DPC 20.5 cm 20.5 cm 0 cm   IP Thumb 7 cm 6.5 cm 0.5 cm       Baseline Measurements of BL UE's for early detection of Lymphedema: 11/5/18     LANDMARK RIGHT UE LEFT UE DIFFERENCE   E + 8" 33 cm 33.5 cm 0.5 cm   E + 6" 32 cm 29.5 cm 2.5 cm   E + 4" 27.5 cm 27 cm 0.5 cm   E + 2" 23.5 cm 25 cm 1.5 cm   Elbow 23.5 cm 23.5 cm 0 cm   W+ 8" 25 cm 24 cm 1 cm   W +  6" 26 cm 23.5 cm 2.5 cm   W + 4" 20.5 cm 20.5 cm 0 cm   Wrist 16 cm 15 cm 1 cm   DPC 22 cm 20.5 cm 1.5 cm   IP Thumb 6.5 cm 6.5 cm 0 cm            Functional Mobility (Bed mobility, transfers)  Independent    ADL's:  Independent with all ADL's    Gait Assessment:   - AD used: none  - Assistance: independent  - Distance: community distances     Patient Education Provided   - role of therapy in multi - disciplinary team, goals for therapy  - Pt was educated in lymphedema etiology and management plans--given pre-op visit 11/5/18   - Pt was provided with written risk reductions and precautions for managing lymphedema--given pre-op visit 11/5/18     Pt has no cultural, educational or language barriers to learning provided.    Treatment and Instruction of Home Exercise Program      Patient seen for initial evaluation only today.      Functional Limitations Reporting      CMS Impairment/Limitation/Restriction for FOTO " Outcomes Shoulder Survey    Therapist reviewed FOTO scores for Elham Rodas on 6/18/2019.   FOTO documents entered into [employer] - see Media section.    Limitations Score: 0%  Category: Carrying           Assessment   This is a 38 y.o. female referred to outpatient physical therapy secondary to diagnosis of right breast cancer and was seen today post-surgery and post-adjuvant chemotherapy.  Patient displays AROM and Strength WNL for RUE and does not display swelling/lymphedema of RUE.  No physical therapy required at this time.    Anticipated barriers to physical therapy: None     Pt's spiritual, cultural and educational needs considered and pt agreeable to plan of care and goals as stated below:     Medical necessity is demonstrated by the following IMPAIRMENTS/PROMBLEM LIST:    History  Co-morbidities and personal factors that may impact the plan of care Co-morbidities:   history of cancer    Personal Factors:   no deficits     moderate   Examination  Body Structures and Functions, activity limitations and participation restrictions that may impact the plan of care Body Regions:   None    Body Systems:    None    Participation Restrictions:   No Deficits    Activity limitations:   Learning and applying knowledge  no deficits    General Tasks and Commands  no deficits    Communication  no deficits    Mobility  no deficits    Self care  no deficits    Domestic Life  no deficits    Interactions/Relationships  no deficits    Life Areas  no deficits    Community and Social Life  no deficits         low   Clinical Presentation stable and uncomplicated low   Decision Making/ Complexity Score: low       No Goals as patient does not require physical therapy at this time.      Plan   Outpatient physical therapy is not required at this time  Patient is discharged from physical therapy.    Plan of care Certification Period: 6/18/2019 to 6/18/19.    Therapist: Nohemi Meléndez, PT  6/18/2019

## 2019-06-19 PROCEDURE — 77280 THER RAD SIMULAJ FIELD SMPL: CPT | Mod: 26,,, | Performed by: RADIOLOGY

## 2019-06-19 PROCEDURE — 77280 PR  SET RADN THERAPY FIELD SIMPLE: ICD-10-PCS | Mod: 26,,, | Performed by: RADIOLOGY

## 2019-06-19 PROCEDURE — 77280 THER RAD SIMULAJ FIELD SMPL: CPT | Mod: TC | Performed by: RADIOLOGY

## 2019-06-19 NOTE — PROCEDURES
Procedures  Date: 06-17-19     Procedures   PREOPERATIVE DIAGNOSIS:  Hx of right invasive breast cancer s/p IV chemotherapy treatment.     PROCEDURE: Removal of left subclavian vein anterior chest wall, 8-Swiss PowerPort Port-A-Cath.     POSTOPERATIVE DIAGNOSIS: Same       PRIMARY SURGEON: Jann Ayala M.D.     ASSISTANT SURGEON: Fatou Guy M.D.     ANESTHESIA: Local anesthesia.     PROCEDURE IN DETAIL: The patient was seen in the clinic. She underwent    informed consent for right-sided Port-A-Cath removal.  She was brought to the    minor procedure room.  She was placed in a supine position. The left anterior    chest was prepped and draped in a sterile fashion after the area was marked.    Local anesthesia was infiltrated at the prior Port-A-Cath insertion site. Skin    was incised sharply with a 15 blade scalpel. The subcutaneous tissue was    dissected with electrocautery to open the fibrous capsule from around the port    and catheter system. The catheter tract was identified. A figure-of-eight 3-0    Vicryl suture was placed around the catheter tract and this was ligated as the    catheter was pulled while the patient underwent Valsalva maneuver to minimize    risk of back bleeding or air embolism. The port and catheter system were    removed intact. The port and catheter were shown for gross identification. The    deep dermal and subcutaneous layers were reapproximated with interrupted deep    three-point fixation sutures down to the posterior fibrous capsule to obliterate  any potential dead space to minimize risk of post-procedure fluid or seroma    buildup. This was done with three interrupted sutures of this kind. With the    skin reapproximated, the skin itself was closed with a running 4-0 Monocryl    subcuticular skin closure. Sterile topical dressing was applied using dermabond.   Postop wound care and analgesic instructions    were provided. The patient will follow up in clinic prn.   She will monitor for any    signs of infection.  She tolerated the procedure well without complication.  No specimens were sent.     Jann Ayala     06-17-19

## 2019-06-19 NOTE — PROCEDURES
"Elham Rodas is a 38 y.o. female patient.    Temp: 98.3 °F (36.8 °C) (06/17/19 1345)  Pulse: 91 (06/17/19 1345)  BP: 121/80 (06/17/19 1345)  Weight: 72.3 kg (159 lb 6.3 oz) (06/17/19 1345)  Height: 5' 4" (162.6 cm) (06/17/19 1345)       Procedures    Jann Ayala  6/19/2019    "

## 2019-06-19 NOTE — PATIENT INSTRUCTIONS
Keep wound covered for 48 hours.  May shower directly over Tegaderm (plastic).  Remove Tegaderm after 48 hours or sooner if underlying gauze gets wet while washing/showering.  After 48 hours you may shower directly over steri-strips which you can remove after 7-10 days or allow them to fall off on their own. If they fall off sooner, that is ok, and you may shower directly over wound.  May use Tylenol or ibuprofen as directed for pain.  You should have a follow up appointment in 2-3 weeks for wound check and pathology review.  You may need to follow up sooner if you have sutures that require removal.           Please call clinic at [phone] for any questions or signs of infection such as redness, swelling, abnormal drainage,pain, tenderness, or fever.

## 2019-06-20 PROCEDURE — 77412 RADIATION TX DELIVERY LVL 3: CPT | Performed by: RADIOLOGY

## 2019-06-20 PROCEDURE — 77417 THER RADIOLOGY PORT IMAGE(S): CPT | Performed by: RADIOLOGY

## 2019-06-21 PROCEDURE — 77412 RADIATION TX DELIVERY LVL 3: CPT | Performed by: RADIOLOGY

## 2019-06-24 PROCEDURE — 77412 RADIATION TX DELIVERY LVL 3: CPT | Performed by: RADIOLOGY

## 2019-06-25 ENCOUNTER — TELEPHONE (OUTPATIENT)
Dept: HEMATOLOGY/ONCOLOGY | Facility: CLINIC | Age: 39
End: 2019-06-25

## 2019-06-25 PROCEDURE — 77412 RADIATION TX DELIVERY LVL 3: CPT | Performed by: RADIOLOGY

## 2019-06-25 NOTE — TELEPHONE ENCOUNTER
----- Message from Effie Brand sent at 6/25/2019 10:46 AM CDT -----  Contact: Pt  Pt called to speak with nurse quirino  have some questions   Callback#[phone]  Thank You  LESTER Brand

## 2019-06-25 NOTE — TELEPHONE ENCOUNTER
Returned call to patient to discuss her questions and concerns. Patient reports diffused generalized bone pain through out her entire body that has been going on for about a month now.   In addition, it has been about a month since she has started working.   Patient has tried different pain medications, such as tylenol, ibuprofen aleve with no relief.   She also plans to reach out to her endocrinologist to discuss these new pains, as recently her thyroid hormones have been extremely elevated and she is concerned about rapid bone decay per endocrinology. They have been trending her levels and attempting to make medication adjustments. Explained to patient that this is unlikely caused to the chemo, 2 months out.   Discussed with Dr. Mcintosh to assure no imaging or labs on our end would be necessary. Per Dr. Mcintosh he recommends the possibility of a bone scan. Plan to discuss and offer to patient pending her endocrinology response.

## 2019-06-26 ENCOUNTER — DOCUMENTATION ONLY (OUTPATIENT)
Dept: RADIATION ONCOLOGY | Facility: CLINIC | Age: 39
End: 2019-06-26

## 2019-06-26 PROCEDURE — 77417 THER RADIOLOGY PORT IMAGE(S): CPT | Performed by: RADIOLOGY

## 2019-06-26 PROCEDURE — 77412 RADIATION TX DELIVERY LVL 3: CPT | Performed by: RADIOLOGY

## 2019-06-26 NOTE — PLAN OF CARE
Problem: Adult Inpatient Plan of Care  Goal: Plan of Care Review  Outcome: Ongoing (interventions implemented as appropriate)  Pt. On day 4 of outpt. xrt to breast.  Pt. Doing well.  Using cream.  Just started.

## 2019-06-27 PROCEDURE — 77336 RADIATION PHYSICS CONSULT: CPT | Performed by: RADIOLOGY

## 2019-06-27 PROCEDURE — 77412 RADIATION TX DELIVERY LVL 3: CPT | Performed by: RADIOLOGY

## 2019-06-28 PROCEDURE — 77412 RADIATION TX DELIVERY LVL 3: CPT | Performed by: RADIOLOGY

## 2019-07-01 ENCOUNTER — HOSPITAL ENCOUNTER (OUTPATIENT)
Dept: RADIATION THERAPY | Facility: HOSPITAL | Age: 39
Discharge: HOME | End: 2019-07-01
Attending: RADIOLOGY
Payer: MEDICAID

## 2019-07-01 PROCEDURE — 77412 RADIATION TX DELIVERY LVL 3: CPT | Performed by: RADIOLOGY

## 2019-07-02 PROCEDURE — 77412 RADIATION TX DELIVERY LVL 3: CPT | Performed by: RADIOLOGY

## 2019-07-03 ENCOUNTER — DOCUMENTATION ONLY (OUTPATIENT)
Dept: RADIATION ONCOLOGY | Facility: CLINIC | Age: 39
End: 2019-07-03

## 2019-07-03 PROCEDURE — 77412 RADIATION TX DELIVERY LVL 3: CPT | Performed by: RADIOLOGY

## 2019-07-03 PROCEDURE — 77336 RADIATION PHYSICS CONSULT: CPT | Performed by: RADIOLOGY

## 2019-07-03 NOTE — PLAN OF CARE
Problem: Adult Inpatient Plan of Care  Goal: Plan of Care Review  Outcome: Ongoing (interventions implemented as appropriate)  Pt. On day 9 of outpt. xrt to breast.  Pt. Doing well.  No c/o.  Using cream.

## 2019-07-05 PROCEDURE — 77417 THER RADIOLOGY PORT IMAGE(S): CPT | Performed by: RADIOLOGY

## 2019-07-05 PROCEDURE — 77412 RADIATION TX DELIVERY LVL 3: CPT | Performed by: RADIOLOGY

## 2019-07-08 PROCEDURE — 77412 RADIATION TX DELIVERY LVL 3: CPT | Performed by: RADIOLOGY

## 2019-07-09 PROCEDURE — 77412 RADIATION TX DELIVERY LVL 3: CPT | Performed by: RADIOLOGY

## 2019-07-10 ENCOUNTER — DOCUMENTATION ONLY (OUTPATIENT)
Dept: RADIATION ONCOLOGY | Facility: CLINIC | Age: 39
End: 2019-07-10

## 2019-07-10 PROCEDURE — 77412 RADIATION TX DELIVERY LVL 3: CPT | Performed by: RADIOLOGY

## 2019-07-10 NOTE — PLAN OF CARE
Problem: Adult Inpatient Plan of Care  Goal: Plan of Care Review  Outcome: Ongoing (interventions implemented as appropriate)  Pt. On day 13 of outpt. xrt to breast.  Skin intact.  Using miaderm.  Mild erythema.

## 2019-07-11 PROCEDURE — 77412 RADIATION TX DELIVERY LVL 3: CPT | Performed by: RADIOLOGY

## 2019-07-11 PROCEDURE — 77336 RADIATION PHYSICS CONSULT: CPT | Performed by: RADIOLOGY

## 2019-07-12 PROCEDURE — 77417 THER RADIOLOGY PORT IMAGE(S): CPT | Performed by: RADIOLOGY

## 2019-07-12 PROCEDURE — 77412 RADIATION TX DELIVERY LVL 3: CPT | Performed by: RADIOLOGY

## 2019-07-15 ENCOUNTER — TELEPHONE (OUTPATIENT)
Dept: HEMATOLOGY/ONCOLOGY | Facility: CLINIC | Age: 39
End: 2019-07-15

## 2019-07-15 DIAGNOSIS — Z85.3 HISTORY OF BREAST CANCER IN FEMALE: Primary | ICD-10-CM

## 2019-07-15 PROCEDURE — 77412 RADIATION TX DELIVERY LVL 3: CPT | Performed by: RADIOLOGY

## 2019-07-15 NOTE — TELEPHONE ENCOUNTER
----- Message from Clarence Bill sent at 7/15/2019  3:22 PM CDT -----  Contact: Patient  Pt would like to speak with a nurse, has questions regarding appt scheduling.      Contact:: [phone]

## 2019-07-15 NOTE — TELEPHONE ENCOUNTER
Returned call to patient to discuss her concerns.   Patient reports that she is still experiencing the diffuse generalized bone aches. She spoke with her endocrinologist who from her end, only suggested repeated blood work in  September, and patient did not feel comfortable waiting until that long until something was addressed.   Dr. horner was at one point considering ordering a bone scan.   Reviewed with MD, who will place orders. Will reach out to our schedulers to have test coordinated.

## 2019-07-16 ENCOUNTER — TELEPHONE (OUTPATIENT)
Dept: HEMATOLOGY/ONCOLOGY | Facility: CLINIC | Age: 39
End: 2019-07-16

## 2019-07-16 ENCOUNTER — DOCUMENTATION ONLY (OUTPATIENT)
Dept: RADIATION ONCOLOGY | Facility: CLINIC | Age: 39
End: 2019-07-16

## 2019-07-16 PROCEDURE — 77412 RADIATION TX DELIVERY LVL 3: CPT | Performed by: RADIOLOGY

## 2019-07-16 NOTE — PLAN OF CARE
Problem: Adult Inpatient Plan of Care  Goal: Plan of Care Review  Outcome: Ongoing (interventions implemented as appropriate)  Pt. On day 18 of outpt. xrt to breast.  Brisk erythema.  And mild hyperpigmentation.  Given gel packs for intermittent discomfort.

## 2019-07-16 NOTE — TELEPHONE ENCOUNTER
----- Message from Vianey Jones sent at 7/15/2019  4:29 PM CDT -----  nuc med gone for the day, call patient and nuc med in AM     Patient Calls     Elham Colby RN  You; [facility] Just now (4:28 PM)     Can you reach out to patient and help her arrange a bone scan.   Routing comment     Elham Colby RN 3 minutes ago (4:25 PM)          Returned call to patient to discuss her concerns.   Patient reports that she is still experiencing the diffuse generalized bone aches. She spoke with her endocrinologist who from her end, only suggested repeated blood work in  September, and patient did not feel comfortable waiting until that long until something was addressed.   Dr. horner was at one point considering ordering a bone scan.   Reviewed with MD, who will place orders. Will reach out to our schedulers to have test coordinated.          Documentation     PETEY Fleming, Elham Bar 3 minutes ago (4:25 PM)    Elham Colby RN 3 minutes ago (4:25 PM)          ----- Message from Clarence Bill sent at 7/15/2019  3:22 PM CDT -----  Contact: Patient  Pt would like to speak with a nurse, has questions regarding appt scheduling.        Contact:: [phone]

## 2019-07-17 PROCEDURE — 77412 RADIATION TX DELIVERY LVL 3: CPT | Performed by: RADIOLOGY

## 2019-07-18 PROCEDURE — 77336 RADIATION PHYSICS CONSULT: CPT | Performed by: RADIOLOGY

## 2019-07-18 PROCEDURE — 77412 RADIATION TX DELIVERY LVL 3: CPT | Performed by: RADIOLOGY

## 2019-07-19 PROCEDURE — 77412 RADIATION TX DELIVERY LVL 3: CPT | Performed by: RADIOLOGY

## 2019-07-19 PROCEDURE — 77417 THER RADIOLOGY PORT IMAGE(S): CPT | Performed by: RADIOLOGY

## 2019-07-22 ENCOUNTER — HOSPITAL ENCOUNTER (OUTPATIENT)
Dept: RADIOLOGY | Facility: HOSPITAL | Age: 39
Discharge: HOME | End: 2019-07-22
Attending: INTERNAL MEDICINE
Payer: MEDICAID

## 2019-07-22 DIAGNOSIS — Z85.3 HISTORY OF BREAST CANCER IN FEMALE: ICD-10-CM

## 2019-07-22 PROCEDURE — 78306 BONE IMAGING WHOLE BODY: CPT | Mod: 26,,, | Performed by: RADIOLOGY

## 2019-07-22 PROCEDURE — 77412 RADIATION TX DELIVERY LVL 3: CPT | Performed by: RADIOLOGY

## 2019-07-22 PROCEDURE — 78306 NM BONE SCAN WHOLE BODY: ICD-10-PCS | Mod: 26,,, | Performed by: RADIOLOGY

## 2019-07-22 PROCEDURE — A9503 TC99M MEDRONATE: HCPCS

## 2019-07-23 ENCOUNTER — DOCUMENTATION ONLY (OUTPATIENT)
Dept: RADIATION ONCOLOGY | Facility: CLINIC | Age: 39
End: 2019-07-23

## 2019-07-23 ENCOUNTER — TELEPHONE (OUTPATIENT)
Dept: HEMATOLOGY/ONCOLOGY | Facility: CLINIC | Age: 39
End: 2019-07-23

## 2019-07-23 PROCEDURE — 77321 SPECIAL TELETX PORT PLAN: CPT | Mod: TC | Performed by: RADIOLOGY

## 2019-07-23 PROCEDURE — 77321 PR  TELETHER ISO-PORT PLAN: ICD-10-PCS | Mod: 26,,, | Performed by: RADIOLOGY

## 2019-07-23 PROCEDURE — 77334 RADIATION TREATMENT AID(S): CPT | Mod: 26,,, | Performed by: RADIOLOGY

## 2019-07-23 PROCEDURE — 77321 SPECIAL TELETX PORT PLAN: CPT | Mod: 26,,, | Performed by: RADIOLOGY

## 2019-07-23 PROCEDURE — 77334 PR  RADN TREATMENT AID(S) COMPLX: ICD-10-PCS | Mod: 26,,, | Performed by: RADIOLOGY

## 2019-07-23 PROCEDURE — 77334 RADIATION TREATMENT AID(S): CPT | Mod: TC | Performed by: RADIOLOGY

## 2019-07-23 PROCEDURE — 77412 RADIATION TX DELIVERY LVL 3: CPT | Performed by: RADIOLOGY

## 2019-07-23 NOTE — TELEPHONE ENCOUNTER
----- Message from Artemio Restrepo MD sent at 7/23/2019  6:18 AM CDT -----  Please let her know that her bone scan was OK..  Thanks,    CT

## 2019-07-23 NOTE — TELEPHONE ENCOUNTER
Contacted patient to discuss recent bone scan results. After review per Dr. Mcintosh, explained to patient that there is no evidence of bone lesions or metastatic disease. Patient states the pains are still present though slightly improved. Encouraged patient to continue follow up with her endocrinologist and PCP for any additional work up. She expressed understanding.   Patient scheduled for follow up in early august s/p XRT completion. appt slip mailed out.   She voiced understanding.

## 2019-07-24 PROCEDURE — 77412 RADIATION TX DELIVERY LVL 3: CPT | Performed by: RADIOLOGY

## 2019-07-25 PROCEDURE — 77336 RADIATION PHYSICS CONSULT: CPT | Performed by: RADIOLOGY

## 2019-07-25 PROCEDURE — 77412 RADIATION TX DELIVERY LVL 3: CPT | Performed by: RADIOLOGY

## 2019-07-26 PROCEDURE — 77412 RADIATION TX DELIVERY LVL 3: CPT | Performed by: RADIOLOGY

## 2019-07-29 PROCEDURE — 77412 RADIATION TX DELIVERY LVL 3: CPT | Performed by: RADIOLOGY

## 2019-07-30 PROCEDURE — 77412 RADIATION TX DELIVERY LVL 3: CPT | Performed by: RADIOLOGY

## 2019-07-31 PROCEDURE — 77412 RADIATION TX DELIVERY LVL 3: CPT | Performed by: RADIOLOGY

## 2019-08-01 ENCOUNTER — HOSPITAL ENCOUNTER (OUTPATIENT)
Dept: RADIATION THERAPY | Facility: HOSPITAL | Age: 39
Discharge: HOME | End: 2019-08-01
Attending: RADIOLOGY
Payer: MEDICAID

## 2019-08-01 PROCEDURE — 77412 RADIATION TX DELIVERY LVL 3: CPT | Performed by: RADIOLOGY

## 2019-08-01 PROCEDURE — 77336 RADIATION PHYSICS CONSULT: CPT | Performed by: RADIOLOGY

## 2019-08-02 ENCOUNTER — DOCUMENTATION ONLY (OUTPATIENT)
Dept: RADIATION ONCOLOGY | Facility: CLINIC | Age: 39
End: 2019-08-02

## 2019-08-02 PROCEDURE — 77412 RADIATION TX DELIVERY LVL 3: CPT | Performed by: RADIOLOGY

## 2019-08-02 NOTE — PLAN OF CARE
Problem: Adult Inpatient Plan of Care  Goal: Plan of Care Review  Outcome: Ongoing (interventions implemented as appropriate)  Pt. On day 30 of outpt. xrt to breast.  Pt. With brisk erythema. No desquamation.  Using cream.  On boost.

## 2019-08-05 PROCEDURE — 77412 RADIATION TX DELIVERY LVL 3: CPT | Performed by: RADIOLOGY

## 2019-08-06 ENCOUNTER — DOCUMENTATION ONLY (OUTPATIENT)
Dept: RADIATION ONCOLOGY | Facility: CLINIC | Age: 39
End: 2019-08-06

## 2019-08-06 PROCEDURE — 77412 RADIATION TX DELIVERY LVL 3: CPT | Performed by: RADIOLOGY

## 2019-08-06 PROCEDURE — 77336 RADIATION PHYSICS CONSULT: CPT | Performed by: RADIOLOGY

## 2019-08-07 ENCOUNTER — OFFICE VISIT (OUTPATIENT)
Dept: HEMATOLOGY/ONCOLOGY | Facility: CLINIC | Age: 39
End: 2019-08-07
Payer: MEDICAID

## 2019-08-07 VITALS
RESPIRATION RATE: 16 BRPM | BODY MASS INDEX: 28.04 KG/M2 | SYSTOLIC BLOOD PRESSURE: 121 MMHG | DIASTOLIC BLOOD PRESSURE: 84 MMHG | HEIGHT: 64 IN | TEMPERATURE: 99 F | OXYGEN SATURATION: 97 % | WEIGHT: 164.25 LBS | HEART RATE: 88 BPM

## 2019-08-07 DIAGNOSIS — C50.611 CARCINOMA OF AXILLARY TAIL OF RIGHT BREAST IN FEMALE, ESTROGEN RECEPTOR POSITIVE: Primary | ICD-10-CM

## 2019-08-07 DIAGNOSIS — Z17.0 CARCINOMA OF AXILLARY TAIL OF RIGHT BREAST IN FEMALE, ESTROGEN RECEPTOR POSITIVE: Primary | ICD-10-CM

## 2019-08-07 PROCEDURE — 99999 PR PBB SHADOW E&M-EST. PATIENT-LVL IV: ICD-10-PCS | Mod: PBBFAC,,, | Performed by: INTERNAL MEDICINE

## 2019-08-07 PROCEDURE — 99214 OFFICE O/P EST MOD 30 MIN: CPT | Mod: PBBFAC | Performed by: INTERNAL MEDICINE

## 2019-08-07 PROCEDURE — 99214 PR OFFICE/OUTPT VISIT, EST, LEVL IV, 30-39 MIN: ICD-10-PCS | Mod: S$PBB,,, | Performed by: INTERNAL MEDICINE

## 2019-08-07 PROCEDURE — 99214 OFFICE O/P EST MOD 30 MIN: CPT | Mod: S$PBB,,, | Performed by: INTERNAL MEDICINE

## 2019-08-07 PROCEDURE — 99999 PR PBB SHADOW E&M-EST. PATIENT-LVL IV: CPT | Mod: PBBFAC,,, | Performed by: INTERNAL MEDICINE

## 2019-08-07 NOTE — PROGRESS NOTES
Subjective:       Patient ID: Elham Rodas is a 38 y.o. female.    Chief Complaint: No chief complaint on file.    HPI   Mrs. Rodas returns today for follow up.  She was due for her fourth cycle of dose dense adjuvant taxol today.  She has been premedicated with dexamethasone.    Briefly, she is a 38-year-old premenopausal female who was recently diagnosed with an invasive mammary carcinoma that was ER positive, WI positive, and HER-2 negative.  On 11/21/2018, she underwent a right lumpectomy with sentinel lymph node biopsy.  The pathology report from the procedure indicates that she had a positive sentinel lymph node with a metastatic deposit of 6 mm.  There was no extranodal extension.  On the right partial mastectomy specimen, she had an invasive lobular carcinoma that was 14 mm in maximum diameter.  It was grade II and resection margins were clear.    She is being treated in the adjuvant setting.    Review of Systems      Overall she feels OK.  She just completed her XRT and her right breast is sire. She has sustained 2nd degree burns.   ECOG PS is 1.  She denies any depression, easy bruising, fevers, chills, night  sweats, weight loss, nausea, vomiting, diarrhea, constipation, diplopia, blurred vision, headache, chest pain, palpitations, shortness of breath, left breast pain, abdominal pain, extremity pain, or difficulty ambulating.  The remainder of the ten-point ROS, including general, skin, lymph, H/N, cardiorespiratory, GI, , Neuro, Endocrine, and psychiatric is negative.     Objective:      Physical Exam        She is alert, oriented to time, place, person, pleasant, well      nourished, in no acute physical distress.                               VITAL SIGNS:  Reviewed                                      HEENT:  Alopecia is no longer noted.  There are no nasal, oral, lip, gingival, auricular, lid,    or conjunctival lesions.  Mucosae are moist and pink, and there is no        thrush.  Pupils are  equal, reactive to light and accommodation.              Extraocular muscle movements are intact.  Dentition is good.  There is no frontal or maxillary tenderness.                                     NECK:  Supple without JVD, adenopathy, or thyromegaly.                       LUNGS:  Clear to auscultation without wheezing, rales, or ronchi.           CARDIOVASCULAR:  Reveals an S1, S2, no murmurs, no rubs, no gallops.         ABDOMEN:  Soft, nontender, without organomegaly.  Bowel sounds are    present.                                                                     EXTREMITIES:  No cyanosis, clubbing, or edema.                               BREASTS:  She is status post right lumpectomy with a well-healed  Incision in the lower outer quadrant of her right breast.    There are no masses in either breast.   The right nipple is retracted.  There is erythema within the radiation field, and she has a healing second degree burn in the right axilla.  A sentinel node biopsy scar is seen in the right axilla.  It is also well  Healed.                                  LYMPHATIC:  There is no cervical, axillary, or supraclavicular adenopathy.   SKIN:  Warm and moist, without petechiae, rashes, induration, or ecchymoses.           NEUROLOGIC:  DTRs are 0-1+ bilaterally, symmetrical, motor function is 5/5,  and cranial nerves are  within normal limits.    Assessment:       1. Carcinoma of axillary tail of right breast in female, estrogen receptor positive     2.    Radiation induced burns  Plan:         I had a long discussion with her.  I recommended that she begin ovarian suppression with zoladex and she be started on AIs in 3-4 weeks.  The pros and cons of such an approach were discussed with her.  We also discussed the option of bilateral oophorectomies.  She will receive her first zoladex injection in a week, and see me in 3 weeks to begin AIs.  Her multiple questions were answered to her satisfaction.

## 2019-08-16 ENCOUNTER — TELEPHONE (OUTPATIENT)
Dept: RADIATION ONCOLOGY | Facility: CLINIC | Age: 39
End: 2019-08-16

## 2019-08-19 ENCOUNTER — TELEPHONE (OUTPATIENT)
Dept: HEMATOLOGY/ONCOLOGY | Facility: CLINIC | Age: 39
End: 2019-08-19

## 2019-08-19 NOTE — TELEPHONE ENCOUNTER
Called and spoke with patient and assisted with scheduling her injection. Apt made for Wednesday per her request

## 2019-08-19 NOTE — TELEPHONE ENCOUNTER
----- Message from Elham Colby, RN sent at 8/19/2019  1:32 PM CDT -----  Yes. Please  thanks  ----- Message -----  From: Vianey Jones  Sent: 8/19/2019  11:41 AM  To: Lauro Ulloa Staff    Injection is approved, should get asap ?    Message Contents   MD Vianey Kam     Zoladex in a week; please obtain authorization.   See me on August 30th

## 2019-08-21 ENCOUNTER — INFUSION (OUTPATIENT)
Dept: INFUSION THERAPY | Facility: HOSPITAL | Age: 39
End: 2019-08-21
Attending: INTERNAL MEDICINE
Payer: MEDICAID

## 2019-08-21 DIAGNOSIS — Z17.0 CARCINOMA OF UPPER-INNER QUADRANT OF RIGHT BREAST IN FEMALE, ESTROGEN RECEPTOR POSITIVE: Primary | ICD-10-CM

## 2019-08-21 DIAGNOSIS — C50.211 CARCINOMA OF UPPER-INNER QUADRANT OF RIGHT BREAST IN FEMALE, ESTROGEN RECEPTOR POSITIVE: Primary | ICD-10-CM

## 2019-08-21 PROCEDURE — 96402 CHEMO HORMON ANTINEOPL SQ/IM: CPT

## 2019-08-21 PROCEDURE — 63600175 PHARM REV CODE 636 W HCPCS: Mod: JG | Performed by: INTERNAL MEDICINE

## 2019-08-21 RX ADMIN — GOSERELIN ACETATE 3.6 MG: 3.6 IMPLANT SUBCUTANEOUS at 09:08

## 2019-08-21 NOTE — NURSING
Patient here to begin treatment with Zoladex-teaching done on use and side effects of zoladex-tolerated well.

## 2019-08-27 ENCOUNTER — OFFICE VISIT (OUTPATIENT)
Dept: HEMATOLOGY/ONCOLOGY | Facility: CLINIC | Age: 39
End: 2019-08-27
Payer: MEDICAID

## 2019-08-27 VITALS
WEIGHT: 164 LBS | OXYGEN SATURATION: 95 % | RESPIRATION RATE: 16 BRPM | HEART RATE: 94 BPM | HEIGHT: 64 IN | SYSTOLIC BLOOD PRESSURE: 122 MMHG | TEMPERATURE: 98 F | BODY MASS INDEX: 28 KG/M2 | DIASTOLIC BLOOD PRESSURE: 77 MMHG

## 2019-08-27 DIAGNOSIS — Z17.0 CARCINOMA OF AXILLARY TAIL OF RIGHT BREAST IN FEMALE, ESTROGEN RECEPTOR POSITIVE: Primary | ICD-10-CM

## 2019-08-27 DIAGNOSIS — C50.611 CARCINOMA OF AXILLARY TAIL OF RIGHT BREAST IN FEMALE, ESTROGEN RECEPTOR POSITIVE: Primary | ICD-10-CM

## 2019-08-27 PROBLEM — Z51.11 ENCOUNTER FOR ANTINEOPLASTIC CHEMOTHERAPY: Status: RESOLVED | Noted: 2019-01-11 | Resolved: 2019-08-27

## 2019-08-27 PROCEDURE — 99999 PR PBB SHADOW E&M-EST. PATIENT-LVL IV: ICD-10-PCS | Mod: PBBFAC,,, | Performed by: INTERNAL MEDICINE

## 2019-08-27 PROCEDURE — 99214 OFFICE O/P EST MOD 30 MIN: CPT | Mod: S$PBB,,, | Performed by: INTERNAL MEDICINE

## 2019-08-27 PROCEDURE — 99214 PR OFFICE/OUTPT VISIT, EST, LEVL IV, 30-39 MIN: ICD-10-PCS | Mod: S$PBB,,, | Performed by: INTERNAL MEDICINE

## 2019-08-27 PROCEDURE — 99214 OFFICE O/P EST MOD 30 MIN: CPT | Mod: PBBFAC | Performed by: INTERNAL MEDICINE

## 2019-08-27 PROCEDURE — 99999 PR PBB SHADOW E&M-EST. PATIENT-LVL IV: CPT | Mod: PBBFAC,,, | Performed by: INTERNAL MEDICINE

## 2019-08-27 NOTE — PROGRESS NOTES
Subjective:       Patient ID: Elham Rodas is a 38 y.o. female.    Chief Complaint: Carcinoma of axillary tail of right breast in female, estrog    HPI   Mrs. Rodas returns today for follow up.  She has completed four cycles of dose dense adjuvant taxol and she is ehre to discuss adjuvant hormonal treatment options.  She finallyr eceived her first injection of zoladex on 8/21/2019.  She si due for the second one on 9/18/2019.    Briefly, she is a 38-year-old premenopausal female who was recently diagnosed with an invasive mammary carcinoma that was ER positive, MI positive, and HER-2 negative.  On 11/21/2018, she underwent a right lumpectomy with sentinel lymph node biopsy.  The pathology report from the procedure indicates that she had a positive sentinel lymph node with a metastatic deposit of 6 mm.  There was no extranodal extension.  On the right partial mastectomy specimen, she had an invasive lobular carcinoma that was 14 mm in maximum diameter.  It was grade II and resection margins were clear.    She is being treated in the adjuvant setting.    Review of Systems      Overall she feels OK.  She has completed her XRT , and the 2nd degree burns she had sustained have all but resolved.   ECOG PS is 1.  She denies any depression, easy bruising, fevers, chills, night  sweats, weight loss, nausea, vomiting, diarrhea, constipation, diplopia, blurred vision, headache, chest pain, palpitations, shortness of breath, left breast pain, abdominal pain, extremity pain, or difficulty ambulating.  The remainder of the ten-point ROS, including general, skin, lymph, H/N, cardiorespiratory, GI, , Neuro, Endocrine, and psychiatric is negative.     Objective:      Physical Exam        She is alert, oriented to time, place, person, pleasant, well      nourished, in no acute physical distress.                               VITAL SIGNS:  Reviewed                                      HEENT:  Alopecia is no longer noted.  There  are no nasal, oral, lip, gingival, auricular, lid,    or conjunctival lesions.  Mucosae are moist and pink, and there is no        thrush.  Pupils are equal, reactive to light and accommodation.              Extraocular muscle movements are intact.  Dentition is good.  There is no frontal or maxillary tenderness.                                     NECK:  Supple without JVD, adenopathy, or thyromegaly.                       LUNGS:  Clear to auscultation without wheezing, rales, or ronchi.           CARDIOVASCULAR:  Reveals an S1, S2, no murmurs, no rubs, no gallops.         ABDOMEN:  Soft, nontender, without organomegaly.  Bowel sounds are    present.                                                                     EXTREMITIES:  No cyanosis, clubbing, or edema.                               BREASTS:  She is status post right lumpectomy with a well-healed incision in the lower outer quadrant of her right breast.    There are no masses in either breast.   The right nipple is retracted.    A sentinel node biopsy scar is seen in the right axilla.  It is also well healed.                                  LYMPHATIC:  There is no cervical, axillary, or supraclavicular adenopathy.   SKIN:  Warm and moist, without petechiae, rashes, induration, or ecchymoses.           NEUROLOGIC:  DTRs are 0-1+ bilaterally, symmetrical, motor function is 5/5,  and cranial nerves are  within normal limits.    Assessment:       1. Carcinoma of axillary tail of right breast in female, estrogen receptor positive     2.     Second degree burns from XRT, resolved.  Plan:         I had a long discussion with her.  She will receive her second zoladex injection on September 18th.  I will see her on that day and initiate AIs.  We had a long discussion about the option of bilateral oophorectomies; she will discuss with her GYN physician.  Her multiple questions were answered to her satisfaction.

## 2019-09-18 ENCOUNTER — OFFICE VISIT (OUTPATIENT)
Dept: HEMATOLOGY/ONCOLOGY | Facility: CLINIC | Age: 39
End: 2019-09-18
Payer: MEDICAID

## 2019-09-18 ENCOUNTER — INFUSION (OUTPATIENT)
Dept: INFUSION THERAPY | Facility: HOSPITAL | Age: 39
End: 2019-09-18
Attending: INTERNAL MEDICINE
Payer: MEDICAID

## 2019-09-18 VITALS
BODY MASS INDEX: 28.04 KG/M2 | DIASTOLIC BLOOD PRESSURE: 87 MMHG | OXYGEN SATURATION: 98 % | SYSTOLIC BLOOD PRESSURE: 127 MMHG | RESPIRATION RATE: 20 BRPM | WEIGHT: 163.38 LBS | TEMPERATURE: 98 F | HEART RATE: 77 BPM

## 2019-09-18 DIAGNOSIS — C50.611 CARCINOMA OF AXILLARY TAIL OF RIGHT BREAST IN FEMALE, ESTROGEN RECEPTOR POSITIVE: Primary | ICD-10-CM

## 2019-09-18 DIAGNOSIS — Z17.0 CARCINOMA OF AXILLARY TAIL OF RIGHT BREAST IN FEMALE, ESTROGEN RECEPTOR POSITIVE: Primary | ICD-10-CM

## 2019-09-18 DIAGNOSIS — C50.211 CARCINOMA OF UPPER-INNER QUADRANT OF RIGHT BREAST IN FEMALE, ESTROGEN RECEPTOR POSITIVE: Primary | ICD-10-CM

## 2019-09-18 DIAGNOSIS — Z17.0 CARCINOMA OF UPPER-INNER QUADRANT OF RIGHT BREAST IN FEMALE, ESTROGEN RECEPTOR POSITIVE: Primary | ICD-10-CM

## 2019-09-18 PROCEDURE — 99999 PR PBB SHADOW E&M-EST. PATIENT-LVL III: ICD-10-PCS | Mod: PBBFAC,,, | Performed by: INTERNAL MEDICINE

## 2019-09-18 PROCEDURE — 63600175 PHARM REV CODE 636 W HCPCS: Mod: JG | Performed by: INTERNAL MEDICINE

## 2019-09-18 PROCEDURE — 99213 OFFICE O/P EST LOW 20 MIN: CPT | Mod: PBBFAC,25 | Performed by: INTERNAL MEDICINE

## 2019-09-18 PROCEDURE — 96402 CHEMO HORMON ANTINEOPL SQ/IM: CPT

## 2019-09-18 PROCEDURE — 99999 PR PBB SHADOW E&M-EST. PATIENT-LVL III: CPT | Mod: PBBFAC,,, | Performed by: INTERNAL MEDICINE

## 2019-09-18 PROCEDURE — 99214 PR OFFICE/OUTPT VISIT, EST, LEVL IV, 30-39 MIN: ICD-10-PCS | Mod: S$PBB,,, | Performed by: INTERNAL MEDICINE

## 2019-09-18 PROCEDURE — 99214 OFFICE O/P EST MOD 30 MIN: CPT | Mod: S$PBB,,, | Performed by: INTERNAL MEDICINE

## 2019-09-18 RX ORDER — NITROFURANTOIN 25; 75 MG/1; MG/1
CAPSULE ORAL
Refills: 0 | COMMUNITY
Start: 2019-09-12 | End: 2019-10-28 | Stop reason: ALTCHOICE

## 2019-09-18 RX ORDER — ANASTROZOLE 1 MG/1
1 TABLET ORAL DAILY
Qty: 90 TABLET | Refills: 1 | Status: SHIPPED | OUTPATIENT
Start: 2019-09-18 | End: 2020-04-27 | Stop reason: SDUPTHER

## 2019-09-18 RX ORDER — NITROFURANTOIN 25; 75 MG/1; MG/1
CAPSULE ORAL
COMMUNITY
End: 2019-10-28 | Stop reason: ALTCHOICE

## 2019-09-18 RX ORDER — AMOXICILLIN 500 MG/1
CAPSULE ORAL
Refills: 0 | COMMUNITY
Start: 2019-09-09 | End: 2019-10-28 | Stop reason: ALTCHOICE

## 2019-09-18 RX ADMIN — GOSERELIN ACETATE 3.6 MG: 3.6 IMPLANT SUBCUTANEOUS at 02:09

## 2019-09-18 NOTE — PROGRESS NOTES
Subjective:       Patient ID: Elham Rodas is a 38 y.o. female.    Chief Complaint: Carcinoma of axillary tail of right breast in female, estrog    HPI   Mrs. Rodas returns today for follow up.  She has completed four cycles of dose dense adjuvant taxol and she is here to receive her second zoladex.  The first injection of zoladex on 8/21/2019.      Briefly, she is a 38-year-old premenopausal female who was diagnosed last year with an invasive mammary carcinoma that was ER positive, WA positive, and HER-2 negative.  On 11/21/2018, she underwent a right lumpectomy with sentinel lymph node biopsy.  The pathology report from the procedure indicates that she had a positive sentinel lymph node with a metastatic deposit of 6 mm.  There was no extranodal extension.  On the right partial mastectomy specimen, she had an invasive lobular carcinoma that was 14 mm in maximum diameter.  It was grade II and resection margins were clear.    She is being treated in the adjuvant setting.    Review of Systems      Overall she feels OK    ECOG PS is 1.  She denies any depression, easy bruising, fevers, chills, night  sweats, weight loss, nausea, vomiting, diarrhea, constipation, diplopia, blurred vision, headache, chest pain, palpitations, shortness of breath, left breast pain, abdominal pain, extremity pain, or difficulty ambulating.  The remainder of the ten-point ROS, including general, skin, lymph, H/N, cardiorespiratory, GI, , Neuro, Endocrine, and psychiatric is negative.     Objective:      Physical Exam        She is alert, oriented to time, place, person, pleasant, well      nourished, in no acute physical distress.                               VITAL SIGNS:  Reviewed                                      HEENT:  Alopecia is no longer noted.  There are no nasal, oral, lip, gingival, auricular, lid,    or conjunctival lesions.  Mucosae are moist and pink, and there is no        thrush.  Pupils are equal, reactive to light  and accommodation.              Extraocular muscle movements are intact.  Dentition is good.  There is no frontal or maxillary tenderness.                                     NECK:  Supple without JVD, adenopathy, or thyromegaly.                       LUNGS:  Clear to auscultation without wheezing, rales, or ronchi.           CARDIOVASCULAR:  Reveals an S1, S2, no murmurs, no rubs, no gallops.         ABDOMEN:  Soft, nontender, without organomegaly.  Bowel sounds are    present.                                                                     EXTREMITIES:  No cyanosis, clubbing, or edema.                               BREASTS:  She is status post right lumpectomy with a well-healed incision in the lower outer quadrant of her right breast.    There are no masses in either breast.   The right nipple is retracted.    A sentinel node biopsy scar is seen in the right axilla.  It is also well healed.                                  LYMPHATIC:  There is no cervical, axillary, or supraclavicular adenopathy.   SKIN:  Warm and moist, without petechiae, rashes, induration, or ecchymoses.           NEUROLOGIC:  DTRs are 0-1+ bilaterally, symmetrical, motor function is 5/5,  and cranial nerves are  within normal limits.    Assessment:       1. Carcinoma of axillary tail of right breast in female, estrogen receptor positive       Plan:         I had a long discussion with her.  She will receive her second zoladex injection today and initiate AIs.  We had a long discussion about the option of bilateral oophorectomies; she has not decided yet.  I will see her in 4 weeks with a DXA scan.  We will repeat her mammogram in November.  Her multiple questions were answered to her satisfaction.

## 2019-10-01 ENCOUNTER — OFFICE VISIT (OUTPATIENT)
Dept: RADIATION ONCOLOGY | Facility: CLINIC | Age: 39
End: 2019-10-01
Payer: MEDICAID

## 2019-10-01 VITALS
BODY MASS INDEX: 27.93 KG/M2 | SYSTOLIC BLOOD PRESSURE: 130 MMHG | WEIGHT: 163.63 LBS | HEIGHT: 64 IN | RESPIRATION RATE: 16 BRPM | TEMPERATURE: 98 F | DIASTOLIC BLOOD PRESSURE: 79 MMHG | HEART RATE: 69 BPM

## 2019-10-01 DIAGNOSIS — C50.611 CARCINOMA OF AXILLARY TAIL OF RIGHT BREAST IN FEMALE, ESTROGEN RECEPTOR POSITIVE: Primary | ICD-10-CM

## 2019-10-01 DIAGNOSIS — C50.411 MALIGNANT NEOPLASM OF UPPER-OUTER QUADRANT OF RIGHT BREAST IN FEMALE, ESTROGEN RECEPTOR POSITIVE: ICD-10-CM

## 2019-10-01 DIAGNOSIS — Z17.0 CARCINOMA OF AXILLARY TAIL OF RIGHT BREAST IN FEMALE, ESTROGEN RECEPTOR POSITIVE: Primary | ICD-10-CM

## 2019-10-01 DIAGNOSIS — Z17.0 MALIGNANT NEOPLASM OF UPPER-OUTER QUADRANT OF RIGHT BREAST IN FEMALE, ESTROGEN RECEPTOR POSITIVE: ICD-10-CM

## 2019-10-01 PROCEDURE — 99213 OFFICE O/P EST LOW 20 MIN: CPT | Mod: PBBFAC | Performed by: RADIOLOGY

## 2019-10-01 PROCEDURE — 99999 PR PBB SHADOW E&M-EST. PATIENT-LVL III: ICD-10-PCS | Mod: PBBFAC,,, | Performed by: RADIOLOGY

## 2019-10-01 PROCEDURE — 99999 PR PBB SHADOW E&M-EST. PATIENT-LVL III: CPT | Mod: PBBFAC,,, | Performed by: RADIOLOGY

## 2019-10-01 PROCEDURE — 99213 PR OFFICE/OUTPT VISIT, EST, LEVL III, 20-29 MIN: ICD-10-PCS | Mod: S$PBB,,, | Performed by: RADIOLOGY

## 2019-10-01 PROCEDURE — 99213 OFFICE O/P EST LOW 20 MIN: CPT | Mod: S$PBB,,, | Performed by: RADIOLOGY

## 2019-10-01 NOTE — PATIENT INSTRUCTIONS
Repeat mammogram in November 2019 as planned  Return to see me in 6 months.  Continue followup with Dr. Restrepo and Dr. Beaulieu

## 2019-10-01 NOTE — PROGRESS NOTES
REFERRING PHYSICIAN: Jann Ayala M.D., Artemio Restrepo MD.    DIAGNOSIS: pT1c N1a M0 invasive lobular carcinoma of the right breast     Radiation Treatment Summary:  Ms. Rodas completed radiation to the right breast and draining lymph nodes including the right axillary, right internal mammary, and right supraclavicular region as shown below.   Course: 1  Treatment Site Energy Dose/Fx (Gy) #Fx Total Dose (Gy) Start Date End Date Elapsed Days   BOOST 12E/16E 2 5 / 5 10 7/31/2019 8/6/2019 6   RT REAST/ IM 18X/6X 1.8 28 / 28 50.4 6/20/2019 7/30/2019 40   RT Supraclav 18X/6X 1.8 28 / 28 50.4 6/20/2019 7/30/2019 40     INTERVAL HISTORY:   Ms. Rodas is a 39 year old female who was diagnosed with right breast cancer after she presented with a palpable mass in the 9 o'clock position of the right breast with associated skin/nipple retraction. A mammogram and ultrasound in October 2018 revealed an architectural distortion in the right breast at 9:00 oclock in the posterior depth correlating to the palpable mass. This measured 13 x 9.6 x 8.7 mm on ultrasound. A core needle biopsy of this lesion on October 10, 2018 revealed grade 1, invasive mammary carcinoma, which was ER positive (greater than 90%), NJ positive (80%), and her 2 negative, with Ki-67 index of less than 10%. On November 21, 2018, she underwent lumpectomy and sentinel node biopsy. The pathology revealed the right breast with 14 mm, invasive lobular carcinoma grade 2, with the closest margin 1.5 mm posteriorly, with additional posterior margin taken, with a final margin of greater than 10 mm posteriorly. 1/1 sentinel lymph node is noted to have 6 mm focus of metastatic deposit without extranodal extension. Her Mammoprint analysis revealed low risk of recurrence. However, after discussion with Dr. Restrepo, patient elected to undergo adjuvant chemotherapy and completed 4 cycles of AC followed by 4 cycles of Taxol which ended on May 6, 2019. To reduce her  "chance of locoregional recurrence, she received postoperative radiation to the right breast and draining lymph nodes including right internal mammary, right supraclavicular, and right axillary region as above.  She is here today for a routine follow-up.    She has started Arimidex and is tolerating that without any unexpected side effects.  She has occasional shooting pains in the right breast which is resolved without intervention.  She is seeing gynecology next week to discuss possible prophylactic oophorectomy.  At present, she denies right breast pain, edema, erythema, or nipple discharge. She also denies fever, night sweats, or weight loss. She is planned to repeat bilateral mammogram in November 2019.    PHYSICAL EXAMINATION:  VITAL SINGS: /79   Pulse 69   Temp 98 °F (36.7 °C) (Oral)   Resp 16   Ht 5' 4" (1.626 m)   Wt 74.2 kg (163 lb 9.6 oz)   BMI 28.08 kg/m²   GENERAL: Patient is alert and oriented, in no acute distress.  HEENT: Extraocular muscles are intact.  Oropharynx is clear without lesions.  There is no cervical or supraclavicular adenopathy palpated.    CHEST: Breath sounds clear bilaterally.  No rales.  No rhonchi.  Unlabored respirations.  CARDIOVASCULAR: Normal S1, S2.  Normal rate.  Regular rhythm.  BREAST EXAM:  There is mild hyperpigmentation of the skin of the right breast noted.  The scar secondary to lumpectomy is noted in the lateral aspect of the right breast. The right nipple is inverted and pulled laterally secondary to surgery.There is also a scar in the right axilla secondary to sentinel node biopsy. No abnormal masses palpated in the right breast or right axilla. The left breast and left axilla are also without palpable masses.  ABDOMEN: Bowel sounds normal.  No tenderness.  No abdominal distention.  No hepatomegaly.  No splenomegaly.  EXTREMITIES: No clubbing, cyanosis, edema  NEUROLOGIC: Cranial nerves II through XII are grossly intact.  Sensation is intact.  Strength is " 5 out of 5 in the upper and lower extremities bilaterally.    ASSESSMENT:   This is a 39-year-old female with p T1c N1a M0, grade 2, invasive lobular carcinoma of the right breast who underwent lumpectomy and sentinel node biopsy on November 21, 2018 followed by adjuvant chemotherapy and postoperative radiation, with 1.4 cm lesion, with 1/1 sentinel lymph node positive with 6 mm focus of metastatic deposit, ER/NH positive, her 2 negative, Ki-67 less than 10%.    PLAN:   Ms. Rodas is recovering from the radiation without any unexpected side effects.  She will continue Arimidex as planned.  She will repeat mammogram in November 2019.  I plan to see her back in approximately 6 months, unless symptoms warrant otherwise.  A summary of her oncologic treatment was given to the patient.

## 2019-10-03 PROBLEM — R73.02 IGT (IMPAIRED GLUCOSE TOLERANCE): Status: ACTIVE | Noted: 2019-10-03

## 2019-10-15 ENCOUNTER — TELEPHONE (OUTPATIENT)
Dept: OBSTETRICS AND GYNECOLOGY | Facility: CLINIC | Age: 39
End: 2019-10-15

## 2019-10-15 ENCOUNTER — OFFICE VISIT (OUTPATIENT)
Dept: OBSTETRICS AND GYNECOLOGY | Facility: CLINIC | Age: 39
End: 2019-10-15
Payer: MEDICAID

## 2019-10-15 VITALS
HEART RATE: 72 BPM | DIASTOLIC BLOOD PRESSURE: 68 MMHG | WEIGHT: 162 LBS | RESPIRATION RATE: 13 BRPM | HEIGHT: 64 IN | SYSTOLIC BLOOD PRESSURE: 122 MMHG | BODY MASS INDEX: 27.66 KG/M2

## 2019-10-15 DIAGNOSIS — Z85.3 PERSONAL HISTORY OF BREAST CANCER: Primary | ICD-10-CM

## 2019-10-15 PROCEDURE — 99214 OFFICE O/P EST MOD 30 MIN: CPT | Mod: PBBFAC | Performed by: OBSTETRICS & GYNECOLOGY

## 2019-10-15 PROCEDURE — 99213 PR OFFICE/OUTPT VISIT, EST, LEVL III, 20-29 MIN: ICD-10-PCS | Mod: S$PBB,,, | Performed by: OBSTETRICS & GYNECOLOGY

## 2019-10-15 PROCEDURE — 99999 PR PBB SHADOW E&M-EST. PATIENT-LVL IV: CPT | Mod: PBBFAC,,, | Performed by: OBSTETRICS & GYNECOLOGY

## 2019-10-15 PROCEDURE — 99999 PR PBB SHADOW E&M-EST. PATIENT-LVL IV: ICD-10-PCS | Mod: PBBFAC,,, | Performed by: OBSTETRICS & GYNECOLOGY

## 2019-10-15 PROCEDURE — 99213 OFFICE O/P EST LOW 20 MIN: CPT | Mod: S$PBB,,, | Performed by: OBSTETRICS & GYNECOLOGY

## 2019-10-15 NOTE — TELEPHONE ENCOUNTER
[facility]SO scheduled for 11/4/19 with preop and preadmit appt's scheduled and discussed.  Printed notification with appt's given to pt.  Medicaid consent for Hysterectomy signed and placed for scanning.  Case request number [id number].  Caitlin in surgery notified of date and time.

## 2019-10-15 NOTE — PROGRESS NOTES
Subjective:       Patient ID: Elham Rodas is a 39 y.o. female.    Chief Complaint:  Consult (maximo ojeda, seeing oncologist for breast cancer ( estrogen receptor positive )  Recommends ovaries be removed)      History of Present Illness  Patient presents with a personal history of breast cancer which is estrogen receptor positive.  Patient's oncologist is recommending bilateral oophorectomy.  Patient was counseled extensively on removal of her ovaries.  Patient has not had a menstrual cycle since starting her chemotherapy.  She understands that removal of her ovaries will put her into menopause permanently and the likelihood of being able to treat with hormone replacement therapy afterward is unfavorable.  Patient was for a counseled that there would be no purpose in leaving the tubes or uterus in place.  Patient would like removal of uterus tubes and ovaries.  Patient recently reported some blood sugar management but is otherwise without any health problems.  Patient's only previous surgery was a diagnostic laparoscopy and a laparoscopic tubal ligation    Menstrual History:  OB History        4    Para   4    Term   4            AB        Living   4       SAB        TAB        Ectopic        Multiple        Live Births                    Menarche age:  Patient's last menstrual period was 2019 (approximate).         Review of Systems  Review of Systems   Constitutional: Negative for activity change, appetite change, chills, diaphoresis, fatigue, fever and unexpected weight change.   HENT: Negative for congestion, dental problem, drooling, ear discharge, ear pain, facial swelling, hearing loss, mouth sores, nosebleeds, postnasal drip, rhinorrhea, sinus pressure, sneezing, sore throat, tinnitus, trouble swallowing and voice change.    Eyes: Negative for photophobia, pain, discharge, redness, itching and visual disturbance.   Respiratory: Negative for apnea, cough, choking, chest tightness,  shortness of breath, wheezing and stridor.    Cardiovascular: Negative for chest pain, palpitations and leg swelling.   Gastrointestinal: Negative for abdominal distention, abdominal pain, anal bleeding, blood in stool, constipation, diarrhea, nausea, rectal pain and vomiting.   Endocrine: Negative for cold intolerance, heat intolerance, polydipsia, polyphagia and polyuria.   Genitourinary: Negative for decreased urine volume, difficulty urinating, dyspareunia, dysuria, enuresis, flank pain, frequency, genital sores, hematuria, menstrual problem, pelvic pain, urgency, vaginal bleeding, vaginal discharge and vaginal pain.   Musculoskeletal: Negative for arthralgias, back pain, gait problem, joint swelling, myalgias, neck pain and neck stiffness.   Skin: Negative for color change, pallor, rash and wound.   Allergic/Immunologic: Negative for environmental allergies, food allergies and immunocompromised state.   Neurological: Negative for dizziness, tremors, seizures, syncope, facial asymmetry, speech difficulty, weakness, light-headedness, numbness and headaches.   Hematological: Negative for adenopathy. Does not bruise/bleed easily.   Psychiatric/Behavioral: Negative for agitation, behavioral problems, confusion, decreased concentration, dysphoric mood, hallucinations, self-injury, sleep disturbance and suicidal ideas. The patient is not nervous/anxious and is not hyperactive.            Objective:      Physical Exam   Nursing note and vitals reviewed.          Assessment:        1. Personal history of breast cancer                Plan:         Elham was seen today for consult.    Diagnoses and all orders for this visit:    Personal history of breast cancer

## 2019-10-16 ENCOUNTER — INFUSION (OUTPATIENT)
Dept: INFUSION THERAPY | Facility: HOSPITAL | Age: 39
End: 2019-10-16
Attending: INTERNAL MEDICINE
Payer: MEDICAID

## 2019-10-16 ENCOUNTER — OFFICE VISIT (OUTPATIENT)
Dept: HEMATOLOGY/ONCOLOGY | Facility: CLINIC | Age: 39
End: 2019-10-16
Payer: MEDICAID

## 2019-10-16 ENCOUNTER — HOSPITAL ENCOUNTER (OUTPATIENT)
Dept: RADIOLOGY | Facility: CLINIC | Age: 39
Discharge: HOME | End: 2019-10-16
Attending: INTERNAL MEDICINE
Payer: MEDICAID

## 2019-10-16 VITALS
RESPIRATION RATE: 16 BRPM | HEIGHT: 64 IN | TEMPERATURE: 98 F | BODY MASS INDEX: 27.74 KG/M2 | OXYGEN SATURATION: 100 % | WEIGHT: 162.5 LBS | DIASTOLIC BLOOD PRESSURE: 86 MMHG | SYSTOLIC BLOOD PRESSURE: 151 MMHG | HEART RATE: 72 BPM

## 2019-10-16 DIAGNOSIS — Z79.811 USE OF AROMATASE INHIBITORS: ICD-10-CM

## 2019-10-16 DIAGNOSIS — C50.211 CARCINOMA OF UPPER-INNER QUADRANT OF RIGHT BREAST IN FEMALE, ESTROGEN RECEPTOR POSITIVE: Primary | ICD-10-CM

## 2019-10-16 DIAGNOSIS — Z17.0 CARCINOMA OF UPPER-INNER QUADRANT OF RIGHT BREAST IN FEMALE, ESTROGEN RECEPTOR POSITIVE: Primary | ICD-10-CM

## 2019-10-16 DIAGNOSIS — C50.611 CARCINOMA OF AXILLARY TAIL OF RIGHT BREAST IN FEMALE, ESTROGEN RECEPTOR POSITIVE: ICD-10-CM

## 2019-10-16 DIAGNOSIS — Z17.0 CARCINOMA OF AXILLARY TAIL OF RIGHT BREAST IN FEMALE, ESTROGEN RECEPTOR POSITIVE: ICD-10-CM

## 2019-10-16 DIAGNOSIS — C50.611 CARCINOMA OF AXILLARY TAIL OF RIGHT BREAST IN FEMALE, ESTROGEN RECEPTOR POSITIVE: Primary | ICD-10-CM

## 2019-10-16 DIAGNOSIS — Z17.0 CARCINOMA OF AXILLARY TAIL OF RIGHT BREAST IN FEMALE, ESTROGEN RECEPTOR POSITIVE: Primary | ICD-10-CM

## 2019-10-16 PROCEDURE — 77080 DEXA BONE DENSITY SPINE HIP: ICD-10-PCS | Mod: 26,,, | Performed by: INTERNAL MEDICINE

## 2019-10-16 PROCEDURE — 99213 OFFICE O/P EST LOW 20 MIN: CPT | Mod: PBBFAC,25 | Performed by: INTERNAL MEDICINE

## 2019-10-16 PROCEDURE — 63600175 PHARM REV CODE 636 W HCPCS: Mod: JG | Performed by: INTERNAL MEDICINE

## 2019-10-16 PROCEDURE — 77080 DXA BONE DENSITY AXIAL: CPT | Mod: 26,,, | Performed by: INTERNAL MEDICINE

## 2019-10-16 PROCEDURE — 99999 PR PBB SHADOW E&M-EST. PATIENT-LVL III: ICD-10-PCS | Mod: PBBFAC,,, | Performed by: INTERNAL MEDICINE

## 2019-10-16 PROCEDURE — 96402 CHEMO HORMON ANTINEOPL SQ/IM: CPT

## 2019-10-16 PROCEDURE — 99999 PR PBB SHADOW E&M-EST. PATIENT-LVL III: CPT | Mod: PBBFAC,,, | Performed by: INTERNAL MEDICINE

## 2019-10-16 PROCEDURE — 99214 PR OFFICE/OUTPT VISIT, EST, LEVL IV, 30-39 MIN: ICD-10-PCS | Mod: S$PBB,,, | Performed by: INTERNAL MEDICINE

## 2019-10-16 PROCEDURE — 77080 DXA BONE DENSITY AXIAL: CPT | Mod: TC

## 2019-10-16 PROCEDURE — 99214 OFFICE O/P EST MOD 30 MIN: CPT | Mod: S$PBB,,, | Performed by: INTERNAL MEDICINE

## 2019-10-16 RX ADMIN — GOSERELIN ACETATE 3.6 MG: 3.6 IMPLANT SUBCUTANEOUS at 03:10

## 2019-10-16 NOTE — PROGRESS NOTES
Subjective:       Patient ID: Elham Rodas is a 39 y.o. female.    Chief Complaint: No chief complaint on file.    HPI   Mrs. Rodas returns today for follow up.  She has completed four cycles of dose dense adjuvant taxol and she is here to receive her third zoladex injection.  The first injection of zoladex on 8/21/2019.      Briefly, she is a 39-year-old premenopausal female who was diagnosed last year with an invasive mammary carcinoma that was ER positive, OR positive, and HER-2 negative.  On 11/21/2018, she underwent a right lumpectomy with sentinel lymph node biopsy.  The pathology report from the procedure indicates that she had a positive sentinel lymph node with a metastatic deposit of 6 mm.  There was no extranodal extension.  On the right partial mastectomy specimen, she had an invasive lobular carcinoma that was 14 mm in maximum diameter.  It was grade II and resection margins were clear.    She completed her adjuvant chemotherapy and her adjuvant XRT, and she is now on zoladex plus Arimidex.    ewhr DXA scan shows normal bone density.        Review of Systems      Overall she feels OK.  She does complain of the right arm feeling stiff, but she has no additional complaints.  ECOG PS is 1.  She denies any depression, easy bruising, fevers, chills, night  sweats, weight loss, nausea, vomiting, diarrhea, constipation, diplopia, blurred vision, headache, chest pain, palpitations, shortness of breath, left breast pain, abdominal pain, extremity pain, or difficulty ambulating.  The remainder of the ten-point ROS, including general, skin, lymph, H/N, cardiorespiratory, GI, , Neuro, Endocrine, and psychiatric is negative.     Objective:      Physical Exam        She is alert, oriented to time, place, person, pleasant, well      nourished, in no acute physical distress.                               VITAL SIGNS:  Reviewed                                      HEENT:  Normal.  There are no nasal, oral, lip,  gingival, auricular, lid,    or conjunctival lesions.  Mucosae are moist and pink, and there is no        thrush.  Pupils are equal, reactive to light and accommodation.              Extraocular muscle movements are intact.  Dentition is good.  There is no frontal or maxillary tenderness.                                     NECK:  Supple without JVD, adenopathy, or thyromegaly.                       LUNGS:  Clear to auscultation without wheezing, rales, or ronchi.           CARDIOVASCULAR:  Reveals an S1, S2, no murmurs, no rubs, no gallops.         ABDOMEN:  Soft, nontender, without organomegaly.  Bowel sounds are    present.                                                                     EXTREMITIES:  No cyanosis, clubbing, or edema.                               BREASTS:  She is status post right lumpectomy with a well-healed incision in the lower outer quadrant of her right breast.    There are no masses in either breast.   The right nipple remains retracted.  There is mild erythema within the XRT field  A sentinel node biopsy scar is seen in the right axilla.  It is also well healed.                                  LYMPHATIC:  There is no cervical, axillary, or supraclavicular adenopathy.   SKIN:  Warm and moist, without petechiae, rashes, induration, or ecchymoses.           NEUROLOGIC:  DTRs are 0-1+ bilaterally, symmetrical, motor function is 5/5,  and cranial nerves are  within normal limits.    Assessment:       1. Carcinoma of axillary tail of right breast in female, estrogen receptor positive       Plan:         I had a long discussion with her.  She will receive her third zoladex injection today and continue AIs.  She is scheduled for oophorectomies on November 4th.  I will see her again in 4 weeks just in case her procedure gets cancelled and she needs another zoladex injection.  We will repeat her mammogram pruior to ehr next visit in 4 weeks..  Her multiple questions were answered to her  satisfaction.

## 2019-10-17 ENCOUNTER — PATIENT MESSAGE (OUTPATIENT)
Dept: HEMATOLOGY/ONCOLOGY | Facility: CLINIC | Age: 39
End: 2019-10-17

## 2019-10-28 ENCOUNTER — OFFICE VISIT (OUTPATIENT)
Dept: OBSTETRICS AND GYNECOLOGY | Facility: CLINIC | Age: 39
End: 2019-10-28
Payer: MEDICAID

## 2019-10-28 ENCOUNTER — HOSPITAL ENCOUNTER (OUTPATIENT)
Dept: RADIOLOGY | Facility: HOSPITAL | Age: 39
Discharge: HOME | End: 2019-10-28
Attending: OBSTETRICS & GYNECOLOGY
Payer: MEDICAID

## 2019-10-28 ENCOUNTER — HOSPITAL ENCOUNTER (OUTPATIENT)
Dept: PULMONOLOGY | Facility: HOSPITAL | Age: 39
Discharge: HOME | End: 2019-10-28
Attending: OBSTETRICS & GYNECOLOGY
Payer: MEDICAID

## 2019-10-28 ENCOUNTER — HOSPITAL ENCOUNTER (OUTPATIENT)
Dept: PREADMISSION TESTING | Facility: HOSPITAL | Age: 39
Discharge: HOME | End: 2019-10-28
Attending: OBSTETRICS & GYNECOLOGY
Payer: MEDICAID

## 2019-10-28 VITALS
HEART RATE: 76 BPM | HEIGHT: 64 IN | DIASTOLIC BLOOD PRESSURE: 80 MMHG | BODY MASS INDEX: 27.83 KG/M2 | RESPIRATION RATE: 14 BRPM | SYSTOLIC BLOOD PRESSURE: 102 MMHG | WEIGHT: 163 LBS

## 2019-10-28 DIAGNOSIS — Z01.818 PREOP TESTING: ICD-10-CM

## 2019-10-28 DIAGNOSIS — Z85.3 PERSONAL HISTORY OF BREAST CANCER: Primary | ICD-10-CM

## 2019-10-28 DIAGNOSIS — Z85.3 PERSONAL HISTORY OF BREAST CANCER: ICD-10-CM

## 2019-10-28 PROCEDURE — 93005 ELECTROCARDIOGRAM TRACING: CPT

## 2019-10-28 PROCEDURE — 99999 PR PBB SHADOW E&M-EST. PATIENT-LVL III: CPT | Mod: PBBFAC,,, | Performed by: OBSTETRICS & GYNECOLOGY

## 2019-10-28 PROCEDURE — 99499 NO LOS: ICD-10-PCS | Mod: S$PBB,,, | Performed by: OBSTETRICS & GYNECOLOGY

## 2019-10-28 PROCEDURE — 93010 EKG 12-LEAD: ICD-10-PCS | Mod: ,,, | Performed by: INTERNAL MEDICINE

## 2019-10-28 PROCEDURE — 99999 PR PBB SHADOW E&M-EST. PATIENT-LVL III: ICD-10-PCS | Mod: PBBFAC,,, | Performed by: OBSTETRICS & GYNECOLOGY

## 2019-10-28 PROCEDURE — 71045 X-RAY EXAM CHEST 1 VIEW: CPT | Mod: TC

## 2019-10-28 PROCEDURE — 99499 UNLISTED E&M SERVICE: CPT | Mod: S$PBB,,, | Performed by: OBSTETRICS & GYNECOLOGY

## 2019-10-28 PROCEDURE — 93010 ELECTROCARDIOGRAM REPORT: CPT | Mod: ,,, | Performed by: INTERNAL MEDICINE

## 2019-10-28 PROCEDURE — 99213 OFFICE O/P EST LOW 20 MIN: CPT | Mod: PBBFAC,25 | Performed by: OBSTETRICS & GYNECOLOGY

## 2019-10-28 NOTE — H&P (VIEW-ONLY)
Subjective:       Patient ID: Elham Rodas is a 39 y.o. female.    Chief Complaint:  Pre-op Exam (scheduled 19 TLH, BSO)      History of Present Illness  Patient presents for preop for T LH and BSO.Patient has a personal history of breast cancer which is estrogen receptor positive.  Patient's oncologist is recommending bilateral oophorectomy.  Patient was counseled extensively on removal of her ovaries.  Patient has not had a menstrual cycle since starting her chemotherapy.  She understands that removal of her ovaries will put her into menopause permanently and the likelihood of being able to treat with hormone replacement therapy afterward is unfavorable.  Patient was for a counseled that there would be no purpose in leaving the tubes or uterus in place.  Patient would like removal of uterus tubes and ovaries.  Patient recently reported some blood sugar management but is otherwise without any health problems.  Patient's only previous surgery was a diagnostic laparoscopy and a laparoscopic tubal ligation       Menstrual History:  OB History        4    Para   4    Term   4            AB        Living   4       SAB        TAB        Ectopic        Multiple        Live Births                    Menarche age:  Patient's last menstrual period was 2019 (approximate).         Review of Systems  Review of Systems   Constitutional: Negative for activity change, appetite change, chills, diaphoresis, fatigue, fever and unexpected weight change.   HENT: Negative for congestion, dental problem, drooling, ear discharge, ear pain, facial swelling, hearing loss, mouth sores, nosebleeds, postnasal drip, rhinorrhea, sinus pressure, sneezing, sore throat, tinnitus, trouble swallowing and voice change.    Eyes: Negative for photophobia, pain, discharge, redness, itching and visual disturbance.   Respiratory: Negative for apnea, cough, choking, chest tightness, shortness of breath, wheezing and stridor.     Cardiovascular: Negative for chest pain, palpitations and leg swelling.   Gastrointestinal: Negative for abdominal distention, abdominal pain, anal bleeding, blood in stool, constipation, diarrhea, nausea, rectal pain and vomiting.   Endocrine: Negative for cold intolerance, heat intolerance, polydipsia, polyphagia and polyuria.   Genitourinary: Negative for decreased urine volume, difficulty urinating, dyspareunia, dysuria, enuresis, flank pain, frequency, genital sores, hematuria, menstrual problem, pelvic pain, urgency, vaginal bleeding, vaginal discharge and vaginal pain.   Musculoskeletal: Negative for arthralgias, back pain, gait problem, joint swelling, myalgias, neck pain and neck stiffness.   Skin: Negative for color change, pallor, rash and wound.   Allergic/Immunologic: Negative for environmental allergies, food allergies and immunocompromised state.   Neurological: Negative for dizziness, tremors, seizures, syncope, facial asymmetry, speech difficulty, weakness, light-headedness, numbness and headaches.   Hematological: Negative for adenopathy. Does not bruise/bleed easily.   Psychiatric/Behavioral: Negative for agitation, behavioral problems, confusion, decreased concentration, dysphoric mood, hallucinations, self-injury, sleep disturbance and suicidal ideas. The patient is not nervous/anxious and is not hyperactive.            Objective:      Physical Exam   Constitutional: She is oriented to person, place, and time. She appears well-developed and well-nourished.   Neck: No thyromegaly present.   Cardiovascular: Normal rate and regular rhythm.   Pulmonary/Chest: Effort normal and breath sounds normal. No breast tenderness or discharge.   Abdominal: Soft. Bowel sounds are normal. She exhibits no mass. There is no tenderness. Hernia confirmed negative in the right inguinal area and confirmed negative in the left inguinal area.   Genitourinary: Vagina normal and uterus normal. Rectal exam shows no  external hemorrhoid. No breast tenderness or discharge. Uterus is not enlarged and not tender. Cervix exhibits no motion tenderness, no discharge and no friability. Right adnexum displays no mass, no tenderness and no fullness. Left adnexum displays no mass, no tenderness and no fullness. No tenderness in the vagina. No foreign body in the vagina. No vaginal discharge found.   Musculoskeletal: Normal range of motion.   Lymphadenopathy:        Right: No inguinal adenopathy present.        Left: No inguinal adenopathy present.   Neurological: She is alert and oriented to person, place, and time. She has normal reflexes.   Skin: Skin is dry.   Psychiatric: She has a normal mood and affect. Her behavior is normal. Judgment and thought content normal.   Nursing note and vitals reviewed.          Assessment:        1. Personal history of breast cancer    2. Preop testing                Plan:         Elham was seen today for pre-op exam.    Diagnoses and all orders for this visit:    Personal history of breast cancer  -     CBC auto differential; Future  -     EKG 12-lead; Future  -     Pregnancy, urine rapid; Future  -     Type & Screen; Future  -     Urinalysis; Future  -     X-Ray Chest 1 View Pre-OP; Future    Preop testing  -     CBC auto differential; Future  -     EKG 12-lead; Future  -     Pregnancy, urine rapid; Future  -     Type & Screen; Future  -     Urinalysis; Future  -     X-Ray Chest 1 View Pre-OP; Future

## 2019-10-28 NOTE — DISCHARGE INSTRUCTIONS
[facility]  Pre Admit Instructions    Day and Date of Procedure: Monday 11/4/19  Arrival time: 230pm      · Call your doctor if you become ill before your surgery  · Someone will call you between 1 p.m. And 5 p.m.the workday before the procedure to give you an arrival time       - 7 a.m. To 5 p.m. Enter through Patient Registration Main Lobby  · You must have a responsible  to bring you home    Do NOT eat  anything   past      6am      your procedure day  Ok for clear liquids until 12pm    Please    · Do not wear makeup, jewelry, nail polish or body piercings  · Bring containers/solution for contacts, dentures, bridges - these and hearing aids will be removed before your procedure  · Do not bring cash, jewelry or valuables the day of your procedure   · No smoking at least 24 hours before your procedure  · Wear clothing that is comfortable and easy to take off and put on  · Do NOT shave for at least 5 days before your surgery    Review skin preparation handout before using. Shower with Hibiclens the Night before and morning of the procedure.                 Information about your stay (Please Review)    Before Surgery  1. Cafeteria Meals: 7am to 10am; 11am to 1:30 pm; Dinner/Supper must may be ordered between 11:00 am and 4 pm from the [facility] After [employer] Menu. Food will be available to  between 5 pm and 6 pm. The kitchen phone extension is [phone].  2. Your doctor may order and review labs, x-rays, ECG or other tests as a pre-surgery workup and will call you if there is need for follow up.  3. No smoking inside or outside the hospital on hospital grounds.  4. Wear clothing that is easy to take off and put on.  The hospital will provide you with a gown.  5. You may bring robe, slippers, nightwear, and toiletries (toothbrush, toothpaste, makeup).  6. If your doctor orders a Fleets Enema or other prep, follow package and/or doctors orders.  7. Brush your teeth and rinse your mouth the  morning of surgery, but dont swallow the water.  8. The nurse will ask questions and check your condition.  The doctor may bao your surgical site.  9. Compression boots may be put on your calves to reduce the risk of blood clots.  10. The doctor may order medicine to help you relax before surgery.  After Surgery  1. The nurse will check your temperature, breathing, blood pressure, heart rate, IV site, and surgery site.  2. A diet will be ordered-most start with ice chips and then advance slowly to other foods.  3. If you have IV fluids the IV pump will beep to let the nurse know that she needs to check it.  4. You may have a urinary catheter and staff may measure your oral intake and urine output.  5. Pain medication may be ordered by the doctor after surgery.  If you have a pain management device tell your caretakers not to press the button because of OVERDOSE RISK.  6. When the nurse or doctor tells you it is okay to get out of bed, ask for help until you are stable.  7. The nurse may ask you to turn, cough, and deep breathe to prevent lung problems.  You can use a pillow to hold your incision when you deep breathe or cough to reduce pain.  8. The nurse will give you discharge instructions--incision care, symptoms to report to your doctor, and your follow-up appointment when you are discharged.  You cannot drive yourself home.  Goal for Discharge from One Day Surgery  · Control pain with an oral medication  · Walk without feeling dizzy or weak  · Tolerate liquids well  · Urinate without difficulty    Things you can do to  Reduce the Risk of Infections or Complications  Wash Hands and use Waterless Hand Sanitizers  · Wash hands frequently with soap and warm water for at least 15 seconds.   · Use hand sanitizers (alcohol based) often at home and in public if hands are not visibly soiled  Take Antibiotic Exactly as Prescribed  · Do not stop antibiotics too soon; you risk developing infection resistant to  antibiotics  · Take your antibiotic even if you are feeling better and even if they upset your stomach  · Call the doctor if you cant tolerate the antibiotic or you have an allergic reaction  Stay Healthy  · Take medicines as prescribed by your doctor  · Keep your diabetes under control - diet and medication  · Get enough rest, exercise and eat a healthy diet  Keep the Wound Clean and Dry  · Wash hands before and after taking care of the incision (cut)  · Wash hands when you remove a dressing, before you touch/apply a new dressing  · Shower and clean incision with antibacterial soap and rinse well if the doctor approves  · Allow the cut to dry completely before putting on a clean dressing  · Do not touch the part of the bandage that will cover the incision  · Do not use ointments unless your doctor tells you to-can promote bacterial growth  · If ordered, put ointment directly on the dressing-do not touch the end of the tube  · Do not scrub, remove scabs, or leave a damp dressing on the incision  · Do not use peroxide or alcohol to clean the incision unless the doctor tells you to   · Do not let children, pets or anyone else contaminate the incision  Stop Smoking To Prevent Infection  · Stop smoking-Centers for Disease Control recommends 30 days before surgery  · Smokers get more infections after surgery-studies have shown 6 times the risk  · Smokers have more scarring and heal slower-open wounds get infected easier  Prevent Respiratory complications  · Stop smoking  · Turn, cough, and deep breathe even if you have some pain when you do so.  · Splint your incision with a pillow when you cough/deep breath, to help control pain.  · Do not lie in one position for long periods of time.   Prevent Blood Clots  · When you wake move your legs, flex your feet, rotate your ankles, wiggle your toes  · Get up when the doctor says its ok.  Dangle your feet from the side of the bed  · Report symptoms-leg pain, redness/swelling,  warm to touch; fever; shortness of breath, chest pain, severe upper back pain.

## 2019-11-04 ENCOUNTER — ANESTHESIA (OUTPATIENT)
Dept: SURGERY | Facility: HOSPITAL | Age: 39
End: 2019-11-04
Payer: MEDICAID

## 2019-11-04 ENCOUNTER — HOSPITAL ENCOUNTER (OUTPATIENT)
Facility: HOSPITAL | Age: 39
Discharge: HOME | End: 2019-11-05
Attending: OBSTETRICS & GYNECOLOGY | Admitting: OBSTETRICS & GYNECOLOGY
Payer: MEDICAID

## 2019-11-04 ENCOUNTER — ANESTHESIA EVENT (OUTPATIENT)
Dept: SURGERY | Facility: HOSPITAL | Age: 39
End: 2019-11-04
Payer: MEDICAID

## 2019-11-04 DIAGNOSIS — Z85.3 PERSONAL HISTORY OF BREAST CANCER: ICD-10-CM

## 2019-11-04 DIAGNOSIS — Z17.0 MALIGNANT NEOPLASM OF RIGHT BREAST IN FEMALE, ESTROGEN RECEPTOR POSITIVE, UNSPECIFIED SITE OF BREAST: Primary | ICD-10-CM

## 2019-11-04 DIAGNOSIS — C50.911 MALIGNANT NEOPLASM OF RIGHT BREAST IN FEMALE, ESTROGEN RECEPTOR POSITIVE, UNSPECIFIED SITE OF BREAST: Primary | ICD-10-CM

## 2019-11-04 DIAGNOSIS — Z17.0 CARCINOMA OF AXILLARY TAIL OF RIGHT BREAST IN FEMALE, ESTROGEN RECEPTOR POSITIVE: ICD-10-CM

## 2019-11-04 DIAGNOSIS — C50.911 MALIGNANT NEOPLASM OF RIGHT FEMALE BREAST: ICD-10-CM

## 2019-11-04 DIAGNOSIS — C50.611 CARCINOMA OF AXILLARY TAIL OF RIGHT BREAST IN FEMALE, ESTROGEN RECEPTOR POSITIVE: ICD-10-CM

## 2019-11-04 LAB — POCT GLUCOSE: 83 MG/DL (ref 70–110)

## 2019-11-04 PROCEDURE — 27201423 OPTIME MED/SURG SUP & DEVICES STERILE SUPPLY: Performed by: OBSTETRICS & GYNECOLOGY

## 2019-11-04 PROCEDURE — 88307 TISSUE EXAM BY PATHOLOGIST: CPT | Performed by: PATHOLOGY

## 2019-11-04 PROCEDURE — 25000003 PHARM REV CODE 250: Performed by: NURSE ANESTHETIST, CERTIFIED REGISTERED

## 2019-11-04 PROCEDURE — 94761 N-INVAS EAR/PLS OXIMETRY MLT: CPT | Mod: 59

## 2019-11-04 PROCEDURE — 58571 TLH W/T/O 250 G OR LESS: CPT | Mod: ,,, | Performed by: OBSTETRICS & GYNECOLOGY

## 2019-11-04 PROCEDURE — 82962 GLUCOSE BLOOD TEST: CPT | Performed by: OBSTETRICS & GYNECOLOGY

## 2019-11-04 PROCEDURE — 27000221 HC OXYGEN, UP TO 24 HOURS

## 2019-11-04 PROCEDURE — 00840 ANES IPER PX LOWER ABD NOS: CPT | Performed by: OBSTETRICS & GYNECOLOGY

## 2019-11-04 PROCEDURE — 58571 PR LAPAROSCOPY W TOT HYSTERECTUTERUS <=250 GRAM  W TUBE/OVARY: ICD-10-PCS | Mod: ,,, | Performed by: OBSTETRICS & GYNECOLOGY

## 2019-11-04 PROCEDURE — 88307 TISSUE EXAM BY PATHOLOGIST: CPT | Mod: 26,,, | Performed by: PATHOLOGY

## 2019-11-04 PROCEDURE — 71000033 HC RECOVERY, INTIAL HOUR: Performed by: OBSTETRICS & GYNECOLOGY

## 2019-11-04 PROCEDURE — 00840 ANES IPER PX LOWER ABD NOS: CPT | Mod: QZ | Performed by: NURSE ANESTHETIST, CERTIFIED REGISTERED

## 2019-11-04 PROCEDURE — 88307 TISSUE SPECIMEN TO PATHOLOGY - SURGERY: ICD-10-PCS | Mod: 26,,, | Performed by: PATHOLOGY

## 2019-11-04 PROCEDURE — 36000711: Performed by: OBSTETRICS & GYNECOLOGY

## 2019-11-04 PROCEDURE — 63600175 PHARM REV CODE 636 W HCPCS: Performed by: NURSE ANESTHETIST, CERTIFIED REGISTERED

## 2019-11-04 PROCEDURE — 58571 PR LAPAROSCOPY W TOT HYSTERECTUTERUS <=250 GRAM  W TUBE/OVARY: ICD-10-PCS | Mod: 80,,, | Performed by: OBSTETRICS & GYNECOLOGY

## 2019-11-04 PROCEDURE — G0378 HOSPITAL OBSERVATION PER HR: HCPCS

## 2019-11-04 PROCEDURE — 36000710: Performed by: OBSTETRICS & GYNECOLOGY

## 2019-11-04 PROCEDURE — 58571 TLH W/T/O 250 G OR LESS: CPT | Mod: 80,,, | Performed by: OBSTETRICS & GYNECOLOGY

## 2019-11-04 PROCEDURE — 37000009 HC ANESTHESIA EA ADD 15 MINS: Performed by: OBSTETRICS & GYNECOLOGY

## 2019-11-04 PROCEDURE — 25000003 PHARM REV CODE 250: Performed by: OBSTETRICS & GYNECOLOGY

## 2019-11-04 PROCEDURE — 94760 N-INVAS EAR/PLS OXIMETRY 1: CPT

## 2019-11-04 PROCEDURE — 37000008 HC ANESTHESIA 1ST 15 MINUTES: Performed by: OBSTETRICS & GYNECOLOGY

## 2019-11-04 RX ORDER — ONDANSETRON 2 MG/ML
4 INJECTION INTRAMUSCULAR; INTRAVENOUS DAILY PRN
Status: DISCONTINUED | OUTPATIENT
Start: 2019-11-04 | End: 2019-11-05 | Stop reason: HOSPADM

## 2019-11-04 RX ORDER — PROPOFOL 10 MG/ML
VIAL (ML) INTRAVENOUS
Status: DISCONTINUED | OUTPATIENT
Start: 2019-11-04 | End: 2019-11-04

## 2019-11-04 RX ORDER — OXYCODONE AND ACETAMINOPHEN 5; 325 MG/1; MG/1
1 TABLET ORAL EVERY 4 HOURS PRN
Status: DISCONTINUED | OUTPATIENT
Start: 2019-11-04 | End: 2019-11-05 | Stop reason: HOSPADM

## 2019-11-04 RX ORDER — KETOROLAC TROMETHAMINE 15 MG/ML
15 INJECTION, SOLUTION INTRAMUSCULAR; INTRAVENOUS EVERY 8 HOURS PRN
Status: DISCONTINUED | OUTPATIENT
Start: 2019-11-04 | End: 2019-11-05 | Stop reason: HOSPADM

## 2019-11-04 RX ORDER — LIDOCAINE HYDROCHLORIDE 20 MG/ML
INJECTION, SOLUTION EPIDURAL; INFILTRATION; INTRACAUDAL; PERINEURAL
Status: DISCONTINUED | OUTPATIENT
Start: 2019-11-04 | End: 2019-11-04

## 2019-11-04 RX ORDER — ROCURONIUM BROMIDE 10 MG/ML
INJECTION, SOLUTION INTRAVENOUS
Status: DISCONTINUED | OUTPATIENT
Start: 2019-11-04 | End: 2019-11-04

## 2019-11-04 RX ORDER — FENTANYL CITRATE 50 UG/ML
INJECTION, SOLUTION INTRAMUSCULAR; INTRAVENOUS
Status: DISCONTINUED | OUTPATIENT
Start: 2019-11-04 | End: 2019-11-04

## 2019-11-04 RX ORDER — SODIUM CHLORIDE 0.9 % (FLUSH) 0.9 %
3 SYRINGE (ML) INJECTION
Status: DISCONTINUED | OUTPATIENT
Start: 2019-11-04 | End: 2019-11-05 | Stop reason: HOSPADM

## 2019-11-04 RX ORDER — SODIUM CHLORIDE, SODIUM LACTATE, POTASSIUM CHLORIDE, CALCIUM CHLORIDE 600; 310; 30; 20 MG/100ML; MG/100ML; MG/100ML; MG/100ML
INJECTION, SOLUTION INTRAVENOUS CONTINUOUS PRN
Status: DISCONTINUED | OUTPATIENT
Start: 2019-11-04 | End: 2019-11-04

## 2019-11-04 RX ORDER — ACETAMINOPHEN 10 MG/ML
INJECTION, SOLUTION INTRAVENOUS
Status: DISCONTINUED | OUTPATIENT
Start: 2019-11-04 | End: 2019-11-04

## 2019-11-04 RX ORDER — PROPOFOL 10 MG/ML
VIAL (ML) INTRAVENOUS CONTINUOUS PRN
Status: DISCONTINUED | OUTPATIENT
Start: 2019-11-04 | End: 2019-11-04

## 2019-11-04 RX ORDER — DIPHENHYDRAMINE HYDROCHLORIDE 50 MG/ML
25 INJECTION INTRAMUSCULAR; INTRAVENOUS EVERY 6 HOURS PRN
Status: DISCONTINUED | OUTPATIENT
Start: 2019-11-04 | End: 2019-11-05 | Stop reason: HOSPADM

## 2019-11-04 RX ORDER — CEFAZOLIN SODIUM 1 G/3ML
INJECTION, POWDER, FOR SOLUTION INTRAMUSCULAR; INTRAVENOUS
Status: DISCONTINUED | OUTPATIENT
Start: 2019-11-04 | End: 2019-11-04

## 2019-11-04 RX ORDER — NEOSTIGMINE METHYLSULFATE 5 MG/5 ML
SYRINGE (ML) INTRAVENOUS
Status: DISCONTINUED | OUTPATIENT
Start: 2019-11-04 | End: 2019-11-04

## 2019-11-04 RX ORDER — SODIUM CHLORIDE 0.9 % (FLUSH) 0.9 %
3 SYRINGE (ML) INJECTION EVERY 8 HOURS
Status: DISCONTINUED | OUTPATIENT
Start: 2019-11-04 | End: 2019-11-05 | Stop reason: HOSPADM

## 2019-11-04 RX ORDER — GLYCOPYRROLATE 0.2 MG/ML
INJECTION INTRAMUSCULAR; INTRAVENOUS
Status: DISCONTINUED | OUTPATIENT
Start: 2019-11-04 | End: 2019-11-04

## 2019-11-04 RX ORDER — MIDAZOLAM HYDROCHLORIDE 1 MG/ML
INJECTION INTRAMUSCULAR; INTRAVENOUS
Status: DISCONTINUED | OUTPATIENT
Start: 2019-11-04 | End: 2019-11-04

## 2019-11-04 RX ORDER — KETOROLAC TROMETHAMINE 30 MG/ML
INJECTION, SOLUTION INTRAMUSCULAR; INTRAVENOUS
Status: DISCONTINUED | OUTPATIENT
Start: 2019-11-04 | End: 2019-11-04

## 2019-11-04 RX ORDER — ONDANSETRON HYDROCHLORIDE 2 MG/ML
INJECTION, SOLUTION INTRAMUSCULAR; INTRAVENOUS
Status: DISCONTINUED | OUTPATIENT
Start: 2019-11-04 | End: 2019-11-04

## 2019-11-04 RX ORDER — DEXAMETHASONE SODIUM PHOSPHATE 4 MG/ML
INJECTION, SOLUTION INTRA-ARTICULAR; INTRALESIONAL; INTRAMUSCULAR; INTRAVENOUS; SOFT TISSUE
Status: DISCONTINUED | OUTPATIENT
Start: 2019-11-04 | End: 2019-11-04

## 2019-11-04 RX ADMIN — MIDAZOLAM HYDROCHLORIDE 2 MG: 1 INJECTION, SOLUTION INTRAMUSCULAR; INTRAVENOUS at 03:11

## 2019-11-04 RX ADMIN — DEXAMETHASONE SODIUM PHOSPHATE 8 MG: 4 INJECTION, SOLUTION INTRAMUSCULAR; INTRAVENOUS at 03:11

## 2019-11-04 RX ADMIN — PROPOFOL 150 MG: 10 INJECTION, EMULSION INTRAVENOUS at 03:11

## 2019-11-04 RX ADMIN — ROCURONIUM BROMIDE 40 MG: 10 INJECTION, SOLUTION INTRAVENOUS at 03:11

## 2019-11-04 RX ADMIN — Medication 3 MG: at 04:11

## 2019-11-04 RX ADMIN — FENTANYL CITRATE 50 MCG: 50 INJECTION, SOLUTION INTRAMUSCULAR; INTRAVENOUS at 03:11

## 2019-11-04 RX ADMIN — LIDOCAINE HYDROCHLORIDE 75 MG: 20 INJECTION, SOLUTION EPIDURAL; INFILTRATION; INTRACAUDAL; PERINEURAL at 03:11

## 2019-11-04 RX ADMIN — PROPOFOL 150 MCG/KG/MIN: 10 INJECTION, EMULSION INTRAVENOUS at 03:11

## 2019-11-04 RX ADMIN — ACETAMINOPHEN 1000 MG: 10 INJECTION, SOLUTION INTRAVENOUS at 03:11

## 2019-11-04 RX ADMIN — SODIUM CHLORIDE, SODIUM LACTATE, POTASSIUM CHLORIDE, AND CALCIUM CHLORIDE: .6; .31; .03; .02 INJECTION, SOLUTION INTRAVENOUS at 04:11

## 2019-11-04 RX ADMIN — CEFAZOLIN 2 G: 1 INJECTION, POWDER, FOR SOLUTION INTRAVENOUS at 03:11

## 2019-11-04 RX ADMIN — ONDANSETRON 8 MG: 2 INJECTION, SOLUTION INTRAMUSCULAR; INTRAVENOUS at 03:11

## 2019-11-04 RX ADMIN — FENTANYL CITRATE 100 MCG: 50 INJECTION, SOLUTION INTRAMUSCULAR; INTRAVENOUS at 03:11

## 2019-11-04 RX ADMIN — SODIUM CHLORIDE, SODIUM LACTATE, POTASSIUM CHLORIDE, AND CALCIUM CHLORIDE: .6; .31; .03; .02 INJECTION, SOLUTION INTRAVENOUS at 03:11

## 2019-11-04 RX ADMIN — KETOROLAC TROMETHAMINE 30 MG: 30 INJECTION, SOLUTION INTRAMUSCULAR; INTRAVENOUS at 04:11

## 2019-11-04 RX ADMIN — OXYCODONE HYDROCHLORIDE AND ACETAMINOPHEN 1 TABLET: 5; 325 TABLET ORAL at 07:11

## 2019-11-04 RX ADMIN — GLYCOPYRROLATE 0.4 MG: 0.2 INJECTION INTRAMUSCULAR; INTRAVENOUS at 04:11

## 2019-11-04 NOTE — OP NOTE
preop diagnosis  patient with estrogen receptor positive breast cancer.  Patient desires prophylactic hysterectomy    Postop diagnosis  Same    Procedure   TLH and BSO    Surgeon  Yoni Oscar    Asst.  Britton Mondragon MD    Specimen uterus with attached cervix bilateral fallopian tubes and ovaries    Estimated blood loss  100 cc    Anesthesia Gen.    Findings  Normal-appearing uterus and tubes and ovaries bilaterally.    Procedure in detail      After the appropriate informed consents were obtained and laboratory found to be within normal limits the patient was taken to the operating room where she was placed in a general anesthesia, she was prepped and draped in the usual sterile fashion, she was placed in the universal stirrups.  A small 10 mm midline incision was made in the umbilicus.  Through this was passed a varies needle.  Initial free flow of new fluid was noted initial low reading pressure of CO2 gas was noted.  CO2 gas was allowed to fill the belly until a pressure of 15.  The varies needle was removed.  A 10 mm trocar with the attached laprascope were placed the same incision.  Entrance into the abdomen was assured.  Right and left lateral ports were placed under direct visualization.  First beginning on the patient's left.The fallopian tube was grasped with a 5 mm grasper.   The harmonic scalpel was used to take down the ovarian support and blood supply.  It was continued all the way to the round ligament which was transected with Harmonic scalpel.  Bladder flap was created anteriorly.  Peritoneum was excised posterior.  The uterine arteries were skeletonized.  The gyrus was used to coagulate across the uterine arteries.  They were transected with the Harmonic scalpel.  Same procedure was repeated on the patient's right.  Following this a sponge stick was placed in the vagina for exposure anteriorly.  The bladder was cleared and Harmonic scalpel was used to make an incision in the anterior  vagina.  Uterus was elevated and sponge stick was placed in the posterior cuff.  The incision was again made with the harmonic scalpel posteriorly.  Following this attention was first turned the patient's right side where the broad ligament was dissected away.  The uterus and cervix were mostly  on that side attention was into the patient's right where again broad ligament was allowed to be pulled away using harmonic scalpel and the cervix and uterus were completely dissected away. Once it was freed from the left side also it was delivered through the vagina.  Hemoperitoneum was reestablished.  The cuff was closed using 2 0 Vicryl in a running fashion.  Following this the pelvis was irrigated with good hemostasis noted.  Both ureters were noted to peristalse.  Interceed was placed in the cervical stump.  The CO2 gas was allowed to escape from the abdomen.  Patient was taken out of the universal stirrups the incisions were repaired with a subcutaneous stitch of 3-0 undyed Vicryl and dressed.  The patient was awakened from anesthesia and sent to recovery room in stable condition.    Discharge Note      SUMMARY     Admit Date: 11/4/2019    Attending Physician: Yoni Oscar MD     Discharge Physician: Yoni Oscar MD    Discharge Date: 11/4/2019     Reason for Admission:  Laparoscopic hysterectomy    Final Diagnoses:   Principal Problem: Malignant neoplasm of right female breast   Secondary Diagnoses:   Active Hospital Problems    Diagnosis  POA    *Malignant neoplasm of right female breast [C50.911]  Yes      Resolved Hospital Problems   No resolved problems to display.       Disposition: Home or Self Care    Procedures Performed: Procedure(s) (LRB):  HYSTERECTOMY, TOTAL, LAPAROSCOPIC (N/A)  SALPINGO-OOPHORECTOMY, LAPAROSCOPIC (Bilateral)    Hospital Course:  Patient was admitted underwent laparoscopic hysterectomy which was uncomplicated.  Once patient is fully awake and urinating on her own and  tolerating liquids she be allowed to be discharged home    Consults: none    Discharged Condition: good    Patient Instructions:   Current Discharge Medication List      CONTINUE these medications which have NOT CHANGED    Details   anastrozole (ARIMIDEX) 1 mg Tab Take 1 tablet (1 mg total) by mouth once daily.  Qty: 90 tablet, Refills: 1    Associated Diagnoses: Carcinoma of axillary tail of right breast in female, estrogen receptor positive      ergocalciferol (ERGOCALCIFEROL) 50,000 unit Cap Take 50,000 Units by mouth once daily.       levothyroxine (SYNTHROID) 88 MCG tablet Take 1/2 1 day per week; 1 tab 6 days a week. Wait at least 4 hours after taking LT4 to take calcium.  Qty: 90 tablet, Refills: 3    Associated Diagnoses: Postsurgical hypothyroidism      metFORMIN (GLUCOPHAGE-XR) 500 MG 24 hr tablet Take 1 tablet (500 mg total) by mouth daily with breakfast.  Qty: 90 tablet, Refills: 3    Associated Diagnoses: Family history of diabetes mellitus; Long term current use of therapeutic drug; IGT (impaired glucose tolerance)      calcium carbonate (TUMS) 200 mg calcium (500 mg) chewable tablet Take 1 tablet by mouth.      fluticasone (FLONASE) 50 mcg/actuation nasal spray 2 sprays (100 mcg total) by Each Nare route once daily.  Qty: 16 g, Refills: 0      oxyCODONE (ROXICODONE) 5 MG immediate release tablet Take 1 tablet (5 mg total) by mouth every 8 (eight) hours as needed for Pain.  Qty: 40 tablet, Refills: 0    Associated Diagnoses: Encounter for antineoplastic chemotherapy; Anemia associated with chemotherapy      pantoprazole (PROTONIX) 40 MG tablet pantoprazole 40 mg tablet,delayed release   Take 1 tablet every day by oral route.      parathyroid hormone (NATPARA) 75 mcg/dose Crtg Inject 75 mcg into the skin once daily.  Qty: 2 each, Refills: 11      promethazine (PHENERGAN) 25 MG tablet promethazine 25 mg tablet      traZODone (DESYREL) 50 MG tablet Take 1 tablet (50 mg total) by mouth nightly as needed  for Insomnia.  Qty: 15 tablet, Refills: 0             Medications:  Reconciled Home Medications:      Medication List      ASK your doctor about these medications    anastrozole 1 mg Tab  Commonly known as:  ARIMIDEX  Take 1 tablet (1 mg total) by mouth once daily.     calcium carbonate 200 mg calcium (500 mg) chewable tablet  Commonly known as:  TUMS  Take 1 tablet by mouth.     ergocalciferol 50,000 unit Cap  Commonly known as:  ERGOCALCIFEROL  Take 50,000 Units by mouth once daily.     fluticasone propionate 50 mcg/actuation nasal spray  Commonly known as:  FLONASE  2 sprays (100 mcg total) by Each Nare route once daily.     levothyroxine 88 MCG tablet  Commonly known as:  SYNTHROID  Take 1/2 1 day per week; 1 tab 6 days a week. Wait at least 4 hours after taking LT4 to take calcium.     metFORMIN 500 MG 24 hr tablet  Commonly known as:  GLUCOPHAGE-XR  Take 1 tablet (500 mg total) by mouth daily with breakfast.     oxyCODONE 5 MG immediate release tablet  Commonly known as:  ROXICODONE  Take 1 tablet (5 mg total) by mouth every 8 (eight) hours as needed for Pain.     pantoprazole 40 MG tablet  Commonly known as:  PROTONIX  pantoprazole 40 mg tablet,delayed release   Take 1 tablet every day by oral route.     parathyroid hormone 75 mcg/dose Crtg  Commonly known as:  NATPARA  Inject 75 mcg into the skin once daily.     promethazine 25 MG tablet  Commonly known as:  PHENERGAN  promethazine 25 mg tablet     traZODone 50 MG tablet  Commonly known as:  DESYREL  Take 1 tablet (50 mg total) by mouth nightly as needed for Insomnia.            Discharge Procedure Orders (must include Diet, Follow-up, Activity)  No discharge procedures on file.     Follow-up Information     Yoni Oscar MD In 2 weeks.    Specialties:  Obstetrics, Obstetrics and Gynecology  Why:  post op follow up  Contact information:  [address] [place], [place] [address] [id number] [phone]

## 2019-11-04 NOTE — ANESTHESIA PREPROCEDURE EVALUATION
Elham Rodas is a 39 y.o. female     Patient Active Problem List   Diagnosis    Postsurgical hypothyroidism    Vitamin D deficiency    Iatrogenic hypocalcemia    Family history of diabetes mellitus    Cough    Acute pharyngitis    Numbness and tingling in both hands    At risk for lymphedema    Malignant neoplasm of right female breast    Carcinoma of right breast in female, estrogen receptor positive    At high risk for breast cancer    Lung nodule    Breast cancer, right    Pneumonia of right lower lobe due to infectious organism    Elevated LFTs    Anemia associated with chemotherapy    Personal history of breast cancer    Lymphedema of arm    Decreased shoulder mobility, right    IGT (impaired glucose tolerance)    BMI 28.0-28.9,adult    Use of aromatase inhibitors       Review of patient's allergies indicates:  No Known Allergies    Current Outpatient Medications on File Prior to Visit   Medication Sig Dispense Refill    anastrozole (ARIMIDEX) 1 mg Tab Take 1 tablet (1 mg total) by mouth once daily. 90 tablet 1    calcium carbonate (TUMS) 200 mg calcium (500 mg) chewable tablet Take 1 tablet by mouth.      ergocalciferol (ERGOCALCIFEROL) 50,000 unit Cap Take 50,000 Units by mouth once daily.       fluticasone (FLONASE) 50 mcg/actuation nasal spray 2 sprays (100 mcg total) by Each Nare route once daily. 16 g 0    levothyroxine (SYNTHROID) 88 MCG tablet Take 1/2 1 day per week; 1 tab 6 days a week. Wait at least 4 hours after taking LT4 to take calcium. 90 tablet 3    metFORMIN (GLUCOPHAGE-XR) 500 MG 24 hr tablet Take 1 tablet (500 mg total) by mouth daily with breakfast. 90 tablet 3    oxyCODONE (ROXICODONE) 5 MG immediate release tablet Take 1 tablet (5 mg total) by mouth every 8 (eight) hours as needed for Pain. 40 tablet 0    pantoprazole (PROTONIX) 40 MG tablet pantoprazole 40 mg tablet,delayed release   Take 1 tablet every day by oral route.      parathyroid hormone  (NATPARA) 75 mcg/dose Crtg Inject 75 mcg into the skin once daily. (Patient not taking: Reported on 10/28/2019) 2 each 11    promethazine (PHENERGAN) 25 MG tablet promethazine 25 mg tablet      traZODone (DESYREL) 50 MG tablet Take 1 tablet (50 mg total) by mouth nightly as needed for Insomnia. 15 tablet 0     Current Facility-Administered Medications on File Prior to Visit   Medication Dose Route Frequency Provider Last Rate Last Dose    lidocaine (PF) 10 mg/ml (1%) injection 10 mg  1 mL Intradermal Once Cain Mcgarry MD           Past Surgical History:   Procedure Laterality Date    AXILLARY NODE DISSECTION Right 11/21/2018    Procedure: LYMPHADENECTOMY, AXILLARY;  Surgeon: Jann Ayala MD;  [facility];  Service: General;  Laterality: Right;    BREAST LUMPECTOMY Right     CHOLECYSTECTOMY      INJECTION FOR SENTINEL NODE IDENTIFICATION Right 11/21/2018    Procedure: INJECTION, FOR SENTINEL NODE IDENTIFICATION;  Surgeon: Jann Ayala MD;  [facility];  Service: General;  Laterality: Right;    INSERTION OF TUNNELED CENTRAL VENOUS CATHETER (CVC) WITH SUBCUTANEOUS PORT Left 1/2/2019    Procedure: [id number];  Surgeon: Jann Ayala MD;  Location: [facility] OR [address] [place] [address];  Service: General;  Laterality: Left;    MASTECTOMY, PARTIAL Right 11/21/2018    Procedure: MASTECTOMY, PARTIAL - seed localized;  Surgeon: Jann Ayala MD;  [facility];  Service: General;  Laterality: Right;  seed placement 11/14    SENTINEL LYMPH NODE BIOPSY Right 11/21/2018    Procedure: BIOPSY, LYMPH NODE, SENTINEL;  Surgeon: Jann Ayala MD;  Location: [facility] OR [address] [place] [address];  Service: General;  Laterality: Right;    THYROIDECTOMY      TUBAL LIGATION         Social History     Socioeconomic History    Marital status:      Spouse name: Not on file    Number of children: Not on file    Years of education: Not on file    Highest education level:  Not on file   Occupational History    Not on file   Social Needs    Financial resource strain: Not on file    Food insecurity:     Worry: Not on file     Inability: Not on file    Transportation needs:     Medical: Not on file     Non-medical: Not on file   Tobacco Use    Smoking status: Never Smoker    Smokeless tobacco: Never Used   Substance and Sexual Activity    Alcohol use: Yes     Alcohol/week: 2.0 standard drinks     Types: 2 Cans of beer per week     Comment: occasionally    Drug use: No    Sexual activity: Yes     Partners: Male     Birth control/protection: See Surgical Hx     Comment:    Lifestyle    Physical activity:     Days per week: Not on file     Minutes per session: Not on file    Stress: Not on file   Relationships    Social connections:     Talks on phone: Not on file     Gets together: Not on file     Attends [employer] service: Not on file     Active member of club or organization: Not on file     Attends meetings of clubs or organizations: Not on file     Relationship status: Not on file   Other Topics Concern    Not on file   Social History Narrative    Not on file         CBC: No results for input(s): WBC, RBC, HGB, HCT, PLT, MCV, MCH, MCHC in the last 72 hours.    CMP: No results for input(s): NA, K, CL, CO2, BUN, CREATININE, GLU, MG, PHOS, CALCIUM, ALBUMIN, PROT, ALKPHOS, ALT, AST, BILITOT in the last 72 hours.    INR  No results for input(s): PT, INR, PROTIME, APTT in the last 72 hours.        Diagnostic Studies:      EKD Echo:  Results for orders placed or performed during the hospital encounter of 01/08/15   2D Echo w/ Color Flow Doppler   Result Value Ref Range    QEF 55 55 - 65    Diastolic Dysfunction No     Tricuspid Valve Regurgitation TRIVIAL          Pre-op Assessment    I have reviewed the Patient Summary Reports.      I have reviewed the Medications.     Review of Systems  Anesthesia Hx:  Hx of Anesthetic complications ponv History of prior  surgery of interest to airway management or planning: Previous anesthesia: General Denies Family Hx of Anesthesia complications.   Denies Personal Hx of Anesthesia complications.   Social:  Non-Smoker, No Alcohol Use    Hematology/Oncology:        Current/Recent Cancer. Breast right axillary node dissection surgery   Cardiovascular:   Exercise tolerance: good ECG has been reviewed.    Pulmonary:   Pneumonia    Renal/:  Renal/ Normal     Hepatic/GI:  Hepatic/GI Normal    Musculoskeletal:  Musculoskeletal Normal    Neurological:  Neurology Normal    Endocrine:   Hypothyroidism    Dermatological:  Skin Normal    Psych:  Psychiatric Normal           Physical Exam  General:  Well nourished, Obesity    Airway/Jaw/Neck:  Airway Findings: Mouth Opening: Normal Tongue: Normal  General Airway Assessment: Adult  Mallampati: II  Improves to II with phonation.  TM Distance: Normal, at least 6 cm  Jaw/Neck Findings:  Neck ROM: Normal ROM      Dental:  Dental Findings: In tact   Chest/Lungs:  Chest/Lungs Findings: Clear to auscultation, Normal Respiratory Rate     Heart/Vascular:  Heart Findings: Rate: Normal  Rhythm: Regular Rhythm  Sounds: Normal        Mental Status:  Mental Status Findings:  Cooperative, Alert and Oriented         Anesthesia Plan  Type of Anesthesia, risks & benefits discussed:  Anesthesia Type:  general  Patient's Preference:   Intra-op Monitoring Plan: standard ASA monitors  Intra-op Monitoring Plan Comments:   Post Op Pain Control Plan: multimodal analgesia  Post Op Pain Control Plan Comments:   Induction:   IV  Beta Blocker:  Patient is not currently on a Beta-Blocker (No further documentation required).       Informed Consent: Patient understands risks and agrees with Anesthesia plan.  Questions answered. Anesthesia consent signed with patient.  ASA Score: 3     Day of Surgery Review of History & Physical: I have interviewed and examined the patient. I have reviewed the patient's H&P dated: 11/4/19.  There are no significant changes.  H&P update referred to the surgeon.         Ready For Surgery From Anesthesia Perspective.

## 2019-11-04 NOTE — INTERVAL H&P NOTE
The patient has been examined and the H&P has been reviewed:        I concur with the findings and no changes have occurred since H&P was written.        Patient cleared for Anesthesia: General     Past Medical History:  Past Medical History:   Diagnosis Date    Abnormal Pap smear of cervix 05/2016    ASCUS, - HPV    Breast cancer 10/2018    right    Hypothyroidism     Palpitations     Syncope and collapse     Thyroid disease     Vitamin D deficiency disease        Past Surgical History:   Procedure Laterality Date    AXILLARY NODE DISSECTION Right 11/21/2018    Procedure: LYMPHADENECTOMY, AXILLARY;  Surgeon: Jann Ayala MD;  [facility];  Service: General;  Laterality: Right;    BREAST LUMPECTOMY Right     CHOLECYSTECTOMY      INJECTION FOR SENTINEL NODE IDENTIFICATION Right 11/21/2018    Procedure: INJECTION, FOR SENTINEL NODE IDENTIFICATION;  Surgeon: Jann Ayala MD;  [facility];  Service: General;  Laterality: Right;    INSERTION OF TUNNELED CENTRAL VENOUS CATHETER (CVC) WITH SUBCUTANEOUS PORT Left 1/2/2019    Procedure: [id number];  Surgeon: Jann Ayala MD;  Location: [facility] OR [address] [place] [address];  Service: General;  Laterality: Left;    MASTECTOMY, PARTIAL Right 11/21/2018    Procedure: MASTECTOMY, PARTIAL - seed localized;  Surgeon: Jann Ayala MD;  Location: [facility] OR [address] [place] [address];  Service: General;  Laterality: Right;  seed placement 11/14    SENTINEL LYMPH NODE BIOPSY Right 11/21/2018    Procedure: BIOPSY, LYMPH NODE, SENTINEL;  Surgeon: Jann Ayala MD;  Location: [facility] OR [address] [place] [address];  Service: General;  Laterality: Right;    THYROIDECTOMY      TUBAL LIGATION         Social History     Tobacco Use    Smoking status: Never Smoker    Smokeless tobacco: Never Used   Substance Use Topics    Alcohol use: Yes     Alcohol/week: 2.0 standard drinks     Types: 2 Cans of beer per week     Comment: occasionally    Drug use: No       Family  History   Problem Relation Age of Onset    Hypertension Mother     Heart attack Mother     Hypertension Father     Ovarian cancer Maternal Aunt     Cancer Paternal Grandfather     Breast cancer Neg Hx     Colon cancer Neg Hx        Outpatient Medications Marked as Taking for the 11/4/19 encounter (Hospital Encounter)   Medication Sig Dispense Refill    anastrozole (ARIMIDEX) 1 mg Tab Take 1 tablet (1 mg total) by mouth once daily. 90 tablet 1    ergocalciferol (ERGOCALCIFEROL) 50,000 unit Cap Take 50,000 Units by mouth once daily.       levothyroxine (SYNTHROID) 88 MCG tablet Take 1/2 1 day per week; 1 tab 6 days a week. Wait at least 4 hours after taking LT4 to take calcium. 90 tablet 3    metFORMIN (GLUCOPHAGE-XR) 500 MG 24 hr tablet Take 1 tablet (500 mg total) by mouth daily with breakfast. 90 tablet 3       Review of patient's allergies indicates:  No Known Allergies              Anesthesia/Surgery risks, benefits and alternative options discussed and understood by patient/family.      There are no hospital problems to display for this patient.

## 2019-11-05 VITALS
TEMPERATURE: 97 F | HEART RATE: 64 BPM | BODY MASS INDEX: 27.31 KG/M2 | DIASTOLIC BLOOD PRESSURE: 63 MMHG | SYSTOLIC BLOOD PRESSURE: 126 MMHG | OXYGEN SATURATION: 98 % | RESPIRATION RATE: 18 BRPM | HEIGHT: 64 IN | WEIGHT: 160 LBS

## 2019-11-05 PROCEDURE — 25000003 PHARM REV CODE 250: Performed by: OBSTETRICS & GYNECOLOGY

## 2019-11-05 PROCEDURE — 94761 N-INVAS EAR/PLS OXIMETRY MLT: CPT

## 2019-11-05 PROCEDURE — 99024 POSTOP FOLLOW-UP VISIT: CPT | Mod: ,,, | Performed by: ADVANCED PRACTICE MIDWIFE

## 2019-11-05 PROCEDURE — 63600175 PHARM REV CODE 636 W HCPCS: Performed by: OBSTETRICS & GYNECOLOGY

## 2019-11-05 PROCEDURE — A4216 STERILE WATER/SALINE, 10 ML: HCPCS | Performed by: OBSTETRICS & GYNECOLOGY

## 2019-11-05 PROCEDURE — 99024 PR POST-OP FOLLOW-UP VISIT: ICD-10-PCS | Mod: ,,, | Performed by: ADVANCED PRACTICE MIDWIFE

## 2019-11-05 PROCEDURE — G0378 HOSPITAL OBSERVATION PER HR: HCPCS

## 2019-11-05 PROCEDURE — 27000221 HC OXYGEN, UP TO 24 HOURS

## 2019-11-05 RX ORDER — OXYCODONE AND ACETAMINOPHEN 5; 325 MG/1; MG/1
1 TABLET ORAL EVERY 4 HOURS PRN
Qty: 20 TABLET | Refills: 0 | Status: SHIPPED | OUTPATIENT
Start: 2019-11-05 | End: 2020-06-09

## 2019-11-05 RX ORDER — OXYCODONE AND ACETAMINOPHEN 5; 325 MG/1; MG/1
1 TABLET ORAL EVERY 4 HOURS PRN
Qty: 20 TABLET | Refills: 0 | Status: SHIPPED | OUTPATIENT
Start: 2019-11-05 | End: 2019-11-05

## 2019-11-05 RX ADMIN — OXYCODONE HYDROCHLORIDE AND ACETAMINOPHEN 1 TABLET: 5; 325 TABLET ORAL at 12:11

## 2019-11-05 RX ADMIN — KETOROLAC TROMETHAMINE 15 MG: 15 INJECTION, SOLUTION INTRAMUSCULAR; INTRAVENOUS at 07:11

## 2019-11-05 RX ADMIN — Medication 10 ML: at 12:11

## 2019-11-05 RX ADMIN — ONDANSETRON 4 MG: 2 INJECTION INTRAMUSCULAR; INTRAVENOUS at 05:11

## 2019-11-05 RX ADMIN — OXYCODONE HYDROCHLORIDE AND ACETAMINOPHEN 1 TABLET: 5; 325 TABLET ORAL at 07:11

## 2019-11-05 NOTE — TRANSFER OF CARE
"Anesthesia Transfer of Care Note    Patient: Elham Rodas    Procedure(s) Performed: Procedure(s) (LRB):  HYSTERECTOMY, TOTAL, LAPAROSCOPIC (N/A)  SALPINGO-OOPHORECTOMY, LAPAROSCOPIC (Bilateral)    Patient location: PACU    Anesthesia Type: general    Transport from OR: Transported from OR on 6-10 L/min O2 by face mask with adequate spontaneous ventilation    Post pain: adequate analgesia    Post assessment: no apparent anesthetic complications and tolerated procedure well    Post vital signs: stable    Level of consciousness: sedated    Nausea/Vomiting: no nausea/vomiting    Complications: none    Transfer of care protocol was followed      Last vitals:   Visit Vitals  /70 (BP Location: Left arm, Patient Position: Lying)   Pulse 69   Temp 35.6 °C (96.1 °F) (Oral)   Resp 18   Ht 5' 4" (1.626 m)   Wt 72.6 kg (160 lb)   SpO2 96%   Breastfeeding? No   BMI 27.46 kg/m²     "

## 2019-11-05 NOTE — SUBJECTIVE & OBJECTIVE
Interval History: S/P TLH     Scheduled Meds:   sodium chloride 0.9%  3 mL Intravenous Q8H     Continuous Infusions:  PRN Meds:diphenhydrAMINE, ketorolac, ondansetron, oxyCODONE-acetaminophen, promethazine (PHENERGAN) IVPB, sodium chloride 0.9%    Review of patient's allergies indicates:  No Known Allergies    Objective:     Vital Signs (Most Recent):  Temp: 97.4 °F (36.3 °C) (11/05/19 0736)  Pulse: 64 (11/05/19 0736)  Resp: 18 (11/05/19 0736)  BP: 126/63 (11/05/19 0736)  SpO2: 98 % (11/05/19 0736) Vital Signs (24h Range):  Temp:  [96 °F (35.6 °C)-98.6 °F (37 °C)] 97.4 °F (36.3 °C)  Pulse:  [57-80] 64  Resp:  [16-19] 18  SpO2:  [90 %-100 %] 98 %  BP: (105-152)/(55-93) 126/63     Weight: 72.6 kg (160 lb)  Body mass index is 27.46 kg/m².  No LMP recorded. Patient has had an ablation.    I&O (Last 24H):    Intake/Output Summary (Last 24 hours) at 11/5/2019 0923  Last data filed at 11/5/2019 0800  Gross per 24 hour   Intake 1980 ml   Output 2350 ml   Net -370 ml       Physical Exam:   Constitutional: She is oriented to person, place, and time. She appears well-developed and well-nourished.    HENT:   Head: Normocephalic.    Eyes: Pupils are equal, round, and reactive to light.    Neck: Normal range of motion.    Cardiovascular: Normal rate.     Pulmonary/Chest: Effort normal.        Abdominal: Soft. She exhibits abdominal incision.   C/D/I WITH DERMABOND NOTED      Genitourinary: Vagina normal.   Genitourinary Comments: Small amount of vag spotting            Musculoskeletal: Normal range of motion.       Neurological: She is alert and oriented to person, place, and time.    Skin: Skin is warm and dry.    Psychiatric: She has a normal mood and affect. Her behavior is normal. Judgment and thought content normal.

## 2019-11-05 NOTE — PROGRESS NOTES
Pt is discharged instructions and education done. Awaiting Scripts from pharmacy to be put in and brought

## 2019-11-05 NOTE — DISCHARGE SUMMARY
[facility]  Obstetrics & Gynecology  Discharge Summary    Patient Name: Elham Rodas  MRN: [id number]  Admission Date: 11/4/2019  Hospital Length of Stay: 0 days  Discharge Date and Time:  11/05/2019 9:21 AM  Attending Physician: Yoni Oscar MD   Discharging Provider: Pearl Vivas CNM  Primary Care Provider: Mookie Rausch MD    HPI:  No notes on file    Hospital Course:  S/P TLH, DC to home     Procedure(s) (LRB):  HYSTERECTOMY, TOTAL, LAPAROSCOPIC (N/A)  SALPINGO-OOPHORECTOMY, LAPAROSCOPIC (Bilateral)             Pending Diagnostic Studies:     None        Final Active Diagnoses:    Diagnosis Date Noted POA    PRINCIPAL PROBLEM:  Malignant neoplasm of right female breast [C50.911] 11/05/2018 Yes      Problems Resolved During this Admission:        Discharged Condition: good    Disposition: Home or Self Care    Follow Up:  Follow-up Information     Yoni Oscar MD In 2 weeks.    Specialties:  Obstetrics, Obstetrics and Gynecology  Why:  post op follow up  appt  11/21 at 315  Contact information:  [place] [address]  [phone]             Follow up In 2 weeks.               Patient Instructions:      Notify your health care provider if you experience any of the following:  temperature >100.4     Notify your health care provider if you experience any of the following:  persistent nausea and vomiting or diarrhea     Notify your health care provider if you experience any of the following:  severe uncontrolled pain     Notify your health care provider if you experience any of the following:  difficulty breathing or increased cough     Medications:  Reconciled Home Medications:      Medication List      START taking these medications    oxyCODONE-acetaminophen 5-325 mg per tablet  Commonly known as:  PERCOCET  Take 1 tablet by mouth every 4 (four) hours as needed (moderate pain 2-5/10 pain scale).        CONTINUE taking these medications    anastrozole 1 mg  Tab  Commonly known as:  ARIMIDEX  Take 1 tablet (1 mg total) by mouth once daily.     calcium carbonate 200 mg calcium (500 mg) chewable tablet  Commonly known as:  TUMS  Take 1 tablet by mouth.     ergocalciferol 50,000 unit Cap  Commonly known as:  ERGOCALCIFEROL  Take 50,000 Units by mouth once daily.     fluticasone propionate 50 mcg/actuation nasal spray  Commonly known as:  FLONASE  2 sprays (100 mcg total) by Each Nare route once daily.     levothyroxine 88 MCG tablet  Commonly known as:  SYNTHROID  Take 1/2 1 day per week; 1 tab 6 days a week. Wait at least 4 hours after taking LT4 to take calcium.     metFORMIN 500 MG 24 hr tablet  Commonly known as:  GLUCOPHAGE-XR  Take 1 tablet (500 mg total) by mouth daily with breakfast.     oxyCODONE 5 MG immediate release tablet  Commonly known as:  ROXICODONE  Take 1 tablet (5 mg total) by mouth every 8 (eight) hours as needed for Pain.     pantoprazole 40 MG tablet  Commonly known as:  PROTONIX  pantoprazole 40 mg tablet,delayed release   Take 1 tablet every day by oral route.     parathyroid hormone 75 mcg/dose Crtg  Commonly known as:  NATPARA  Inject 75 mcg into the skin once daily.     promethazine 25 MG tablet  Commonly known as:  PHENERGAN  promethazine 25 mg tablet     traZODone 50 MG tablet  Commonly known as:  DESYREL  Take 1 tablet (50 mg total) by mouth nightly as needed for Insomnia.            Pearl Vivas CNM  Obstetrics & Gynecology  [facility]

## 2019-11-05 NOTE — ANESTHESIA POSTPROCEDURE EVALUATION
Anesthesia Post Evaluation    Patient: Elham Rodas    Procedure(s) Performed: Procedure(s) (LRB):  HYSTERECTOMY, TOTAL, LAPAROSCOPIC (N/A)  SALPINGO-OOPHORECTOMY, LAPAROSCOPIC (Bilateral)    Final Anesthesia Type: general  Patient location during evaluation: PACU  Patient participation: Yes- Able to Participate  Level of consciousness: awake and alert, oriented and awake  Post-procedure vital signs: reviewed and stable  Pain management: adequate  Airway patency: patent  PONV status at discharge: No PONV  Anesthetic complications: no      Cardiovascular status: blood pressure returned to baseline  Respiratory status: unassisted, spontaneous ventilation and room air  Hydration status: euvolemic  Follow-up not needed.  Comments: [facility]          Vitals Value Taken Time   /70 11/4/2019  6:05 PM   Temp 35.6 °C (96.1 °F) 11/4/2019  6:05 PM   Pulse 69 11/4/2019  6:05 PM   Resp 18 11/4/2019  6:05 PM   SpO2 96 % 11/4/2019  6:05 PM         Event Time     Out of Recovery 17:55:09          Pain/Solis Score: Solis Score: 9 (11/4/2019  5:40 PM)

## 2019-11-05 NOTE — PROGRESS NOTES
[facility]  Obstetrics & Gynecology  Progress Note    Patient Name: Elham Rodas  MRN: [id number]  Admission Date: 11/4/2019  Primary Care Provider: Mookie Rausch MD  Principal Problem: Malignant neoplasm of right female breast    Subjective:     HPI:  History of Present Illness  Patient presents for preop for T LH and BSO.Patient has a personal history of breast cancer which is estrogen receptor positive.  Patient's oncologist is recommending bilateral oophorectomy.  Patient was counseled extensively on removal of her ovaries.  Patient has not had a menstrual cycle since starting her chemotherapy.  She understands that removal of her ovaries will put her into menopause permanently and the likelihood of being able to treat with hormone replacement therapy afterward is unfavorable.  Patient was for a counseled that there would be no purpose in leaving the tubes or uterus in place.  Patient would like removal of uterus tubes and ovaries.  Patient recently reported some blood sugar management but is otherwise without any health problems.  Patient's only previous surgery was a diagnostic laparoscopy and a laparoscopic tubal ligation    Interval History: S/P TLH     Scheduled Meds:   sodium chloride 0.9%  3 mL Intravenous Q8H     Continuous Infusions:  PRN Meds:diphenhydrAMINE, ketorolac, ondansetron, oxyCODONE-acetaminophen, promethazine (PHENERGAN) IVPB, sodium chloride 0.9%    Review of patient's allergies indicates:  No Known Allergies    Objective:     Vital Signs (Most Recent):  Temp: 97.4 °F (36.3 °C) (11/05/19 0736)  Pulse: 64 (11/05/19 0736)  Resp: 18 (11/05/19 0736)  BP: 126/63 (11/05/19 0736)  SpO2: 98 % (11/05/19 0736) Vital Signs (24h Range):  Temp:  [96 °F (35.6 °C)-98.6 °F (37 °C)] 97.4 °F (36.3 °C)  Pulse:  [57-80] 64  Resp:  [16-19] 18  SpO2:  [90 %-100 %] 98 %  BP: (105-152)/(55-93) 126/63     Weight: 72.6 kg (160 lb)  Body mass index is 27.46 kg/m².  No LMP recorded. Patient has  had an ablation.    I&O (Last 24H):    Intake/Output Summary (Last 24 hours) at 11/5/2019 0923  Last data filed at 11/5/2019 0800  Gross per 24 hour   Intake 1980 ml   Output 2350 ml   Net -370 ml       Physical Exam:   Constitutional: She is oriented to person, place, and time. She appears well-developed and well-nourished.    HENT:   Head: Normocephalic.    Eyes: Pupils are equal, round, and reactive to light.    Neck: Normal range of motion.    Cardiovascular: Normal rate.     Pulmonary/Chest: Effort normal.        Abdominal: Soft. She exhibits abdominal incision.   C/D/I WITH DERMABOND NOTED      Genitourinary: Vagina normal.   Genitourinary Comments: Small amount of vag spotting            Musculoskeletal: Normal range of motion.       Neurological: She is alert and oriented to person, place, and time.    Skin: Skin is warm and dry.    Psychiatric: She has a normal mood and affect. Her behavior is normal. Judgment and thought content normal.           Assessment/Plan:     Will DC today with FU in 2 weeks with Dr Dayne Vivas, TAHIR  Obstetrics & Gynecology  [facility]

## 2019-11-05 NOTE — PLAN OF CARE
A/ox3, Fall Risk discussed with patient voices understanding significant other at bedside, ABD lap sites intact RAZA,Tolerating po meals without difficulty, Dangled at bedside, Mondragon catheter still in will remove in Ms. the a.m. Pain controlled with medication.

## 2019-11-13 ENCOUNTER — OFFICE VISIT (OUTPATIENT)
Dept: HEMATOLOGY/ONCOLOGY | Facility: CLINIC | Age: 39
End: 2019-11-13
Payer: MEDICAID

## 2019-11-13 VITALS
HEIGHT: 64 IN | BODY MASS INDEX: 28.12 KG/M2 | TEMPERATURE: 99 F | WEIGHT: 164.69 LBS | DIASTOLIC BLOOD PRESSURE: 76 MMHG | HEART RATE: 87 BPM | RESPIRATION RATE: 16 BRPM | SYSTOLIC BLOOD PRESSURE: 134 MMHG | OXYGEN SATURATION: 99 %

## 2019-11-13 DIAGNOSIS — Z17.0 CARCINOMA OF AXILLARY TAIL OF RIGHT BREAST IN FEMALE, ESTROGEN RECEPTOR POSITIVE: Primary | ICD-10-CM

## 2019-11-13 DIAGNOSIS — Z79.811 USE OF AROMATASE INHIBITORS: ICD-10-CM

## 2019-11-13 DIAGNOSIS — C50.611 CARCINOMA OF AXILLARY TAIL OF RIGHT BREAST IN FEMALE, ESTROGEN RECEPTOR POSITIVE: Primary | ICD-10-CM

## 2019-11-13 PROCEDURE — 99214 OFFICE O/P EST MOD 30 MIN: CPT | Mod: PBBFAC | Performed by: INTERNAL MEDICINE

## 2019-11-13 PROCEDURE — 99999 PR PBB SHADOW E&M-EST. PATIENT-LVL IV: CPT | Mod: PBBFAC,,, | Performed by: INTERNAL MEDICINE

## 2019-11-13 PROCEDURE — 99213 PR OFFICE/OUTPT VISIT, EST, LEVL III, 20-29 MIN: ICD-10-PCS | Mod: S$PBB,,, | Performed by: INTERNAL MEDICINE

## 2019-11-13 PROCEDURE — 99213 OFFICE O/P EST LOW 20 MIN: CPT | Mod: S$PBB,,, | Performed by: INTERNAL MEDICINE

## 2019-11-13 PROCEDURE — 99999 PR PBB SHADOW E&M-EST. PATIENT-LVL IV: ICD-10-PCS | Mod: PBBFAC,,, | Performed by: INTERNAL MEDICINE

## 2019-11-13 RX ORDER — ACETAMINOPHEN 500 MG
TABLET ORAL
COMMUNITY
End: 2021-01-04 | Stop reason: SDUPTHER

## 2019-11-13 NOTE — PROGRESS NOTES
Subjective:       Patient ID: Elham Rodas is a 39 y.o. female.    Chief Complaint: No chief complaint on file.    HPI   Mrs. Rodas returns today for follow up.  She has completed four cycles of dose dense adjuvant taxol, receoived XRT, and has been on zoladex plus anastrazole.  Last week she underwent a laparoscopic hysterectomy and bilateral oophorectomies.       Briefly, she is a 39-year-old premenopausal female who was diagnosed last year with an invasive mammary carcinoma that was ER positive, AL positive, and HER-2 negative.  On 11/21/2018, she underwent a right lumpectomy with sentinel lymph node biopsy.  The pathology report from the procedure indicates that she had a positive sentinel lymph node with a metastatic deposit of 6 mm.  There was no extranodal extension.  On the right partial mastectomy specimen, she had an invasive lobular carcinoma that was 14 mm in maximum diameter.  It was grade II and resection margins were clear.    She completed her adjuvant chemotherapy and her adjuvant XRT, and she is now on Arimidex.    Her DXA scan had shown normal bone density.      Review of Systems      Overall she feels OK.  She is still sore from her laparoscopic procedure last week.  She complains of frequent hot flashes but she denies any depression, easy bruising, fevers, chills, night  sweats, weight loss, nausea, vomiting, diarrhea, constipation, diplopia, blurred vision, headache, chest pain, palpitations, shortness of breath, left breast pain, abdominal pain, extremity pain, or difficulty ambulating.  The remainder of the ten-point ROS, including general, skin, lymph, H/N, cardiorespiratory, GI, , Neuro, Endocrine, and psychiatric is negative.     Objective:      Physical Exam        She is alert, oriented to time, place, person, pleasant, well      nourished, in no acute physical distress.                               VITAL SIGNS:  Reviewed                                      HEENT:  Normal.  There  are no nasal, oral, lip, gingival, auricular, lid,    or conjunctival lesions.  Mucosae are moist and pink, and there is no        thrush.  Pupils are equal, reactive to light and accommodation.              Extraocular muscle movements are intact.  Dentition is good.  There is no frontal or maxillary tenderness.                                     NECK:  Supple without JVD, adenopathy, or thyromegaly.                       LUNGS:  Clear to auscultation without wheezing, rales, or ronchi.           CARDIOVASCULAR:  Reveals an S1, S2, no murmurs, no rubs, no gallops.         ABDOMEN:  Soft, slightly tender, without organomegaly. Three scars from her laparoscopic procedure are seen.  Bowel sounds are present.                                                                     EXTREMITIES:  No cyanosis, clubbing, or edema.                               BREASTS:  She is status post right lumpectomy with a well-healed incision in the lower outer quadrant of her right breast.    There are no masses in either breast.   The right nipple remains retracted.    A sentinel node biopsy scar is seen in the right axilla.  It is also well healed.                                  LYMPHATIC:  There is no cervical, axillary, or supraclavicular adenopathy.   SKIN:  Warm and moist, without petechiae, rashes, induration, or ecchymoses.           NEUROLOGIC:  DTRs are 0-1+ bilaterally, symmetrical, motor function is 5/5,  and cranial nerves are  within normal limits.    Assessment:       1. Carcinoma of axillary tail of right breast in female, estrogen receptor positive    2. Use of aromatase inhibitors     3/      S/p recent laparoscopic oophorectomies and hysterectomy.  Plan:         I had a long discussion with her.  She will remain on anastrazole for 5 years, through August 2024.  She will have her yearly mammogram next week, and see me in three months.  Her multiple questions were answered to her satisfaction.

## 2019-11-20 ENCOUNTER — HOSPITAL ENCOUNTER (OUTPATIENT)
Dept: RADIOLOGY | Facility: HOSPITAL | Age: 39
Discharge: HOME | End: 2019-11-20
Attending: INTERNAL MEDICINE
Payer: MEDICAID

## 2019-11-20 DIAGNOSIS — C50.611 CARCINOMA OF AXILLARY TAIL OF RIGHT BREAST IN FEMALE, ESTROGEN RECEPTOR POSITIVE: ICD-10-CM

## 2019-11-20 DIAGNOSIS — Z17.0 CARCINOMA OF AXILLARY TAIL OF RIGHT BREAST IN FEMALE, ESTROGEN RECEPTOR POSITIVE: ICD-10-CM

## 2019-11-20 PROCEDURE — 77066 DX MAMMO INCL CAD BI: CPT | Mod: 26,,, | Performed by: RADIOLOGY

## 2019-11-20 PROCEDURE — 77066 MAMMO DIGITAL DIAGNOSTIC BILAT WITH TOMOSYNTHESIS_CAD: ICD-10-PCS | Mod: 26,,, | Performed by: RADIOLOGY

## 2019-11-20 PROCEDURE — 77062 BREAST TOMOSYNTHESIS BI: CPT | Mod: 26,,, | Performed by: RADIOLOGY

## 2019-11-20 PROCEDURE — 77062 BREAST TOMOSYNTHESIS BI: CPT | Mod: TC,PO

## 2019-11-20 PROCEDURE — 77062 MAMMO DIGITAL DIAGNOSTIC BILAT WITH TOMOSYNTHESIS_CAD: ICD-10-PCS | Mod: 26,,, | Performed by: RADIOLOGY

## 2019-11-21 ENCOUNTER — OFFICE VISIT (OUTPATIENT)
Dept: OBSTETRICS AND GYNECOLOGY | Facility: CLINIC | Age: 39
End: 2019-11-21
Payer: MEDICAID

## 2019-11-21 VITALS
BODY MASS INDEX: 27.83 KG/M2 | SYSTOLIC BLOOD PRESSURE: 108 MMHG | HEART RATE: 80 BPM | DIASTOLIC BLOOD PRESSURE: 78 MMHG | HEIGHT: 64 IN | WEIGHT: 163 LBS | RESPIRATION RATE: 16 BRPM

## 2019-11-21 DIAGNOSIS — Z09 POSTOPERATIVE FOLLOW-UP: Primary | ICD-10-CM

## 2019-11-21 PROCEDURE — 99024 PR POST-OP FOLLOW-UP VISIT: ICD-10-PCS | Mod: ,,, | Performed by: OBSTETRICS & GYNECOLOGY

## 2019-11-21 PROCEDURE — 99024 POSTOP FOLLOW-UP VISIT: CPT | Mod: ,,, | Performed by: OBSTETRICS & GYNECOLOGY

## 2019-11-21 PROCEDURE — 99999 PR PBB SHADOW E&M-EST. PATIENT-LVL III: ICD-10-PCS | Mod: PBBFAC,,, | Performed by: OBSTETRICS & GYNECOLOGY

## 2019-11-21 PROCEDURE — 99213 OFFICE O/P EST LOW 20 MIN: CPT | Mod: PBBFAC | Performed by: OBSTETRICS & GYNECOLOGY

## 2019-11-21 PROCEDURE — 99999 PR PBB SHADOW E&M-EST. PATIENT-LVL III: CPT | Mod: PBBFAC,,, | Performed by: OBSTETRICS & GYNECOLOGY

## 2019-11-21 NOTE — PROGRESS NOTES
Subjective:       Patient ID: Elham Rodas is a 39 y.o. female.    Chief Complaint:  Post-op Evaluation ([facility] 19)      History of Present Illness  Patient presents for postoperative follow-up from [facility] and BSO done 2 weeks ago.  Patient states she has had no bleeding and no pain. 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  She states she has a good appetite is voiding well. Counseling was done.  Patient advised not to have intercourse for 6 full weeks.    Menstrual History:  OB History        4    Para   4    Term   4            AB        Living   4       SAB        TAB        Ectopic        Multiple        Live Births                    Menarche age:  No LMP recorded. Patient has had an ablation.         Review of Systems  Review of Systems   Constitutional: Negative for activity change, appetite change, chills, diaphoresis, fatigue, fever and unexpected weight change.   HENT: Negative for congestion, dental problem, drooling, ear discharge, ear pain, facial swelling, hearing loss, mouth sores, nosebleeds, postnasal drip, rhinorrhea, sinus pressure, sneezing, sore throat, tinnitus, trouble swallowing and voice change.    Eyes: Negative for photophobia, pain, discharge, redness, itching and visual disturbance.   Respiratory: Negative for apnea, cough, choking, chest tightness, shortness of breath, wheezing and stridor.    Cardiovascular: Negative for chest pain, palpitations and leg swelling.   Gastrointestinal: Negative for abdominal distention, abdominal pain, anal bleeding, blood in stool, constipation, diarrhea, nausea, rectal pain and vomiting.   Endocrine: Negative for cold intolerance, heat intolerance, polydipsia, polyphagia and polyuria.   Genitourinary: Negative for decreased urine volume, difficulty urinating, dyspareunia, dysuria, enuresis, flank pain, frequency, genital sores, hematuria, menstrual problem, pelvic pain, urgency, vaginal bleeding, vaginal discharge and vaginal  pain.   Musculoskeletal: Negative for arthralgias, back pain, gait problem, joint swelling, myalgias, neck pain and neck stiffness.   Skin: Negative for color change, pallor, rash and wound.   Allergic/Immunologic: Negative for environmental allergies, food allergies and immunocompromised state.   Neurological: Negative for dizziness, tremors, seizures, syncope, facial asymmetry, speech difficulty, weakness, light-headedness, numbness and headaches.   Hematological: Negative for adenopathy. Does not bruise/bleed easily.   Psychiatric/Behavioral: Negative for agitation, behavioral problems, confusion, decreased concentration, dysphoric mood, hallucinations, self-injury, sleep disturbance and suicidal ideas. The patient is not nervous/anxious and is not hyperactive.            Objective:      Physical Exam   Nursing note and vitals reviewed.      incisions clean dry and intact.  Abdomen soft nontender     Assessment:        1. Postoperative follow-up                Plan:         Elham was seen today for post-op evaluation.    Diagnoses and all orders for this visit:    Postoperative follow-up

## 2020-02-09 ENCOUNTER — OFFICE VISIT (OUTPATIENT)
Dept: URGENT CARE | Facility: CLINIC | Age: 40
End: 2020-02-09
Payer: MEDICAID

## 2020-02-09 VITALS
OXYGEN SATURATION: 100 % | HEIGHT: 64 IN | BODY MASS INDEX: 27.31 KG/M2 | WEIGHT: 160 LBS | DIASTOLIC BLOOD PRESSURE: 88 MMHG | HEART RATE: 82 BPM | RESPIRATION RATE: 18 BRPM | SYSTOLIC BLOOD PRESSURE: 118 MMHG | TEMPERATURE: 97 F

## 2020-02-09 DIAGNOSIS — J40 BRONCHITIS: Primary | ICD-10-CM

## 2020-02-09 DIAGNOSIS — R05.9 COUGH: ICD-10-CM

## 2020-02-09 PROCEDURE — 99214 OFFICE O/P EST MOD 30 MIN: CPT | Mod: S$GLB,,, | Performed by: FAMILY MEDICINE

## 2020-02-09 PROCEDURE — 99214 PR OFFICE/OUTPT VISIT, EST, LEVL IV, 30-39 MIN: ICD-10-PCS | Mod: S$GLB,,, | Performed by: FAMILY MEDICINE

## 2020-02-09 RX ORDER — PROMETHAZINE HYDROCHLORIDE AND DEXTROMETHORPHAN HYDROBROMIDE 6.25; 15 MG/5ML; MG/5ML
5 SYRUP ORAL 3 TIMES DAILY PRN
Qty: 180 ML | Refills: 0 | Status: SHIPPED | OUTPATIENT
Start: 2020-02-09 | End: 2020-02-19

## 2020-02-09 RX ORDER — AZITHROMYCIN 250 MG/1
TABLET, FILM COATED ORAL
Qty: 6 TABLET | Refills: 0 | Status: SHIPPED | OUTPATIENT
Start: 2020-02-09 | End: 2020-06-09 | Stop reason: ALTCHOICE

## 2020-02-09 NOTE — PROGRESS NOTES
"Subjective:       Patient ID: Elham Rodas is a 39 y.o. female.    Vitals:  height is 5' 4" (1.626 m) and weight is 72.6 kg (160 lb). Her temperature is 97 °F (36.1 °C). Her blood pressure is 118/88 and her pulse is 82. Her respiration is 18 and oxygen saturation is 100%.     Chief Complaint: Cough    39 years old female came with complaint of cough and sinus congestion for 10 days, symptoms progressively getting worse over last 3 to 4 days.  Denies fever, chills, chest pain, shortness of breath, weakness, headache.    Cough   This is a new problem. The current episode started 1 to 4 weeks ago (4 weeks). The problem has been gradually worsening. The problem occurs constantly. The cough is non-productive. Associated symptoms include shortness of breath. Pertinent negatives include no chills, ear pain, eye redness, fever, hemoptysis, myalgias, rash, sore throat or wheezing. Nothing aggravates the symptoms. She has tried OTC cough suppressant for the symptoms. The treatment provided no relief. There is no history of asthma, bronchitis or pneumonia.       Constitution: Negative for chills, sweating, fatigue and fever.   HENT: Negative for ear pain, congestion, sinus pain, sinus pressure, sore throat and voice change.    Neck: Negative for painful lymph nodes.   Eyes: Negative for eye redness.   Respiratory: Positive for chest tightness, cough and shortness of breath. Negative for sputum production, bloody sputum, COPD, stridor, wheezing and asthma.    Gastrointestinal: Negative for nausea and vomiting.   Musculoskeletal: Negative for muscle ache.   Skin: Negative for rash.   Allergic/Immunologic: Negative for seasonal allergies and asthma.   Hematologic/Lymphatic: Negative for swollen lymph nodes.       Objective:      Physical Exam   Constitutional: She is oriented to person, place, and time. She appears well-developed and well-nourished. She is cooperative.  Non-toxic appearance. She does not appear ill. No " distress.   HENT:   Head: Normocephalic and atraumatic.   Right Ear: Hearing, tympanic membrane, external ear and ear canal normal.   Left Ear: Hearing, tympanic membrane, external ear and ear canal normal.   Nose: Nose normal. No mucosal edema, rhinorrhea or nasal deformity. No epistaxis. Right sinus exhibits no maxillary sinus tenderness and no frontal sinus tenderness. Left sinus exhibits no maxillary sinus tenderness and no frontal sinus tenderness.   Mouth/Throat: Uvula is midline and mucous membranes are normal. No trismus in the jaw. Normal dentition. No uvula swelling. No posterior oropharyngeal erythema.   Positive mild pharyngeal erythema without tonsillar swelling or exudates.  No cervical lymph node swelling or tenderness. No sinus tenderness.  TMs are clear.   Eyes: Conjunctivae and lids are normal. Right eye exhibits no discharge. Left eye exhibits no discharge. No scleral icterus.   Neck: Trachea normal, normal range of motion, full passive range of motion without pain and phonation normal. Neck supple.   Cardiovascular: Normal rate, regular rhythm, normal heart sounds, intact distal pulses and normal pulses.   Pulmonary/Chest: Effort normal and breath sounds normal. No stridor. No respiratory distress. She has no wheezes. She has no rales. She exhibits no tenderness.   Abdominal: Soft. Normal appearance and bowel sounds are normal. She exhibits no distension, no pulsatile midline mass and no mass. There is no tenderness.   Musculoskeletal: Normal range of motion. She exhibits no edema or deformity.   Neurological: She is alert and oriented to person, place, and time. She exhibits normal muscle tone. Coordination normal.   Skin: Skin is warm, dry, intact, not diaphoretic and not pale.   Psychiatric: She has a normal mood and affect. Her speech is normal and behavior is normal. Judgment and thought content normal. Cognition and memory are normal.   Nursing note and vitals reviewed.        Assessment:        1. Bronchitis    2. Cough        Plan:         Bronchitis    Cough    Other orders  -     azithromycin (ZITHROMAX Z-LAKESHIA) 250 MG tablet; Take 2 tablets (500 mg) on  Day 1,  followed by 1 tablet (250 mg) once daily on Days 2 through 5.  Dispense: 6 tablet; Refill: 0  -     promethazine-dextromethorphan (PROMETHAZINE-DM) 6.25-15 mg/5 mL Syrp; Take 5 mLs by mouth 3 (three) times daily as needed (cough).  Dispense: 180 mL; Refill: 0        PLEASE READ YOUR DISCHARGE INSTRUCTIONS ENTIRELY AS IT CONTAINS IMPORTANT INFORMATION.    Please return here or go to the Emergency Department for any concerns or worsening of condition.    If you were prescribed antibiotics, please take them to completion    If not allergic, please take over the counter Tylenol (Acetaminophen) and/or Motrin (Ibuprofen) as directed for control of pain and/or fever.  Please follow up with your primary care doctor or specialist as needed.    If you smoke, please stop smoking.    Please return or see your primary care doctor if you develop new or worsening symptoms.     Please arrange follow up with your primary medical clinic as soon as possible. You must understand that you've received an Urgent Care treatment only and that you may be released before all of your medical problems are known or treated. You, the patient, will arrange for follow up as instructed. If your symptoms worsen or fail to improve you should go to the Emergency Room.  Bronchitis, Antibiotic Treatment (Adult)    Bronchitis is an infection of the air passages (bronchial tubes) in your lungs. It often occurs when you have a cold. This illness is contagious during the first few days and is spread through the air by coughing and sneezing, or by direct contact (touching the sick person and then touching your own eyes, nose, or mouth).  Symptoms of bronchitis include cough with mucus (phlegm) and low-grade fever. Bronchitis usually lasts 7 to 14 days. Mild cases can be treated with  simple home remedies. More severe infection is treated with an antibiotic.  Home care  Follow these guidelines when caring for yourself at home:  · If your symptoms are severe, rest at home for the first 2 to 3 days. When you go back to your usual activities, don't let yourself get too tired.  · Do not smoke. Also avoid being exposed to secondhand smoke.  · You may use over-the-counter medicines to control fever or pain, unless another medicine was prescribed. (Note: If you have chronic liver or kidney disease or have ever had a stomach ulcer or gastrointestinal bleeding, talk with your healthcare provider before using these medicines. Also talk to your provider if you are taking medicine to prevent blood clots.) Aspirin should never be given to anyone younger than 18 years of age who is ill with a viral infection or fever. It may cause severe liver or brain damage.  · Your appetite may be poor, so a light diet is fine. Avoid dehydration by drinking 6 to 8 glasses of fluids per day (such as water, soft drinks, sports drinks, juices, tea, or soup). Extra fluids will help loosen secretions in the nose and lungs.  · Over-the-counter cough, cold, and sore-throat medicines will not shorten the length of the illness, but they may be helpful to reduce symptoms. (Note: Do not use decongestants if you have high blood pressure.)  · Finish all antibiotic medicine. Do this even if you are feeling better after only a few days.  Follow-up care  Follow up with your healthcare provider, or as advised. If you had an X-ray or ECG (electrocardiogram), a specialist will review it. You will be notified of any new findings that may affect your care.  Note: If you are age 65 or older, or if you have a chronic lung disease or condition that affects your immune system, or you smoke, talk to your healthcare provider about having pneumococcal vaccinations and a yearly influenza vaccination (flu shot).  When to seek medical advice  Call your  healthcare provider right away if any of these occur:  · Fever of 100.4°F (38°C) or higher  · Coughing up increased amounts of colored sputum  · Weakness, drowsiness, headache, facial pain, ear pain, or a stiff neck  Call 911, or get immediate medical care  Contact emergency services right away if any of these occur.  · Coughing up blood  · Worsening weakness, drowsiness, headache, or stiff neck  · Trouble breathing, wheezing, or pain with breathing  Date Last Reviewed: 9/13/2015 © 2000-2017 [employer]. [address] [place] [id number]. All rights reserved. This information is not intended as a substitute for professional medical care. Always follow your healthcare professional's instructions.

## 2020-02-09 NOTE — PATIENT INSTRUCTIONS
PLEASE READ YOUR DISCHARGE INSTRUCTIONS ENTIRELY AS IT CONTAINS IMPORTANT INFORMATION.    Please return here or go to the Emergency Department for any concerns or worsening of condition.    If you were prescribed antibiotics, please take them to completion    If not allergic, please take over the counter Tylenol (Acetaminophen) and/or Motrin (Ibuprofen) as directed for control of pain and/or fever.  Please follow up with your primary care doctor or specialist as needed.    If you smoke, please stop smoking.    Please return or see your primary care doctor if you develop new or worsening symptoms.     Please arrange follow up with your primary medical clinic as soon as possible. You must understand that you've received an Urgent Care treatment only and that you may be released before all of your medical problems are known or treated. You, the patient, will arrange for follow up as instructed. If your symptoms worsen or fail to improve you should go to the Emergency Room.  Bronchitis, Antibiotic Treatment (Adult)    Bronchitis is an infection of the air passages (bronchial tubes) in your lungs. It often occurs when you have a cold. This illness is contagious during the first few days and is spread through the air by coughing and sneezing, or by direct contact (touching the sick person and then touching your own eyes, nose, or mouth).  Symptoms of bronchitis include cough with mucus (phlegm) and low-grade fever. Bronchitis usually lasts 7 to 14 days. Mild cases can be treated with simple home remedies. More severe infection is treated with an antibiotic.  Home care  Follow these guidelines when caring for yourself at home:  · If your symptoms are severe, rest at home for the first 2 to 3 days. When you go back to your usual activities, don't let yourself get too tired.  · Do not smoke. Also avoid being exposed to secondhand smoke.  · You may use over-the-counter medicines to control fever or pain, unless another  medicine was prescribed. (Note: If you have chronic liver or kidney disease or have ever had a stomach ulcer or gastrointestinal bleeding, talk with your healthcare provider before using these medicines. Also talk to your provider if you are taking medicine to prevent blood clots.) Aspirin should never be given to anyone younger than 18 years of age who is ill with a viral infection or fever. It may cause severe liver or brain damage.  · Your appetite may be poor, so a light diet is fine. Avoid dehydration by drinking 6 to 8 glasses of fluids per day (such as water, soft drinks, sports drinks, juices, tea, or soup). Extra fluids will help loosen secretions in the nose and lungs.  · Over-the-counter cough, cold, and sore-throat medicines will not shorten the length of the illness, but they may be helpful to reduce symptoms. (Note: Do not use decongestants if you have high blood pressure.)  · Finish all antibiotic medicine. Do this even if you are feeling better after only a few days.  Follow-up care  Follow up with your healthcare provider, or as advised. If you had an X-ray or ECG (electrocardiogram), a specialist will review it. You will be notified of any new findings that may affect your care.  Note: If you are age 65 or older, or if you have a chronic lung disease or condition that affects your immune system, or you smoke, talk to your healthcare provider about having pneumococcal vaccinations and a yearly influenza vaccination (flu shot).  When to seek medical advice  Call your healthcare provider right away if any of these occur:  · Fever of 100.4°F (38°C) or higher  · Coughing up increased amounts of colored sputum  · Weakness, drowsiness, headache, facial pain, ear pain, or a stiff neck  Call 911, or get immediate medical care  Contact emergency services right away if any of these occur.  · Coughing up blood  · Worsening weakness, drowsiness, headache, or stiff neck  · Trouble breathing, wheezing, or pain  with breathing  Date Last Reviewed: 9/13/2015  © 7861-2344 [employer]. [address] [place] [address], [place] [address]. All rights reserved. This information is not intended as a substitute for professional medical care. Always follow your healthcare professional's instructions.

## 2020-02-12 ENCOUNTER — OFFICE VISIT (OUTPATIENT)
Dept: HEMATOLOGY/ONCOLOGY | Facility: CLINIC | Age: 40
End: 2020-02-12
Payer: MEDICAID

## 2020-02-12 VITALS
HEIGHT: 64 IN | OXYGEN SATURATION: 97 % | BODY MASS INDEX: 28.19 KG/M2 | SYSTOLIC BLOOD PRESSURE: 127 MMHG | TEMPERATURE: 99 F | RESPIRATION RATE: 18 BRPM | DIASTOLIC BLOOD PRESSURE: 90 MMHG | HEART RATE: 87 BPM | WEIGHT: 165.13 LBS

## 2020-02-12 DIAGNOSIS — Z79.811 USE OF AROMATASE INHIBITORS: ICD-10-CM

## 2020-02-12 DIAGNOSIS — C50.611 CARCINOMA OF AXILLARY TAIL OF RIGHT BREAST IN FEMALE, ESTROGEN RECEPTOR POSITIVE: Primary | ICD-10-CM

## 2020-02-12 DIAGNOSIS — Z17.0 CARCINOMA OF AXILLARY TAIL OF RIGHT BREAST IN FEMALE, ESTROGEN RECEPTOR POSITIVE: Primary | ICD-10-CM

## 2020-02-12 PROCEDURE — 99213 OFFICE O/P EST LOW 20 MIN: CPT | Mod: S$PBB,,, | Performed by: INTERNAL MEDICINE

## 2020-02-12 PROCEDURE — 99214 OFFICE O/P EST MOD 30 MIN: CPT | Mod: PBBFAC | Performed by: INTERNAL MEDICINE

## 2020-02-12 PROCEDURE — 99999 PR PBB SHADOW E&M-EST. PATIENT-LVL IV: CPT | Mod: PBBFAC,,, | Performed by: INTERNAL MEDICINE

## 2020-02-12 PROCEDURE — 99213 PR OFFICE/OUTPT VISIT, EST, LEVL III, 20-29 MIN: ICD-10-PCS | Mod: S$PBB,,, | Performed by: INTERNAL MEDICINE

## 2020-02-12 PROCEDURE — 99999 PR PBB SHADOW E&M-EST. PATIENT-LVL IV: ICD-10-PCS | Mod: PBBFAC,,, | Performed by: INTERNAL MEDICINE

## 2020-02-12 NOTE — PROGRESS NOTES
Subjective:       Patient ID: Elham Rodas is a 39 y.o. female.    Chief Complaint: No chief complaint on file.    HPI   Mrs. Rodas returns today for follow up.  She has completed four cycles of dose dense adjuvant taxol, receoived XRT, and has been on zoladex plus anastrazole.  Last week she underwent a laparoscopic hysterectomy and bilateral oophorectomies.       Briefly, she is a 39-year-old premenopausal female who was diagnosed last year with an invasive mammary carcinoma that was ER positive, [place] positive, and HER-2 negative.  On 11/21/2018, she underwent a right lumpectomy with sentinel lymph node biopsy.  The pathology report from the procedure indicates that she had a positive sentinel lymph node with a metastatic deposit of 6 mm.  There was no extranodal extension.  On the right partial mastectomy specimen, she had an invasive lobular carcinoma that was 14 mm in maximum diameter.  It was grade II and resection margins were clear.    She completed her adjuvant chemotherapy and her adjuvant XRT, and she is now on Arimidex.    Her DXA scan had shown normal bone density.  Her mammogram in November 2019 was BI-RADS 2 and a 1 year follow-up was recommended.      Review of Systems      Overall she feels OK.   She complains of frequent hot flashes but she denies any depression, easy bruising, fevers, chills, night  sweats, weight loss, nausea, vomiting, diarrhea, constipation, diplopia, blurred vision, headache, chest pain, palpitations, shortness of breath, left breast pain, abdominal pain, extremity pain, or difficulty ambulating.  The remainder of the ten-point ROS, including general, skin, lymph, H/N, cardiorespiratory, GI, , Neuro, Endocrine, and psychiatric is negative.     Objective:      Physical Exam        She is alert, oriented to time, place, person, pleasant, well      nourished, in no acute physical distress.                               VITAL SIGNS:  Reviewed                                       HEENT:  Normal.  There are no nasal, oral, lip, gingival, auricular, lid,    or conjunctival lesions.  Mucosae are moist and pink, and there is no        thrush.  Pupils are equal, reactive to light and accommodation.              Extraocular muscle movements are intact.  Dentition is good.  There is no frontal or maxillary tenderness.                                     NECK:  Supple without JVD, adenopathy, or thyromegaly.                       LUNGS:  Clear to auscultation without wheezing, rales, or ronchi.           CARDIOVASCULAR:  Reveals an S1, S2, no murmurs, no rubs, no gallops.         ABDOMEN:  Soft, slightly tender, without organomegaly. Three scars from her laparoscopic procedure are seen.  Bowel sounds are present.                                                                     EXTREMITIES:  No cyanosis, clubbing, or edema.                               BREASTS:  She is status post right lumpectomy with a well-healed incision in the lower outer quadrant of her right breast.    There are no masses in either breast.   The right nipple remains retracted.    A sentinel node biopsy scar is seen in the right axilla.  It is also well healed.                                  LYMPHATIC:  There is no cervical, axillary, or supraclavicular adenopathy.   SKIN:  Warm and moist, without petechiae, rashes, induration, or ecchymoses.           NEUROLOGIC:  DTRs are 0-1+ bilaterally, symmetrical, motor function is 5/5,  and cranial nerves are  within normal limits.    Assessment:       1. Carcinoma of axillary tail of right breast in female, estrogen receptor positive    2. Use of aromatase inhibitors     3  Plan:         I had a long discussion with her.  She will remain on anastrazole for 5 years, through August 2024.  I will see her again in 3 months.  Her mammogram will be repeated in November 2020.    Her multiple questions were answered to her satisfaction.

## 2020-02-26 NOTE — PROGRESS NOTES
Carcinoma of right breast in female, estrogen receptor positive    10/10/2018 Biopsy     There is a 13 mm x 9.6 mm x 8.7 mm irregularly shaped, non-parallel, hypoechoic mass with indistinct margins seen in the right breast at 9 o'clock in the posterior depth.         10/10/2018 Initial Diagnosis     -Invasive mammary carcinoma, combined grade 1  Tubule formation: Score 2  Nuclear pleomorphism: Score 2  Mitotic rate: Score 1  -Longest continuous tumor focus measures 9 mm on this biopsy.         10/10/2018 Tumor Markers     Estrogen Receptor: Positive >90%  Progesterone Receptor: Positive 50-90%  HER2: Negative  Ki67: <10%         11/21/2018 Surgery     Partial mastectomy with SLNB  1. Lymph node, Right axillary sentinel node, hot blue 150 (excision):  1 lymph node POSITIVE for metastatic mammary carcinoma (1/1)  Size a metastatic deposit 6 mm  No extranodal extension    2. Right breast (lobectomy):  Invasive lobular carcinoma    3. Breast, true deep posterior margin (excision):  Negative for tumor         11/21/2018 Cancer Staged     Cancer Staging  pT1c N1a MX  Stage IA per AJCC 8th ed. pathologic prognostic staging system           12/6/2018 Genetic Testing     Patient has genetic testing done with [facility] panel                                          Results revealed patient has the following mutation(s): pending         12/18/2018 Tumor Conference        Whole breast radiation and axillary radiation therapy recommended    Send mammaprint, consider AI only if mammaprint is low risk. Consider LHRH agonist and AI rather then tamoxifen    Will await genetic testing results prior to radiation         12/20/2018 Tumor Genotyping     Mammaprint came back low risk, however patient decided she would still proceed with chemotherapy             Mohs Method Verbiage: An incision at a 45 degree angle following the standard Mohs approach was done and the specimen was harvested as a microscopic controlled layer.

## 2020-04-16 ENCOUNTER — TELEPHONE (OUTPATIENT)
Dept: HEMATOLOGY/ONCOLOGY | Facility: CLINIC | Age: 40
End: 2020-04-16

## 2020-04-16 ENCOUNTER — PATIENT MESSAGE (OUTPATIENT)
Dept: HEMATOLOGY/ONCOLOGY | Facility: CLINIC | Age: 40
End: 2020-04-16

## 2020-04-16 NOTE — TELEPHONE ENCOUNTER
Returned call to patient. No answer. Not able to leave voicemail as phone keeps cutting off after one ring. Patient portal message sent to discuss symptoms patient is having in her arm.     ----- Message from Clarence Bill sent at 4/16/2020 10:34 AM CDT -----  Contact: Patient  Patient Advice/Staff Message     Caller name: pt    Reason for call: Pt wants to speak with the nurse, says she's been having ongoing issues with her arm on the side that she had the cancer. It goes completely numb and aches under her armpit.     Do you feel you need to be seen today:: No        Communication Preference: [phone]    Additional Information:

## 2020-04-20 ENCOUNTER — OFFICE VISIT (OUTPATIENT)
Dept: HEMATOLOGY/ONCOLOGY | Facility: CLINIC | Age: 40
End: 2020-04-20
Payer: MEDICAID

## 2020-04-20 DIAGNOSIS — C50.611 CARCINOMA OF AXILLARY TAIL OF RIGHT BREAST IN FEMALE, ESTROGEN RECEPTOR POSITIVE: Primary | ICD-10-CM

## 2020-04-20 DIAGNOSIS — Z79.811 USE OF AROMATASE INHIBITORS: ICD-10-CM

## 2020-04-20 DIAGNOSIS — Z17.0 CARCINOMA OF AXILLARY TAIL OF RIGHT BREAST IN FEMALE, ESTROGEN RECEPTOR POSITIVE: Primary | ICD-10-CM

## 2020-04-20 PROCEDURE — 99214 PR OFFICE/OUTPT VISIT, EST, LEVL IV, 30-39 MIN: ICD-10-PCS | Mod: S$PBB,,, | Performed by: INTERNAL MEDICINE

## 2020-04-20 PROCEDURE — 99214 OFFICE O/P EST MOD 30 MIN: CPT | Mod: S$PBB,,, | Performed by: INTERNAL MEDICINE

## 2020-04-20 PROCEDURE — 99999 PR PBB SHADOW E&M-EST. PATIENT-LVL I: CPT | Mod: PBBFAC,,, | Performed by: INTERNAL MEDICINE

## 2020-04-20 PROCEDURE — 99211 OFF/OP EST MAY X REQ PHY/QHP: CPT | Mod: PBBFAC | Performed by: INTERNAL MEDICINE

## 2020-04-20 PROCEDURE — 99999 PR PBB SHADOW E&M-EST. PATIENT-LVL I: ICD-10-PCS | Mod: PBBFAC,,, | Performed by: INTERNAL MEDICINE

## 2020-04-20 NOTE — PROGRESS NOTES
Subjective:       Patient ID: Elham Rodas is a 39 y.o. female.    Chief Complaint: No chief complaint on file.    HPI   Mrs. Rodas returns today for follow up.  She has completed four cycles of dose dense adjuvant taxol, receoived XRT, and has been on zoladex plus anastrazole.  Last week she underwent a laparoscopic hysterectomy and bilateral oophorectomies.       Briefly, she is a 39-year-old premenopausal female who was diagnosed last year with an invasive mammary carcinoma that was ER positive, IN positive, and HER-2 negative.  On 11/21/2018, she underwent a right lumpectomy with sentinel lymph node biopsy.  The pathology report from the procedure indicates that she had a positive sentinel lymph node with a metastatic deposit of 6 mm.  There was no extranodal extension.  On the right partial mastectomy specimen, she had an invasive lobular carcinoma that was 14 mm in maximum diameter.  It was grade II and resection margins were clear.    She completed her adjuvant chemotherapy and her adjuvant XRT, and she is now on Arimidex.    Her DXA scan had shown normal bone density.  Her mammogram in November 2019 was BI-RADS 2 and a 1 year follow-up was recommended.      Review of Systems      Overall she feels OK, however, she complains of pain in the upper back and right scapular that has been present for a little bit more than a month.  The she does not recall any injury.   She all complains of frequent hot flashes and hand stiff but she denies any depression, easy bruising, fevers, chills, night  sweats, weight loss, nausea, vomiting, diarrhea, constipation, diplopia, blurred vision, headache, chest pain, palpitations, shortness of breath, breast pain, abdominal pain, extremity pain, or difficulty ambulating.  The remainder of the ten-point ROS, including general, skin, lymph, H/N, cardiorespiratory, GI, , Neuro, Endocrine, and psychiatric is negative.     Objective:      Physical Exam        She is alert,  oriented to time, place, person, pleasant, well      nourished, in no acute physical distress.                               VITAL SIGNS:  Reviewed                                      HEENT:  Normal.  There are no nasal, oral, lip, gingival, auricular, lid,    or conjunctival lesions.  Mucosae are moist and pink, and there is no        thrush.  Pupils are equal, reactive to light and accommodation.              Extraocular muscle movements are intact.  Dentition is good.  There is no frontal or maxillary tenderness.                                     NECK:  Supple without JVD, adenopathy, or thyromegaly.                       LUNGS:  Clear to auscultation without wheezing, rales, or ronchi.           CARDIOVASCULAR:  Reveals an S1, S2, no murmurs, no rubs, no gallops.         ABDOMEN:  Soft, slightly tender, without organomegaly. Three scars from her laparoscopic procedure are seen.  Bowel sounds are present.                                                                     EXTREMITIES:  No cyanosis, clubbing, or edema.                               BREASTS:  She is status post right lumpectomy with a well-healed incision in the lower outer quadrant of her right breast.    There are no masses in either breast.   The right nipple remains retracted.    A sentinel node biopsy scar is seen in the right axilla.  It is also well healed.                                  LYMPHATIC:  There is no cervical, axillary, or supraclavicular adenopathy.   SKIN:  Warm and moist, without petechiae, rashes, induration, or ecchymoses.           NEUROLOGIC:  DTRs are 0-1+ bilaterally, symmetrical, motor function is 5/5,  and cranial nerves are  within normal limits.  There is point tenderness on the right scapula.  No masses are palpated.    Assessment:       1. Carcinoma of axillary tail of right breast in female, estrogen receptor positive    2. Use of aromatase inhibitors     3.     New onset right scapular pain.  Plan:         I  had a long discussion with her.  She will remain on anastrazole for 5 years, through August 2024.  In regards to her new onset upper back and scapular pain, out of abundance of caution we will proceed with a bone scan.  I will call her with the results.  Assuming her bone scan is negative, I will see her again in 3 months.  Her mammogram will be repeated in November 2020.    Her multiple questions were answered to her satisfaction.

## 2020-04-21 ENCOUNTER — HOSPITAL ENCOUNTER (OUTPATIENT)
Dept: RADIOLOGY | Facility: HOSPITAL | Age: 40
Discharge: HOME | End: 2020-04-21
Attending: INTERNAL MEDICINE
Payer: MEDICAID

## 2020-04-21 ENCOUNTER — PATIENT MESSAGE (OUTPATIENT)
Dept: HEMATOLOGY/ONCOLOGY | Facility: CLINIC | Age: 40
End: 2020-04-21

## 2020-04-21 DIAGNOSIS — Z17.0 CARCINOMA OF AXILLARY TAIL OF RIGHT BREAST IN FEMALE, ESTROGEN RECEPTOR POSITIVE: ICD-10-CM

## 2020-04-21 DIAGNOSIS — C50.611 CARCINOMA OF AXILLARY TAIL OF RIGHT BREAST IN FEMALE, ESTROGEN RECEPTOR POSITIVE: ICD-10-CM

## 2020-04-21 PROCEDURE — A9503 TC99M MEDRONATE: HCPCS

## 2020-04-21 PROCEDURE — 78306 NM BONE SCAN WHOLE BODY: ICD-10-PCS | Mod: 26,,, | Performed by: RADIOLOGY

## 2020-04-21 PROCEDURE — 78306 BONE IMAGING WHOLE BODY: CPT | Mod: 26,,, | Performed by: RADIOLOGY

## 2020-04-27 DIAGNOSIS — Z17.0 CARCINOMA OF AXILLARY TAIL OF RIGHT BREAST IN FEMALE, ESTROGEN RECEPTOR POSITIVE: ICD-10-CM

## 2020-04-27 DIAGNOSIS — C50.611 CARCINOMA OF AXILLARY TAIL OF RIGHT BREAST IN FEMALE, ESTROGEN RECEPTOR POSITIVE: ICD-10-CM

## 2020-04-27 RX ORDER — ANASTROZOLE 1 MG/1
1 TABLET ORAL DAILY
Qty: 90 TABLET | Refills: 1 | Status: SHIPPED | OUTPATIENT
Start: 2020-04-27 | End: 2020-12-15

## 2020-06-09 ENCOUNTER — OFFICE VISIT (OUTPATIENT)
Dept: OBSTETRICS AND GYNECOLOGY | Facility: CLINIC | Age: 40
End: 2020-06-09
Payer: MEDICAID

## 2020-06-09 VITALS
HEIGHT: 64 IN | WEIGHT: 170 LBS | BODY MASS INDEX: 29.02 KG/M2 | HEART RATE: 72 BPM | DIASTOLIC BLOOD PRESSURE: 67 MMHG | SYSTOLIC BLOOD PRESSURE: 122 MMHG

## 2020-06-09 DIAGNOSIS — Z85.3 PERSONAL HISTORY OF BREAST CANCER: ICD-10-CM

## 2020-06-09 DIAGNOSIS — Z01.419 ENCOUNTER FOR GYNECOLOGICAL EXAMINATION WITHOUT ABNORMAL FINDING: Primary | ICD-10-CM

## 2020-06-09 PROCEDURE — 99396 PREV VISIT EST AGE 40-64: CPT | Mod: S$PBB,,, | Performed by: OBSTETRICS & GYNECOLOGY

## 2020-06-09 PROCEDURE — 99213 OFFICE O/P EST LOW 20 MIN: CPT | Mod: PBBFAC | Performed by: OBSTETRICS & GYNECOLOGY

## 2020-06-09 PROCEDURE — 99396 PR PREVENTIVE VISIT,EST,40-64: ICD-10-PCS | Mod: S$PBB,,, | Performed by: OBSTETRICS & GYNECOLOGY

## 2020-06-09 PROCEDURE — 99999 PR PBB SHADOW E&M-EST. PATIENT-LVL III: ICD-10-PCS | Mod: PBBFAC,,, | Performed by: OBSTETRICS & GYNECOLOGY

## 2020-06-09 PROCEDURE — 99999 PR PBB SHADOW E&M-EST. PATIENT-LVL III: CPT | Mod: PBBFAC,,, | Performed by: OBSTETRICS & GYNECOLOGY

## 2020-06-09 NOTE — PROGRESS NOTES
Subjective:       Patient ID: Elham Rodas is a 39 y.o. female.    Chief Complaint:  Well Woman      History of Present Illness  Patient presents for annual exam.  Patient has a personal history of breast cancer.  She reports recent breast exam.  Patient admits to decreased libido but is unable to take hormones at this time.  She is otherwise without gyn complaints.    Menstrual History:  OB History        4    Para   4    Term   4            AB        Living   4       SAB        TAB        Ectopic        Multiple        Live Births                    Menarche age:  Patient's last menstrual period was 2019 (approximate).         Review of Systems  Review of Systems   Constitutional: Negative for activity change, appetite change, chills, diaphoresis, fatigue, fever and unexpected weight change.   HENT: Negative for congestion, dental problem, drooling, ear discharge, ear pain, facial swelling, hearing loss, mouth sores, nosebleeds, postnasal drip, rhinorrhea, sinus pressure, sneezing, sore throat, tinnitus, trouble swallowing and voice change.    Eyes: Negative for photophobia, pain, discharge, redness, itching and visual disturbance.   Respiratory: Negative for apnea, cough, choking, chest tightness, shortness of breath, wheezing and stridor.    Cardiovascular: Negative for chest pain, palpitations and leg swelling.   Gastrointestinal: Negative for abdominal distention, abdominal pain, anal bleeding, blood in stool, constipation, diarrhea, nausea, rectal pain and vomiting.   Endocrine: Negative for cold intolerance, heat intolerance, polydipsia, polyphagia and polyuria.   Genitourinary: Negative for decreased urine volume, difficulty urinating, dyspareunia, dysuria, enuresis, flank pain, frequency, genital sores, hematuria, menstrual problem, pelvic pain, urgency, vaginal bleeding, vaginal discharge and vaginal pain.   Musculoskeletal: Negative for arthralgias, back pain, gait problem, joint  swelling, myalgias, neck pain and neck stiffness.   Skin: Negative for color change, pallor, rash and wound.   Allergic/Immunologic: Negative for environmental allergies, food allergies and immunocompromised state.   Neurological: Negative for dizziness, tremors, seizures, syncope, facial asymmetry, speech difficulty, weakness, light-headedness, numbness and headaches.   Hematological: Negative for adenopathy. Does not bruise/bleed easily.   Psychiatric/Behavioral: Negative for agitation, behavioral problems, confusion, decreased concentration, dysphoric mood, hallucinations, self-injury, sleep disturbance and suicidal ideas. The patient is not nervous/anxious and is not hyperactive.            Objective:      Physical Exam   Constitutional: She is oriented to person, place, and time. She appears well-developed and well-nourished.   Neck: No thyromegaly present.   Cardiovascular: Normal rate and regular rhythm.   Pulmonary/Chest: Effort normal and breath sounds normal.   Abdominal: Soft. Bowel sounds are normal. She exhibits no mass. There is no tenderness. Hernia confirmed negative in the right inguinal area and confirmed negative in the left inguinal area.   Genitourinary: Vagina normal and uterus normal. Rectal exam shows no external hemorrhoid. No breast tenderness or discharge. Uterus is not enlarged and not tender. Cervix exhibits no motion tenderness, no discharge and no friability. Right adnexum displays no mass, no tenderness and no fullness. Left adnexum displays no mass, no tenderness and no fullness. No tenderness in the vagina. No foreign body in the vagina. No vaginal discharge found.   Musculoskeletal: Normal range of motion.   Lymphadenopathy:        Right: No inguinal adenopathy present.        Left: No inguinal adenopathy present.   Neurological: She is alert and oriented to person, place, and time. She has normal reflexes.   Skin: Skin is dry.   Psychiatric: She has a normal mood and affect. Her  behavior is normal. Judgment and thought content normal.   Nursing note and vitals reviewed.          Assessment:        1. Encounter for gynecological examination without abnormal finding    2. Personal history of breast cancer                Plan:         Elham was seen today for well woman.    Diagnoses and all orders for this visit:    Encounter for gynecological examination without abnormal finding    Personal history of breast cancer

## 2020-07-15 ENCOUNTER — OFFICE VISIT (OUTPATIENT)
Dept: HEMATOLOGY/ONCOLOGY | Facility: CLINIC | Age: 40
End: 2020-07-15
Payer: MEDICAID

## 2020-07-15 ENCOUNTER — TELEPHONE (OUTPATIENT)
Dept: HEMATOLOGY/ONCOLOGY | Facility: CLINIC | Age: 40
End: 2020-07-15

## 2020-07-15 VITALS
SYSTOLIC BLOOD PRESSURE: 139 MMHG | TEMPERATURE: 98 F | DIASTOLIC BLOOD PRESSURE: 84 MMHG | BODY MASS INDEX: 29.79 KG/M2 | HEART RATE: 80 BPM | WEIGHT: 170.88 LBS | RESPIRATION RATE: 20 BRPM

## 2020-07-15 DIAGNOSIS — I89.0 LYMPHEDEMA OF ARM: ICD-10-CM

## 2020-07-15 DIAGNOSIS — R55 VASOMOTOR INSTABILITY: ICD-10-CM

## 2020-07-15 DIAGNOSIS — Z79.811 USE OF AROMATASE INHIBITORS: ICD-10-CM

## 2020-07-15 DIAGNOSIS — M25.611 DECREASED SHOULDER MOBILITY, RIGHT: ICD-10-CM

## 2020-07-15 DIAGNOSIS — D22.9 ATYPICAL MOLE: ICD-10-CM

## 2020-07-15 DIAGNOSIS — T45.1X5A AROMATASE INHIBITOR-ASSOCIATED ARTHRALGIA: ICD-10-CM

## 2020-07-15 DIAGNOSIS — M25.50 AROMATASE INHIBITOR-ASSOCIATED ARTHRALGIA: ICD-10-CM

## 2020-07-15 DIAGNOSIS — C50.911 MALIGNANT NEOPLASM OF RIGHT BREAST IN FEMALE, ESTROGEN RECEPTOR POSITIVE, UNSPECIFIED SITE OF BREAST: Primary | ICD-10-CM

## 2020-07-15 DIAGNOSIS — Z17.0 MALIGNANT NEOPLASM OF RIGHT BREAST IN FEMALE, ESTROGEN RECEPTOR POSITIVE, UNSPECIFIED SITE OF BREAST: Primary | ICD-10-CM

## 2020-07-15 PROCEDURE — 99999 PR PBB SHADOW E&M-EST. PATIENT-LVL IV: ICD-10-PCS | Mod: PBBFAC,,, | Performed by: NURSE PRACTITIONER

## 2020-07-15 PROCEDURE — 99215 PR OFFICE/OUTPT VISIT, EST, LEVL V, 40-54 MIN: ICD-10-PCS | Mod: S$PBB,,, | Performed by: NURSE PRACTITIONER

## 2020-07-15 PROCEDURE — 99215 OFFICE O/P EST HI 40 MIN: CPT | Mod: S$PBB,,, | Performed by: NURSE PRACTITIONER

## 2020-07-15 PROCEDURE — 99999 PR PBB SHADOW E&M-EST. PATIENT-LVL IV: CPT | Mod: PBBFAC,,, | Performed by: NURSE PRACTITIONER

## 2020-07-15 PROCEDURE — 99214 OFFICE O/P EST MOD 30 MIN: CPT | Mod: PBBFAC | Performed by: NURSE PRACTITIONER

## 2020-07-15 NOTE — PROGRESS NOTES
Subjective:       Patient ID: Elham Rodas is a 39 y.o. female.    Chief Complaint: Follow-up    HPI   Mrs. Rodas returns today for follow up.    Doing well although now on Metformin.   +hot flashes  Insomnia  +joint pain.   Continues to have mild shoulder discomfort and mild tightness to right arm.   No infectious complaints.   When asked, +vaginal dryness and painful intercourse.         Briefly, she is a 39-year-old premenopausal female who was diagnosed last year with an invasive mammary carcinoma that was ER positive, MI positive, and HER-2 negative.  On 11/21/2018, she underwent a right lumpectomy with sentinel lymph node biopsy.  The pathology report from the procedure indicates that she had a positive sentinel lymph node with a metastatic deposit of 6 mm.  There was no extranodal extension.  On the right partial mastectomy specimen, she had an invasive lobular carcinoma that was 14 mm in maximum diameter.  It was grade II and resection margins were clear.    She completed her adjuvant chemotherapy with four cycles of dose dense adjuvant taxol, and her adjuvant XRT, and she is now on Arimidex.  Of not, she underwent a laparoscopic hysterectomy and bilateral oophorectomies.       Her DXA scan had shown normal bone density.    Bone scan 4/2020 negative.     Her mammogram in November 2019 was BI-RADS 2 and a 1 year follow-up was recommended.      Review of Systems      Overall she feels OK, see above.   She denies any depression, easy bruising, fevers, chills, night  sweats, weight loss, nausea, vomiting, diarrhea, constipation, diplopia, blurred vision, headache, chest pain, palpitations, shortness of breath, breast pain, abdominal pain, extremity pain, or difficulty ambulating.  The remainder of the ten-point ROS, including general, skin, lymph, H/N, cardiorespiratory, GI, , Neuro, Endocrine, and psychiatric is negative.     Objective:      Physical Exam        She is alert, oriented to time, place,  person, pleasant, well      nourished, in no acute physical distress.                               VITAL SIGNS:  Reviewed                                      HEENT:  Normal.  There are no nasal, oral, lip, gingival, auricular, lid,    or conjunctival lesions.  Mucosae are moist and pink, and there is no        thrush.  Pupils are equal, reactive to light and accommodation.              Extraocular muscle movements are intact.  Dentition is good.  There is no frontal or maxillary tenderness.                                     NECK:  Supple without JVD, adenopathy, or thyromegaly.                       LUNGS:  Clear to auscultation without wheezing, rales, or ronchi.           CARDIOVASCULAR:  Reveals an S1, S2, no murmurs, no rubs, no gallops.         ABDOMEN:  Soft, nontender, without organomegaly. Three scars from her laparoscopic procedure are seen.  Bowel sounds are present.                                                                     EXTREMITIES:  No cyanosis, clubbing, or edema. Slightly limited ROM to right shoulder. Right arm with minimal lymphedema.                           BREASTS:  She is status post right lumpectomy with a well-healed incision in the lower outer quadrant of her right breast.    There are no masses in either breast.   The right nipple remains retracted.    A sentinel node biopsy scar is seen in the right axilla.  It is also well healed.                                  LYMPHATIC:  There is no cervical, axillary, or supraclavicular adenopathy.   SKIN:  Warm and moist, without petechiae, rashes, induration, or ecchymoses.  Hyperpigmented fleshy mole to to upper abdomen.                Assessment:       1. Malignant neoplasm of right breast in female, estrogen receptor positive, unspecified site of breast    2. Use of aromatase inhibitors    3. Lymphedema of arm    4. Decreased shoulder mobility, right    5. Aromatase inhibitor-associated arthralgia    6. Vasomotor instability     7. Atypical mole          Plan:           Doing well, BRYON clinically.   She will remain on anastrazole for 5 years, through August 2024.  MMG will be repeated in November 2020.  BMD due 10/2021.   Vit D adequately replaced.   Consult Dr. Trinidad for survivorship, acunpuncture and vaginal dryness.   Derm referral for mole removal.   PT for mild right arm lymphedema and ROM limitations since surgery.   RTC in 4  months to see Dr. Mcintosh with a MMG.     Patient is in agreement with the proposed treatment plan. All questions were answered to the patient's satisfaction. Pt knows to call clinic for any new or worsening symptoms and if anything is needed before the next clinic visit.      Holly Euceda, LATRICE-C  Hematology & Oncology  [facility] [place] [address]  ph. [phone]  Fax. [phone]     I spent 40 minutes (face to face) with the patient, more than 50% was in counseling and coordination of care as detailed above.

## 2020-07-15 NOTE — Clinical Note
Refer to Dr. Trinidad for survivorship, acupuncture.   Refer to dermatology for mole removal.  Refer to PT for lymphedema therapy  RTC in 4 months to see Dr. Mcintosh or myself with a MMG.

## 2020-07-15 NOTE — Clinical Note
Dr. Trinidad, I referred patient to you for vaginal dryness.   Also, She is having AI induced arthralgias and hot flashes - she is interested in acupuncture. Thanks, PJ

## 2020-07-15 NOTE — TELEPHONE ENCOUNTER
----- Message from Holly Euceda NP sent at 7/15/2020  1:13 PM CDT -----  Refer to Dr. Trinidad for survivorship, acupuncture. Refer to dermatology for mole removal.Refer to PT for lymphedema therapyRTC in 4 months to see Dr. Mcintosh or myself with a MMG.

## 2020-07-20 ENCOUNTER — TELEPHONE (OUTPATIENT)
Dept: OBSTETRICS AND GYNECOLOGY | Facility: CLINIC | Age: 40
End: 2020-07-20

## 2020-07-20 NOTE — TELEPHONE ENCOUNTER
RN Navigator left a voicemail for patient in regards to scheduling an appt with Dr. Trinidad and the [facility]. RN provided contact information requesting return call, PETEY Smith

## 2020-08-03 ENCOUNTER — OFFICE VISIT (OUTPATIENT)
Dept: DERMATOLOGY | Facility: CLINIC | Age: 40
End: 2020-08-03
Payer: MEDICAID

## 2020-08-03 DIAGNOSIS — D23.9 INTRADERMAL NEVUS: Primary | ICD-10-CM

## 2020-08-03 DIAGNOSIS — D22.9 ATYPICAL MOLE: ICD-10-CM

## 2020-08-03 PROCEDURE — 99202 OFFICE O/P NEW SF 15 MIN: CPT | Mod: 95,,, | Performed by: DERMATOLOGY

## 2020-08-03 PROCEDURE — 99202 PR OFFICE/OUTPT VISIT, NEW, LEVL II, 15-29 MIN: ICD-10-PCS | Mod: 95,,, | Performed by: DERMATOLOGY

## 2020-08-03 NOTE — PATIENT INSTRUCTIONS
Monitoring Moles  Moles, also called nevi, are small, pigmented (colored) marks on the skin. They have no known purpose. Many moles appear before age 30, but they also increase frequently as people age. Moles most often are benign (not cancer) and harmless. But some become cancerous. Thats why you need to watch the moles on your body and tell your health care provider about any concern you.    What are moles?  Moles are a type of pigmented bao. Freckles, which often are sprinkled across the bridge of the nose, the cheeks, and the arms, are another type of pigmented bao. Moles can appear on any part of the body. There are many types, sizes, and shapes of moles. Most moles are solid brown. In most cases, they are flat or dome-shaped, smooth, and have well-defined edges. Freckles are flat.  Why worry about moles?  Most moles are benign and dont require treatment. You can have moles removed if you dont like the way they look or feel. But moles that appear after you are 30 or that change in certain ways may become a problem. These moles may turn into melanoma, a type of skin cancer. Melanoma is one of the fastest growing cancers in the [place], but it is often curable if caught early. But this disease can be life-threatening, particularly when not diagnosed early. The more moles someone has, the higher the risk. Risk is also higher for those who have had more lifetime exposure to the sun, severe blistering sunburns, exposure to tanning beds, a prior personal history of cancer, and those with a family history of skin cancer. To manage your risk, its smart to check your moles for changes and ask your health care provider to perform a thorough skin exam when you have a physical exam. To do this, you first need to learn where your moles are. Then, be sure to check your moles each month.    Checking your moles  You can check many of your moles each month. You can do this right after you shower and before you get  dressed. Check your body from head to toe. Then, make a list of your moles. If you find any new moles or changes in your moles, call your health care provider. To check your moles, youll need:  · A full-length mirror  · A stool or chair to sit on while you check your feet  If you have a lot of moles, take digital photos of them each month. Make sure to take photos both up close and from a distance. These can help you see if any moles change over time.  When to seek medical treatment  See your health care provider if your moles hurt, itch, ooze, bleed, thicken, become crusty, or show other changes. Also, be sure to call your health care provider if your moles show any of the following signs of melanoma:  · A change in size, shape, color, or elevation  · Asymmetry (when the sides dont match)  · Ragged, notched, or blurred borders  · Varied colors within the same mole  · Size is larger than 5 mm or 6 mm in diameter (the size of a pencil eraser)  © 2749-5509 [employer]. [address] [place] [address]. All rights reserved. This information is not intended as a substitute for professional medical care. Always follow your healthcare professional's instructions.

## 2020-08-03 NOTE — PROGRESS NOTES
Subjective:       Patient ID:  Elham Rodas is a 39 y.o. female who presents for No chief complaint on file.    The patient location is: [place]  The chief complaint leading to consultation is: mole    Visit type: audiovisual    Face to Face time with patient: 15 min  20 minutes of total time spent on the encounter, which includes face to face time and non-face to face time preparing to see the patient (eg, review of tests), Obtaining and/or reviewing separately obtained history, Documenting clinical information in the electronic or other health record, Independently interpreting results (not separately reported) and communicating results to the patient/family/caregiver, or Care coordination (not separately reported).         Each patient to whom he or she provides medical services by telemedicine is:  (1) informed of the relationship between the physician and patient and the respective role of any other health care provider with respect to management of the patient; and (2) notified that he or she may decline to receive medical services by telemedicine and may withdraw from such care at any time.    Notes:   History of Present Illness: The patient presents with chief complaint of mole.  Location: abdomen  Duration: years- since teenager  Signs/Symptoms: slowly enlarged and slowly got a little darker; denies bleeding; sometimes catches on bra line and gets irritated    Prior treatments: denies    Denies personal/fam hx of skin cancer.    Hx of breast cancer      Review of Systems   Skin: Positive for activity-related sunscreen use. Negative for daily sunscreen use and recent sunburn.   Hematologic/Lymphatic: Does not bruise/bleed easily.        Objective:    Physical Exam   Constitutional: She appears well-developed and well-nourished. No distress.   Neurological: She is alert and oriented to person, place, and time. She is not disoriented.   Psychiatric: She has a normal mood and affect.   Skin:   Areas  Examined (abnormalities noted in diagram):   Abdomen Inspection Performed              Diagram Legend     Erythematous scaling macule/papule c/w actinic keratosis       Vascular papule c/w angioma      Pigmented verrucoid papule/plaque c/w seborrheic keratosis      Yellow umbilicated papule c/w sebaceous hyperplasia      Irregularly shaped tan macule c/w lentigo     1-2 mm smooth white papules consistent with Milia      Movable subcutaneous cyst with punctum c/w epidermal inclusion cyst      Subcutaneous movable cyst c/w pilar cyst      Firm pink to brown papule c/w dermatofibroma      Pedunculated fleshy papule(s) c/w skin tag(s)      Evenly pigmented macule c/w junctional nevus     Mildly variegated pigmented, slightly irregular-bordered macule c/w mildly atypical nevus      Flesh colored to evenly pigmented papule c/w intradermal nevus       Pink pearly papule/plaque c/w basal cell carcinoma      Erythematous hyperkeratotic cursted plaque c/w SCC      Surgical scar with no sign of skin cancer recurrence      Open and closed comedones      Inflammatory papules and pustules      Verrucoid papule consistent consistent with wart     Erythematous eczematous patches and plaques     Dystrophic onycholytic nail with subungual debris c/w onychomycosis     Umbilicated papule    Erythematous-base heme-crusted tan verrucoid plaque consistent with inflamed seborrheic keratosis     Erythematous Silvery Scaling Plaque c/w Psoriasis     See annotation                  Assessment / Plan:        Intradermal nevus  Reassurance provided.  Instructed patient to observe lesion(s) for changes and follow up in clinic if changes are noted. Discussed ABCDE's of moles and brochure provided.               Follow up if symptoms worsen or fail to improve.

## 2020-08-03 NOTE — LETTER
August 3, 2020      Holly Euceda, NP  [address] [facility] [address]           [facility]  [address] [facility] [address]  Phone: [phone]  Fax: [phone]          Patient: Elham Rodas   MR Number: [id number]   YOB: 1980   Date of Visit: 8/3/2020       Dear Holly Euceda:    Thank you for referring Elham oRdas to me for evaluation. Attached you will find relevant portions of my assessment and plan of care.    If you have questions, please do not hesitate to call me. I look forward to following Elham Rodas along with you.    Sincerely,    Jennifer Chambers MD    Enclosure  CC:  No Recipients    If you would like to receive this communication electronically, please contact [email or web address] or [phone] to request more information on [employer] access.    For providers and/or their staff who would like to refer a patient to [facility], please contact us through our one-stop-shop provider referral line, [facility] , at [phone].    If you feel you have received this communication in error or would no longer like to receive these types of communications, please e-mail [email or web address]

## 2020-08-07 ENCOUNTER — TELEPHONE (OUTPATIENT)
Dept: OBSTETRICS AND GYNECOLOGY | Facility: CLINIC | Age: 40
End: 2020-08-07

## 2020-08-07 NOTE — TELEPHONE ENCOUNTER
Appt confirmed with patient for 08/28/20 at 0930 with Dr. Trinidad to discuss AI side effects. PETEY Smith.

## 2020-08-10 ENCOUNTER — CLINICAL SUPPORT (OUTPATIENT)
Dept: REHABILITATION | Facility: HOSPITAL | Age: 40
End: 2020-08-10
Payer: MEDICAID

## 2020-08-10 DIAGNOSIS — M25.511 PAIN IN SHOULDER REGION, RIGHT: ICD-10-CM

## 2020-08-10 DIAGNOSIS — I89.0 LYMPHEDEMA OF ARM: ICD-10-CM

## 2020-08-10 DIAGNOSIS — M25.611 DECREASED RANGE OF MOTION OF RIGHT SHOULDER: Primary | ICD-10-CM

## 2020-08-10 DIAGNOSIS — Z91.89 AT RISK FOR LYMPHEDEMA: ICD-10-CM

## 2020-08-10 PROCEDURE — 97162 PT EVAL MOD COMPLEX 30 MIN: CPT | Mod: PO

## 2020-08-10 PROCEDURE — 97110 THERAPEUTIC EXERCISES: CPT | Mod: PO

## 2020-08-10 NOTE — PLAN OF CARE
"[facility]  Physical Therapy Initial Evaluation    Name: Elham Rodas  Clinic Number: [id number]    Therapy Diagnosis:   Encounter Diagnoses   Name Primary?    Lymphedema of arm     Pain in shoulder region, right     Decreased range of motion of right shoulder Yes    At risk for lymphedema      Physician: Holly Euceda NP    Physician Orders: PT Eval and Treat   Medical Diagnosis: Lymphedema, not elsewhere classified  Evaluation Date: 8/10/2020  Authorization Period Expiration: 8/10/21  Plan of Care Certification Period: 10/16/20  Visit # / Visits authorized: 1/ 1  Insurance:     Time In: 1:30 PM  Time Out: 2:20 PM  Total Billable Time: 50 minutes    Precautions: Standard and cancer      History   History of Present Illness: Elham is a 39 y.o. female that presents to  [facility] at the  s/p right breast surgery. She underwent a R breast lumpectomy with SLNB on 11/21/18. She is s/p insertion of Port- A Cath on 1/2/19. It has since been removed.   Diagnosis: Right breast IMC and ILC, Grade II, ER (+), MS (+), HER2 (-), 1/1 (+) Lymph node  Chief complaint: pt states she's been feeling more limited in her right arm with reaching overhead. Pt states she also gets numbness in her RUE when lying down (it can be on either side or even on her back)- "I don't know if it's a pinched nerve." pt endorse tightness in R axilla. Pt has been feeling restricted in R shoulder since surgery.  The numbness with lying down has been going on a few months. Pt reports her R arm feels/appears swollen, particularly in R forearm    Surgical History:  Elham Rodas  has a past surgical history that includes Thyroidectomy; Tubal ligation; Cholecystectomy; Mastectomy, partial (Right, 11/21/2018); [place] lymph node biopsy (Right, 11/21/2018); Injection for sentinel node identification (Right, 11/21/2018); Axillary node dissection (Right, 11/21/2018); Insertion of tunneled " central venous catheter (CVC) with subcutaneous port (Left, 1/2/2019); Breast lumpectomy (Right); Laparoscopic total hysterectomy (N/A, 11/4/2019); Laparoscopic salpingo-oophorectomy (Bilateral, 11/4/2019); Hysterectomy; and Oophorectomy.    Chemotherapy: Adjuvant chemotherapy with AC and Taxol from 1/19 to 5/19  Radiation:  6/19/19 to August, 2019    Past Medical History:   Diagnosis Date    Abnormal Pap smear of cervix 05/2016    ASCUS, - HPV    Breast cancer 10/2018    right    Hypothyroidism     Palpitations     Syncope and collapse     Thyroid disease     Vitamin D deficiency disease           Medications:  Elham has a current medication list which includes the following prescription(s): anastrozole, calcium carbonate, cholecalciferol (vitamin d3), levothyroxine, and metformin, and the following Facility-Administered Medications: lidocaine (pf) 10 mg/ml (1%).    Allergies:  Review of patient's allergies indicates:  No Known Allergies     Prior Therapy: yes   Social History:  lives with their family  Occupation: Works in [employer]  Prior Level of Function: Independent  Current Level of Function: pt is having a hard time lifting overhead, gets numbness in RUE at night with sleeping in any position (pillow support helps). Pt has difficulty with reaching bra with R hand and reaching across her body for opposite shoulder.    Other Past Medical History: pt being treated for pre-diabetes- pt went to ER for palpitations and her blood sugar was off    Patient's Goals: to get some relief and have full ROM    Hand dominance: right handed    Subjective   Pt states: the only pain is in her upper back (points to upper trap)  Pain: 1/10 on VAS currently.   Best: 0/10  Worst: 2/10   Pain location: right upper trap (upper back)   Objective   Mental status :A+ O x 3, pleasant, appropriate    Postural examination/scapula alignment: Rounded shoulder and Head forward        Edema: Mild  Location: Right forearm. Please refer  "to measurements below    Axillary Web Syndrome/Cording:   Location: none noted      Sensation: WNL light touch        Range of Motion:      Shoulder Range of Motion:   Active /Passive ROM Right Left   Flexion 150* 170   Abduction 85* 165   Extension 65 65   IR/90deg 70 (at 70 ABD)* 90   ER/90deg ~30 (at 70 ABD)* 90   ER sitting ~T2 ~T2   IR sitting  Forearm across low back T5          Strength: manual muscle test grades below   Upper Extremity Strength   (R) UE (L) UE   Shoulder flexion: 4+/5* 5/5   Shoulder Abduction: 5/5* 5/5   Shoulder IR 5/5 5/5   Shoulder ER 5/5 5/5   Elbow flexion: 5/5 5/5   Elbow extension: 5/5 5/5   Wrist flexion: 5/5 5/5   Wrist extension: 5/5 5/5    good good   * pain    Baseline Measurements of BL UE's for early detection of Lymphedema:     LANDMARK RIGHT UE LEFT UE DIFFERENCE   E + 8" 35.5 cm 36 cm 0.5 cm   E + 6" 31 cm 33 cm 2 cm   E + 4" 29 cm 29cm 0 cm   E + 2" 28 cm 27.5 cm 0.5 cm   Elbow 25.5 cm 25.5 cm 0 cm   W+ 8" 27 cm 25.5 cm 1.5 cm   W +  6" 25.5 cm 24 cm 1.5 cm   W + 4" 22 cm 21 cm 1 cm   Wrist 16.5 cm 16.5 cm 0 cm   DPC 21.5 cm 21.5 cm 0 cm   IP Thumb 6.5 cm 7 cm 0.5 cm     Functional Mobility (Bed mobility, transfers)  independent    ADL's:  Independent, but pt endorses difficulty with reaching overhead    Gait Assessment:   - AD used: none  - Assistance: independent  - Distance: community distances     Patient Education Provided   - role of therapy in multi - disciplinary team, goals for therapy  -Sleeping with pillow under R arm  -HEP, exercise technique, scheduling, POC   - pt may use Tubigrip that she has from prior PT session, wear during the day only and discontinue if it increases symptoms    Pt has no cultural, educational or language barriers to learning provided.  Treatment and Instruction of Home Exercise Program    Time In: 2:03  Time Out: 2:15    Elham received individual therapeutic exercises to improve postural correction and alignment, stretching and " soft tissue mobility, and strengthening for 8 minutes including the following:   Upper trap stretch, 2  X 30s/side  Shoulder ABD at wall x 10 reps  Shoulder ER stretch at wall 10 x 5s  Pec stretch to by PT x 30s    Elham received the following manual therapy techniques were performed to increased myofascial/soft tissue length, mobility and pliability, increase PROM, AROM and function as well as to decrease pain for 4 minutes  Sub occipital release (noted tightness on R side)  STM to R upper trap      Written Home Exercises Provided and Patient Education: Handouts given   See EMR under patient instructions for program given  Pt demonstrated good understanding of the education provided. Patient demo good return demo of skill of exercises.    Functional Limitations Reporting      CMS Impairment/Limitation/Restriction for FOTO Upper Extremity Regional Swelling Survey    Therapist reviewed FOTO scores for Elham Rodas on 8/10/2020.   FOTO documents entered into [employer] - see Media section.    Limitations Score: 25%         Assessment   This is a 39 y.o. female referred to outpatient physical therapy and presents with a medical diagnosis of R breast cancer and was seen today post-operatively to establish PT plan of care for impairments including R shoulder region pain, decreased strength, poor posture, decreased ROM, swelling in RUE, and, impaired functional mobility. Pt's R forearm girth measurements have increased since she was last seen for PT in 2019, so PT encouraged that pt schedule a follow-up with a lymph certified therapist, so that therapist can take assess further. In the mean time, pt will benefit from continued therapy to address ROM deficits and improve soft tissue quality in R upper trap and shoulder region. Pt's RUE swelling may due to lack of pt specifically exercising and stretching that arm. PT also encouraged use of tubigrip during the day as long as it does not increase symptoms  .     Pt instructed  in HEP this session and was able to perform all exercises given independently. Pt instructed to follow up with therapist if any concerns arise with program established. Pt will continue to benefit from skilled physical therapy to address the impairments stated in chart below, provide patient/family education and to maximize pt's level of independence in home and community environment     Anticipated barriers to physical therapy: none     Pt's spiritual, cultural and educational needs considered and pt agreeable to plan of care and goals as stated below:     Medical necessity is demonstrated by the following IMPAIRMENTS/PROMBLEM LIST:    History  Co-morbidities and personal factors that may impact the plan of care Co-morbidities:   history of cancer    Personal Factors:   attitudes     moderate   Examination  Body Structures and Functions, activity limitations and participation restrictions that may impact the plan of care Body Regions:   upper extremities    Body Systems:    ROM  strength  motor control  edema    Participation Restrictions:   None anticipated    Activity limitations:   Learning and applying knowledge  no deficits    General Tasks and Commands  no deficits    Communication  no deficits    Mobility  lifting and carrying objects  reaching    Self care  dressing    Domestic Life  doing house work (cleaning house, washing dishes, laundry)    Interactions/Relationships  no deficits    Life Areas  employment    Community and Social Life  community life  recreation and leisure         high   Clinical Presentation evolving clinical presentation with changing clinical characteristics moderate   Decision Making/ Complexity Score: moderate       Goals: Pt agrees with goals set    Short Term goals: 4 weeks  1. Patient will report reduced numbness in RUE at night for >/=3 nights out of the week for improved quality of life. (progressing, not met)  2. Patient will demonstrate independence with Home Exercise  program established. (progressing, not met)  3. Pt will increase AROM/PROM in shoulder abduction ROM to 120 degrees on right to improve functional reach, carry, push, pull pain free. (progressing, not met)  4. Pt will increase AROM/PROM in shoulder flexion to 170 degrees on right to improve functional reach, carry, push, pull pain free.(progressing, not met)  5. Pt will demo 5/5 strength in  BUE's for improved functional mobility, endurance, and posture. (progressing, not met)      Long Term Goals: 8 weeks   1.  Pt will increase AROM/PROM in shoulder ER at 90 to 60 degrees on right to improve functional reach, carry, push, pull pain free. (progressing, not met)  2. Pt will demo increased R shoulder IR AROM to allow for pt to reach bra and/or opposite shoulder without pain for imrpoved function. (progressing, not met)  3. Pt will demonstrate full/maximized tissue mobility to increase ROM and promote healthy tissue to be pain free at discharge. (progressing, not met)  4. Pt will report decrease in overall worst pain to 1/10 at discharge. (progressing, not met)  5. Pt will increase AROM/PROM in shoulder abduction ROM to 170 degrees on right to improve functional reach, carry, push, pull pain free. (progressing, not met)  6. Patient will report reduced numbness in RUE at night for >/=5 nights out of the week for improved quality of life. (progressing, not met)  7. Patient will report being able to reach items overhead without pain for improved functional mobility. (progressing, not met)    Plan   Outpatient physical therapy 2x week for 8 weeks to include the following:   Manual Therapy, Moist Heat/ Ice, Neuromuscular Re-ed, Patient Education, Self Care, Therapeutic Activites, Therapeutic Exercise and modalities as appropriate.    Plan of care Certification Period: 8/10/2020 to 10/16/20.    Therapist: Catalina Vargas, PT  8/10/2020

## 2020-08-11 PROBLEM — M25.611 DECREASED RANGE OF MOTION OF RIGHT SHOULDER: Status: ACTIVE | Noted: 2020-08-11

## 2020-08-11 PROBLEM — Z91.89 AT RISK FOR LYMPHEDEMA: Status: RESOLVED | Noted: 2018-11-05 | Resolved: 2020-08-11

## 2020-08-11 PROBLEM — Z91.89 AT RISK FOR LYMPHEDEMA: Status: ACTIVE | Noted: 2020-08-11

## 2020-08-11 PROBLEM — M25.511 PAIN IN SHOULDER REGION, RIGHT: Status: ACTIVE | Noted: 2020-08-11

## 2020-08-11 PROBLEM — C50.911 MALIGNANT NEOPLASM OF RIGHT FEMALE BREAST: Status: RESOLVED | Noted: 2018-11-05 | Resolved: 2020-08-11

## 2020-08-11 PROBLEM — M25.611 DECREASED SHOULDER MOBILITY, RIGHT: Status: RESOLVED | Noted: 2019-06-18 | Resolved: 2020-08-11

## 2020-08-11 PROBLEM — Z85.3 PERSONAL HISTORY OF BREAST CANCER: Status: RESOLVED | Noted: 2019-06-07 | Resolved: 2020-08-11

## 2020-08-11 PROBLEM — I89.0 LYMPHEDEMA OF ARM: Status: RESOLVED | Noted: 2019-06-18 | Resolved: 2020-08-11

## 2020-08-26 ENCOUNTER — OFFICE VISIT (OUTPATIENT)
Dept: CARDIOLOGY | Facility: CLINIC | Age: 40
End: 2020-08-26
Payer: MEDICAID

## 2020-08-26 ENCOUNTER — CLINICAL SUPPORT (OUTPATIENT)
Dept: REHABILITATION | Facility: HOSPITAL | Age: 40
End: 2020-08-26
Payer: MEDICAID

## 2020-08-26 VITALS
HEIGHT: 64 IN | WEIGHT: 170 LBS | OXYGEN SATURATION: 98 % | SYSTOLIC BLOOD PRESSURE: 129 MMHG | DIASTOLIC BLOOD PRESSURE: 88 MMHG | BODY MASS INDEX: 29.02 KG/M2 | HEART RATE: 84 BPM

## 2020-08-26 DIAGNOSIS — R06.02 SOB (SHORTNESS OF BREATH): ICD-10-CM

## 2020-08-26 DIAGNOSIS — R00.2 PALPITATIONS: ICD-10-CM

## 2020-08-26 DIAGNOSIS — Z91.89 AT RISK FOR LYMPHEDEMA: ICD-10-CM

## 2020-08-26 DIAGNOSIS — M25.511 PAIN IN SHOULDER REGION, RIGHT: ICD-10-CM

## 2020-08-26 DIAGNOSIS — R55 POSTURAL DIZZINESS WITH PRESYNCOPE: ICD-10-CM

## 2020-08-26 DIAGNOSIS — R42 POSTURAL DIZZINESS WITH PRESYNCOPE: ICD-10-CM

## 2020-08-26 DIAGNOSIS — M25.611 DECREASED RANGE OF MOTION OF RIGHT SHOULDER: ICD-10-CM

## 2020-08-26 PROCEDURE — 99214 OFFICE O/P EST MOD 30 MIN: CPT | Mod: PBBFAC,PN | Performed by: INTERNAL MEDICINE

## 2020-08-26 PROCEDURE — 99999 PR PBB SHADOW E&M-EST. PATIENT-LVL IV: ICD-10-PCS | Mod: PBBFAC,,, | Performed by: INTERNAL MEDICINE

## 2020-08-26 PROCEDURE — 99999 PR PBB SHADOW E&M-EST. PATIENT-LVL IV: CPT | Mod: PBBFAC,,, | Performed by: INTERNAL MEDICINE

## 2020-08-26 PROCEDURE — 99214 PR OFFICE/OUTPT VISIT, EST, LEVL IV, 30-39 MIN: ICD-10-PCS | Mod: S$PBB,,, | Performed by: INTERNAL MEDICINE

## 2020-08-26 PROCEDURE — 97110 THERAPEUTIC EXERCISES: CPT | Mod: PO

## 2020-08-26 PROCEDURE — 99214 OFFICE O/P EST MOD 30 MIN: CPT | Mod: S$PBB,,, | Performed by: INTERNAL MEDICINE

## 2020-08-26 RX ORDER — DULOXETIN HYDROCHLORIDE 30 MG/1
CAPSULE, DELAYED RELEASE ORAL
COMMUNITY
Start: 2020-08-06 | End: 2021-04-19

## 2020-08-26 RX ORDER — LANCETS 33 GAUGE
EACH MISCELLANEOUS
COMMUNITY

## 2020-08-26 RX ORDER — BLOOD-GLUCOSE METER
EACH MISCELLANEOUS
COMMUNITY
Start: 2020-07-14

## 2020-08-26 RX ORDER — CALCIUM CITRATE/VITAMIN D3 200MG-6.25
TABLET ORAL
COMMUNITY
Start: 2020-07-14 | End: 2023-04-04 | Stop reason: SDUPTHER

## 2020-08-26 NOTE — LETTER
August 26, 2020      [place]  [address] [facility] [address]           [facility]  [address] [address] [facility] [address]  [facility] [address]  Phone: [phone]  Fax: [phone]          Patient: Elham Rodas   MR Number: [id number]   YOB: 1980   Date of Visit: 8/26/2020       Dear Kimberly Landry:    Thank you for referring Elham Rodas to me for evaluation. Attached you will find relevant portions of my assessment and plan of care.    If you have questions, please do not hesitate to call me. I look forward to following Elham Rodas along with you.    Sincerely,    Morris Hathaway III, MD    Enclosure  CC:  No Recipients    If you would like to receive this communication electronically, please contact [email or web address] or [phone] to request more information on [employer] access.    For providers and/or their staff who would like to refer a patient to [facility], please contact us through our one-stop-shop provider referral line, [facility] , at [phone].    If you feel you have received this communication in error or would no longer like to receive these types of communications, please e-mail [email or web address]

## 2020-08-26 NOTE — ASSESSMENT & PLAN NOTE
She has episodes of palpitations a/w presyncope and improved w/ eating.  May be related to blood sugar changes however she has not been checking her glucose during episodes.   - check echo and cardiac event monitor as above

## 2020-08-26 NOTE — PROGRESS NOTES
Physical Therapy Daily Treatment Note     Name: Elham Rodas  Clinic Number: [id number]    Therapy Diagnosis:   Encounter Diagnoses   Name Primary?    Pain in shoulder region, right     Decreased range of motion of right shoulder     At risk for lymphedema      Physician: Holly Euceda NP    Visit Date: 8/26/2020      Physician Orders: PT Eval and Treat   Medical Diagnosis: Lymphedema, not elsewhere classified  Evaluation Date: 8/10/2020  Authorization Period Expiration: 8/10/21  Plan of Care Certification Period: 10/16/20  Visit # / Visits authorized: 2 pending  Insurance: Medicaid     Time In: 1100a  Time Out: 1210p  Total Billable Time: 70 minutes     Precautions: Standard, lymphedema and cancer    Subjective     Pt reports: she has tightness in her neck and upper back, notices fullness in her forearm region, and will have some pain/discomfort at night.   She was compliant with home exercise program.  Response to previous treatment: has performed stretching, has some basic information regarding breast CA and lymphedema from pre and post op visits  Functional change: limited full reach, some soreness with lift/carry/use of R UE    Pain: 2/10  Location: right axilla, upper back and shoulder      Objective     Postural examination/scapula alignment: Rounded shoulder and Head forward   Edema: Mild  Location: Right forearm. Please refer to measurements below  SLNB and lumpectomy sites R breast and axilla- mild tenderness and tightness on palpation  Wearing bra- not removed for sesssion  Axillary Web Syndrome/Cording:   Location: none noted   Shoulder Range of Motion:   Active /Passive ROM Right Left   Flexion 150*- pulling in axilla and lateral breast 170   Abduction 85*- tightness in sh and axilla 165   Extension 65 65   IR/90deg 70 (at 70 ABD)*- notes anterior tightness 90   ER/90deg ~30 (at 70 ABD)*- pulling ant sh region 90   ER sitting ~T2 ~T2   IR sitting  Forearm across low back T5    *  "pain     Baseline Measurements of BL UE's for early detection of Lymphedema:      LANDMARK RIGHT UE  8/10/20 LEFT UE  8/10/20 DIFF  At eval   E + 8" 35.5 cm 36 cm 0.5 cm   E + 6" 31 cm 33 cm 2 cm   E + 4" 29 cm 29cm 0 cm   E + 2" 28 cm 27.5 cm 0.5 cm   Elbow 25.5 cm 25.5 cm 0 cm   W+ 8" 27 cm 25.5 cm 1.5 cm   W +  6" 25.5 cm 24 cm 1.5 cm   W + 4" 22 cm 21 cm 1 cm   Wrist 16.5 cm 16.5 cm 0 cm   DPC 21.5 cm 21.5 cm 0 cm   IP Thumb 6.5 cm 7 cm 0.5cm       Treatment:   Elham received the following manual therapy techniques:- Manual Lymphatic Drainage were applied to the: R UE for 40 minutes, including: MLD and   Education and training in Lymphedema risk and management options    MANUAL LYMPHATIC DRAINAGE:  Drainage of R upper quadrant and R UE   While supine with arm elevated,  Drainage along neck, B subclavian regions,  Across ant chest and top of shoulder,  Axillary regions,  drainage of entire UE especially upper arm, forearm, wrist and hand with return of all areas proximally,  In sidelying stimulation of substitution pathways,  drainage post arm, and across back and down along trunk    Positional rom and stm and mobility to R UE, axilla, lateral chest wall, ribs, and scar massage to incisional sites  Scapular mobility    SEQUENTIAL COMPRESSION PUMP:na     MULTILAYERED BANDAGING:  Not performed  Discussed potential for bandaging and potential need for compression sleeve  Consider tubigrip as lite form of compression.    Elham received therapeutic exercises to develop strength, endurance, ROM, flexibility and posture for 30 minutes including: positional stretch with arm overhead in flex, abd and er on 2 pillows  aarom R UE  Positional stretch added to HEP  Pt was instructed in and performed dowel assisted sh flex and abd, scapular retractions, pec stretch in corner or doorway, wall postural extension, thoracic sbing with arms overhead,  UE reach overhead with scaption and spinal elongation, bow and arrow style " stretch of reach and rotate, and UE flexion and spinal elongation via table slides or table walk aways.    Performed x 10 reps each with stretches 3x for 20 seconds  Pt was able to demonstrate and voice understanding of performance.     THERAPEUTIC EXERCISES:  Continue HEP of AROM, stretching, and postural correction.   Home Exercises Provided and Patient Education Provided     Education provided:    lymphedema risk and education in plan of care  Potential compression needs  Manual lymphatic drainage components  PATIENT/FAMILY Education: compression wear schedule,  HEP,  Beginning of self massage,  Self or assisted bandaging , and Risk reduction    Written Home Exercises Provided: Patient instructed to cont prior HEP.and added plan  Exercises were reviewed and Elham was able to demonstrate them prior to the end of the session.  Elham demonstrated good  understanding of the education provided.       Assessment   This is a 39 y.o. female referred to outpatient physical therapy and presents with a medical diagnosis of R breast cancer with SLNB, lumpectomy, chemotherapy and radiation. Impairments including R shoulder region pain, decreased strength, poor posture, decreased ROM, swelling in RUE, and, impaired functional mobility with lymphedema risk.   Mild fullness in R forearm as compared to her L UE.  Limited soft tissue mobility in her R axilla, chest wall, breast and postural dysfunction with noted discomfort to R shoulder, upper back and neck.    Pt will benefit from continued therapy to address ROM deficits and improve soft tissue quality in R upper trap and shoulder region as well as educate in lymphedema management. Elham is progressing well towards her goals.   Pt prognosis is Excellent.     Pt will continue to benefit from skilled outpatient physical therapy to address the deficits listed in the problem list box on initial evaluation, provide pt/family education and to maximize pt's level of independence in  the home and community environment.     Pt's spiritual, cultural and educational needs considered and pt agreeable to plan of care and goals.     Anticipated barriers to physical therapy: access to schedule with work    Goals: Goals: Pt agrees with goals set     Short Term goals: 4 weeks  1. Patient will report reduced numbness in RUE at night for >/=3 nights out of the week for improved quality of life. (progressing, not met)  2. Patient will demonstrate independence with Home Exercise program established. (progressing, not met)  3. Pt will increase AROM/PROM in shoulder abduction ROM to 120 degrees on right to improve functional reach, carry, push, pull pain free. (progressing, not met)  4. Pt will increase AROM/PROM in shoulder flexion to 170 degrees on right to improve functional reach, carry, push, pull pain free.(progressing, not met)  5. Pt will demo 5/5 strength in  BUE's for improved functional mobility, endurance, and posture. (progressing, not met)        Long Term Goals: 8 weeks   1.  Pt will increase AROM/PROM in shoulder ER at 90 to 60 degrees on right to improve functional reach, carry, push, pull pain free. (progressing, not met)  2. Pt will demo increased R shoulder IR AROM to allow for pt to reach bra and/or opposite shoulder without pain for imrpoved function. (progressing, not met)  3. Pt will demonstrate full/maximized tissue mobility to increase ROM and promote healthy tissue to be pain free at discharge. (progressing, not met)  4. Pt will report decrease in overall worst pain to 1/10 at discharge. (progressing, not met)  5. Pt will increase AROM/PROM in shoulder abduction ROM to 170 degrees on right to improve functional reach, carry, push, pull pain free. (progressing, not met)  6. Patient will report reduced numbness in RUE at night for >/=5 nights out of the week for improved quality of life. (progressing, not met)  7. Patient will report being able to reach items overhead without pain for  improved functional mobility. (progressing, not met)    Additional goals 8/26/20:  1. Patient to show decreased girth in R UE by up to 1cm in forearm region to allow for improved UE symmetry, clothing choice, ROM, and UE function.  (progressing, not met)  2. Patient will show reduction in density to mild or less with improved contour of limb to allow for improved cosmesis, improved lymphatic drainage, and functional mobility.  (progressing, not met)  3. Patient to ric/doff compression garment with daily compliance to support lymphatic mobility and skin elasticity.  (progressing, not met)- as needed  4. Pt to show improved postural awareness and alignment.  (progressing, not met)  5. Pt to be I and compliant with HEP to allow for improved ROM, reach and carry with functional endurance and strength.    (progressing, not met)  Plan   Continue PT  2x   weekly for Complete Decongestive Therapy:  Manual lymphatic drainage, Multilayered short stretch bandaging, Pneumatic compression, Therapeutic exercises, Patient education as deemed necessary to achieve stated goals.  Schedule access - offer visits as able.    Maribel Gupta, PT

## 2020-08-26 NOTE — PROGRESS NOTES
Subjective:    Patient ID:  Elham Rodas is a 39 y.o. female who presents for evaluation of Establish Care (near syncope)      PCP: Mookie Rausch MD     Referring Provider: Kimberly Landry NP     HPI  Pt is a 38 yo F w/ PMH of Prediabetes and Breast Cancer s/p chemo/XRT and lumpectomy 2/2019 who presents for evaluation palpitations and presyncope x few years.  She mentions that she has been following with her PCP and recently found out she is prediabetic and c/o palpitations and presyncope and was referred to cardiology.  She states that she gets palpitations and presyncope together and happens weekly.  She had an ED visit 8/8/2020 for an episode and had a negative cardiac w/u w/ a normal ECG.  She notes a prodrome of nausea, sob, and tremors prior to the presyncope and palpitations.  She states that her symptoms are improved when she eats.  She was asked to check her blood glucose at the time of the episodes however she has not done this. She denies stress, increased anxiety, cp, edema, orthopnea, PND, LOC, and claudication.  She does not exercise regularly and states that she can not exercise due to paresthesias she gets.  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  She does not monitor her BP at home.  She had a recent Holter monitor which was negative for arrhythmia however she did not return the diary.  Orthostatics were performed during clinic visit which were negative.            Past Medical History:   Diagnosis Date    Abnormal Pap smear of cervix 05/2016    ASCUS, - HPV    Breast cancer 10/2018    right    Hypothyroidism     Palpitations     Syncope and collapse     Thyroid disease     Vitamin D deficiency disease      Past Surgical History:   Procedure Laterality Date    AXILLARY NODE DISSECTION Right 11/21/2018    Procedure: LYMPHADENECTOMY, AXILLARY;  Surgeon: Jann Ayala MD;  Location: [facility] OR [address] [place] [address];  Service: General;  Laterality: Right;    BREAST  LUMPECTOMY Right     CHOLECYSTECTOMY      HYSTERECTOMY      TLH, BSO--( breast cancer)    INJECTION FOR SENTINEL NODE IDENTIFICATION Right 11/21/2018    Procedure: INJECTION, FOR SENTINEL NODE IDENTIFICATION;  Surgeon: Jann Ayala MD;  Location: [facility] OR 2ND FLR;  Service: General;  Laterality: Right;    INSERTION OF TUNNELED CENTRAL VENOUS CATHETER (CVC) WITH SUBCUTANEOUS PORT Left 1/2/2019    Procedure: [id number];  Surgeon: Jann Ayala MD;  Location: [facility] OR 2ND FLR;  Service: General;  Laterality: Left;    LAPAROSCOPIC SALPINGO-OOPHORECTOMY Bilateral 11/4/2019    Procedure: SALPINGO-OOPHORECTOMY, LAPAROSCOPIC;  Surgeon: Yoni Oscar MD;  Location: [facility] OR;  Service: OB/GYN;  Laterality: Bilateral;    LAPAROSCOPIC TOTAL HYSTERECTOMY N/A 11/4/2019    Procedure: HYSTERECTOMY, TOTAL, LAPAROSCOPIC;  Surgeon: Yoni Oscar MD;  Location: [facility] OR;  Service: OB/GYN;  Laterality: N/A;    MASTECTOMY, PARTIAL Right 11/21/2018    Procedure: MASTECTOMY, PARTIAL - seed localized;  Surgeon: Jann Ayala MD;  Location: [facility] OR 2ND FLR;  Service: General;  Laterality: Right;  seed placement 11/14    OOPHORECTOMY      SENTINEL LYMPH NODE BIOPSY Right 11/21/2018    Procedure: BIOPSY, LYMPH NODE, SENTINEL;  Surgeon: Jann Ayala MD;  Location: [facility] OR 2ND FLR;  Service: General;  Laterality: Right;    THYROIDECTOMY      TUBAL LIGATION       Social History     Socioeconomic History    Marital status:      Spouse name: Not on file    Number of children: Not on file    Years of education: Not on file    Highest education level: Not on file   Occupational History    Not on file   Social Needs    Financial resource strain: Not on file    Food insecurity     Worry: Not on file     Inability: Not on file    Transportation needs     Medical: Not on file     Non-medical: Not on file   Tobacco Use    Smoking status: Never Smoker    Smokeless tobacco: Never Used    Substance and Sexual Activity    Alcohol use: Yes     Alcohol/week: 2.0 standard drinks     Types: 2 Cans of beer per week     Comment: occasionally    Drug use: No    Sexual activity: Yes     Partners: Male     Birth control/protection: See Surgical Hx     Comment:    Lifestyle    Physical activity     Days per week: Not on file     Minutes per session: Not on file    Stress: Not on file   Relationships    Social connections     Talks on phone: Not on file     Gets together: Not on file     Attends Christian service: Not on file     Active member of club or organization: Not on file     Attends meetings of clubs or organizations: Not on file     Relationship status: Not on file   Other Topics Concern    Not on file   Social History Narrative    Not on file     Family History   Problem Relation Age of Onset    Hypertension Mother     Heart attack Mother     Hypertension Father     Ovarian cancer Maternal Aunt     Cancer Paternal Grandfather     Breast cancer Neg Hx     Colon cancer Neg Hx        Review of patient's allergies indicates:  No Known Allergies    Medication List with Changes/Refills   Current Medications    ANASTROZOLE (ARIMIDEX) 1 MG TAB    Take 1 tablet (1 mg total) by mouth once daily.    CALCIUM CARBONATE (TUMS) 200 MG CALCIUM (500 MG) CHEWABLE TABLET    Take 1 tablet by mouth.    CHOLECALCIFEROL, VITAMIN D3, 5,000 UNIT TAB    Vitamin D3 125 mcg (5,000 unit) tablet   Take 1 tablet every day by oral route.    DULOXETINE (CYMBALTA) 30 MG CAPSULE    TK 1 C PO QD    LANCETS (TRUEPLUS LANCETS) 33 GAUGE MISC    TRUEplus Lancets 33 gauge    LEVOTHYROXINE (SYNTHROID) 88 MCG TABLET    Take 1/2 1 day per week; 1 tab 6 days a week. Wait at least 4 hours after taking LT4 to take calcium.    METFORMIN (GLUCOPHAGE-XR) 500 MG 24 HR TABLET    Take 1 tablet (500 mg total) by mouth once daily.    TRUE METRIX GLUCOSE METER MISC    USE TO TEST BLOOD SUGAR AS DIRECTED    TRUE METRIX GLUCOSE TEST  "STRIP STRP    TEST BLOOD SUGAR ONCE D       Review of Systems   Constitution: Negative for diaphoresis and fever.   HENT: Negative for congestion and hearing loss.    Eyes: Negative for blurred vision and pain.   Cardiovascular: Positive for near-syncope and palpitations. Negative for chest pain, claudication, dyspnea on exertion, leg swelling, orthopnea, paroxysmal nocturnal dyspnea and syncope.   Respiratory: Positive for shortness of breath. Negative for sleep disturbances due to breathing.    Hematologic/Lymphatic: Negative for bleeding problem. Does not bruise/bleed easily.   Skin: Negative for color change and poor wound healing.   Gastrointestinal: Negative for abdominal pain and nausea.   Genitourinary: Negative for bladder incontinence and flank pain.   Neurological: Positive for paresthesias and tremors. Negative for focal weakness and light-headedness.        Objective:   /88 (BP Location: Left arm, Patient Position: Sitting, BP Method: Large (Automatic))   Pulse 84   Ht 5' 4" (1.626 m)   Wt 77.1 kg (170 lb)   SpO2 98%   BMI 29.18 kg/m²    Physical Exam   Constitutional: She is oriented to person, place, and time. She appears well-developed and well-nourished.   HENT:   Head: Normocephalic and atraumatic.   Mouth/Throat: Oropharynx is clear and moist.   Eyes: Pupils are equal, round, and reactive to light. EOM are normal. No scleral icterus.   Neck: Normal range of motion. Neck supple. No JVD present.   Cardiovascular: Normal rate, regular rhythm, S1 normal, S2 normal and intact distal pulses. Exam reveals no gallop and no friction rub.   No murmur heard.  Pulmonary/Chest: Effort normal and breath sounds normal. No respiratory distress. She has no wheezes. She has no rales. She exhibits no tenderness.   Abdominal: Soft. Bowel sounds are normal. She exhibits no distension and no mass. There is no abdominal tenderness. There is no rebound.   Musculoskeletal: Normal range of motion.         " General: No tenderness or edema.   Neurological: She is alert and oriented to person, place, and time. She displays normal reflexes. Coordination normal.   Skin: Skin is warm and dry. She is not diaphoretic. No pallor.   Psychiatric: She has a normal mood and affect. Her behavior is normal. Judgment normal.         EC2020- reviewed.  NSR, normal ECG.      Assessment:       1. Postural dizziness with presyncope    2. Palpitations    3. SOB (shortness of breath)         Plan:         Postural dizziness with presyncope  Orthostatics performed in clinic and negative.  Pt w/ normal ECG, normal functional status, and prior echo 2019 noted to be normal.  Holter monitor negative for arrhythmia however pt states she had no symptoms during the 2 days she wore the device.    - check echo to eval cardiac function  - pt to monitor blood glucose to eval if symptoms related to hypo/hyperglycemia  - check cardiac event monitor to r/o arrhythmia    Palpitations  She has episodes of palpitations a/w presyncope and improved w/ eating.  May be related to blood sugar changes however she has not been checking her glucose during episodes.   - check echo and cardiac event monitor as above     SOB (shortness of breath)  Pt notes sob w/ episodes of palpitations and presyncope.  Pt noted to have chemo following her echo in 2019.  - check echo as above          Total duration of face to face visit time 30 minutes.  Total time spent counseling greater than fifty percent of total visit time.  Counseling included discussion regarding imaging findings, diagnosis, possibilities, treatment options, risks and benefits.  The patient had many questions regarding the options and long-term effects      Morris Hathaway M.D.  Interventional Cardiology

## 2020-08-26 NOTE — ASSESSMENT & PLAN NOTE
Orthostatics performed in clinic and negative.  Pt w/ normal ECG, normal functional status, and prior echo 1/2019 noted to be normal.  Holter monitor negative for arrhythmia however pt states she had no symptoms during the 2 days she wore the device.    - check echo to eval cardiac function  - pt to monitor blood glucose to eval if symptoms related to hypo/hyperglycemia  - check cardiac event monitor to r/o arrhythmia

## 2020-08-26 NOTE — ASSESSMENT & PLAN NOTE
Pt notes sob w/ episodes of palpitations and presyncope.  Pt noted to have chemo following her echo in 1/2019.  - check echo as above

## 2020-08-28 ENCOUNTER — CLINICAL SUPPORT (OUTPATIENT)
Dept: REHABILITATION | Facility: HOSPITAL | Age: 40
End: 2020-08-28
Payer: MEDICAID

## 2020-08-28 ENCOUNTER — TELEPHONE (OUTPATIENT)
Dept: OBSTETRICS AND GYNECOLOGY | Facility: CLINIC | Age: 40
End: 2020-08-28

## 2020-08-28 DIAGNOSIS — M25.511 PAIN IN SHOULDER REGION, RIGHT: ICD-10-CM

## 2020-08-28 DIAGNOSIS — Z91.89 AT RISK FOR LYMPHEDEMA: ICD-10-CM

## 2020-08-28 DIAGNOSIS — M25.611 DECREASED RANGE OF MOTION OF RIGHT SHOULDER: ICD-10-CM

## 2020-08-28 PROCEDURE — 97110 THERAPEUTIC EXERCISES: CPT | Mod: PO

## 2020-08-28 NOTE — PROGRESS NOTES
"  Physical Therapy Daily Treatment Note     Name: Elham Rodas  Clinic Number: [id number]    Therapy Diagnosis:   Encounter Diagnoses   Name Primary?    Pain in shoulder region, right     Decreased range of motion of right shoulder     At risk for lymphedema      Physician: Holly Euceda NP    Visit Date: 8/28/2020      Physician Orders: PT Eval and Treat   Medical Diagnosis: Lymphedema, not elsewhere classified  Evaluation Date: 8/10/2020  Authorization Period Expiration: 10/27/20  Plan of Care Certification Period: 10/16/20  Visit # / Visits authorized: 3/10  Insurance: Medicaid     Time In: 140p  Time Out: 245p  Total Billable Time: 65minutes     Precautions: Standard, lymphedema and cancer    Subjective     Pt reports: firmness and fullness to outer part of R breast- uses small pad due to tissue/size defect of lumpectomy.  She was compliant with home exercise program.  Response to previous treatment: less soreness around R shoulder, mild pulling with stretches along breast and axilla.  Pt notes some tingling and numbness R UE during night with sleep/position.  Functional change: working on postural correction and alignment    Pain: 2/10  Location: right axilla, breast, upper back and shoulder      Objective     Postural examination/scapula alignment: Rounded shoulder and Head forward   Edema: Mild  Location: Right forearm. Please refer to measurements below  SLNB and lumpectomy sites R breast and axilla- mild tenderness and tightness on palpation  Wearing bra- not removed for session- has small pad laterally for cosmesis    [facility]MARK RIGHT UE  8/10/20 LEFT UE  8/10/20 DIFF  At eval   E + 8" 35.5 cm 36 cm 0.5 cm   E + 6" 31 cm 33 cm 2 cm   E + 4" 29 cm 29cm 0 cm   E + 2" 28 cm 27.5 cm 0.5 cm   Elbow 25.5 cm 25.5 cm 0 cm   W+ 8" 27 cm 25.5 cm 1.5 cm   W +  6" 25.5 cm 24 cm 1.5 cm   W + 4" 22 cm 21 cm 1 cm   Wrist 16.5 cm 16.5 cm 0 cm   DPC 21.5 cm 21.5 cm 0 cm   IP Thumb 6.5 cm 7 cm 0.5cm       Treatment: " Patient Education        Perineal Abscess: Care Instructions  Your Care Instructions  A perineal abscess is an infection that causes a painful lump in the perineum. The perineum is the area between the scrotum and the anus in a man. In a woman, it's the area between the vulva and the anus. The area may look red and feel painful and be swollen. The abscess may form after surgery or after delivery of a baby. It can also be caused by an infection of the prostate gland. The prostate gland is an organ found inside a man's body, just below the bladder. Your doctor may have done minor surgery to open and drain the abscess. You may have had a sedative to help you relax. You may be unsteady after having sedation. It can take a few hours for the medicine's effects to wear off. Common side effects include nausea, vomiting, and feeling sleepy or tired. The doctor has checked you carefully, but problems can develop later. If you notice any problems or new symptoms, get medical treatment right away. Follow-up care is a key part of your treatment and safety. Be sure to make and go to all appointments, and call your doctor if you are having problems. It's also a good idea to know your test results and keep a list of the medicines you take. How can you care for yourself at home? · If your doctor gave you a sedative:  ? For 24 hours, don't do anything that requires attention to detail. This includes going to work, making important decisions, or signing any legal documents. It takes time for the medicine's effects to completely wear off.  ? For your safety, do not drive or operate any machinery that could be dangerous. Wait until the medicine wears off. You need to be able to think clearly and react easily. · Be safe with medicines. Read and follow all instructions on the label. ? If the doctor gave you a prescription medicine for pain, take it as prescribed.   ? If you are not taking a prescription pain medicine, ask your   Elham received the following manual therapy techniques:- Manual Lymphatic Drainage were applied to the: R UE for 35 minutes, including: MLD and   Education and training in Lymphedema risk and management options    MANUAL LYMPHATIC DRAINAGE:  Drainage of R upper quadrant and R UE   While supine with arm elevated,  Drainage along neck, B subclavian regions,  Across ant chest and top of shoulder,  Axillary regions,  drainage of entire UE especially upper arm, forearm, wrist and hand with return of all areas proximally,  In sidelying stimulation of substitution pathways,  drainage post arm, and across back and down along trunk    Positional rom and stm and mobility to R UE, axilla, lateral chest wall, ribs, and scar massage to incisional sites  Scapular mobility    Educated in self manual lymphatic drainage for her R breast and upper quadrant- demonstration training with written/illustrated plan- will consider if breast requires additional assessment and lymphedema/scar management by PT    SEQUENTIAL COMPRESSION PUMP:na     MULTILAYERED BANDAGING:  Not performed  Discussed potential for bandaging and potential need for compression sleeve  Consider tubigrip as lite form of compression.    Elham received therapeutic exercises to develop strength, endurance, ROM, flexibility and posture for 30 minutes including: positional stretch with arm overhead in flex, abd and er on 2 pillows  aarom R UE    Performed sidelying reach and retraction of R UE x 10  Sh abd to 90 then stretch overhead  Supine snow katty style motions with added LTR  Assisted ROM and stm to axilla and lateral chest, breast, and trunk  Supine lying on 1/2 roll x 5 min.  Positional stretch added to HEP  Standing scaption overhead with added spinal elongation.  Pt was able to demonstrate and voice understanding of performance.     THERAPEUTIC EXERCISES:  Continue HEP of AROM, stretching, and postural correction.   Home Exercises Provided and Patient  doctor if you can take an over-the-counter medicine. · If your doctor prescribed antibiotics, take them as directed. Do not stop taking them just because you feel better. You need to take the full course of antibiotics. · Sit in a few inches of warm water (sitz bath) 3 times a day and after bowel movements. The warm water helps the area heal and eases discomfort. When should you call for help? [id number] anytime you think you may need emergency care. For example, call if:  · You have trouble breathing. · You passed out (lost consciousness). Call your doctor now or seek immediate medical care if:  · You have symptoms of infection, such as:  ? Increased pain, swelling, warmth, or redness. ? Red streaks leading from the area. ? Pus draining from the area. ? A fever. Watch closely for changes in your health, and be sure to contact your doctor if:  · You do not get better as expected. Where can you learn more? Go to [email or web address].Serveron. org and sign in to your Weatlas account. Enter A917 in the Astrid box to learn more about \"Perineal Abscess: Care Instructions. \"     If you do not have an account, please click on the \"Sign Up Now\" link. Current as of: August 12, 2019               Content Version: 12.5  © 7274-5047 Healthwise, Incorporated. Care instructions adapted under license by [facility] ([facility]). If you have questions about a medical condition or this instruction, always ask your healthcare professional. [place] [address] any warranty or liability for your use of this information. Education Provided     Education provided:    lymphedema risk and education in plan of care  Potential compression needs  Manual lymphatic drainage components- to R Upper quadrant, UE and breast  PATIENT/FAMILY Education: compression wear schedule,  HEP,  Beginning of self massage,  Self or assisted bandaging , and Risk reduction    Written Home Exercises Provided: Patient instructed to cont prior HEP.and added plan  Exercises were reviewed and Elham was able to demonstrate them prior to the end of the session.  Elham demonstrated good  understanding of the education provided.   Assessment   This is a 39 y.o. female referred to outpatient physical therapy and presents with a medical diagnosis of R breast cancer with SLNB, lumpectomy, chemotherapy and radiation. Impairments including R shoulder region pain, decreased strength, poor posture, decreased ROM, swelling in RUE, and, impaired functional mobility with lymphedema risk.     May need to further assess R breast for scarring, lymphedema and limited soft tissue mobility- with UE ROM pt noting pulling in lateral breast and axilla more than shoulder limitations. Will advance HEP and self MLD accordingly.  Pt has advanced her Sh ROM and improved postural awareness with less pain. Minimal fullness to R forearm region.    Pt will benefit from continued therapy to address ROM deficits and improve soft tissue quality in R upper trap and shoulder region as well as educate in lymphedema management. Elham is progressing well towards her goals.   Pt prognosis is Excellent.     Pt will continue to benefit from skilled outpatient physical therapy to address the deficits listed in the problem list box on initial evaluation, provide pt/family education and to maximize pt's level of independence in the home and community environment.     Pt's spiritual, cultural and educational needs considered and pt agreeable to plan of care and goals.     Anticipated barriers to physical  therapy: access to schedule with work    Goals: Goals: Pt agrees with goals set     Short Term goals: 4 weeks  1. Patient will report reduced numbness in RUE at night for >/=3 nights out of the week for improved quality of life. (progressing, not met)  2. Patient will demonstrate independence with Home Exercise program established. (progressing, not met)  3. Pt will increase AROM/PROM in shoulder abduction ROM to 120 degrees on right to improve functional reach, carry, push, pull pain free. (progressing, not met)  4. Pt will increase AROM/PROM in shoulder flexion to 170 degrees on right to improve functional reach, carry, push, pull pain free.(progressing, not met)  5. Pt will demo 5/5 strength in  BUE's for improved functional mobility, endurance, and posture. (progressing, not met)        Long Term Goals: 8 weeks   1.  Pt will increase AROM/PROM in shoulder ER at 90 to 60 degrees on right to improve functional reach, carry, push, pull pain free. (progressing, not met)  2. Pt will demo increased R shoulder IR AROM to allow for pt to reach bra and/or opposite shoulder without pain for imrpoved function. (progressing, not met)  3. Pt will demonstrate full/maximized tissue mobility to increase ROM and promote healthy tissue to be pain free at discharge. (progressing, not met)  4. Pt will report decrease in overall worst pain to 1/10 at discharge. (progressing, not met)  5. Pt will increase AROM/PROM in shoulder abduction ROM to 170 degrees on right to improve functional reach, carry, push, pull pain free. (progressing, not met)  6. Patient will report reduced numbness in RUE at night for >/=5 nights out of the week for improved quality of life. (progressing, not met)  7. Patient will report being able to reach items overhead without pain for improved functional mobility. (progressing, not met)    Additional goals 8/26/20:  1. Patient to show decreased girth in R UE by up to 1cm in forearm region to allow for improved  UE symmetry, clothing choice, ROM, and UE function.  (progressing, not met)  2. Patient will show reduction in density to mild or less with improved contour of limb to allow for improved cosmesis, improved lymphatic drainage, and functional mobility.  (progressing, not met)  3. Patient to ric/doff compression garment with daily compliance to support lymphatic mobility and skin elasticity.  (progressing, not met)- as needed  4. Pt to show improved postural awareness and alignment.  (progressing, not met)  5. Pt to be I and compliant with HEP to allow for improved ROM, reach and carry with functional endurance and strength.    (progressing, not met)  Plan   Continue PT  2x   weekly for Complete Decongestive Therapy:  Manual lymphatic drainage, Multilayered short stretch bandaging, Pneumatic compression, Therapeutic exercises, Patient education as deemed necessary to achieve stated goals.  Schedule access - offer visits as able.    Maribel Gupta, PT

## 2020-08-31 LAB
AORTIC ROOT ANNULUS: 2.67 CM
AORTIC VALVE CUSP SEPERATION: 1.85 CM
ASCENDING AORTA: 2.69 CM
AV INDEX (PROSTH): 0.63
AV MEAN GRADIENT: 2 MMHG
AV PEAK GRADIENT: 4 MMHG
AV VALVE AREA: 2.16 CM2
AV VELOCITY RATIO: 0.68
BSA FOR ECHO PROCEDURE: 1.87 M2
CV ECHO LV RWT: 0.61 CM
DOP CALC AO PEAK VEL: 0.96 M/S
DOP CALC AO VTI: 21.94 CM
DOP CALC LVOT AREA: 3.4 CM2
DOP CALC LVOT DIAMETER: 2.09 CM
DOP CALC LVOT PEAK VEL: 0.65 M/S
DOP CALC LVOT STROKE VOLUME: 47.35 CM3
DOP CALCLVOT PEAK VEL VTI: 13.81 CM
E WAVE DECELERATION TIME: 199.89 MSEC
E/A RATIO: 1.19
ECHO LV POSTERIOR WALL: 1.26 CM (ref 0.6–1.1)
FRACTIONAL SHORTENING: 36 % (ref 28–44)
INTERVENTRICULAR SEPTUM: 0.91 CM (ref 0.6–1.1)
LA MAJOR: 4.08 CM
LA MINOR: 4.48 CM
LA WIDTH: 3.16 CM
LEFT ATRIUM SIZE: 3.58 CM
LEFT ATRIUM VOLUME INDEX: 22.5 ML/M2
LEFT ATRIUM VOLUME: 41.07 CM3
LEFT INTERNAL DIMENSION IN SYSTOLE: 2.65 CM (ref 2.1–4)
LEFT VENTRICLE DIASTOLIC VOLUME INDEX: 41.79 ML/M2
LEFT VENTRICLE DIASTOLIC VOLUME: 76.29 ML
LEFT VENTRICLE MASS INDEX: 83 G/M2
LEFT VENTRICLE SYSTOLIC VOLUME INDEX: 14.1 ML/M2
LEFT VENTRICLE SYSTOLIC VOLUME: 25.82 ML
LEFT VENTRICULAR INTERNAL DIMENSION IN DIASTOLE: 4.15 CM (ref 3.5–6)
LEFT VENTRICULAR MASS: 151.16 G
MV PEAK A VEL: 0.64 M/S
MV PEAK E VEL: 0.76 M/S
MV STENOSIS PRESSURE HALF TIME: 57.97 MS
MV VALVE AREA P 1/2 METHOD: 3.8 CM2
PV PEAK VELOCITY: 0.89 CM/S
RA MAJOR: 3.97 CM
RA PRESSURE: 3 MMHG
RIGHT VENTRICULAR END-DIASTOLIC DIMENSION: 2.03 CM
STJ: 2.57 CM

## 2020-09-02 ENCOUNTER — TELEPHONE (OUTPATIENT)
Dept: CARDIOLOGY | Facility: HOSPITAL | Age: 40
End: 2020-09-02

## 2020-09-03 ENCOUNTER — CLINICAL SUPPORT (OUTPATIENT)
Dept: CARDIOLOGY | Facility: HOSPITAL | Age: 40
End: 2020-09-03
Attending: INTERNAL MEDICINE
Payer: MEDICAID

## 2020-09-03 DIAGNOSIS — R42 POSTURAL DIZZINESS WITH PRESYNCOPE: ICD-10-CM

## 2020-09-03 DIAGNOSIS — R00.2 PALPITATIONS: ICD-10-CM

## 2020-09-03 DIAGNOSIS — R55 POSTURAL DIZZINESS WITH PRESYNCOPE: ICD-10-CM

## 2020-09-03 PROCEDURE — 93272 CARDIAC EVENT MONITOR (CUPID ONLY): ICD-10-PCS | Mod: ,,, | Performed by: INTERNAL MEDICINE

## 2020-09-03 PROCEDURE — 93271 ECG/MONITORING AND ANALYSIS: CPT

## 2020-09-03 PROCEDURE — 93272 ECG/REVIEW INTERPRET ONLY: CPT | Mod: ,,, | Performed by: INTERNAL MEDICINE

## 2020-09-09 ENCOUNTER — TELEPHONE (OUTPATIENT)
Dept: OBSTETRICS AND GYNECOLOGY | Facility: CLINIC | Age: 40
End: 2020-09-09

## 2020-09-10 ENCOUNTER — TELEPHONE (OUTPATIENT)
Dept: OBSTETRICS AND GYNECOLOGY | Facility: CLINIC | Age: 40
End: 2020-09-10

## 2020-09-10 NOTE — TELEPHONE ENCOUNTER
RN [facility] re: availability for re-scheduling her appt with Dr. Trinidad. Return contact information provided with appt options left via voicemail. PETEY Smtih.     ----- Message from Ольга Fernandez MA sent at 9/9/2020  4:35 PM CDT -----    ----- Message -----  From: Marti Carney  Sent: 9/9/2020   4:31 PM CDT  To: [facility]    Type:  Patient Returning Call    Who Called:pt   Who Left Message for Patient: pt  Does the patient know what this is regarding?: pt trying to get an appt she had one 08/28/20 but she missed the appt   Would the patient rather a call back or a response via [facility]?  Call   Best Call Back Number:[phone]  Additional Information:  appt

## 2020-09-11 ENCOUNTER — OFFICE VISIT (OUTPATIENT)
Dept: PODIATRY | Facility: CLINIC | Age: 40
End: 2020-09-11
Payer: MEDICAID

## 2020-09-11 VITALS
WEIGHT: 171.19 LBS | BODY MASS INDEX: 29.23 KG/M2 | RESPIRATION RATE: 16 BRPM | HEART RATE: 88 BPM | DIASTOLIC BLOOD PRESSURE: 90 MMHG | SYSTOLIC BLOOD PRESSURE: 120 MMHG | HEIGHT: 64 IN

## 2020-09-11 DIAGNOSIS — E11.42 DIABETIC POLYNEUROPATHY ASSOCIATED WITH TYPE 2 DIABETES MELLITUS: ICD-10-CM

## 2020-09-11 DIAGNOSIS — R52 PAIN AGGRAVATED BY WALKING: ICD-10-CM

## 2020-09-11 DIAGNOSIS — G62.0 CHEMOTHERAPY-INDUCED NEUROPATHY: ICD-10-CM

## 2020-09-11 DIAGNOSIS — T45.1X5A CHEMOTHERAPY-INDUCED NEUROPATHY: ICD-10-CM

## 2020-09-11 DIAGNOSIS — M76.821 POSTERIOR TIBIAL TENDON DYSFUNCTION (PTTD) OF RIGHT LOWER EXTREMITY: Primary | ICD-10-CM

## 2020-09-11 PROCEDURE — 99999 PR PBB SHADOW E&M-EST. PATIENT-LVL V: ICD-10-PCS | Mod: PBBFAC,,, | Performed by: PODIATRIST

## 2020-09-11 PROCEDURE — 99203 PR OFFICE/OUTPT VISIT, NEW, LEVL III, 30-44 MIN: ICD-10-PCS | Mod: S$PBB,,, | Performed by: PODIATRIST

## 2020-09-11 PROCEDURE — 99203 OFFICE O/P NEW LOW 30 MIN: CPT | Mod: S$PBB,,, | Performed by: PODIATRIST

## 2020-09-11 PROCEDURE — 99215 OFFICE O/P EST HI 40 MIN: CPT | Mod: PBBFAC,PN | Performed by: PODIATRIST

## 2020-09-11 PROCEDURE — 99999 PR PBB SHADOW E&M-EST. PATIENT-LVL V: CPT | Mod: PBBFAC,,, | Performed by: PODIATRIST

## 2020-09-11 NOTE — PROGRESS NOTES
Subjective:      Patient ID: Elham Rodas is a 39 y.o. female.    Chief Complaint: Foot Pain (B/L feet for over a year) and PCP last visit over 8 months ago      39 y.o. female presenting with b/l feet pain. Known to Dr. Tolentino. Was seen by him once. xrays were negative for bone abnormalities. Pt was recently evaluated in ED and diagnosed with PF. Was discharged with naproxen. Points medial hindfoot along PT tendon for right foot for pain. She points dorsal lateral and plantar foot for pain for left foot. History of chemotherapy 2/2 breast cancer. Her symptoms presented after chemotherapy since 2019. She also tells me she is pre DM. Tried different conservative treatments including different shoe, insoles without any relief. Describes pain as combination of throbbing and sharp.     Review of Systems   Constitution: Negative for chills, decreased appetite, fever and malaise/fatigue.   HENT: Negative for congestion, ear discharge and sore throat.    Eyes: Negative for discharge and pain.   Cardiovascular: Negative for chest pain, claudication and leg swelling.   Respiratory: Negative for cough and shortness of breath.    Skin: Negative for color change, nail changes and rash.   Musculoskeletal: Positive for arthritis and stiffness. Negative for joint pain, joint swelling and muscle weakness.        Bilateral feet pain   Gastrointestinal: Negative for bloating, abdominal pain, diarrhea, nausea and vomiting.   Genitourinary: Negative for flank pain and hematuria.   Neurological: Positive for sensory change. Negative for headaches, numbness and weakness.   Psychiatric/Behavioral: Negative for altered mental status.             Past Medical History:   Diagnosis Date    Abnormal Pap smear of cervix 05/2016    ASCUS, - HPV    Breast cancer 10/2018    right    Hypothyroidism     Palpitations     Syncope and collapse     Thyroid disease     Vitamin D deficiency disease        Past Surgical History:   Procedure  Laterality Date    AXILLARY NODE DISSECTION Right 11/21/2018    Procedure: LYMPHADENECTOMY, AXILLARY;  Surgeon: Jann Ayala MD;  Location: [facility] OR 2ND FLR;  Service: General;  Laterality: Right;    BREAST LUMPECTOMY Right     CHOLECYSTECTOMY      HYSTERECTOMY      TLH, BSO--( breast cancer)    INJECTION FOR SENTINEL NODE IDENTIFICATION Right 11/21/2018    Procedure: INJECTION, FOR SENTINEL NODE IDENTIFICATION;  Surgeon: Jann Ayala MD;  [facility];  Service: General;  Laterality: Right;    INSERTION OF TUNNELED CENTRAL VENOUS CATHETER (CVC) WITH SUBCUTANEOUS PORT Left 1/2/2019    Procedure: [id number];  Surgeon: Jann Ayala MD;  Location: [facility] OR [address] [place] [address];  Service: General;  Laterality: Left;    LAPAROSCOPIC SALPINGO-OOPHORECTOMY Bilateral 11/4/2019    Procedure: SALPINGO-OOPHORECTOMY, LAPAROSCOPIC;  Surgeon: Yoni Oscar MD;  Location: [facility] OR;  Service: OB/GYN;  Laterality: Bilateral;    LAPAROSCOPIC TOTAL HYSTERECTOMY N/A 11/4/2019    Procedure: HYSTERECTOMY, TOTAL, LAPAROSCOPIC;  Surgeon: Yoni Oscar MD;  Location: [facility] OR;  Service: OB/GYN;  Laterality: N/A;    MASTECTOMY, PARTIAL Right 11/21/2018    Procedure: MASTECTOMY, PARTIAL - seed localized;  Surgeon: Jann Ayala MD;  Location: [facility] OR [address] [place] [address];  Service: General;  Laterality: Right;  seed placement 11/14    OOPHORECTOMY      SENTINEL LYMPH NODE BIOPSY Right 11/21/2018    Procedure: BIOPSY, LYMPH NODE, SENTINEL;  Surgeon: Jann Ayala MD;  Location: [facility] OR [address] [place] [address];  Service: General;  Laterality: Right;    THYROIDECTOMY      TUBAL LIGATION         Family History   Problem Relation Age of Onset    Hypertension Mother     Heart attack Mother     Hypertension Father     Ovarian cancer Maternal Aunt     Cancer Paternal Grandfather     Breast cancer Neg Hx     Colon cancer Neg Hx        Social History     Socioeconomic History    Marital status:      Spouse  name: Not on file    Number of children: Not on file    Years of education: Not on file    Highest education level: Not on file   Occupational History    Not on file   Social Needs    Financial resource strain: Not on file    Food insecurity     Worry: Not on file     Inability: Not on file    Transportation needs     Medical: Not on file     Non-medical: Not on file   Tobacco Use    Smoking status: Never Smoker    Smokeless tobacco: Never Used   Substance and Sexual Activity    Alcohol use: Yes     Alcohol/week: 2.0 standard drinks     Types: 2 Cans of beer per week     Comment: occasionally    Drug use: No    Sexual activity: Yes     Partners: Male     Birth control/protection: See Surgical Hx     Comment:    Lifestyle    Physical activity     Days per week: Not on file     Minutes per session: Not on file    Stress: Not on file   Relationships    Social connections     Talks on phone: Not on file     Gets together: Not on file     Attends Buddhism service: Not on file     Active member of club or organization: Not on file     Attends meetings of clubs or organizations: Not on file     Relationship status: Not on file   Other Topics Concern    Not on file   Social History Narrative    Not on file       Current Outpatient Medications   Medication Sig Dispense Refill    anastrozole (ARIMIDEX) 1 mg Tab Take 1 tablet (1 mg total) by mouth once daily. 90 tablet 1    calcium carbonate (TUMS) 200 mg calcium (500 mg) chewable tablet Take 1 tablet by mouth.      cholecalciferol, vitamin D3, 5,000 unit Tab Vitamin D3 125 mcg (5,000 unit) tablet   Take 1 tablet every day by oral route.      lancets (TRUEPLUS LANCETS) 33 gauge Misc TRUEplus Lancets 33 gauge      levothyroxine (SYNTHROID) 88 MCG tablet Take 1/2 1 day per week; 1 tab 6 days a week. Wait at least 4 hours after taking LT4 to take calcium. 90 tablet 3    metFORMIN (GLUCOPHAGE-XR) 500 MG 24 hr tablet Take 1 tablet (500 mg total) by  "mouth once daily. 90 tablet 3    naproxen (NAPROSYN) 500 MG tablet Take 1 tablet (500 mg total) by mouth 2 (two) times daily with meals. 20 tablet 0    TRUE METRIX GLUCOSE METER Misc USE TO TEST BLOOD SUGAR AS DIRECTED      TRUE METRIX GLUCOSE TEST STRIP Strp TEST BLOOD SUGAR ONCE D      DULoxetine (CYMBALTA) 30 MG capsule TK 1 C PO QD       No current facility-administered medications for this visit.      Facility-Administered Medications Ordered in Other Visits   Medication Dose Route Frequency Provider Last Rate Last Dose    lidocaine (PF) 10 mg/ml (1%) injection 10 mg  1 mL Intradermal Once Cain Mcgarry MD           Review of patient's allergies indicates:  No Known Allergies    Vitals:    09/11/20 1340   BP: (!) 120/90   Pulse: 88   Resp: 16   Weight: 77.7 kg (171 lb 3.2 oz)   Height: 5' 4" (1.626 m)   PainSc: 10-Worst pain ever   PainLoc: Foot       Objective:      Physical Exam  Constitutional:       General: She is not in acute distress.     Appearance: She is well-developed.   HENT:      Nose: Nose normal.   Eyes:      Conjunctiva/sclera: Conjunctivae normal.   Neck:      Musculoskeletal: Normal range of motion.   Pulmonary:      Effort: Pulmonary effort is normal.   Chest:      Chest wall: No tenderness.   Abdominal:      Tenderness: There is no abdominal tenderness.   Neurological:      Mental Status: She is alert and oriented to person, place, and time.   Psychiatric:         Behavior: Behavior normal.         Vascular: Distal DP/PT pulses palpable 2/4. CRT < 3 sec to tips of toes. No vericosities noted to LEs. Hair growth present LE, warm to touch LE, No edema noted to LE.    Dermatologic: No open lesions, lacerations or wounds. Interdigital spaces clean, dry and intact. No erythema, rubor, calor noted LE    Musculoskeletal:  Passive range of motion of ankle and pedal joints is painless. No calf tenderness LE, Compartments soft/compressible.   Right foot:  Diffuse tenderness along the " posterior tibial tendon and medial hindfoot and ankle.   L foot: diffuse tenderness entire plantar foot and dorsal lateral midfoot.     Neurological: Light touch, proprioception, and sharp/dull sensation are all intact. Protective threshold with the [place]-Wienstein monofilament is intact. Vibratory sensation intact.         Assessment:       Encounter Diagnoses   Name Primary?    Pain aggravated by walking     Posterior tibial tendon dysfunction (PTTD) of right lower extremity Yes    Chemotherapy-induced neuropathy     Diabetic polyneuropathy associated with type 2 diabetes mellitus          Plan:       Elham was seen today for foot pain and pcp last visit over 8 months ago.    Diagnoses and all orders for this visit:    Posterior tibial tendon dysfunction (PTTD) of right lower extremity  -     Ambulatory referral/consult to Physical/Occupational Therapy; Future    Pain aggravated by walking  -     X-Ray Foot Complete 3 view Bilateral; Future  -     X-Ray Ankle Complete 3 View Bilateral; Future    Chemotherapy-induced neuropathy    Diabetic polyneuropathy associated with type 2 diabetes mellitus      I counseled the patient on her conditions, their implications and medical management.    39 y.o. female with bilateral feet pain.     -rx. Physical therapy for R foot posterior tibial tendonitis.   -bilateral foot x-ray taken and reviewed.  No bony abnormality noted.  -her symptoms most likely related to history of chemotherapy and also with pre diabetic stage.  Consistent with chemotherapy induced neuropathy versus diabetic neuropathy.  I ruled out bony abnormality from Podiatry standpoint since x-ray is negative.  She has some pain along posterior tibial tendon on the right foot.  We can start with physical therapy and possibly MRI to rule out if symptoms continue.   -recommend to follow up with Neurology to confirm the diagnosis.     -The nature of the condition, options for management, as well as potential  risks and complications were discussed in detail with patient. Patient was amenable to my recommendations and left my office fully informed and will follow up as instructed or sooner if necessary.    -f/u prn       Note dictated with voice recognition software, please excuse any grammatical errors.

## 2020-09-14 ENCOUNTER — DOCUMENTATION ONLY (OUTPATIENT)
Dept: REHABILITATION | Facility: HOSPITAL | Age: 40
End: 2020-09-14

## 2020-09-14 DIAGNOSIS — M25.511 PAIN IN SHOULDER REGION, RIGHT: Primary | ICD-10-CM

## 2020-09-14 DIAGNOSIS — Z91.89 AT RISK FOR LYMPHEDEMA: ICD-10-CM

## 2020-09-14 DIAGNOSIS — M25.611 DECREASED RANGE OF MOTION OF RIGHT SHOULDER: ICD-10-CM

## 2020-09-14 NOTE — PROGRESS NOTES
Pt was no show 9/14/20 and 9/11/20- several phone attempts to contact patient to confirm attendance with recent schedule changes and weather events- unable to reach patient.

## 2020-09-15 ENCOUNTER — CLINICAL SUPPORT (OUTPATIENT)
Dept: REHABILITATION | Facility: HOSPITAL | Age: 40
End: 2020-09-15
Attending: PODIATRIST
Payer: MEDICAID

## 2020-09-15 DIAGNOSIS — M25.671 DECREASED RANGE OF MOTION OF BOTH ANKLES: ICD-10-CM

## 2020-09-15 DIAGNOSIS — M79.671 BILATERAL FOOT PAIN: ICD-10-CM

## 2020-09-15 DIAGNOSIS — M76.821 POSTERIOR TIBIAL TENDON DYSFUNCTION (PTTD) OF RIGHT LOWER EXTREMITY: ICD-10-CM

## 2020-09-15 DIAGNOSIS — R26.89 IMPAIRED GAIT AND MOBILITY: Primary | ICD-10-CM

## 2020-09-15 DIAGNOSIS — M79.672 BILATERAL FOOT PAIN: ICD-10-CM

## 2020-09-15 DIAGNOSIS — R53.1 DECREASED STRENGTH: ICD-10-CM

## 2020-09-15 DIAGNOSIS — M25.672 DECREASED RANGE OF MOTION OF BOTH ANKLES: ICD-10-CM

## 2020-09-15 PROCEDURE — 97110 THERAPEUTIC EXERCISES: CPT | Mod: PO

## 2020-09-15 PROCEDURE — 97161 PT EVAL LOW COMPLEX 20 MIN: CPT | Mod: PO

## 2020-09-15 NOTE — PLAN OF CARE
[facility]  Physical Therapy Initial Evaluation    Date: 9/15/2020   Name: Elham Rodas  Clinic Number: [id number]    Therapy Diagnosis:   Encounter Diagnoses   Name Primary?    Posterior tibial tendon dysfunction (PTTD) of right lower extremity     Bilateral foot pain     Decreased range of motion of both ankles     Impaired gait and mobility Yes    Decreased strength      Physician: Socorro Ochoa DPM    Physician Orders: PT Eval and Treat -      eval and treat for PTTD of right foot        Medical Diagnosis from Referral: Posterior tibial tendon dysfunction (PTTD) of right lower extremity  Evaluation Date: 9/15/2020  Authorization Period Expiration: 9/21/20  Plan of Care Expiration: 11/13/20  Visit # / Visits authorized: 1/ 1    Time In: 2:39 PM  Time Out: 3:38 PM  Total Appointment Time (timed & untimed codes): 59 minutes    Precautions: Standard, Diabetes and cancer    Subjective   Date of onset: after going back to work, ~1 year ago  History of current condition - Elham reports: since she went back to work 1 year ago, she's been having B foot pain. Pt states she talked to her oncologist about it because she was worried it was neuropathy. Pt states symptoms didn't get any better, even with trying new shoes or orthotics, so she saw a foot MD about it. Pt states podiatrist thinks it's related to her pre-diabetic status and chemo-induced neuropathy.   She can't wear any kind of heels. In her R foot, she gets pain under sole of foot and R heel ((at calcaneus- plantar fascia insertion)- she said she had to go to the ER because she couldn't put weight on it. Pt states she often feels like there's something under the balls of her R foot. On left foot pt gets pain at bottom of calcaneus (at plantar fascia insertion), top of foot, and lateral aspect of foot. Pt endorses tingling in B feet (after increased time in standing). Pt states pain is worse in B feet in the morning, but it  improves as she moves.        Medical History:   Past Medical History:   Diagnosis Date    Abnormal Pap smear of cervix 05/2016    ASCUS, - HPV    Breast cancer 10/2018    right    Hypothyroidism     Palpitations     Syncope and collapse     Thyroid disease     Vitamin D deficiency disease        Surgical History:   Elham Rodas  has a past surgical history that includes Thyroidectomy; Tubal ligation; Cholecystectomy; Mastectomy, partial (Right, 11/21/2018); [place] lymph node biopsy (Right, 11/21/2018); Injection for sentinel node identification (Right, 11/21/2018); Axillary node dissection (Right, 11/21/2018); Insertion of tunneled central venous catheter (CVC) with subcutaneous port (Left, 1/2/2019); Breast lumpectomy (Right); Laparoscopic total hysterectomy (N/A, 11/4/2019); Laparoscopic salpingo-oophorectomy (Bilateral, 11/4/2019); Hysterectomy; and Oophorectomy.    Medications:   Elham has a current medication list which includes the following prescription(s): anastrozole, calcium carbonate, cholecalciferol (vitamin d3), duloxetine, lancets, levothyroxine, metformin, naproxen, true metrix glucose meter, and true metrix glucose test strip, and the following Facility-Administered Medications: lidocaine (pf) 10 mg/ml (1%).    Allergies:   Review of patient's allergies indicates:  No Known Allergies     Imaging, bone scan films: There is osseous spur at the Achilles and plantar fascia attachment to the calcaneus bilaterally.  There is no fracture, dislocation, or bony erosion.    Prior Therapy: yes   Social History:  lives with their family  Occupation: Works in restaurant full time  Prior Level of Function: Independent  Current Level of Function: pain in B feet worse in the morning, pain after being on feet all day. She can stand a couple hours before pain becomes severe. Pt has difficulty sleeping    Pain:  Current 1/10, worst 10/10, best 1/10   Location: bilateral feet   Description: Aching,  Burning, Throbbing and Sharp  Aggravating Factors: Standing, Walking, Morning and after prolonged sitting. L foot is very sensitive at lateral aspect  Easing Factors: rest and movement, soaking feet    Pts goals: to ease some of this pain    Objective     Observation: pt received ambulating independently but with antalgic gait. Pt is pleasant, but she is mildly anxious      Posture: pes planus B      Gait: pt with antalgic gait, mild B out- toeing    Range of Motion: AROM (PROM):      Ankle Left Right   Dorsiflexion 0 degrees 2 degrees   Plantarflexion 65 degrees 68 degrees   Inversion 22  degrees 20  degrees   Eversion 15 degrees 12 degrees       Strength:  Hip Left Right   Flexion 5/5* 5/5   Abduction 4+/5* 5/5   Adduction 5/5* 5/5   Extension 5/5 5/5   External Rot 5/5 5/5   Internal Rot 5/5 5/5     Knee Left Right   Extension 5/5 5/5   Flexion 5/5 5/5     Ankle Left Right   Dorsiflexion 5/5 5/5   Plantarflexion 5/5 5/5   Inversion 5/5 5/5   Eversion >/=4/5* 5/5   * pain    Palpation: sensitive at lateral left foot and dorsum of left foot. Plantar fascia insertion on calcaneus is tender B    Sensation:  light touch intact    Flexibility: tight gastroc B    Proprioception: intact in B great toes        Limitation/Restriction for FOTO - TBA       TREATMENT   Treatment Time In: 3:20 PM  Treatment Time Out: 3:30 PM  Total Treatment time (time-based codes) separate from Evaluation: 10 minutes    Elham received therapeutic exercises to develop strength, ROM and flexibility for 8 minutes including:  gastroc stretch c/ towel, 30s/LE  Soleus stretch c/ towel, 30s/LE  Towel scrunches, 20 reps/LE    Elham received the following manual therapy techniques: Joint mobilizations were applied to the: B calcaneus for 2 minutes, including:  Calcaneal distraction without response  .    Home Exercises and Patient Education Provided    Education provided:   - HEP, explanation of what symptoms may be related to neuropathy and what  symptoms are consistent to plantar fasciitis, foot anatomy, POC, scheduling. Pt encouraged to use frozen water bottle to roll out foot 2-3x/day. Discussed potential benefit of resting night splint    Written Home Exercises Provided: yes.  Exercises were reviewed and Elham was able to demonstrate them prior to the end of the session.  Elham demonstrated good  understanding of the education provided.     See EMR under Patient Instructions for exercises provided 9/15/2020.    Assessment   Elham is a 39 y.o. female referred to outpatient Physical Therapy with a medical diagnosis of Posterior tibial tendon dysfunction (PTTD) of right lower extremity. Pt presents with sensitivity and occasional numbness in B feet that may be related to neuropathy (both chemo and pre-diabetic), but B point tenderness at plantar fascia calcaneal insertion and increased pain in the morning that improves with movement is consistent with plantar fasciitis. Pt demo's B foot pain, decreased strength, decreased ROM in ankles, impaired gait, decreased endurance, and impaired functional mobility/decreased activity tolerance. She will benefit from PT to address deficits in B feet.      Pt prognosis is Good.   Pt will benefit from skilled outpatient Physical Therapy to address the deficits stated above and in the chart below, provide pt/family education, and to maximize pt's level of independence.     Plan of care discussed with patient: Yes  Pt's spiritual, cultural and educational needs considered and patient is agreeable to the plan of care and goals as stated below:     Anticipated Barriers for therapy: chronicity of symptoms; on her feet a lot for work    Medical Necessity is demonstrated by the following  History  Co-morbidities and personal factors that may impact the plan of care Co-morbidities:   history of cancer and pre-diabetic    Personal Factors:   no deficits     moderate   Examination  Body Structures and Functions, activity  limitations and participation restrictions that may impact the plan of care Body Regions:   lower extremities    Body Systems:    ROM  strength  gait  motor control    Participation Restrictions:   None anticpated    Activity limitations:   Learning and applying knowledge  no deficits    General Tasks and Commands  no deficits    Communication  no deficits    Mobility  walking    Self care  no deficits    Domestic Life  shopping  cooking  doing house work (cleaning house, washing dishes, laundry)    Interactions/Relationships  no deficits    Life Areas  employment    Community and Social Life  community life  recreation and leisure         high   Clinical Presentation stable and uncomplicated low   Decision Making/ Complexity Score: low     Goals:  Short Term Goals: 4 weeks   1. Pt will tolerate HEP for improved strength, functional mobility, ROM, posture, and endurance. (progressing, not met)  2. Pt will report reduced B foot pain to </= 6/10 at worst for improved functional mobility and ability to participate in work activities/ADL's. (progressing, not met)  3. Pt will increase B ankle dorsiflexion AROM to >/= 5 degrees for improved functional mobility and gait mechanics. (progressing, not met)  4. Pt will demo 5/5 strength in BLE's for improved functional mobility, endurance, and posture. (progressing, not met)  5. Pt will report improved ability to walk and weight-bear first thing in the morning without increase in pain/symptoms for improved functional mobility (progressing, not met)  6. Pt will report compliance with use of ice 2-3x day to manage symptoms (progressing, not met)  7. Pt will report being able to complete standing chores or errands without increase in symptoms for improved functional mobility (progressing, not met)    Long Term Goals: 8 weeks   1. Pt will be I with updated HEP for improved functional mobility, posture, strength, and endurance. (progressing, not met)  2. Pt will report reduced B  foot pain to </= 4/10 at worst for improved functional mobility and ability to participate in work activities/ADL's. (progressing, not met)  3. Pt will increase B ankle DF AROM to >/= 10 degrees for improved functional mobility and gait mechanics. (progressing, not met)    4. Pt will report increased ability to complete a work shift without pain/symptoms for improved functional mobility (progressing, not met)    Plan   Plan of care Certification: 9/15/2020 to 11/13/20.    Outpatient Physical Therapy 2 times weekly for 8 weeks to include the following interventions: Gait Training, Manual Therapy, Moist Heat/ Ice, Neuromuscular Re-ed, Orthotic Management and Training, Patient Education, Self Care, Therapeutic Activites, Therapeutic Exercise and dry needling and taping as appropriate.     Catalina Vargas, PT

## 2020-09-16 ENCOUNTER — CLINICAL SUPPORT (OUTPATIENT)
Dept: REHABILITATION | Facility: HOSPITAL | Age: 40
End: 2020-09-16
Payer: MEDICAID

## 2020-09-16 DIAGNOSIS — M25.611 DECREASED RANGE OF MOTION OF RIGHT SHOULDER: ICD-10-CM

## 2020-09-16 DIAGNOSIS — Z91.89 AT RISK FOR LYMPHEDEMA: ICD-10-CM

## 2020-09-16 DIAGNOSIS — M25.511 PAIN IN SHOULDER REGION, RIGHT: ICD-10-CM

## 2020-09-16 PROBLEM — R26.89 IMPAIRED GAIT AND MOBILITY: Status: ACTIVE | Noted: 2020-09-16

## 2020-09-16 PROBLEM — M79.672 BILATERAL FOOT PAIN: Status: ACTIVE | Noted: 2020-09-16

## 2020-09-16 PROBLEM — R53.1 DECREASED STRENGTH: Status: ACTIVE | Noted: 2020-09-16

## 2020-09-16 PROBLEM — M25.671 DECREASED RANGE OF MOTION OF BOTH ANKLES: Status: ACTIVE | Noted: 2020-09-16

## 2020-09-16 PROBLEM — M25.672 DECREASED RANGE OF MOTION OF BOTH ANKLES: Status: ACTIVE | Noted: 2020-09-16

## 2020-09-16 PROBLEM — M79.671 BILATERAL FOOT PAIN: Status: ACTIVE | Noted: 2020-09-16

## 2020-09-16 PROCEDURE — 97110 THERAPEUTIC EXERCISES: CPT | Mod: PO
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
## 2020-09-24 ENCOUNTER — OFFICE VISIT (OUTPATIENT)
Dept: URGENT CARE | Facility: CLINIC | Age: 40
End: 2020-09-24
Payer: MEDICAID

## 2020-09-24 VITALS
SYSTOLIC BLOOD PRESSURE: 132 MMHG | DIASTOLIC BLOOD PRESSURE: 94 MMHG | HEART RATE: 99 BPM | WEIGHT: 172 LBS | OXYGEN SATURATION: 95 % | HEIGHT: 65 IN | BODY MASS INDEX: 28.66 KG/M2 | TEMPERATURE: 100 F | RESPIRATION RATE: 16 BRPM

## 2020-09-24 DIAGNOSIS — B34.9 VIRAL ILLNESS: Primary | ICD-10-CM

## 2020-09-24 DIAGNOSIS — U07.1 COVID-19 VIRUS INFECTION: ICD-10-CM

## 2020-09-24 DIAGNOSIS — U07.1 COVID-19 VIRUS DETECTED: ICD-10-CM

## 2020-09-24 PROBLEM — K21.00 GASTRO-ESOPHAGEAL REFLUX DISEASE WITH ESOPHAGITIS: Status: ACTIVE | Noted: 2019-01-23

## 2020-09-24 PROBLEM — E03.9 HYPOTHYROIDISM: Status: ACTIVE | Noted: 2017-03-20

## 2020-09-24 PROBLEM — I10 HYPERTENSIVE DISORDER: Status: ACTIVE | Noted: 2017-03-20

## 2020-09-24 LAB
CTP QC/QA: YES
SARS-COV-2 RDRP RESP QL NAA+PROBE: POSITIVE

## 2020-09-24 PROCEDURE — 99213 OFFICE O/P EST LOW 20 MIN: CPT | Mod: S$GLB,,, | Performed by: NURSE PRACTITIONER

## 2020-09-24 PROCEDURE — 99213 PR OFFICE/OUTPT VISIT, EST, LEVL III, 20-29 MIN: ICD-10-PCS | Mod: S$GLB,,, | Performed by: NURSE PRACTITIONER

## 2020-09-24 PROCEDURE — U0002: ICD-10-PCS | Mod: QW,S$GLB,, | Performed by: NURSE PRACTITIONER

## 2020-09-24 PROCEDURE — U0002 COVID-19 LAB TEST NON-CDC: HCPCS | Mod: QW,S$GLB,, | Performed by: NURSE PRACTITIONER

## 2020-09-24 NOTE — PROGRESS NOTES
"Subjective:       Patient ID: Elham Rodas is a 40 y.o. female.    Vitals:  height is 5' 4.8" (1.646 m) and weight is 78 kg (172 lb). Her temporal temperature is 99.9 °F (37.7 °C). Her blood pressure is 132/94 (abnormal) and her pulse is 99. Her respiration is 16 and oxygen saturation is 95%.     Chief Complaint: COVID-19 Concerns (HEADACHE, SORE THROAT AND BODY ACHES)    Patient has sore throat, headache and F x 1 day. Would like Covid test.     Headache   This is a new problem. The current episode started yesterday. The problem occurs constantly. The problem has been gradually worsening. The pain quality is not similar to prior headaches. The pain is at a severity of 8/10. The pain is moderate. Associated symptoms include coughing, a fever, muscle aches, nausea, rhinorrhea and a sore throat. Pertinent negatives include no blurred vision, dizziness, ear pain, neck pain, photophobia, seizures, vomiting or weakness. Nothing aggravates the symptoms. She has tried nothing for the symptoms. The treatment provided no relief.       Constitution: Positive for fever. Negative for chills and fatigue.   HENT: Positive for sore throat. Negative for ear pain and congestion.    Neck: Negative for neck pain and painful lymph nodes.   Cardiovascular: Negative for chest pain and palpitations.   Eyes: Negative for photophobia, double vision and blurred vision.   Respiratory: Positive for cough. Negative for sputum production, shortness of breath, wheezing and asthma.    Gastrointestinal: Positive for nausea. Negative for vomiting and diarrhea.   Genitourinary: Negative for dysuria and frequency.   Musculoskeletal: Positive for muscle ache. Negative for joint pain and muscle cramps.   Skin: Negative for color change, pale and rash.   Allergic/Immunologic: Negative for seasonal allergies and asthma.   Neurological: Positive for headaches. Negative for dizziness, light-headedness, passing out and seizures.   Hematologic/Lymphatic: " Negative for swollen lymph nodes.   Psychiatric/Behavioral: Negative for nervous/anxious. The patient is not nervous/anxious.        Objective:      Physical Exam   Constitutional: She is oriented to person, place, and time. She appears well-developed. She is cooperative.  Non-toxic appearance. She does not appear ill. No distress.   HENT:   Head: Normocephalic and atraumatic.   Ears:   Right Ear: Hearing, tympanic membrane, external ear and ear canal normal.   Left Ear: Hearing, tympanic membrane, external ear and ear canal normal.   Nose: Nose normal. No mucosal edema, rhinorrhea or nasal deformity. No epistaxis. Right sinus exhibits no maxillary sinus tenderness and no frontal sinus tenderness. Left sinus exhibits no maxillary sinus tenderness and no frontal sinus tenderness.   Mouth/Throat: Mucous membranes are normal.   Eyes: Conjunctivae and lids are normal. No scleral icterus.   Neck: Trachea normal, full passive range of motion without pain and phonation normal. Neck supple. No neck rigidity. No edema and no erythema present.   Cardiovascular: Normal rate, regular rhythm, normal heart sounds and normal pulses.   Pulmonary/Chest: Effort normal and breath sounds normal. No respiratory distress. She has no decreased breath sounds. She has no wheezes. She has no rhonchi.   Abdominal: Normal appearance.   Musculoskeletal: Normal range of motion.         General: No deformity.   Neurological: She is alert and oriented to person, place, and time. She exhibits normal muscle tone. Coordination normal.   Skin: Skin is warm, dry, intact, not diaphoretic and not pale. Psychiatric: Her speech is normal and behavior is normal. Judgment and thought content normal.   Nursing note and vitals reviewed.        Assessment:       1. Viral illness    2. COVID-19 virus infection        Plan:         Viral illness  -     POCT COVID-19 Rapid Screening    COVID-19 virus infection      Results for orders placed or performed in visit  on 09/24/20   POCT COVID-19 Rapid Screening   Result Value Ref Range    POC Rapid COVID Positive (A) Negative     Acceptable Yes      Patient Instructions   Quarantine for 10 days starting from yesterday 09/23.    Drink plenty of fluids such as water/ GatorAide/Smart Water.  You can rotate Tylenol (1000mg) every 4hrs with Ibuprofen (600mg) every 6 hours for fever and body aches. If possible take the Ibuprofen with food or milk.    You can use any cough/cold medications on the shelf that help with your symptoms. Follow instructions on the label.       You must understand that you've received an Urgent Care treatment only and that you may be released before all your medical problems are known or treated. You, the patient, will arrange for follow up care as instructed.  If your condition worsens we recommend that you receive another evaluation at the emergency room immediately or contact your primary medical clinics after hours call service to discuss your concerns.  Please return here or go to the Emergency Department for any concerns or worsening of condition.        Instructions for Patients with Confirmed or Suspected COVID-19    If you are awaiting your test result, you will either be called or it will be released to the patient portal.  If you have any questions about your test, please visit [email or web address] or call our COVID-19 information line at [phone].      Instructions for non-hospitalized or discharged patients with confirmed or suspected COVID-19:       Stay home except to get medical care.    Separate yourself from other people and animals in your home.    Call ahead before visiting your doctor.    Wear a face mask.    Cover your coughs and sneezes.    Clean your hands often.    Avoid sharing personal household items.    Clean all high-touch surfaces every day.    Monitor your symptoms. Seek prompt medical attention if your illness is worsening (e.g.,  difficulty breathing). Before seeking care, call your healthcare provider.    If you have a medical emergency and must call 911, notify the dispatcher that you have or are being evaluated for COVID-19. If possible, put on a face mask before emergency medical services arrive.    Use the following symptom-based strategy to return to normal activity following a suspected or confirmed case of COVID-19. Continue isolation until:   o At least 3 days (72 hours) have passed since recovery defined as resolution of fever without the use of fever-reducing medications and improvement in respiratory symptoms (e.g. cough, shortness of breath), and   o At least 10 days have passed since the first positive test.       As one of the next steps, you will receive a call or text from the [place] Department of Health ([facility]) COVID-19 Tracing Team. See the contact information below so you know not to ignore the health departments call. It is important that you contact them back immediately so they can help.     Contact Tracer Number:  [phone]  Caller ID for most carriers: [facility]    What is contact tracing?   Contact tracing is a process that helps identify everyone who has been in close contact with an infected person. Contact tracers let those people know they may have been exposed and guide them on next steps. Confidentiality is important for everyone; no one will be told who may have exposed them to the virus.   Your involvement is important. The more we know about where and how this virus is spreading, the better chance we have at stopping it from spreading further.  What does exposure mean?   Exposure means you have been within 6 feet for more than 15 minutes with a person who has or had COVID-19.  What kind of questions do the contact tracers ask?   A contact tracer will confirm your basic contact information including name, address, phone number, and next of kin, as well as asking about any symptoms you may  have had. Theyll also ask you how you think you may have gotten sick, such as places where you may have been exposed to the virus, and people you were with. Those names will never be shared with anyone outside of that call, and will only be used to help trace and stop the spread of the virus.   I have privacy concerns. How will the state use my information?   Your privacy about your health is important. All calls are completed using call centers that use the appropriate health privacy protection measures (HIPAA compliance), meaning that your patient information is safe. No one will ever ask you any questions related to immigration status. Your health comes first.   Do I have to participate?   You do not have to participate, but we strongly encourage you to. Contact tracing can help us catch and control new outbreaks as theyre developing to keep your friends and family safe.   What if I dont hear from anyone?   If you dont receive a call within 24 hours, you can call the number above right away to inquire about your status. That line is open from 8:00 am - 8:00 p.m., 7 days a week.  Contact tracing saves lives! Together, we have the power to beat this virus and keep our loved ones and neighbors safe.       Instructions for household members, intimate partners and caregivers in a non-healthcare setting of a patient with confirmed or suspected COVID-19:         Close contacts should monitor their health and call their healthcare provider right away if they develop symptoms suggestive of COVID-19 (e.g., fever, cough, shortness of breath).    Stay home except to get medical care. Separate yourself from other people and animals in the home.   Monitor the patients symptoms. If the patient is getting sicker, call his or her healthcare provider. If the patient has a medical emergency and you need to call 911, notify the dispatch personnel that the patient has or is being evaluated for COVID-19.    Wear a facemask  when around other people such as sharing a room or vehicle and before entering a healthcare provider's office.   Cover coughs and sneezes with a tissue. Throw used tissues in a lined trash can immediately and wash hands.   Clean hands often with soap and water for at least 20 seconds or with an alcohol-based hand , rubbing hands together until they feel dry. Avoid touching your eyes, nose, and mouth with unwashed hands.   Clean all high-touch; surfaces every day, including counters, tabletops, doorknobs, bathroom fixtures, toilets, phones, keyboards, tablets, bedside tables, etc. Use a household cleaning spray or wipe according to label instructions.   Avoid sharing personal household items such as dishes, drinking glasses, cups, towels, bedding, etc. After these items are used, they should be washed thoroughly with soap and water.   Continue isolation until:   At least 3 days (72 hours) have passed since recovery defined as resolution of fever without the use of fever-reducing medications and improvement in respiratory symptoms (e.g. cough, shortness of breath), and    At least 10 days have passed since the patients first positive test.    https://www.cdc.gov/coronavirus/2019-ncov/your-health/index.htm

## 2020-09-24 NOTE — PATIENT INSTRUCTIONS
Quarantine for 10 days starting from yesterday 09/23.    Drink plenty of fluids such as water/ GatorAide/Smart Water.  You can rotate Tylenol (1000mg) every 4hrs with Ibuprofen (600mg) every 6 hours for fever and body aches. If possible take the Ibuprofen with food or milk.    You can use any cough/cold medications on the shelf that help with your symptoms. Follow instructions on the label.       You must understand that you've received an Urgent Care treatment only and that you may be released before all your medical problems are known or treated. You, the patient, will arrange for follow up care as instructed.  If your condition worsens we recommend that you receive another evaluation at the emergency room immediately or contact your primary medical clinics after hours call service to discuss your concerns.  Please return here or go to the Emergency Department for any concerns or worsening of condition.        Instructions for Patients with Confirmed or Suspected COVID-19    If you are awaiting your test result, you will either be called or it will be released to the patient portal.  If you have any questions about your test, please visit [email or web address] or call our COVID-19 information line at [phone].      Instructions for non-hospitalized or discharged patients with confirmed or suspected COVID-19:       Stay home except to get medical care.    Separate yourself from other people and animals in your home.    Call ahead before visiting your doctor.    Wear a face mask.    Cover your coughs and sneezes.    Clean your hands often.    Avoid sharing personal household items.    Clean all high-touch surfaces every day.    Monitor your symptoms. Seek prompt medical attention if your illness is worsening (e.g., difficulty breathing). Before seeking care, call your healthcare provider.    If you have a medical emergency and must call 911, notify the dispatcher that you have or are being  evaluated for COVID-19. If possible, put on a face mask before emergency medical services arrive.    Use the following symptom-based strategy to return to normal activity following a suspected or confirmed case of COVID-19. Continue isolation until:   o At least 3 days (72 hours) have passed since recovery defined as resolution of fever without the use of fever-reducing medications and improvement in respiratory symptoms (e.g. cough, shortness of breath), and   o At least 10 days have passed since the first positive test.       As one of the next steps, you will receive a call or text from the [place] Department of Health ([facility]) COVID-19 Tracing Team. See the contact information below so you know not to ignore the health departments call. It is important that you contact them back immediately so they can help.     Contact Tracer Number:  [phone]  Caller ID for most carriers: [facility]    What is contact tracing?   Contact tracing is a process that helps identify everyone who has been in close contact with an infected person. Contact tracers let those people know they may have been exposed and guide them on next steps. Confidentiality is important for everyone; no one will be told who may have exposed them to the virus.   Your involvement is important. The more we know about where and how this virus is spreading, the better chance we have at stopping it from spreading further.  What does exposure mean?   Exposure means you have been within 6 feet for more than 15 minutes with a person who has or had COVID-19.  What kind of questions do the contact tracers ask?   A contact tracer will confirm your basic contact information including name, address, phone number, and next of kin, as well as asking about any symptoms you may have had. Theyll also ask you how you think you may have gotten sick, such as places where you may have been exposed to the virus, and people you were with. Those names will never  be shared with anyone outside of that call, and will only be used to help trace and stop the spread of the virus.   I have privacy concerns. How will the state use my information?   Your privacy about your health is important. All calls are completed using call centers that use the appropriate health privacy protection measures (HIPAA compliance), meaning that your patient information is safe. No one will ever ask you any questions related to immigration status. Your health comes first.   Do I have to participate?   You do not have to participate, but we strongly encourage you to. Contact tracing can help us catch and control new outbreaks as theyre developing to keep your friends and family safe.   What if I dont hear from anyone?   If you dont receive a call within 24 hours, you can call the number above right away to inquire about your status. That line is open from 8:00 am - 8:00 p.m., 7 days a week.  Contact tracing saves lives! Together, we have the power to beat this virus and keep our loved ones and neighbors safe.       Instructions for household members, intimate partners and caregivers in a non-healthcare setting of a patient with confirmed or suspected COVID-19:         Close contacts should monitor their health and call their healthcare provider right away if they develop symptoms suggestive of COVID-19 (e.g., fever, cough, shortness of breath).    Stay home except to get medical care. Separate yourself from other people and animals in the home.   Monitor the patients symptoms. If the patient is getting sicker, call his or her healthcare provider. If the patient has a medical emergency and you need to call 911, notify the dispatch personnel that the patient has or is being evaluated for COVID-19.    Wear a facemask when around other people such as sharing a room or vehicle and before entering a healthcare provider's office.   Cover coughs and sneezes with a tissue. Throw used tissues in a  lined trash can immediately and wash hands.   Clean hands often with soap and water for at least 20 seconds or with an alcohol-based hand , rubbing hands together until they feel dry. Avoid touching your eyes, nose, and mouth with unwashed hands.   Clean all high-touch; surfaces every day, including counters, tabletops, doorknobs, bathroom fixtures, toilets, phones, keyboards, tablets, bedside tables, etc. Use a household cleaning spray or wipe according to label instructions.   Avoid sharing personal household items such as dishes, drinking glasses, cups, towels, bedding, etc. After these items are used, they should be washed thoroughly with soap and water.   Continue isolation until:   At least 3 days (72 hours) have passed since recovery defined as resolution of fever without the use of fever-reducing medications and improvement in respiratory symptoms (e.g. cough, shortness of breath), and    At least 10 days have passed since the patients first positive test.    https://www.cdc.gov/coronavirus/2019-ncov/your-health/index.htm

## 2020-09-24 NOTE — LETTER
[address] ? [place], [address] ? Phone [phone] ? Fax [phone]           Return to Work/School    Patient: Elham Rodas  YOB: 1980   Date: 09/24/2020      To Whom It May Concern:     Elham Rodas was in contact with/seen in my office on 09/24/2020. COVID-19 is present in our communities across the state.      Elham Rodas has met the criteria for COVID-19 testing and has a POSITIVE result. She can return to work once they are asymptomatic for 24 hours without the use of fever reducing medications AND at least ten days from the start of symptoms. Symptoms started on 09/23/20 (or from the first positive result if they have no symptoms).      If you have any questions or concerns, or if I can be of further assistance, please do not hesitate to contact me.     Sincerely,    Isiah Monsivais NP

## 2020-09-25 ENCOUNTER — DOCUMENTATION ONLY (OUTPATIENT)
Dept: REHABILITATION | Facility: HOSPITAL | Age: 40
End: 2020-09-25

## 2020-09-29 ENCOUNTER — PATIENT MESSAGE (OUTPATIENT)
Dept: REHABILITATION | Facility: HOSPITAL | Age: 40
End: 2020-09-29

## 2020-10-05 ENCOUNTER — CLINICAL SUPPORT (OUTPATIENT)
Dept: REHABILITATION | Facility: HOSPITAL | Age: 40
End: 2020-10-05
Attending: NURSE PRACTITIONER
Payer: MEDICAID

## 2020-10-05 DIAGNOSIS — M25.671 DECREASED RANGE OF MOTION OF BOTH ANKLES: ICD-10-CM

## 2020-10-05 DIAGNOSIS — M79.672 BILATERAL FOOT PAIN: ICD-10-CM

## 2020-10-05 DIAGNOSIS — R53.1 DECREASED STRENGTH: ICD-10-CM

## 2020-10-05 DIAGNOSIS — M79.671 BILATERAL FOOT PAIN: ICD-10-CM

## 2020-10-05 DIAGNOSIS — M25.672 DECREASED RANGE OF MOTION OF BOTH ANKLES: ICD-10-CM

## 2020-10-05 DIAGNOSIS — R26.89 IMPAIRED GAIT AND MOBILITY: ICD-10-CM

## 2020-10-05 PROCEDURE — 97110 THERAPEUTIC EXERCISES: CPT | Mod: PO,CQ

## 2020-10-05 PROCEDURE — 97140 MANUAL THERAPY 1/> REGIONS: CPT | Mod: PO,CQ

## 2020-10-05 NOTE — PROGRESS NOTES
Physical Therapy Daily Treatment Note     Name: Elham Rodas  Clinic Number: [id number]    Therapy Diagnosis:   Encounter Diagnoses   Name Primary?    Bilateral foot pain     Decreased range of motion of both ankles     Impaired gait and mobility     Decreased strength      Physician: Holly Euceda NP    Visit Date: 10/5/2020    Medical Diagnosis from Referral: Posterior tibial tendon dysfunction (PTTD) of right lower extremity  Evaluation Date: 9/15/2020  Authorization Period Expiration: 9/21/20  Plan of Care Expiration: 11/13/20  Visit # / Visits authorized: 2 /   PTA Visit Number: 1    Time In: 08:30 am   Time Out: 09:10 am   Total Billable Time:  40  minutes  Charges: TE - 3     Precautions: Standard    Subjective     Pt reports: she has not done her stretches given at [facility] due to not feeling well over the last couple weeks   She was not compliant with home exercise program.  Response to previous treatment: Tolerated evaluation well   Functional change: None     Pain: 2/10  Location: bilateral ankles     Objective     Elham received therapeutic exercises to develop strength, endurance, ROM and flexibility for 30  minutes including:    Upright bike: x 7 minutes   gastroc stretch c/ towel, 30s/LE  Soleus stretch c/ towel, 30s/LE  Towel scrunches, 20 reps/LE  Seated heel raises      Elham received the following manual therapy techniques: Joint mobilizations were applied to the: B calcaneus for 10 minutes, including:  Calcaneal distraction without response  STM to gastroc   ]    Home Exercises Provided and Patient Education Provided     Education provided:   -     Written Home Exercises Provided: Patient instructed to cont prior HEP.  Exercises were reviewed and Elham was able to demonstrate them prior to the end of the session.  Elham demonstrated good  understanding of the education provided.     See EMR under Patient Instructions for exercises provided prior visit.    Assessment     Patient  tolerated therapy session fairly well. No reports of pain reduction following manual techniques. Will continue to benefit from skilled physical therapy to address deficits    Elham is progressing well towards her goals.   Pt prognosis is Good.     Pt will continue to benefit from skilled outpatient physical therapy to address the deficits listed in the problem list box on initial evaluation, provide pt/family education and to maximize pt's level of independence in the home and community environment.     Pt's spiritual, cultural and educational needs considered and pt agreeable to plan of care and goals.     Anticipated barriers to physical therapy: None     Goals:     Short Term Goals: 4 weeks   1. Pt will tolerate HEP for improved strength, functional mobility, ROM, posture, and endurance. (progressing, not met)  2. Pt will report reduced B foot pain to </= 6/10 at worst for improved functional mobility and ability to participate in work activities/ADL's. (progressing, not met)  3. Pt will increase B ankle dorsiflexion AROM to >/= 5 degrees for improved functional mobility and gait mechanics. (progressing, not met)  4. Pt will demo 5/5 strength in BLE's for improved functional mobility, endurance, and posture. (progressing, not met)  5. Pt will report improved ability to walk and weight-bear first thing in the morning without increase in pain/symptoms for improved functional mobility (progressing, not met)  6. Pt will report compliance with use of ice 2-3x day to manage symptoms (progressing, not met)  7. Pt will report being able to complete standing chores or errands without increase in symptoms for improved functional mobility (progressing, not met)     Long Term Goals: 8 weeks   1. Pt will be I with updated HEP for improved functional mobility, posture, strength, and endurance. (progressing, not met)  2. Pt will report reduced B foot pain to </= 4/10 at worst for improved functional mobility and ability to  participate in work activities/ADL's. (progressing, not met)  3. Pt will increase B ankle DF AROM to >/= 10 degrees for improved functional mobility and gait mechanics. (progressing, not met)     4. Pt will report increased ability to complete a work shift without pain/symptoms for improved functional mobility (progressing, not met)    Plan     Cont with PT POC     Nohemi Daniels, PTA

## 2020-10-07 ENCOUNTER — CLINICAL SUPPORT (OUTPATIENT)
Dept: REHABILITATION | Facility: HOSPITAL | Age: 40
End: 2020-10-07
Payer: MEDICAID

## 2020-10-07 DIAGNOSIS — R26.89 IMPAIRED GAIT AND MOBILITY: ICD-10-CM

## 2020-10-07 DIAGNOSIS — M25.671 DECREASED RANGE OF MOTION OF BOTH ANKLES: ICD-10-CM

## 2020-10-07 DIAGNOSIS — M25.672 DECREASED RANGE OF MOTION OF BOTH ANKLES: ICD-10-CM

## 2020-10-07 DIAGNOSIS — R53.1 DECREASED STRENGTH: ICD-10-CM

## 2020-10-07 DIAGNOSIS — M79.672 BILATERAL FOOT PAIN: ICD-10-CM

## 2020-10-07 DIAGNOSIS — M79.671 BILATERAL FOOT PAIN: ICD-10-CM

## 2020-10-07 PROCEDURE — 97110 THERAPEUTIC EXERCISES: CPT | Mod: PO,CQ

## 2020-10-07 PROCEDURE — 97140 MANUAL THERAPY 1/> REGIONS: CPT | Mod: PO,CQ

## 2020-10-07 NOTE — PROGRESS NOTES
Physical Therapy Daily Treatment Note     Name: Elham Rodas  Clinic Number: [id number]    Therapy Diagnosis:   Encounter Diagnoses   Name Primary?    Bilateral foot pain     Decreased range of motion of both ankles     Impaired gait and mobility     Decreased strength      Physician: Holly Euceda NP    Visit Date: 10/7/2020    Medical Diagnosis from Referral: Posterior tibial tendon dysfunction (PTTD) of right lower extremity  Evaluation Date: 9/15/2020  Authorization Period Expiration: 9/21/20  Plan of Care Expiration: 11/13/20  Visit # / Visits authorized: 3 /   PTA Visit Number: 2    Time In: 08:30 am   Time Out: 09:10 am   Total Billable Time:  40  minutes  Charges: TE - 2 MT -1      Precautions: Standard    Subjective     Pt reports: her pain is exactly the same    She was not compliant with home exercise program.  Response to previous treatment: Tolerated evaluation well   Functional change: None     Pain: 2/10  Location: bilateral ankles     Objective     Elham received therapeutic exercises to develop strength, endurance, ROM and flexibility for 30  minutes including:    Upright bike: x 7 minutes   gastroc stretch c/ towel, 30s/LE  Soleus stretch c/ towel, 30s/LE  Towel scrunches, 20 reps/LE  Seated heel raises : 2 x 10   Seated arch lifts: 2 x 10      Elham received the following manual therapy techniques: Joint mobilizations were applied to the: B calcaneus for 10 minutes, including:  Calcaneal distraction without response  STM to gastroc   Kinesiotaping  Patient was screened for use of kinesiotape and was cleared for use. Pt. received kinesiotaping on lateral B LE with a I strip for facilitation of peroneal   1. Has the patient ever had a reaction to the adhesive in bandaids? No  2. Has the patient ever uses athletic/kinesiotape in the past? No  3. Is the patient hemodynamically impaired (PE, DVT, CHF, Kidney failure)? Yes  4. Can the PT/PTA apply the tape to your skin? Yes     Patient was  instructed on duration to wear the tape, proper drying techniques for the tape, and to remove the tape if he/she has any skin irritation: including, but not exclusive to, redness/itchiness/discomfort. Patient verbalized understanding of these instructions.    Home Exercises Provided and Patient Education Provided     Education provided:   -     Written Home Exercises Provided: Patient instructed to cont prior HEP.  Exercises were reviewed and Elham was able to demonstrate them prior to the end of the session.  Elham demonstrated good  understanding of the education provided.     See EMR under Patient Instructions for exercises provided prior visit.    Assessment     Patient tolerated therapy session fairly well. No reports of pain reduction following manual techniques. Will continue to benefit from skilled physical therapy to address deficits. Will assess next session the benefit of taping.    Elham is progressing well towards her goals.   Pt prognosis is Good.     Pt will continue to benefit from skilled outpatient physical therapy to address the deficits listed in the problem list box on initial evaluation, provide pt/family education and to maximize pt's level of independence in the home and community environment.     Pt's spiritual, cultural and educational needs considered and pt agreeable to plan of care and goals.     Anticipated barriers to physical therapy: None     Goals:     Short Term Goals: 4 weeks   1. Pt will tolerate HEP for improved strength, functional mobility, ROM, posture, and endurance. (progressing, not met)  2. Pt will report reduced B foot pain to </= 6/10 at worst for improved functional mobility and ability to participate in work activities/ADL's. (progressing, not met)  3. Pt will increase B ankle dorsiflexion AROM to >/= 5 degrees for improved functional mobility and gait mechanics. (progressing, not met)  4. Pt will demo 5/5 strength in BLE's for improved functional mobility,  endurance, and posture. (progressing, not met)  5. Pt will report improved ability to walk and weight-bear first thing in the morning without increase in pain/symptoms for improved functional mobility (progressing, not met)  6. Pt will report compliance with use of ice 2-3x day to manage symptoms (progressing, not met)  7. Pt will report being able to complete standing chores or errands without increase in symptoms for improved functional mobility (progressing, not met)     Long Term Goals: 8 weeks   1. Pt will be I with updated HEP for improved functional mobility, posture, strength, and endurance. (progressing, not met)  2. Pt will report reduced B foot pain to </= 4/10 at worst for improved functional mobility and ability to participate in work activities/ADL's. (progressing, not met)  3. Pt will increase B ankle DF AROM to >/= 10 degrees for improved functional mobility and gait mechanics. (progressing, not met)     4. Pt will report increased ability to complete a work shift without pain/symptoms for improved functional mobility (progressing, not met)    Plan     Cont with PT POC     Nohemi Daniels, PTA

## 2020-10-11 NOTE — TELEPHONE ENCOUNTER
----- Message from Marti Carney sent at 9/9/2020  4:31 PM CDT -----  Type:  Patient Returning Call    Who Called:pt   Who Left Message for Patient: pt  Does the patient know what this is regarding?: pt trying to get an appt she had one 08/28/20 but she missed the appt   Would the patient rather a call back or a response via [facility]?  Call   Best Call Back Number:[phone]  Additional Information:  appt        
Message sent to [facility] nurse navigator  
Weakness

## 2020-10-19 ENCOUNTER — CLINICAL SUPPORT (OUTPATIENT)
Dept: REHABILITATION | Facility: HOSPITAL | Age: 40
End: 2020-10-19
Payer: MEDICAID

## 2020-10-19 ENCOUNTER — DOCUMENTATION ONLY (OUTPATIENT)
Dept: REHABILITATION | Facility: HOSPITAL | Age: 40
End: 2020-10-19

## 2020-10-19 DIAGNOSIS — M79.672 BILATERAL FOOT PAIN: ICD-10-CM

## 2020-10-19 DIAGNOSIS — M25.671 DECREASED RANGE OF MOTION OF BOTH ANKLES: ICD-10-CM

## 2020-10-19 DIAGNOSIS — R26.89 IMPAIRED GAIT AND MOBILITY: ICD-10-CM

## 2020-10-19 DIAGNOSIS — M79.671 BILATERAL FOOT PAIN: ICD-10-CM

## 2020-10-19 DIAGNOSIS — M25.672 DECREASED RANGE OF MOTION OF BOTH ANKLES: ICD-10-CM

## 2020-10-19 DIAGNOSIS — R53.1 DECREASED STRENGTH: ICD-10-CM

## 2020-10-19 PROCEDURE — 97110 THERAPEUTIC EXERCISES: CPT | Mod: PO

## 2020-10-19 NOTE — PROGRESS NOTES
PT/PTA face to face conference completed regarding patient treatment plan and progress towards established goals. Treatment will be continued as described in initial report/ evaluation and treatment/progress notes. Patient will be seen by physical therapist every sixth visit or minimally once per month.

## 2020-10-19 NOTE — PROGRESS NOTES
Physical Therapy Daily Treatment Note     Name: Elham Rodas  Clinic Number: [id number]    Therapy Diagnosis:   Encounter Diagnoses   Name Primary?    Bilateral foot pain     Decreased range of motion of both ankles     Impaired gait and mobility     Decreased strength      Physician: Holly Euceda NP    Visit Date: 10/19/2020    Medical Diagnosis from Referral: Posterior tibial tendon dysfunction (PTTD) of right lower extremity  Evaluation Date: 9/15/2020  Authorization Period Expiration: 9/21/20  Plan of Care Expiration: 11/13/20  Visit # / Visits authorized: 4   PTA Visit Number: 0    Time In: 08:55 am   Time Out: 09:45 am   Total Billable Time:  44  minutes    Precautions: Standard    Subjective     Pt reports: She got something to wear on her left foot and has found it helpful with the pain. Tape maybe helped her R foot.  She was not compliant with home exercise program.  Response to previous treatment: Tolerated evaluation well   Functional change: None     Pain: 3-4/10  Location: bilateral feet    Objective     Elham received therapeutic exercises to develop strength, endurance, ROM and flexibility for 36 minutes including:    Upright bike: x 6 minutes, Level 2   gastroc stretch on incline, 2 x 1min  Soleus stretch on incline, 2 x 1 min  Seated with lacrosse ball:  -20 reps/LE on ankle DF/PF  -20 reps/LE of rolling length of foot    Seated Towel scrunches, 30 reps/LE  Ankle DF with orange band, 2 x10/LE  Ankle eversion with orange band, 2 x 10 reps/LE    Kinesiotaping  Patient was screened for use of kinesiotape and was cleared for use. Pt. received kinesiotaping on B arches- I strip at % tension       Patient was instructed on duration to wear the tape, proper drying techniques for the tape, and to remove the tape if he/she has any skin irritation: including, but not exclusive to, redness/itchiness/discomfort. Patient verbalized understanding of these instructions.    Not performed  today:  Seated heel raises : 2 x 10   Seated arch lifts: 2 x 10      Elham received the following manual therapy techniques: Joint mobilizations were applied to the: B gastrcfor 8 minutes, including:    STM to gastroc       Home Exercises Provided and Patient Education Provided     Education provided:   - HEP update, exercise technique    Written Home Exercises Provided: yes.  Exercises were reviewed and Elham was able to demonstrate them prior to the end of the session.  Elham demonstrated good  understanding of the education provided.     See EMR under Patient Instructions for exercises provided 10/19/20.    Assessment     Patient tolerated therapy session fairly well. Progressed stretches, added ankle strengthening and Lacrosse ball exercises as well. Pt reports some discomfort with manual techniques, but she does present with medial gastroc tightness. Will continue to benefit from skilled physical therapy to address deficits. .    lEham is progressing well towards her goals.   Pt prognosis is Good.     Pt will continue to benefit from skilled outpatient physical therapy to address the deficits listed in the problem list box on initial evaluation, provide pt/family education and to maximize pt's level of independence in the home and community environment.     Pt's spiritual, cultural and educational needs considered and pt agreeable to plan of care and goals.     Anticipated barriers to physical therapy: None     Goals:     Short Term Goals: 4 weeks   1. Pt will tolerate HEP for improved strength, functional mobility, ROM, posture, and endurance. (progressing, not met)  2. Pt will report reduced B foot pain to </= 6/10 at worst for improved functional mobility and ability to participate in work activities/ADL's. (progressing, not met)  3. Pt will increase B ankle dorsiflexion AROM to >/= 5 degrees for improved functional mobility and gait mechanics. (progressing, not met)  4. Pt will demo 5/5 strength in  BLE's for improved functional mobility, endurance, and posture. (progressing, not met)  5. Pt will report improved ability to walk and weight-bear first thing in the morning without increase in pain/symptoms for improved functional mobility (progressing, not met)  6. Pt will report compliance with use of ice 2-3x day to manage symptoms (progressing, not met)  7. Pt will report being able to complete standing chores or errands without increase in symptoms for improved functional mobility (progressing, not met)     Long Term Goals: 8 weeks   1. Pt will be I with updated HEP for improved functional mobility, posture, strength, and endurance. (progressing, not met)  2. Pt will report reduced B foot pain to </= 4/10 at worst for improved functional mobility and ability to participate in work activities/ADL's. (progressing, not met)  3. Pt will increase B ankle DF AROM to >/= 10 degrees for improved functional mobility and gait mechanics. (progressing, not met)     4. Pt will report increased ability to complete a work shift without pain/symptoms for improved functional mobility (progressing, not met)    Plan     Cont with PT POC     Catalina Vargas, PT

## 2020-10-26 ENCOUNTER — OFFICE VISIT (OUTPATIENT)
Dept: CARDIOLOGY | Facility: CLINIC | Age: 40
End: 2020-10-26
Payer: MEDICAID

## 2020-10-26 ENCOUNTER — CLINICAL SUPPORT (OUTPATIENT)
Dept: REHABILITATION | Facility: HOSPITAL | Age: 40
End: 2020-10-26
Payer: MEDICAID

## 2020-10-26 VITALS
SYSTOLIC BLOOD PRESSURE: 110 MMHG | HEART RATE: 105 BPM | BODY MASS INDEX: 29.37 KG/M2 | DIASTOLIC BLOOD PRESSURE: 82 MMHG | OXYGEN SATURATION: 98 % | WEIGHT: 172 LBS | HEIGHT: 64 IN

## 2020-10-26 DIAGNOSIS — M79.671 BILATERAL FOOT PAIN: ICD-10-CM

## 2020-10-26 DIAGNOSIS — T45.1X5A ANEMIA ASSOCIATED WITH CHEMOTHERAPY: ICD-10-CM

## 2020-10-26 DIAGNOSIS — C50.911 MALIGNANT NEOPLASM OF RIGHT FEMALE BREAST, UNSPECIFIED ESTROGEN RECEPTOR STATUS, UNSPECIFIED SITE OF BREAST: ICD-10-CM

## 2020-10-26 DIAGNOSIS — M25.672 DECREASED RANGE OF MOTION OF BOTH ANKLES: ICD-10-CM

## 2020-10-26 DIAGNOSIS — E89.0 POSTSURGICAL HYPOTHYROIDISM: Chronic | ICD-10-CM

## 2020-10-26 DIAGNOSIS — R26.89 IMPAIRED GAIT AND MOBILITY: ICD-10-CM

## 2020-10-26 DIAGNOSIS — M25.671 DECREASED RANGE OF MOTION OF BOTH ANKLES: ICD-10-CM

## 2020-10-26 DIAGNOSIS — M79.672 BILATERAL FOOT PAIN: ICD-10-CM

## 2020-10-26 DIAGNOSIS — K21.00 GASTROESOPHAGEAL REFLUX DISEASE WITH ESOPHAGITIS WITHOUT HEMORRHAGE: ICD-10-CM

## 2020-10-26 DIAGNOSIS — R00.2 PALPITATIONS: ICD-10-CM

## 2020-10-26 DIAGNOSIS — R53.1 DECREASED STRENGTH: ICD-10-CM

## 2020-10-26 DIAGNOSIS — D64.81 ANEMIA ASSOCIATED WITH CHEMOTHERAPY: ICD-10-CM

## 2020-10-26 PROCEDURE — 99213 OFFICE O/P EST LOW 20 MIN: CPT | Mod: PBBFAC,PN | Performed by: INTERNAL MEDICINE

## 2020-10-26 PROCEDURE — 97110 THERAPEUTIC EXERCISES: CPT | Mod: PO,CQ

## 2020-10-26 PROCEDURE — 99999 PR PBB SHADOW E&M-EST. PATIENT-LVL III: CPT | Mod: PBBFAC,,, | Performed by: INTERNAL MEDICINE

## 2020-10-26 PROCEDURE — 99214 PR OFFICE/OUTPT VISIT, EST, LEVL IV, 30-39 MIN: ICD-10-PCS | Mod: S$PBB,,, | Performed by: INTERNAL MEDICINE

## 2020-10-26 PROCEDURE — 97140 MANUAL THERAPY 1/> REGIONS: CPT | Mod: PO,CQ

## 2020-10-26 PROCEDURE — 99214 OFFICE O/P EST MOD 30 MIN: CPT | Mod: S$PBB,,, | Performed by: INTERNAL MEDICINE

## 2020-10-26 PROCEDURE — 99999 PR PBB SHADOW E&M-EST. PATIENT-LVL III: ICD-10-PCS | Mod: PBBFAC,,, | Performed by: INTERNAL MEDICINE

## 2020-10-26 RX ORDER — MELOXICAM 15 MG/1
TABLET ORAL
COMMUNITY
Start: 2020-10-02 | End: 2021-04-19

## 2020-10-26 NOTE — ASSESSMENT & PLAN NOTE
She has episodes of palpitations a/w presyncope and improved w/ eating.  May be related to blood sugar changes however she has not been checking her glucose during episodes.   - echo and cardiac event monitor are WNL

## 2020-10-26 NOTE — PROGRESS NOTES
Subjective:    Patient ID:  Elham Rodas is a 40 y.o. female who presents for follow-up of Results (Holter, Echo)      PCP: Mookie Rausch MD     Referring Provider: Kimberly Landry NP     HPI    Pt is a 40 yo F w/ PMH of Prediabetes and Breast Cancer s/p chemo/XRT and lumpectomy 2/2019 who presents for f/u of palpitations and presyncope x few years.  She mentioned that she has been following with her PCP and recently found out she is prediabetic and c/o palpitations and presyncope and was referred to cardiology.  She states that she gets palpitations and presyncope together and happens weekly.  She had an ED visit 8/8/2020 for an episode and had a negative cardiac w/u w/ a normal ECG.  She notes a prodrome of nausea, sob, and tremors prior to the presyncope and palpitations.  She states that her symptoms are improved when she eats.  She was last seen 8/26/2020 and had an echo and event monitor which both were WNL.  She mentions that she continues to have these episodes and things are unchanged.   She denies stress, increased anxiety, cp, edema, orthopnea, PND, LOC, and claudication.  She does not exercise regularly and states that she can not exercise due to paresthesias she gets.  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  She does not monitor her BP at home.        Past Medical History:   Diagnosis Date    Abnormal Pap smear of cervix 05/2016    ASCUS, - HPV    Breast cancer 10/2018    right    Hypothyroidism     Palpitations     Syncope and collapse     Thyroid disease     Vitamin D deficiency disease      Past Surgical History:   Procedure Laterality Date    AXILLARY NODE DISSECTION Right 11/21/2018    Procedure: LYMPHADENECTOMY, AXILLARY;  Surgeon: Jann Ayala MD;  Location: [facility] OR [address] [place] [address];  Service: General;  Laterality: Right;    BREAST LUMPECTOMY Right     CHOLECYSTECTOMY      HYSTERECTOMY      TLH, BSO--( breast cancer)    INJECTION FOR SENTINEL NODE  IDENTIFICATION Right 11/21/2018    Procedure: INJECTION, FOR SENTINEL NODE IDENTIFICATION;  Surgeon: Jann Ayala MD;  Location: [facility] OR 2ND FLR;  Service: General;  Laterality: Right;    INSERTION OF TUNNELED CENTRAL VENOUS CATHETER (CVC) WITH SUBCUTANEOUS PORT Left 1/2/2019    Procedure: [id number];  Surgeon: Jann Ayala MD;  Location: [facility] OR 2ND FLR;  Service: General;  Laterality: Left;    LAPAROSCOPIC SALPINGO-OOPHORECTOMY Bilateral 11/4/2019    Procedure: SALPINGO-OOPHORECTOMY, LAPAROSCOPIC;  Surgeon: Yoni Oscar MD;  Location: [facility] OR;  Service: OB/GYN;  Laterality: Bilateral;    LAPAROSCOPIC TOTAL HYSTERECTOMY N/A 11/4/2019    Procedure: HYSTERECTOMY, TOTAL, LAPAROSCOPIC;  Surgeon: Yoni Oscar MD;  Location: [facility] OR;  Service: OB/GYN;  Laterality: N/A;    MASTECTOMY, PARTIAL Right 11/21/2018    Procedure: MASTECTOMY, PARTIAL - seed localized;  Surgeon: Jann Ayala MD;  Location: [facility] OR 2ND FLR;  Service: General;  Laterality: Right;  seed placement 11/14    OOPHORECTOMY      SENTINEL LYMPH NODE BIOPSY Right 11/21/2018    Procedure: BIOPSY, LYMPH NODE, SENTINEL;  Surgeon: Jann Ayala MD;  Location: [facility] OR 2ND FLR;  Service: General;  Laterality: Right;    THYROIDECTOMY      TUBAL LIGATION       Social History     Socioeconomic History    Marital status:      Spouse name: Not on file    Number of children: Not on file    Years of education: Not on file    Highest education level: Not on file   Occupational History    Not on file   Social Needs    Financial resource strain: Not on file    Food insecurity     Worry: Not on file     Inability: Not on file    Transportation needs     Medical: Not on file     Non-medical: Not on file   Tobacco Use    Smoking status: Never Smoker    Smokeless tobacco: Never Used   Substance and Sexual Activity    Alcohol use: Yes     Alcohol/week: 2.0 standard drinks     Types: 2 Cans of beer per week      Comment: occasionally    Drug use: No    Sexual activity: Yes     Partners: Male     Birth control/protection: See Surgical Hx     Comment:    Lifestyle    Physical activity     Days per week: Not on file     Minutes per session: Not on file    Stress: Not on file   Relationships    Social connections     Talks on phone: Not on file     Gets together: Not on file     Attends Rastafarian service: Not on file     Active member of club or organization: Not on file     Attends meetings of clubs or organizations: Not on file     Relationship status: Not on file   Other Topics Concern    Not on file   Social History Narrative    Not on file     Family History   Problem Relation Age of Onset    Hypertension Mother     Heart attack Mother     Hypertension Father     Ovarian cancer Maternal Aunt     Cancer Paternal Grandfather     Breast cancer Neg Hx     Colon cancer Neg Hx        Review of patient's allergies indicates:  No Known Allergies    Medication List with Changes/Refills   Current Medications    ANASTROZOLE (ARIMIDEX) 1 MG TAB    Take 1 tablet (1 mg total) by mouth once daily.    CALCIUM CARBONATE (TUMS) 200 MG CALCIUM (500 MG) CHEWABLE TABLET    Take 1 tablet by mouth.    CHOLECALCIFEROL, VITAMIN D3, 5,000 UNIT TAB    Vitamin D3 125 mcg (5,000 unit) tablet   Take 1 tablet every day by oral route.    DULOXETINE (CYMBALTA) 30 MG CAPSULE    TK 1 C PO QD    LANCETS (TRUEPLUS LANCETS) 33 GAUGE MISC    TRUEplus Lancets 33 gauge    LEVOTHYROXINE (LEVOXYL) 75 MCG TABLET    Take 1 tablet (75 mcg total) by mouth before breakfast.    MELOXICAM (MOBIC) 15 MG TABLET    TK 1 T PO QD    METFORMIN (GLUCOPHAGE-XR) 500 MG ER 24HR TABLET    TAKE 1 TABLET(500 MG) BY MOUTH DAILY WITH BREAKFAST    NAPROXEN (NAPROSYN) 500 MG TABLET    Take 1 tablet (500 mg total) by mouth 2 (two) times daily with meals.    TRUE METRIX GLUCOSE METER MISC    USE TO TEST BLOOD SUGAR AS DIRECTED    TRUE METRIX GLUCOSE TEST STRIP [facility]  "   TEST BLOOD SUGAR ONCE D   Discontinued Medications    LEVOTHYROXINE (SYNTHROID) 75 MCG TABLET    Take 1 tablet (75 mcg total) by mouth before breakfast. Wait  4 hours after taking LT4 to take MVI.  Wait 1 hour to take other medications.       Review of Systems   Constitution: Negative for diaphoresis and fever.   HENT: Negative for congestion and hearing loss.    Eyes: Negative for blurred vision and pain.   Cardiovascular: Positive for near-syncope and palpitations. Negative for chest pain, claudication, dyspnea on exertion, leg swelling, orthopnea, paroxysmal nocturnal dyspnea and syncope.   Respiratory: Positive for shortness of breath. Negative for sleep disturbances due to breathing.    Hematologic/Lymphatic: Negative for bleeding problem. Does not bruise/bleed easily.   Skin: Negative for color change and poor wound healing.   Gastrointestinal: Negative for abdominal pain and nausea.   Genitourinary: Negative for bladder incontinence and flank pain.   Neurological: Positive for paresthesias and tremors. Negative for focal weakness and light-headedness.        Objective:   /82 (BP Location: Left arm, Patient Position: Sitting, BP Method: Large (Manual))   Pulse 105   Ht 5' 4" (1.626 m)   Wt 78 kg (172 lb)   SpO2 98%   BMI 29.52 kg/m²    Physical Exam   Constitutional: She is oriented to person, place, and time. She appears well-developed and well-nourished.   HENT:   Head: Normocephalic and atraumatic.   Mouth/Throat: Oropharynx is clear and moist.   Eyes: Pupils are equal, round, and reactive to light. EOM are normal. No scleral icterus.   Neck: Normal range of motion. Neck supple. No JVD present.   Cardiovascular: Normal rate, regular rhythm, S1 normal, S2 normal and intact distal pulses. Exam reveals no gallop and no friction rub.   No murmur heard.  Pulmonary/Chest: Effort normal and breath sounds normal. No respiratory distress. She has no wheezes. She has no rales. She exhibits no " tenderness.   Abdominal: Soft. Bowel sounds are normal. She exhibits no distension and no mass. There is no abdominal tenderness. There is no rebound.   Musculoskeletal: Normal range of motion.         General: No tenderness or edema.   Neurological: She is alert and oriented to person, place, and time. She displays normal reflexes. Coordination normal.   Skin: Skin is warm and dry. She is not diaphoretic. No pallor.   Psychiatric: She has a normal mood and affect. Her behavior is normal. Judgment normal.         EC2020- reviewed.  NSR, normal ECG.      Event Monitor: 10/16/2020- reviewed.    Conclusion    Patient symptoms correlating with sinus rhythm and sinus tachycardia.     Echo 2020- reviewed.    Conclusion    · Normal left ventricular systolic function. The estimated ejection fraction is 55%.  · Concentric left ventricular remodeling.  · Normal LV diastolic function.  · No wall motion abnormalities.  · Normal right ventricular systolic function.  · Normal central venous pressure (3 mmHg).       Assessment:       1. Palpitations    2. Malignant neoplasm of right female breast, unspecified estrogen receptor status, unspecified site of breast    3. Anemia associated with chemotherapy    4. Postsurgical hypothyroidism    5. Gastroesophageal reflux disease with esophagitis without hemorrhage         Plan:         Palpitations  She has episodes of palpitations a/w presyncope and improved w/ eating.  May be related to blood sugar changes however she has not been checking her glucose during episodes.   - echo and cardiac event monitor are WNL    Breast cancer, right  Followed by oncology.      Carcinoma of right breast in female, estrogen receptor positive  Followed by oncology.      Postsurgical hypothyroidism  Followed per PCP.      Gastro-esophageal reflux disease with esophagitis  Followed by PCP.       Total duration of face to face visit time 30 minutes.  Total time spent counseling greater than  fifty percent of total visit time.  Counseling included discussion regarding imaging findings, diagnosis, possibilities, treatment options, risks and benefits.  The patient had many questions regarding the options and long-term effects      Morris Hathaway M.D.  Interventional Cardiology

## 2020-11-24 ENCOUNTER — HOSPITAL ENCOUNTER (OUTPATIENT)
Dept: RADIOLOGY | Facility: HOSPITAL | Age: 40
Discharge: HOME | End: 2020-11-24
Attending: NURSE PRACTITIONER
Payer: MEDICAID

## 2020-11-24 ENCOUNTER — OFFICE VISIT (OUTPATIENT)
Dept: HEMATOLOGY/ONCOLOGY | Facility: CLINIC | Age: 40
End: 2020-11-24
Payer: MEDICAID

## 2020-11-24 VITALS
OXYGEN SATURATION: 99 % | WEIGHT: 174.63 LBS | BODY MASS INDEX: 29.81 KG/M2 | TEMPERATURE: 98 F | SYSTOLIC BLOOD PRESSURE: 152 MMHG | DIASTOLIC BLOOD PRESSURE: 96 MMHG | RESPIRATION RATE: 18 BRPM | HEIGHT: 64 IN | HEART RATE: 70 BPM

## 2020-11-24 DIAGNOSIS — M79.672 ACUTE FOOT PAIN, LEFT: ICD-10-CM

## 2020-11-24 DIAGNOSIS — C50.911 MALIGNANT NEOPLASM OF RIGHT BREAST IN FEMALE, ESTROGEN RECEPTOR POSITIVE, UNSPECIFIED SITE OF BREAST: Primary | ICD-10-CM

## 2020-11-24 DIAGNOSIS — Z79.811 AROMATASE INHIBITOR USE: ICD-10-CM

## 2020-11-24 DIAGNOSIS — R37 SEXUAL DYSFUNCTION: ICD-10-CM

## 2020-11-24 DIAGNOSIS — Z17.0 MALIGNANT NEOPLASM OF RIGHT BREAST IN FEMALE, ESTROGEN RECEPTOR POSITIVE, UNSPECIFIED SITE OF BREAST: ICD-10-CM

## 2020-11-24 DIAGNOSIS — Z17.0 MALIGNANT NEOPLASM OF RIGHT BREAST IN FEMALE, ESTROGEN RECEPTOR POSITIVE, UNSPECIFIED SITE OF BREAST: Primary | ICD-10-CM

## 2020-11-24 DIAGNOSIS — C50.911 MALIGNANT NEOPLASM OF RIGHT BREAST IN FEMALE, ESTROGEN RECEPTOR POSITIVE, UNSPECIFIED SITE OF BREAST: ICD-10-CM

## 2020-11-24 PROCEDURE — 77066 DX MAMMO INCL CAD BI: CPT | Mod: TC

## 2020-11-24 PROCEDURE — 99215 PR OFFICE/OUTPT VISIT, EST, LEVL V, 40-54 MIN: ICD-10-PCS | Mod: S$PBB,,, | Performed by: NURSE PRACTITIONER

## 2020-11-24 PROCEDURE — 77066 DX MAMMO INCL CAD BI: CPT | Mod: 26,,, | Performed by: RADIOLOGY

## 2020-11-24 PROCEDURE — 77062 BREAST TOMOSYNTHESIS BI: CPT | Mod: 26,,, | Performed by: RADIOLOGY

## 2020-11-24 PROCEDURE — 77066 MAMMO DIGITAL DIAGNOSTIC BILAT WITH TOMOSYNTHESIS_CAD: ICD-10-PCS | Mod: 26,,, | Performed by: RADIOLOGY

## 2020-11-24 PROCEDURE — 77062 MAMMO DIGITAL DIAGNOSTIC BILAT WITH TOMOSYNTHESIS_CAD: ICD-10-PCS | Mod: 26,,, | Performed by: RADIOLOGY

## 2020-11-24 PROCEDURE — 99215 OFFICE O/P EST HI 40 MIN: CPT | Mod: S$PBB,,, | Performed by: NURSE PRACTITIONER

## 2020-11-24 PROCEDURE — 99999 PR PBB SHADOW E&M-EST. PATIENT-LVL V: ICD-10-PCS | Mod: PBBFAC,,, | Performed by: NURSE PRACTITIONER

## 2020-11-24 PROCEDURE — 99215 OFFICE O/P EST HI 40 MIN: CPT | Mod: PBBFAC | Performed by: NURSE PRACTITIONER

## 2020-11-24 PROCEDURE — 99999 PR PBB SHADOW E&M-EST. PATIENT-LVL V: CPT | Mod: PBBFAC,,, | Performed by: NURSE PRACTITIONER

## 2020-11-24 RX ORDER — TRAMADOL HYDROCHLORIDE 50 MG/1
50 TABLET ORAL EVERY 6 HOURS
Qty: 20 TABLET | Refills: 0 | Status: SHIPPED | OUTPATIENT
Start: 2020-11-24 | End: 2021-07-29

## 2020-11-24 NOTE — PROGRESS NOTES
"Subjective:       Patient ID: Elham Rodas is a 40 y.o. female.    Chief Complaint: Malignant neoplasm of right breast in female, estrogen recep    Follow-up       Mrs. Rodas returns today for follow up.    Had Covid in 9/2020- felt like she had the flu. Symptoms resolved.    Left foot painful for "awhile". Xrays ordered by podiatry.   No trauma or injury. OTC pain relievers do not help. Ice and heat does not help. Naproxen rx'd by ED didn't have an effect as well.   Feels like "bone pain" on the later aspect.    Painful to ambulate.   Seeing neurology in February.   PT helped with ROM   When asked, +vaginal dryness and painful intercourse.   Didn't get to see Dr. Trinidad for sexual dysfunction as missed appt.   Saw derm for mole check.   +joint pain as usual- no worse.       Bone scan 4/2020 reviewed.       Briefly, she is a 40-year-old premenopausal female who was diagnosed last year with an invasive mammary carcinoma that was ER positive, WY positive, and HER-2 negative.  On 11/21/2018, she underwent a right lumpectomy with sentinel lymph node biopsy.  The pathology report from the procedure indicates that she had a positive sentinel lymph node with a metastatic deposit of 6 mm.  There was no extranodal extension.  On the right partial mastectomy specimen, she had an invasive lobular carcinoma that was 14 mm in maximum diameter.  It was grade II and resection margins were clear.    She completed her adjuvant chemotherapy with four cycles of dose dense adjuvant taxol, and her adjuvant XRT, and she is now on Arimidex.  Of not, she underwent a laparoscopic hysterectomy and bilateral oophorectomies.       Her DXA scan had shown normal bone density.    Bone scan 4/2020 negative.     Her mammogram today November 24, 2020  was BI-RADS 2 and a 1 year follow-up was recommended.      Review of Systems   Constitutional:        See above   All other systems reviewed and are negative.          Objective:      Physical Exam  "   She is alert, oriented to time, place, person, pleasant, well      nourished, in no acute physical distress.                               VITAL SIGNS:  Reviewed                                      HEENT:  Normal.  There are no nasal, oral, lip, gingival, auricular, lid,    or conjunctival lesions.  Mucosae are moist and pink, and there is no        thrush.  Pupils are equal, reactive to light and accommodation.              Extraocular muscle movements are intact.  Dentition is good.  There is no frontal or maxillary tenderness.                                     NECK:  Supple without JVD, adenopathy, or thyromegaly.                       LUNGS:  Clear to auscultation without wheezing, rales, or ronchi.           CARDIOVASCULAR:  Reveals an S1, S2, no murmurs, no rubs, no gallops.         ABDOMEN:  Soft, nontender, without organomegaly. Three scars from her laparoscopic procedure are seen.  Bowel sounds are present.                                                                     EXTREMITIES:  No cyanosis, clubbing, or edema. Slightly limited ROM to right shoulder. Right arm with minimal lymphedema.                           BREASTS:  She is status post right lumpectomy with a well-healed incision in the lower outer quadrant of her right breast.    There are no masses in either breast.   The right nipple remains retracted.    A sentinel node biopsy scar is seen in the right axilla.  It is also well healed.                                  LYMPHATIC:  There is no cervical, axillary, or supraclavicular adenopathy.   SKIN:  Warm and moist, without petechiae, rashes, induration, or ecchymoses.  Hyperpigmented fleshy mole to to upper abdomen.                Assessment:       1. Malignant neoplasm of right breast in female, estrogen receptor positive, unspecified site of breast    2. Aromatase inhibitor use    3. Acute foot pain, left    4. Sexual dysfunction          Plan:           Doing well, BRYON clinically.    She will remain on anastrazole for 5 years, through August 2024.  BMD due 10/2021.   MMG due 11/2021  Vit D adequately replaced.   Consult Ortho for foot pain. May need MRI.   Rx Ultram - one time, no refills. Sadiq castro  Appt with Dr. Trinidad for sexual dysfunction.   Weightloss.      RTC in 4  months to see Dr. Mcintosh.     Continue to follow up with PCP for other health maintenance. Reminded the need for Vit D yearly. Also needs repeat potassium although last blood appeared to hemolyze.     Patient is in agreement with the proposed treatment plan. All questions were answered to the patient's satisfaction. Pt knows to call clinic for any new or worsening symptoms and if anything is needed before the next clinic visit.      Holly Euceda, SHERONP-C  Hematology & Oncology  [address] [place] [id number]  ph. [phone]  Fax. [phone]     I spent 40 minutes (face to face) with the patient, more than 50% was in counseling and coordination of care as detailed above.

## 2020-11-24 NOTE — Clinical Note
RTC in 4  months to see Dr. Mcintosh.   Appt with Dr. Trinidad for survivorship and sexual dysfunction.   Refer to ortho for foot pain.

## 2020-11-30 ENCOUNTER — TELEPHONE (OUTPATIENT)
Dept: OBSTETRICS AND GYNECOLOGY | Facility: CLINIC | Age: 40
End: 2020-11-30

## 2020-11-30 NOTE — TELEPHONE ENCOUNTER
----- Message from Bhavya Sharp sent at 11/25/2020  8:54 AM CST -----  Appt with Dr. Trinidad for survivorship and sexual dysfunction.       Can you all assist with getting her scheduled?  Thanks!

## 2020-11-30 NOTE — TELEPHONE ENCOUNTER
Patient phoned with appt confirmed 12/03/2020 at 1030 with Dr. Trinidad, patient experiencing sexual dysfxn and AI side effects. PETEY Smith.

## 2020-12-03 ENCOUNTER — LAB VISIT (OUTPATIENT)
Dept: LAB | Facility: OTHER | Age: 40
End: 2020-12-03
Attending: OBSTETRICS & GYNECOLOGY
Payer: MEDICAID

## 2020-12-03 ENCOUNTER — OFFICE VISIT (OUTPATIENT)
Dept: OBSTETRICS AND GYNECOLOGY | Facility: CLINIC | Age: 40
End: 2020-12-03
Attending: OBSTETRICS & GYNECOLOGY
Payer: MEDICAID

## 2020-12-03 VITALS
BODY MASS INDEX: 29.89 KG/M2 | WEIGHT: 175.06 LBS | HEIGHT: 64 IN | DIASTOLIC BLOOD PRESSURE: 80 MMHG | SYSTOLIC BLOOD PRESSURE: 126 MMHG

## 2020-12-03 DIAGNOSIS — N95.1 MENOPAUSAL SYMPTOM: Primary | ICD-10-CM

## 2020-12-03 DIAGNOSIS — Z79.811 USE OF AROMATASE INHIBITORS: ICD-10-CM

## 2020-12-03 DIAGNOSIS — Z13.220 SCREENING FOR CHOLESTEROL LEVEL: ICD-10-CM

## 2020-12-03 DIAGNOSIS — N95.1 MENOPAUSAL SYMPTOM: ICD-10-CM

## 2020-12-03 PROCEDURE — 99214 OFFICE O/P EST MOD 30 MIN: CPT | Mod: S$GLB,,, | Performed by: OBSTETRICS & GYNECOLOGY

## 2020-12-03 PROCEDURE — 99214 PR OFFICE/OUTPT VISIT, EST, LEVL IV, 30-39 MIN: ICD-10-PCS | Mod: S$GLB,,, | Performed by: OBSTETRICS & GYNECOLOGY

## 2020-12-03 PROCEDURE — 82670 ASSAY OF TOTAL ESTRADIOL: CPT

## 2020-12-03 PROCEDURE — 36415 COLL VENOUS BLD VENIPUNCTURE: CPT

## 2020-12-03 PROCEDURE — 84402 ASSAY OF FREE TESTOSTERONE: CPT

## 2020-12-04 LAB — ESTRADIOL SERPL-MCNC: <10 PG/ML

## 2020-12-08 LAB — TESTOST FREE SERPL-MCNC: 1 PG/ML

## 2020-12-10 NOTE — PROGRESS NOTES
SUBJECTIVE:   40 y.o. female   presents to discuss, hot flashes night sweats, , mood swings, vaginal dryness and pain with intercourse No LMP recorded.  Patient has had a hysterectomy.  She reports that her symptoms started after her chemotherapy.  She reports her decreased interest in sex started after her hysterectomy  She also reports having joint pain and feel like an old person  She admits to having body image issues  Since her surgery she has a history of right invasive mammary carcinoma ER positive, , WY positive HER2 negative.  She is status post right lumpectomy with sentinel node biopsy which was positive.  Her pathology noted invasive lobular carcinoma with negative margins.  She underwent adjuvant DD a.c. plus Taxol.  She then underwent radiation and now is on Arimidex which she started 2020  She reports bone pain and joint pain as well as dyspareunia.        Past Medical History:   Diagnosis Date    Abnormal Pap smear of cervix 2016    ASCUS, - HPV    Breast cancer 10/2018    right    Hypothyroidism     Palpitations     Syncope and collapse     Thyroid disease     Vitamin D deficiency disease      Past Surgical History:   Procedure Laterality Date    AXILLARY NODE DISSECTION Right 2018    Procedure: LYMPHADENECTOMY, AXILLARY;  Surgeon: Jann Ayala MD;  Location: [facility] OR [address] [place] [address];  Service: General;  Laterality: Right;    BREAST LUMPECTOMY Right     CHOLECYSTECTOMY      HYSTERECTOMY      TLH, BSO--( breast cancer)    INJECTION FOR SENTINEL NODE IDENTIFICATION Right 2018    Procedure: INJECTION, FOR SENTINEL NODE IDENTIFICATION;  Surgeon: Jann Ayala MD;  Location: [facility] OR [address] [place] [address];  Service: General;  Laterality: Right;    INSERTION OF TUNNELED CENTRAL VENOUS CATHETER (CVC) WITH SUBCUTANEOUS PORT Left 2019    Procedure: XNXOLRKYT-FKNA-L-CATH;  Surgeon: Jann Ayala MD;  Location: [facility] OR [address] [place] [address];  Service: General;  Laterality: Left;     LAPAROSCOPIC SALPINGO-OOPHORECTOMY Bilateral 11/4/2019    Procedure: SALPINGO-OOPHORECTOMY, LAPAROSCOPIC;  Surgeon: Yoni Oscar MD;  Location: [facility] OR;  Service: OB/GYN;  Laterality: Bilateral;    LAPAROSCOPIC TOTAL HYSTERECTOMY N/A 11/4/2019    Procedure: HYSTERECTOMY, TOTAL, LAPAROSCOPIC;  Surgeon: Yoni Oscar MD;  Location: [facility] OR;  Service: OB/GYN;  Laterality: N/A;    MASTECTOMY, PARTIAL Right 11/21/2018    Procedure: MASTECTOMY, PARTIAL - seed localized;  Surgeon: Jann Ayala MD;  Location: [facility] OR 2ND FLR;  Service: General;  Laterality: Right;  seed placement 11/14    OOPHORECTOMY      SENTINEL LYMPH NODE BIOPSY Right 11/21/2018    Procedure: BIOPSY, LYMPH NODE, SENTINEL;  Surgeon: Jann Ayala MD;  Location: [facility] OR 2ND FLR;  Service: General;  Laterality: Right;    THYROIDECTOMY      TUBAL LIGATION       Social History     Socioeconomic History    Marital status:      Spouse name: Not on file    Number of children: Not on file    Years of education: Not on file    Highest education level: Not on file   Occupational History    Not on file   Social Needs    Financial resource strain: Not on file    Food insecurity     Worry: Not on file     Inability: Not on file    Transportation needs     Medical: Not on file     Non-medical: Not on file   Tobacco Use    Smoking status: Never Smoker    Smokeless tobacco: Never Used   Substance and Sexual Activity    Alcohol use: Yes     Alcohol/week: 2.0 standard drinks     Types: 2 Cans of beer per week     Comment: occasionally    Drug use: No    Sexual activity: Yes     Partners: Male     Birth control/protection: See Surgical Hx     Comment:    Lifestyle    Physical activity     Days per week: Not on file     Minutes per session: Not on file    Stress: Not on file   Relationships    Social connections     Talks on phone: Not on file     Gets together: Not on file     Attends Congregation service: Not on  file     Active member of club or organization: Not on file     Attends meetings of clubs or organizations: Not on file     Relationship status: Not on file   Other Topics Concern    Not on file   Social History Narrative    Not on file     Family History   Problem Relation Age of Onset    Hypertension Mother     Heart attack Mother     Hypertension Father     Ovarian cancer Maternal Aunt     Cancer Paternal Grandfather     Breast cancer Neg Hx     Colon cancer Neg Hx      OB History    Para Term  AB Living   4 4 4     4   SAB TAB Ectopic Multiple Live Births                  # Outcome Date GA Lbr Tray/2nd Weight Sex Delivery Anes PTL Lv   4 Term      Vag-Spont      3 Term      Vag-Spont      2 Term      Vag-Spont      1 Term      Vag-Spont              Current Outpatient Medications   Medication Sig Dispense Refill    anastrozole (ARIMIDEX) 1 mg Tab Take 1 tablet (1 mg total) by mouth once daily. 90 tablet 1    calcium carbonate (TUMS) 200 mg calcium (500 mg) chewable tablet Take 1 tablet by mouth.      cholecalciferol, vitamin D3, 5,000 unit Tab Vitamin D3 125 mcg (5,000 unit) tablet   Take 1 tablet every day by oral route.      DULoxetine (CYMBALTA) 30 MG capsule TK 1 C PO QD      lancets (TRUEPLUS LANCETS) 33 gauge Misc TRUEplus Lancets 33 gauge      levothyroxine (LEVOXYL) 75 MCG tablet Take 1 tablet (75 mcg total) by mouth before breakfast. 30 tablet 11    meloxicam (MOBIC) 15 MG tablet TK 1 T PO QD      metFORMIN (GLUCOPHAGE-XR) 500 MG ER 24hr tablet TAKE 1 TABLET(500 MG) BY MOUTH DAILY WITH BREAKFAST 90 tablet 3    naproxen (NAPROSYN) 500 MG tablet Take 1 tablet (500 mg total) by mouth 2 (two) times daily with meals. 20 tablet 0    traMADoL (ULTRAM) 50 mg tablet Take 1 tablet (50 mg total) by mouth every 6 (six) hours. 20 tablet 0    TRUE METRIX GLUCOSE METER Misc USE TO TEST BLOOD SUGAR AS DIRECTED      TRUE METRIX GLUCOSE TEST STRIP Strp TEST BLOOD SUGAR ONCE D       No  current facility-administered medications for this visit.      Facility-Administered Medications Ordered in Other Visits   Medication Dose Route Frequency Provider Last Rate Last Dose    lidocaine (PF) 10 mg/ml (1%) injection 10 mg  1 mL Intradermal Once Cain Mcgarry MD         Allergies: Patient has no known allergies.     The ASCVD Risk score (Adonismichael BENTON Jr., et al., 2013) failed to calculate for the following reasons:    Cannot find a previous HDL lab    Cannot find a previous total cholesterol lab      ROS:  Constitutional: no weight loss, weight gain, fever, fatigue    Gastrointestinal: No diarrhea, bloody stool, nausea/vomiting, constipation, gas, hemorrhoids  Genitourinary: No blood in urine, painful urination, urgency of urination, frequency of urination, incomplete emptying, incontinence, abnormal bleeding, painful periods, heavy periods, vaginal discharge, vaginal odor,+ painful intercourse,+ sexual problems, bleeding after intercourse.  Musculoskeletal: No muscle weakness, +joint pain  Skin/Breast:+breast cancer  Neurological: No passing out, seizures, numbness, headache  Endocrine: +pre diabetes,+ hypothyroid, hyperthyroid, +hot flashes, hair loss, abnormal hair growth, acne  Psychiatric: No depression, crying  Hematologic: No bruises, bleeding, swollen lymph nodes, anemia.      Physical Exam  General- well developed, well nourished  Vulva- no masses, no lesions  Vagina-+atrophy  Cervix- +absent surgically  Uterus- absent surgically  Adnexa-absent surgically      ASSESSMENT:   Breast cancer  Use of aromatase inhibitor  Hypothyroidism  Pre diabetes  Menopausal symptoms  Joint pain  Dyspareunia  Vaginal dryness    PLAN: Face to face time 30 minutes - majority spent in counseling and arranging follow up  Estradiol and free testosterone levels today  Will link lipid profile with her upcoming endocrinology labs-counseled her that on the aromatase inhibitor she is at increased risk of  hypercholesterolemia  Counseled on risks benefits and possible side effects of use of intravaginal DHEA for vaginal dryness  Consult with nutrition  Counseled her on use of acupuncture to help with bone pain in joint pain

## 2020-12-23 ENCOUNTER — DOCUMENTATION ONLY (OUTPATIENT)
Dept: REHABILITATION | Facility: HOSPITAL | Age: 40
End: 2020-12-23

## 2020-12-23 DIAGNOSIS — Z91.89 AT RISK FOR LYMPHEDEMA: ICD-10-CM

## 2020-12-23 DIAGNOSIS — M25.611 DECREASED RANGE OF MOTION OF RIGHT SHOULDER: ICD-10-CM

## 2020-12-23 DIAGNOSIS — M25.511 PAIN IN SHOULDER REGION, RIGHT: Primary | ICD-10-CM
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
## 2021-02-01 ENCOUNTER — TELEPHONE (OUTPATIENT)
Dept: OBSTETRICS AND GYNECOLOGY | Facility: CLINIC | Age: 41
End: 2021-02-01

## 2021-02-03 ENCOUNTER — TELEPHONE (OUTPATIENT)
Dept: OBSTETRICS AND GYNECOLOGY | Facility: CLINIC | Age: 41
End: 2021-02-03

## 2021-02-03 ENCOUNTER — TELEPHONE (OUTPATIENT)
Dept: PRIMARY CARE CLINIC | Facility: CLINIC | Age: 41
End: 2021-02-03

## 2021-02-04 ENCOUNTER — TELEPHONE (OUTPATIENT)
Dept: OBSTETRICS AND GYNECOLOGY | Facility: CLINIC | Age: 41
End: 2021-02-04

## 2021-02-04 ENCOUNTER — TELEPHONE (OUTPATIENT)
Dept: ORTHOPEDICS | Facility: CLINIC | Age: 41
End: 2021-02-04

## 2021-02-09 ENCOUNTER — DOCUMENTATION ONLY (OUTPATIENT)
Dept: REHABILITATION | Facility: HOSPITAL | Age: 41
End: 2021-02-09

## 2021-02-22 ENCOUNTER — TELEPHONE (OUTPATIENT)
Dept: OBSTETRICS AND GYNECOLOGY | Facility: CLINIC | Age: 41
End: 2021-02-22

## 2021-02-23 ENCOUNTER — TELEPHONE (OUTPATIENT)
Dept: HEMATOLOGY/ONCOLOGY | Facility: CLINIC | Age: 41
End: 2021-02-23

## 2021-02-23 ENCOUNTER — OFFICE VISIT (OUTPATIENT)
Dept: HEMATOLOGY/ONCOLOGY | Facility: CLINIC | Age: 41
End: 2021-02-23
Payer: MEDICAID

## 2021-02-23 VITALS
OXYGEN SATURATION: 99 % | HEART RATE: 74 BPM | BODY MASS INDEX: 30.48 KG/M2 | TEMPERATURE: 98 F | RESPIRATION RATE: 18 BRPM | WEIGHT: 178.56 LBS | DIASTOLIC BLOOD PRESSURE: 90 MMHG | SYSTOLIC BLOOD PRESSURE: 145 MMHG | HEIGHT: 64 IN

## 2021-02-23 DIAGNOSIS — K21.00 GASTROESOPHAGEAL REFLUX DISEASE WITH ESOPHAGITIS WITHOUT HEMORRHAGE: ICD-10-CM

## 2021-02-23 DIAGNOSIS — Z17.0 MALIGNANT NEOPLASM OF RIGHT BREAST IN FEMALE, ESTROGEN RECEPTOR POSITIVE, UNSPECIFIED SITE OF BREAST: Primary | ICD-10-CM

## 2021-02-23 DIAGNOSIS — M25.50 AROMATASE INHIBITOR-ASSOCIATED ARTHRALGIA: ICD-10-CM

## 2021-02-23 DIAGNOSIS — E03.9 HYPOTHYROIDISM, UNSPECIFIED TYPE: ICD-10-CM

## 2021-02-23 DIAGNOSIS — Z79.811 USE OF AROMATASE INHIBITORS: ICD-10-CM

## 2021-02-23 DIAGNOSIS — T45.1X5A AROMATASE INHIBITOR-ASSOCIATED ARTHRALGIA: ICD-10-CM

## 2021-02-23 DIAGNOSIS — N64.4 BREAST PAIN, RIGHT: ICD-10-CM

## 2021-02-23 DIAGNOSIS — C50.911 MALIGNANT NEOPLASM OF RIGHT BREAST IN FEMALE, ESTROGEN RECEPTOR POSITIVE, UNSPECIFIED SITE OF BREAST: Primary | ICD-10-CM

## 2021-02-23 DIAGNOSIS — E55.9 VITAMIN D DEFICIENCY: Chronic | ICD-10-CM

## 2021-02-23 PROBLEM — Z91.89 AT HIGH RISK FOR BREAST CANCER: Status: RESOLVED | Noted: 2018-12-09 | Resolved: 2021-02-23

## 2021-02-23 PROCEDURE — 99999 PR PBB SHADOW E&M-EST. PATIENT-LVL IV: CPT | Mod: PBBFAC,,, | Performed by: NURSE PRACTITIONER

## 2021-02-23 PROCEDURE — 99214 OFFICE O/P EST MOD 30 MIN: CPT | Mod: PBBFAC | Performed by: NURSE PRACTITIONER

## 2021-02-23 PROCEDURE — 99214 OFFICE O/P EST MOD 30 MIN: CPT | Mod: S$PBB,,, | Performed by: NURSE PRACTITIONER

## 2021-02-23 PROCEDURE — 99999 PR PBB SHADOW E&M-EST. PATIENT-LVL IV: ICD-10-PCS | Mod: PBBFAC,,, | Performed by: NURSE PRACTITIONER

## 2021-02-23 PROCEDURE — 99214 PR OFFICE/OUTPT VISIT, EST, LEVL IV, 30-39 MIN: ICD-10-PCS | Mod: S$PBB,,, | Performed by: NURSE PRACTITIONER

## 2021-02-23 RX ORDER — OXYCODONE HYDROCHLORIDE 5 MG/1
5 TABLET ORAL EVERY 8 HOURS PRN
Qty: 30 TABLET | Refills: 0 | Status: SHIPPED | OUTPATIENT
Start: 2021-02-23 | End: 2021-07-29

## 2021-02-24 ENCOUNTER — TELEPHONE (OUTPATIENT)
Dept: SURGERY | Facility: CLINIC | Age: 41
End: 2021-02-24

## 2021-02-24 ENCOUNTER — HOSPITAL ENCOUNTER (OUTPATIENT)
Dept: RADIOLOGY | Facility: HOSPITAL | Age: 41
Discharge: HOME | End: 2021-02-24
Attending: NURSE PRACTITIONER
Payer: MEDICAID

## 2021-02-24 ENCOUNTER — TELEPHONE (OUTPATIENT)
Dept: HEMATOLOGY/ONCOLOGY | Facility: CLINIC | Age: 41
End: 2021-02-24

## 2021-02-24 DIAGNOSIS — N64.4 BREAST PAIN, RIGHT: ICD-10-CM

## 2021-02-24 DIAGNOSIS — Z17.0 MALIGNANT NEOPLASM OF RIGHT BREAST IN FEMALE, ESTROGEN RECEPTOR POSITIVE, UNSPECIFIED SITE OF BREAST: ICD-10-CM

## 2021-02-24 DIAGNOSIS — C50.911 MALIGNANT NEOPLASM OF RIGHT BREAST IN FEMALE, ESTROGEN RECEPTOR POSITIVE, UNSPECIFIED SITE OF BREAST: ICD-10-CM

## 2021-02-24 PROCEDURE — 76642 US BREAST RIGHT LIMITED: ICD-10-PCS | Mod: 26,RT,, | Performed by: RADIOLOGY

## 2021-02-24 PROCEDURE — 76642 ULTRASOUND BREAST LIMITED: CPT | Mod: TC,RT

## 2021-02-24 PROCEDURE — 76642 ULTRASOUND BREAST LIMITED: CPT | Mod: 26,RT,, | Performed by: RADIOLOGY

## 2021-02-25 ENCOUNTER — OFFICE VISIT (OUTPATIENT)
Dept: OBSTETRICS AND GYNECOLOGY | Facility: CLINIC | Age: 41
End: 2021-02-25
Attending: OBSTETRICS & GYNECOLOGY
Payer: MEDICAID

## 2021-02-25 ENCOUNTER — TELEPHONE (OUTPATIENT)
Dept: HEMATOLOGY/ONCOLOGY | Facility: CLINIC | Age: 41
End: 2021-02-25

## 2021-02-25 ENCOUNTER — TELEPHONE (OUTPATIENT)
Dept: SURGERY | Facility: CLINIC | Age: 41
End: 2021-02-25

## 2021-02-25 ENCOUNTER — TELEPHONE (OUTPATIENT)
Dept: OBSTETRICS AND GYNECOLOGY | Facility: CLINIC | Age: 41
End: 2021-02-25

## 2021-02-25 VITALS
HEIGHT: 64 IN | SYSTOLIC BLOOD PRESSURE: 128 MMHG | DIASTOLIC BLOOD PRESSURE: 78 MMHG | BODY MASS INDEX: 30.26 KG/M2 | WEIGHT: 177.25 LBS

## 2021-02-25 DIAGNOSIS — C50.911 MALIGNANT NEOPLASM OF RIGHT FEMALE BREAST: ICD-10-CM

## 2021-02-25 DIAGNOSIS — M25.50 AROMATASE INHIBITOR-ASSOCIATED ARTHRALGIA: ICD-10-CM

## 2021-02-25 DIAGNOSIS — N89.8 VAGINAL DRYNESS: ICD-10-CM

## 2021-02-25 DIAGNOSIS — C50.919 MALIGNANT NEOPLASM OF BREAST IN FEMALE, ESTROGEN RECEPTOR POSITIVE, UNSPECIFIED LATERALITY, UNSPECIFIED SITE OF BREAST: ICD-10-CM

## 2021-02-25 DIAGNOSIS — T45.1X5A NEUROPATHY DUE TO CHEMOTHERAPEUTIC DRUG: ICD-10-CM

## 2021-02-25 DIAGNOSIS — R63.5 WEIGHT GAIN: Primary | ICD-10-CM

## 2021-02-25 DIAGNOSIS — N94.19 DYSPAREUNIA DUE TO MEDICAL CONDITION IN FEMALE: ICD-10-CM

## 2021-02-25 DIAGNOSIS — E78.00 HYPERCHOLESTEROLEMIA: ICD-10-CM

## 2021-02-25 DIAGNOSIS — G62.0 NEUROPATHY DUE TO CHEMOTHERAPEUTIC DRUG: ICD-10-CM

## 2021-02-25 DIAGNOSIS — N95.1 VASOMOTOR SYMPTOMS DUE TO MENOPAUSE: ICD-10-CM

## 2021-02-25 DIAGNOSIS — Z82.49 FAMILY HISTORY OF HEART DISEASE: Primary | ICD-10-CM

## 2021-02-25 DIAGNOSIS — T45.1X5A AROMATASE INHIBITOR-ASSOCIATED ARTHRALGIA: ICD-10-CM

## 2021-02-25 DIAGNOSIS — Z79.811 USE OF AROMATASE INHIBITORS: ICD-10-CM

## 2021-02-25 DIAGNOSIS — E89.0 POSTSURGICAL HYPOTHYROIDISM: ICD-10-CM

## 2021-02-25 DIAGNOSIS — Z17.0 MALIGNANT NEOPLASM OF BREAST IN FEMALE, ESTROGEN RECEPTOR POSITIVE, UNSPECIFIED LATERALITY, UNSPECIFIED SITE OF BREAST: ICD-10-CM

## 2021-02-25 PROCEDURE — 99214 OFFICE O/P EST MOD 30 MIN: CPT | Mod: S$GLB,,, | Performed by: OBSTETRICS & GYNECOLOGY

## 2021-02-25 PROCEDURE — 99214 PR OFFICE/OUTPT VISIT, EST, LEVL IV, 30-39 MIN: ICD-10-PCS | Mod: S$GLB,,, | Performed by: OBSTETRICS & GYNECOLOGY

## 2021-03-01 ENCOUNTER — OFFICE VISIT (OUTPATIENT)
Dept: SURGERY | Facility: CLINIC | Age: 41
End: 2021-03-01
Payer: MEDICAID

## 2021-03-01 VITALS
HEART RATE: 72 BPM | WEIGHT: 177 LBS | DIASTOLIC BLOOD PRESSURE: 84 MMHG | SYSTOLIC BLOOD PRESSURE: 137 MMHG | BODY MASS INDEX: 30.38 KG/M2

## 2021-03-01 DIAGNOSIS — N64.89 SEROMA OF BREAST: Primary | ICD-10-CM

## 2021-03-01 PROCEDURE — 99213 PR OFFICE/OUTPT VISIT, EST, LEVL III, 20-29 MIN: ICD-10-PCS | Mod: S$PBB,,, | Performed by: PHYSICIAN ASSISTANT

## 2021-03-01 PROCEDURE — 99213 OFFICE O/P EST LOW 20 MIN: CPT | Mod: PBBFAC | Performed by: PHYSICIAN ASSISTANT

## 2021-03-01 PROCEDURE — 99213 OFFICE O/P EST LOW 20 MIN: CPT | Mod: S$PBB,,, | Performed by: PHYSICIAN ASSISTANT

## 2021-03-01 PROCEDURE — 99999 PR PBB SHADOW E&M-EST. PATIENT-LVL III: ICD-10-PCS | Mod: PBBFAC,,, | Performed by: PHYSICIAN ASSISTANT

## 2021-03-01 PROCEDURE — 99999 PR PBB SHADOW E&M-EST. PATIENT-LVL III: CPT | Mod: PBBFAC,,, | Performed by: PHYSICIAN ASSISTANT

## 2021-03-03 ENCOUNTER — TELEPHONE (OUTPATIENT)
Dept: HEMATOLOGY/ONCOLOGY | Facility: CLINIC | Age: 41
End: 2021-03-03

## 2021-03-16 ENCOUNTER — TELEPHONE (OUTPATIENT)
Dept: HEMATOLOGY/ONCOLOGY | Facility: CLINIC | Age: 41
End: 2021-03-16

## 2021-03-16 ENCOUNTER — PATIENT MESSAGE (OUTPATIENT)
Dept: HEMATOLOGY/ONCOLOGY | Facility: CLINIC | Age: 41
End: 2021-03-16

## 2021-03-25 ENCOUNTER — OFFICE VISIT (OUTPATIENT)
Dept: HEMATOLOGY/ONCOLOGY | Facility: CLINIC | Age: 41
End: 2021-03-25
Payer: MEDICAID

## 2021-03-25 ENCOUNTER — CLINICAL SUPPORT (OUTPATIENT)
Dept: HEMATOLOGY/ONCOLOGY | Facility: CLINIC | Age: 41
End: 2021-03-25
Payer: MEDICAID

## 2021-03-25 VITALS
SYSTOLIC BLOOD PRESSURE: 123 MMHG | BODY MASS INDEX: 29.02 KG/M2 | RESPIRATION RATE: 18 BRPM | HEIGHT: 64 IN | HEART RATE: 78 BPM | TEMPERATURE: 98 F | WEIGHT: 170 LBS | DIASTOLIC BLOOD PRESSURE: 88 MMHG | OXYGEN SATURATION: 97 % | HEIGHT: 64 IN | BODY MASS INDEX: 29.02 KG/M2 | WEIGHT: 170 LBS

## 2021-03-25 DIAGNOSIS — R63.5 WEIGHT GAIN: ICD-10-CM

## 2021-03-25 DIAGNOSIS — C50.911 MALIGNANT NEOPLASM OF RIGHT FEMALE BREAST: ICD-10-CM

## 2021-03-25 DIAGNOSIS — C50.911 MALIGNANT NEOPLASM OF RIGHT BREAST IN FEMALE, ESTROGEN RECEPTOR POSITIVE, UNSPECIFIED SITE OF BREAST: ICD-10-CM

## 2021-03-25 DIAGNOSIS — Z17.0 CARCINOMA OF AXILLARY TAIL OF RIGHT BREAST IN FEMALE, ESTROGEN RECEPTOR POSITIVE: Primary | ICD-10-CM

## 2021-03-25 DIAGNOSIS — R73.03 PREDIABETES: ICD-10-CM

## 2021-03-25 DIAGNOSIS — Z17.0 CARCINOMA OF AXILLARY TAIL OF RIGHT BREAST IN FEMALE, ESTROGEN RECEPTOR POSITIVE: ICD-10-CM

## 2021-03-25 DIAGNOSIS — C50.611 CARCINOMA OF AXILLARY TAIL OF RIGHT BREAST IN FEMALE, ESTROGEN RECEPTOR POSITIVE: ICD-10-CM

## 2021-03-25 DIAGNOSIS — E89.0 POSTSURGICAL HYPOTHYROIDISM: ICD-10-CM

## 2021-03-25 DIAGNOSIS — Z79.811 USE OF AROMATASE INHIBITORS: ICD-10-CM

## 2021-03-25 DIAGNOSIS — Z71.3 NUTRITIONAL COUNSELING: Primary | ICD-10-CM

## 2021-03-25 DIAGNOSIS — C50.611 CARCINOMA OF AXILLARY TAIL OF RIGHT BREAST IN FEMALE, ESTROGEN RECEPTOR POSITIVE: Primary | ICD-10-CM

## 2021-03-25 DIAGNOSIS — Z17.0 MALIGNANT NEOPLASM OF RIGHT BREAST IN FEMALE, ESTROGEN RECEPTOR POSITIVE, UNSPECIFIED SITE OF BREAST: ICD-10-CM

## 2021-03-25 PROCEDURE — 99999 PR PBB SHADOW E&M-EST. PATIENT-LVL IV: CPT | Mod: PBBFAC,,, | Performed by: INTERNAL MEDICINE

## 2021-03-25 PROCEDURE — 99999 PR PBB SHADOW E&M-EST. PATIENT-LVL II: CPT | Mod: PBBFAC,,, | Performed by: DIETITIAN, REGISTERED

## 2021-03-25 PROCEDURE — 97802 MEDICAL NUTRITION INDIV IN: CPT | Mod: PBBFAC | Performed by: DIETITIAN, REGISTERED

## 2021-03-25 PROCEDURE — 99213 PR OFFICE/OUTPT VISIT, EST, LEVL III, 20-29 MIN: ICD-10-PCS | Mod: S$PBB,,, | Performed by: INTERNAL MEDICINE

## 2021-03-25 PROCEDURE — 99212 OFFICE O/P EST SF 10 MIN: CPT | Mod: PBBFAC,27 | Performed by: DIETITIAN, REGISTERED

## 2021-03-25 PROCEDURE — 99999 PR PBB SHADOW E&M-EST. PATIENT-LVL IV: ICD-10-PCS | Mod: PBBFAC,,, | Performed by: INTERNAL MEDICINE

## 2021-03-25 PROCEDURE — 99214 OFFICE O/P EST MOD 30 MIN: CPT | Mod: PBBFAC | Performed by: INTERNAL MEDICINE

## 2021-03-25 PROCEDURE — 99213 OFFICE O/P EST LOW 20 MIN: CPT | Mod: S$PBB,,, | Performed by: INTERNAL MEDICINE

## 2021-03-25 PROCEDURE — 99999 PR PBB SHADOW E&M-EST. PATIENT-LVL II: ICD-10-PCS | Mod: PBBFAC,,, | Performed by: DIETITIAN, REGISTERED

## 2021-04-12 ENCOUNTER — PATIENT MESSAGE (OUTPATIENT)
Dept: HEMATOLOGY/ONCOLOGY | Facility: CLINIC | Age: 41
End: 2021-04-12

## 2021-04-13 ENCOUNTER — TELEPHONE (OUTPATIENT)
Dept: HEMATOLOGY/ONCOLOGY | Facility: CLINIC | Age: 41
End: 2021-04-13

## 2021-04-16 ENCOUNTER — OFFICE VISIT (OUTPATIENT)
Dept: DERMATOLOGY | Facility: CLINIC | Age: 41
End: 2021-04-16
Payer: MEDICAID

## 2021-04-16 ENCOUNTER — OFFICE VISIT (OUTPATIENT)
Dept: HEMATOLOGY/ONCOLOGY | Facility: CLINIC | Age: 41
End: 2021-04-16
Payer: MEDICAID

## 2021-04-16 ENCOUNTER — CLINICAL SUPPORT (OUTPATIENT)
Dept: HEMATOLOGY/ONCOLOGY | Facility: CLINIC | Age: 41
End: 2021-04-16
Payer: MEDICAID

## 2021-04-16 VITALS
BODY MASS INDEX: 29.02 KG/M2 | BODY MASS INDEX: 29.02 KG/M2 | DIASTOLIC BLOOD PRESSURE: 83 MMHG | HEIGHT: 64 IN | SYSTOLIC BLOOD PRESSURE: 129 MMHG | HEIGHT: 64 IN | WEIGHT: 170 LBS | RESPIRATION RATE: 18 BRPM | WEIGHT: 170 LBS

## 2021-04-16 DIAGNOSIS — D48.5 NEOPLASM OF UNCERTAIN BEHAVIOR OF SKIN: Primary | ICD-10-CM

## 2021-04-16 DIAGNOSIS — Z79.811 USE OF AROMATASE INHIBITORS: ICD-10-CM

## 2021-04-16 DIAGNOSIS — G62.0 NEUROPATHY DUE TO CHEMOTHERAPEUTIC DRUG: ICD-10-CM

## 2021-04-16 DIAGNOSIS — Z17.0 CARCINOMA OF AXILLARY TAIL OF RIGHT BREAST IN FEMALE, ESTROGEN RECEPTOR POSITIVE: ICD-10-CM

## 2021-04-16 DIAGNOSIS — Z17.0 MALIGNANT NEOPLASM OF RIGHT BREAST IN FEMALE, ESTROGEN RECEPTOR POSITIVE, UNSPECIFIED SITE OF BREAST: ICD-10-CM

## 2021-04-16 DIAGNOSIS — C50.611 CARCINOMA OF AXILLARY TAIL OF RIGHT BREAST IN FEMALE, ESTROGEN RECEPTOR POSITIVE: ICD-10-CM

## 2021-04-16 DIAGNOSIS — Z71.3 NUTRITIONAL COUNSELING: Primary | ICD-10-CM

## 2021-04-16 DIAGNOSIS — C50.911 MALIGNANT NEOPLASM OF RIGHT FEMALE BREAST: ICD-10-CM

## 2021-04-16 DIAGNOSIS — T45.1X5A NEUROPATHY DUE TO CHEMOTHERAPEUTIC DRUG: ICD-10-CM

## 2021-04-16 DIAGNOSIS — K13.0 CHEILITIS: ICD-10-CM

## 2021-04-16 DIAGNOSIS — C50.911 MALIGNANT NEOPLASM OF RIGHT BREAST IN FEMALE, ESTROGEN RECEPTOR POSITIVE, UNSPECIFIED SITE OF BREAST: ICD-10-CM

## 2021-04-16 DIAGNOSIS — F45.22 BODY IMAGE DISTURBANCE: Primary | ICD-10-CM

## 2021-04-16 DIAGNOSIS — N95.2 VAGINAL ATROPHY: ICD-10-CM

## 2021-04-16 PROCEDURE — 99214 PR OFFICE/OUTPT VISIT, EST, LEVL IV, 30-39 MIN: ICD-10-PCS | Mod: S$PBB,,, | Performed by: OBSTETRICS & GYNECOLOGY

## 2021-04-16 PROCEDURE — 97803 MED NUTRITION INDIV SUBSEQ: CPT | Mod: PBBFAC | Performed by: DIETITIAN, REGISTERED

## 2021-04-16 PROCEDURE — 99999 PR PBB SHADOW E&M-EST. PATIENT-LVL III: ICD-10-PCS | Mod: PBBFAC,,, | Performed by: DERMATOLOGY

## 2021-04-16 PROCEDURE — 99214 OFFICE O/P EST MOD 30 MIN: CPT | Mod: S$PBB,,, | Performed by: OBSTETRICS & GYNECOLOGY

## 2021-04-16 PROCEDURE — 88305 TISSUE EXAM BY PATHOLOGIST: CPT | Mod: 26,,, | Performed by: PATHOLOGY

## 2021-04-16 PROCEDURE — 99213 PR OFFICE/OUTPT VISIT, EST, LEVL III, 20-29 MIN: ICD-10-PCS | Mod: 25,S$PBB,, | Performed by: DERMATOLOGY

## 2021-04-16 PROCEDURE — 99999 PR PBB SHADOW E&M-EST. PATIENT-LVL I: ICD-10-PCS | Mod: PBBFAC,,, | Performed by: DIETITIAN, REGISTERED

## 2021-04-16 PROCEDURE — 99999 PR PBB SHADOW E&M-EST. PATIENT-LVL III: CPT | Mod: PBBFAC,,, | Performed by: DERMATOLOGY

## 2021-04-16 PROCEDURE — 99999 PR PBB SHADOW E&M-EST. PATIENT-LVL V: ICD-10-PCS | Mod: PBBFAC,,, | Performed by: OBSTETRICS & GYNECOLOGY

## 2021-04-16 PROCEDURE — 99213 OFFICE O/P EST LOW 20 MIN: CPT | Mod: PBBFAC,27,PO | Performed by: DERMATOLOGY

## 2021-04-16 PROCEDURE — 99215 OFFICE O/P EST HI 40 MIN: CPT | Mod: PBBFAC,27 | Performed by: OBSTETRICS & GYNECOLOGY

## 2021-04-16 PROCEDURE — 99999 PR PBB SHADOW E&M-EST. PATIENT-LVL V: CPT | Mod: PBBFAC,,, | Performed by: OBSTETRICS & GYNECOLOGY

## 2021-04-16 PROCEDURE — 99999 PR PBB SHADOW E&M-EST. PATIENT-LVL I: CPT | Mod: PBBFAC,,, | Performed by: DIETITIAN, REGISTERED

## 2021-04-16 PROCEDURE — 88305 TISSUE EXAM BY PATHOLOGIST: CPT | Performed by: PATHOLOGY

## 2021-04-16 PROCEDURE — 11102 PR TANGENTIAL BIOPSY, SKIN, SINGLE LESION: ICD-10-PCS | Mod: S$PBB,,, | Performed by: DERMATOLOGY

## 2021-04-16 PROCEDURE — 11102 TANGNTL BX SKIN SINGLE LES: CPT | Mod: S$PBB,,, | Performed by: DERMATOLOGY

## 2021-04-16 PROCEDURE — 11102 TANGNTL BX SKIN SINGLE LES: CPT | Mod: PBBFAC,PO | Performed by: DERMATOLOGY

## 2021-04-16 PROCEDURE — 99211 OFF/OP EST MAY X REQ PHY/QHP: CPT | Mod: PBBFAC,25 | Performed by: DIETITIAN, REGISTERED

## 2021-04-16 PROCEDURE — 99213 OFFICE O/P EST LOW 20 MIN: CPT | Mod: 25,S$PBB,, | Performed by: DERMATOLOGY

## 2021-04-16 PROCEDURE — 88305 TISSUE EXAM BY PATHOLOGIST: ICD-10-PCS | Mod: 26,,, | Performed by: PATHOLOGY

## 2021-04-16 RX ORDER — KETOCONAZOLE 20 MG/G
CREAM TOPICAL
Qty: 30 G | Refills: 0 | Status: SHIPPED | OUTPATIENT
Start: 2021-04-16 | End: 2021-07-29

## 2021-04-16 RX ORDER — PRASTERONE 6.5 MG/1
6.5 INSERT VAGINAL NIGHTLY
Qty: 28 EACH | Refills: 11 | Status: SHIPPED | OUTPATIENT
Start: 2021-04-16 | End: 2022-05-03 | Stop reason: SDUPTHER

## 2021-04-16 RX ORDER — HYDROCORTISONE 25 MG/G
OINTMENT TOPICAL 2 TIMES DAILY
Qty: 20 G | Refills: 0 | Status: SHIPPED | OUTPATIENT
Start: 2021-04-16 | End: 2021-07-29

## 2021-04-19 ENCOUNTER — OFFICE VISIT (OUTPATIENT)
Dept: HEMATOLOGY/ONCOLOGY | Facility: CLINIC | Age: 41
End: 2021-04-19
Payer: MEDICAID

## 2021-04-19 ENCOUNTER — TELEPHONE (OUTPATIENT)
Dept: INFUSION THERAPY | Facility: HOSPITAL | Age: 41
End: 2021-04-19

## 2021-04-19 VITALS
BODY MASS INDEX: 28.64 KG/M2 | DIASTOLIC BLOOD PRESSURE: 88 MMHG | WEIGHT: 167.75 LBS | HEIGHT: 64 IN | SYSTOLIC BLOOD PRESSURE: 134 MMHG | HEART RATE: 67 BPM

## 2021-04-19 DIAGNOSIS — E66.3 OVERWEIGHT (BMI 25.0-29.9): ICD-10-CM

## 2021-04-19 DIAGNOSIS — Z17.0 MALIGNANT NEOPLASM OF RIGHT BREAST IN FEMALE, ESTROGEN RECEPTOR POSITIVE, UNSPECIFIED SITE OF BREAST: Primary | ICD-10-CM

## 2021-04-19 DIAGNOSIS — C50.911 MALIGNANT NEOPLASM OF RIGHT BREAST IN FEMALE, ESTROGEN RECEPTOR POSITIVE, UNSPECIFIED SITE OF BREAST: Primary | ICD-10-CM

## 2021-04-19 PROCEDURE — 99213 OFFICE O/P EST LOW 20 MIN: CPT | Mod: PBBFAC | Performed by: PHYSICIAN ASSISTANT

## 2021-04-19 PROCEDURE — 99999 PR PBB SHADOW E&M-EST. PATIENT-LVL III: ICD-10-PCS | Mod: PBBFAC,,, | Performed by: PHYSICIAN ASSISTANT

## 2021-04-19 PROCEDURE — 99213 OFFICE O/P EST LOW 20 MIN: CPT | Mod: S$PBB,,, | Performed by: PHYSICIAN ASSISTANT

## 2021-04-19 PROCEDURE — 99213 PR OFFICE/OUTPT VISIT, EST, LEVL III, 20-29 MIN: ICD-10-PCS | Mod: S$PBB,,, | Performed by: PHYSICIAN ASSISTANT

## 2021-04-19 PROCEDURE — 99999 PR PBB SHADOW E&M-EST. PATIENT-LVL III: CPT | Mod: PBBFAC,,, | Performed by: PHYSICIAN ASSISTANT

## 2021-04-23 LAB
FINAL PATHOLOGIC DIAGNOSIS: NORMAL
GROSS: NORMAL
Lab: NORMAL
MICROSCOPIC EXAM: NORMAL

## 2021-04-25 ENCOUNTER — HOSPITAL ENCOUNTER (EMERGENCY)
Facility: HOSPITAL | Age: 41
Discharge: HOME | End: 2021-04-25
Attending: EMERGENCY MEDICINE
Payer: MEDICAID

## 2021-04-25 VITALS
RESPIRATION RATE: 14 BRPM | HEART RATE: 66 BPM | OXYGEN SATURATION: 97 % | DIASTOLIC BLOOD PRESSURE: 75 MMHG | SYSTOLIC BLOOD PRESSURE: 112 MMHG | TEMPERATURE: 98 F

## 2021-04-25 DIAGNOSIS — R10.9 ABDOMINAL PAIN, UNSPECIFIED ABDOMINAL LOCATION: ICD-10-CM

## 2021-04-25 DIAGNOSIS — R07.9 CHEST PAIN, UNSPECIFIED TYPE: ICD-10-CM
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
## 2021-04-25 LAB
ALBUMIN SERPL BCP-MCNC: 4.2 G/DL (ref 3.5–5.2)
ALP SERPL-CCNC: 104 U/L (ref 55–135)
ALT SERPL W/O P-5'-P-CCNC: 24 U/L (ref 10–44)
ANION GAP SERPL CALC-SCNC: 12 MMOL/L (ref 8–16)
AST SERPL-CCNC: 23 U/L (ref 10–40)
B-HCG UR QL: NEGATIVE
BASOPHILS # BLD AUTO: 0.05 K/UL (ref 0–0.2)
BASOPHILS NFR BLD: 1 % (ref 0–1.9)
BILIRUB SERPL-MCNC: 0.4 MG/DL (ref 0.1–1)
BUN SERPL-MCNC: 10 MG/DL (ref 6–20)
CALCIUM SERPL-MCNC: 9.1 MG/DL (ref 8.7–10.5)
CHLORIDE SERPL-SCNC: 106 MMOL/L (ref 95–110)
CO2 SERPL-SCNC: 21 MMOL/L (ref 23–29)
CREAT SERPL-MCNC: 0.8 MG/DL (ref 0.5–1.4)
CTP QC/QA: YES
DIFFERENTIAL METHOD: NORMAL
EOSINOPHIL # BLD AUTO: 0.1 K/UL (ref 0–0.5)
EOSINOPHIL NFR BLD: 2.7 % (ref 0–8)
ERYTHROCYTE [DISTWIDTH] IN BLOOD BY AUTOMATED COUNT: 13.8 % (ref 11.5–14.5)
EST. GFR  (AFRICAN AMERICAN): >60 ML/MIN/1.73 M^2
EST. GFR  (NON AFRICAN AMERICAN): >60 ML/MIN/1.73 M^2
GLUCOSE SERPL-MCNC: 134 MG/DL (ref 70–110)
HCT VFR BLD AUTO: 38.7 % (ref 37–48.5)
HGB BLD-MCNC: 13.2 G/DL (ref 12–16)
IMM GRANULOCYTES # BLD AUTO: 0.01 K/UL (ref 0–0.04)
IMM GRANULOCYTES NFR BLD AUTO: 0.2 % (ref 0–0.5)
INR PPP: 0.9 (ref 0.8–1.2)
LYMPHOCYTES # BLD AUTO: 1.4 K/UL (ref 1–4.8)
LYMPHOCYTES NFR BLD: 26.1 % (ref 18–48)
MCH RBC QN AUTO: 29.3 PG (ref 27–31)
MCHC RBC AUTO-ENTMCNC: 34.1 G/DL (ref 32–36)
MCV RBC AUTO: 86 FL (ref 82–98)
MONOCYTES # BLD AUTO: 0.4 K/UL (ref 0.3–1)
MONOCYTES NFR BLD: 7.4 % (ref 4–15)
NEUTROPHILS # BLD AUTO: 3.3 K/UL (ref 1.8–7.7)
NEUTROPHILS NFR BLD: 62.6 % (ref 38–73)
NRBC BLD-RTO: 0 /100 WBC
PLATELET # BLD AUTO: 205 K/UL (ref 150–450)
PMV BLD AUTO: 12.3 FL (ref 9.2–12.9)
POTASSIUM SERPL-SCNC: 3.6 MMOL/L (ref 3.5–5.1)
PROT SERPL-MCNC: 7.3 G/DL (ref 6–8.4)
PROTHROMBIN TIME: 10.4 SEC (ref 9–12.5)
RBC # BLD AUTO: 4.5 M/UL (ref 4–5.4)
SODIUM SERPL-SCNC: 139 MMOL/L (ref 136–145)
WBC # BLD AUTO: 5.24 K/UL (ref 3.9–12.7)

## 2021-04-25 PROCEDURE — 85610 PROTHROMBIN TIME: CPT | Performed by: STUDENT IN AN ORGANIZED HEALTH CARE EDUCATION/TRAINING PROGRAM

## 2021-04-25 PROCEDURE — 25500020 PHARM REV CODE 255: Performed by: EMERGENCY MEDICINE

## 2021-04-25 PROCEDURE — 93010 EKG 12-LEAD: ICD-10-PCS | Mod: ,,, | Performed by: STUDENT IN AN ORGANIZED HEALTH CARE EDUCATION/TRAINING PROGRAM

## 2021-04-25 PROCEDURE — 86803 HEPATITIS C AB TEST: CPT | Performed by: EMERGENCY MEDICINE

## 2021-04-25 PROCEDURE — 85025 COMPLETE CBC W/AUTO DIFF WBC: CPT | Performed by: STUDENT IN AN ORGANIZED HEALTH CARE EDUCATION/TRAINING PROGRAM

## 2021-04-25 PROCEDURE — 81025 URINE PREGNANCY TEST: CPT | Performed by: EMERGENCY MEDICINE

## 2021-04-25 PROCEDURE — 93005 ELECTROCARDIOGRAM TRACING: CPT

## 2021-04-25 PROCEDURE — 99284 PR EMERGENCY DEPT VISIT,LEVEL IV: ICD-10-PCS | Mod: ,,, | Performed by: EMERGENCY MEDICINE

## 2021-04-25 PROCEDURE — 99284 EMERGENCY DEPT VISIT MOD MDM: CPT | Mod: ,,, | Performed by: EMERGENCY MEDICINE

## 2021-04-25 PROCEDURE — 99285 EMERGENCY DEPT VISIT HI MDM: CPT | Mod: 25

## 2021-04-25 PROCEDURE — 25000003 PHARM REV CODE 250: Performed by: STUDENT IN AN ORGANIZED HEALTH CARE EDUCATION/TRAINING PROGRAM

## 2021-04-25 PROCEDURE — 93010 ELECTROCARDIOGRAM REPORT: CPT | Mod: ,,, | Performed by: STUDENT IN AN ORGANIZED HEALTH CARE EDUCATION/TRAINING PROGRAM

## 2021-04-25 PROCEDURE — 80053 COMPREHEN METABOLIC PANEL: CPT | Performed by: STUDENT IN AN ORGANIZED HEALTH CARE EDUCATION/TRAINING PROGRAM

## 2021-04-25 PROCEDURE — 86703 HIV-1/HIV-2 1 RESULT ANTBDY: CPT | Performed by: EMERGENCY MEDICINE

## 2021-04-25 RX ORDER — ACETAMINOPHEN 500 MG
1000 TABLET ORAL
Status: COMPLETED | OUTPATIENT
Start: 2021-04-25 | End: 2021-04-25

## 2021-04-25 RX ORDER — ONDANSETRON 2 MG/ML
4 INJECTION INTRAMUSCULAR; INTRAVENOUS
Status: DISCONTINUED | OUTPATIENT
Start: 2021-04-25 | End: 2021-04-25

## 2021-04-25 RX ORDER — METHOCARBAMOL 500 MG/1
1000 TABLET, FILM COATED ORAL
Status: COMPLETED | OUTPATIENT
Start: 2021-04-25 | End: 2021-04-25

## 2021-04-25 RX ORDER — MORPHINE SULFATE 4 MG/ML
4 INJECTION, SOLUTION INTRAMUSCULAR; INTRAVENOUS
Status: DISCONTINUED | OUTPATIENT
Start: 2021-04-25 | End: 2021-04-25

## 2021-04-25 RX ORDER — NAPROXEN 500 MG/1
500 TABLET ORAL 2 TIMES DAILY WITH MEALS
Qty: 30 TABLET | Refills: 0 | Status: SHIPPED | OUTPATIENT
Start: 2021-04-25 | End: 2021-07-29 | Stop reason: SDUPTHER

## 2021-04-25 RX ADMIN — METHOCARBAMOL 1000 MG: 500 TABLET ORAL at 02:04

## 2021-04-25 RX ADMIN — IOHEXOL 75 ML: 350 INJECTION, SOLUTION INTRAVENOUS at 04:04

## 2021-04-25 RX ADMIN — ACETAMINOPHEN 1000 MG: 500 TABLET, FILM COATED ORAL at 02:04

## 2021-04-26 ENCOUNTER — TELEPHONE (OUTPATIENT)
Dept: EMERGENCY MEDICINE | Facility: HOSPITAL | Age: 41
End: 2021-04-26

## 2021-04-26 LAB
HCV AB SERPL QL IA: NEGATIVE
HIV 1+2 AB+HIV1 P24 AG SERPL QL IA: NEGATIVE

## 2021-04-27 ENCOUNTER — TELEPHONE (OUTPATIENT)
Dept: INFUSION THERAPY | Facility: HOSPITAL | Age: 41
End: 2021-04-27

## 2021-04-27 ENCOUNTER — PATIENT MESSAGE (OUTPATIENT)
Dept: HEMATOLOGY/ONCOLOGY | Facility: CLINIC | Age: 41
End: 2021-04-27

## 2021-04-29 ENCOUNTER — PATIENT MESSAGE (OUTPATIENT)
Dept: DERMATOLOGY | Facility: CLINIC | Age: 41
End: 2021-04-29

## 2021-05-03 ENCOUNTER — PATIENT MESSAGE (OUTPATIENT)
Dept: HEMATOLOGY/ONCOLOGY | Facility: CLINIC | Age: 41
End: 2021-05-03

## 2021-05-03 PROBLEM — E78.2 MIXED HYPERLIPIDEMIA: Status: ACTIVE | Noted: 2021-05-03

## 2021-05-03 PROBLEM — E78.2 MIXED HYPERLIPIDEMIA: Chronic | Status: ACTIVE | Noted: 2021-05-03

## 2021-05-06 ENCOUNTER — PATIENT MESSAGE (OUTPATIENT)
Dept: RESEARCH | Facility: HOSPITAL | Age: 41
End: 2021-05-06

## 2021-05-10 ENCOUNTER — PATIENT MESSAGE (OUTPATIENT)
Dept: RESEARCH | Facility: HOSPITAL | Age: 41
End: 2021-05-10

## 2021-05-26 ENCOUNTER — OFFICE VISIT (OUTPATIENT)
Dept: PSYCHIATRY | Facility: CLINIC | Age: 41
End: 2021-05-26
Payer: MEDICAID

## 2021-05-26 DIAGNOSIS — F45.22 BODY IMAGE DISTURBANCE: ICD-10-CM

## 2021-05-26 DIAGNOSIS — C50.911 MALIGNANT NEOPLASM OF RIGHT FEMALE BREAST: ICD-10-CM

## 2021-05-26 DIAGNOSIS — F43.23 ADJUSTMENT DISORDER WITH MIXED ANXIETY AND DEPRESSED MOOD: Primary | ICD-10-CM

## 2021-05-26 DIAGNOSIS — Z79.811 USE OF AROMATASE INHIBITORS: ICD-10-CM

## 2021-05-26 PROCEDURE — 90791 PSYCH DIAGNOSTIC EVALUATION: CPT | Mod: ,,, | Performed by: PSYCHOLOGIST

## 2021-05-26 PROCEDURE — 99999 PR PBB SHADOW E&M-EST. PATIENT-LVL II: CPT | Mod: PBBFAC,,, | Performed by: PSYCHOLOGIST

## 2021-05-26 PROCEDURE — 99212 OFFICE O/P EST SF 10 MIN: CPT | Mod: PBBFAC | Performed by: PSYCHOLOGIST

## 2021-05-26 PROCEDURE — 99999 PR PBB SHADOW E&M-EST. PATIENT-LVL II: ICD-10-PCS | Mod: PBBFAC,,, | Performed by: PSYCHOLOGIST

## 2021-05-26 PROCEDURE — 90791 PR PSYCHIATRIC DIAGNOSTIC EVALUATION: ICD-10-PCS | Mod: ,,, | Performed by: PSYCHOLOGIST

## 2021-06-30 ENCOUNTER — TELEPHONE (OUTPATIENT)
Dept: INFUSION THERAPY | Facility: HOSPITAL | Age: 41
End: 2021-06-30

## 2021-07-15 ENCOUNTER — OFFICE VISIT (OUTPATIENT)
Dept: HEMATOLOGY/ONCOLOGY | Facility: CLINIC | Age: 41
End: 2021-07-15
Payer: MEDICAID

## 2021-07-15 VITALS
SYSTOLIC BLOOD PRESSURE: 138 MMHG | HEART RATE: 67 BPM | OXYGEN SATURATION: 100 % | RESPIRATION RATE: 18 BRPM | BODY MASS INDEX: 28.49 KG/M2 | DIASTOLIC BLOOD PRESSURE: 84 MMHG | HEIGHT: 64 IN | WEIGHT: 166.88 LBS

## 2021-07-15 DIAGNOSIS — C50.611 CARCINOMA OF AXILLARY TAIL OF RIGHT BREAST IN FEMALE, ESTROGEN RECEPTOR POSITIVE: Primary | ICD-10-CM

## 2021-07-15 DIAGNOSIS — Z79.811 USE OF AROMATASE INHIBITORS: ICD-10-CM

## 2021-07-15 DIAGNOSIS — Z17.0 CARCINOMA OF AXILLARY TAIL OF RIGHT BREAST IN FEMALE, ESTROGEN RECEPTOR POSITIVE: Primary | ICD-10-CM

## 2021-07-15 PROCEDURE — 99214 OFFICE O/P EST MOD 30 MIN: CPT | Mod: PBBFAC | Performed by: INTERNAL MEDICINE

## 2021-07-15 PROCEDURE — 99213 PR OFFICE/OUTPT VISIT, EST, LEVL III, 20-29 MIN: ICD-10-PCS | Mod: S$PBB,,, | Performed by: INTERNAL MEDICINE

## 2021-07-15 PROCEDURE — 99999 PR PBB SHADOW E&M-EST. PATIENT-LVL IV: ICD-10-PCS | Mod: PBBFAC,,, | Performed by: INTERNAL MEDICINE

## 2021-07-15 PROCEDURE — 99213 OFFICE O/P EST LOW 20 MIN: CPT | Mod: S$PBB,,, | Performed by: INTERNAL MEDICINE

## 2021-07-15 PROCEDURE — 99999 PR PBB SHADOW E&M-EST. PATIENT-LVL IV: CPT | Mod: PBBFAC,,, | Performed by: INTERNAL MEDICINE

## 2021-07-29 ENCOUNTER — OFFICE VISIT (OUTPATIENT)
Dept: OBSTETRICS AND GYNECOLOGY | Facility: CLINIC | Age: 41
End: 2021-07-29
Payer: MEDICAID

## 2021-07-29 VITALS
BODY MASS INDEX: 28.17 KG/M2 | HEART RATE: 73 BPM | HEIGHT: 64 IN | SYSTOLIC BLOOD PRESSURE: 122 MMHG | WEIGHT: 165 LBS | DIASTOLIC BLOOD PRESSURE: 67 MMHG

## 2021-07-29 DIAGNOSIS — Z01.419 ENCOUNTER FOR GYNECOLOGICAL EXAMINATION WITHOUT ABNORMAL FINDING: Primary | ICD-10-CM

## 2021-07-29 PROCEDURE — 99396 PREV VISIT EST AGE 40-64: CPT | Mod: S$PBB,,, | Performed by: OBSTETRICS & GYNECOLOGY

## 2021-07-29 PROCEDURE — 99999 PR PBB SHADOW E&M-EST. PATIENT-LVL III: CPT | Mod: PBBFAC,,, | Performed by: OBSTETRICS & GYNECOLOGY

## 2021-07-29 PROCEDURE — 99999 PR PBB SHADOW E&M-EST. PATIENT-LVL III: ICD-10-PCS | Mod: PBBFAC,,, | Performed by: OBSTETRICS & GYNECOLOGY

## 2021-07-29 PROCEDURE — 99396 PR PREVENTIVE VISIT,EST,40-64: ICD-10-PCS | Mod: S$PBB,,, | Performed by: OBSTETRICS & GYNECOLOGY

## 2021-07-29 PROCEDURE — 99213 OFFICE O/P EST LOW 20 MIN: CPT | Mod: PBBFAC | Performed by: OBSTETRICS & GYNECOLOGY

## 2021-08-03 ENCOUNTER — OFFICE VISIT (OUTPATIENT)
Dept: PSYCHIATRY | Facility: CLINIC | Age: 41
End: 2021-08-03
Payer: MEDICAID

## 2021-08-03 ENCOUNTER — PATIENT MESSAGE (OUTPATIENT)
Dept: HEMATOLOGY/ONCOLOGY | Facility: CLINIC | Age: 41
End: 2021-08-03

## 2021-08-03 DIAGNOSIS — F43.23 ADJUSTMENT DISORDER WITH MIXED ANXIETY AND DEPRESSED MOOD: Primary | ICD-10-CM

## 2021-08-03 DIAGNOSIS — F45.22 BODY IMAGE DISTURBANCE: ICD-10-CM

## 2021-08-03 DIAGNOSIS — Z17.0 MALIGNANT NEOPLASM OF RIGHT BREAST IN FEMALE, ESTROGEN RECEPTOR POSITIVE, UNSPECIFIED SITE OF BREAST: ICD-10-CM

## 2021-08-03 DIAGNOSIS — Z79.811 USE OF AROMATASE INHIBITORS: ICD-10-CM

## 2021-08-03 DIAGNOSIS — C50.911 MALIGNANT NEOPLASM OF RIGHT BREAST IN FEMALE, ESTROGEN RECEPTOR POSITIVE, UNSPECIFIED SITE OF BREAST: ICD-10-CM

## 2021-08-03 PROCEDURE — 99999 PR PBB SHADOW E&M-EST. PATIENT-LVL I: CPT | Mod: PBBFAC,,, | Performed by: PSYCHOLOGIST

## 2021-08-03 PROCEDURE — 90834 PR PSYCHOTHERAPY W/PATIENT, 45 MIN: ICD-10-PCS | Mod: ,,, | Performed by: PSYCHOLOGIST

## 2021-08-03 PROCEDURE — 90834 PSYTX W PT 45 MINUTES: CPT | Mod: ,,, | Performed by: PSYCHOLOGIST

## 2021-08-03 PROCEDURE — 99999 PR PBB SHADOW E&M-EST. PATIENT-LVL I: ICD-10-PCS | Mod: PBBFAC,,, | Performed by: PSYCHOLOGIST

## 2021-08-03 PROCEDURE — 99211 OFF/OP EST MAY X REQ PHY/QHP: CPT | Mod: PBBFAC | Performed by: PSYCHOLOGIST

## 2021-08-06 ENCOUNTER — DOCUMENTATION ONLY (OUTPATIENT)
Dept: ONCOLOGY | Facility: HOSPITAL | Age: 41
End: 2021-08-06

## 2021-08-06 ENCOUNTER — OFFICE VISIT (OUTPATIENT)
Dept: CARDIOLOGY | Facility: CLINIC | Age: 41
End: 2021-08-06
Attending: OBSTETRICS & GYNECOLOGY
Payer: MEDICAID

## 2021-08-06 VITALS
HEART RATE: 75 BPM | BODY MASS INDEX: 28.88 KG/M2 | HEIGHT: 64 IN | DIASTOLIC BLOOD PRESSURE: 80 MMHG | SYSTOLIC BLOOD PRESSURE: 108 MMHG | WEIGHT: 169.19 LBS

## 2021-08-06 DIAGNOSIS — Z92.3 S/P RADIATION > 12 WEEKS: ICD-10-CM

## 2021-08-06 DIAGNOSIS — Z92.21 S/P CHEMOTHERAPY, TIME SINCE GREATER THAN 12 WEEKS: ICD-10-CM

## 2021-08-06 DIAGNOSIS — Z82.49 FAMILY HISTORY OF PREMATURE CAD: Primary | ICD-10-CM

## 2021-08-06 DIAGNOSIS — Z82.49 FAMILY HISTORY OF HEART DISEASE: ICD-10-CM

## 2021-08-06 DIAGNOSIS — C50.911 MALIGNANT NEOPLASM OF RIGHT FEMALE BREAST, UNSPECIFIED ESTROGEN RECEPTOR STATUS, UNSPECIFIED SITE OF BREAST: ICD-10-CM

## 2021-08-06 PROCEDURE — 99204 OFFICE O/P NEW MOD 45 MIN: CPT | Mod: S$GLB,,, | Performed by: INTERNAL MEDICINE

## 2021-08-06 PROCEDURE — 99204 PR OFFICE/OUTPT VISIT, NEW, LEVL IV, 45-59 MIN: ICD-10-PCS | Mod: S$GLB,,, | Performed by: INTERNAL MEDICINE

## 2021-08-06 RX ORDER — ROSUVASTATIN CALCIUM 20 MG/1
20 TABLET, COATED ORAL DAILY
Qty: 90 TABLET | Refills: 3 | Status: SHIPPED | OUTPATIENT
Start: 2021-08-06 | End: 2022-09-01

## 2021-08-06 RX ORDER — TIZANIDINE 4 MG/1
4 TABLET ORAL
COMMUNITY
Start: 2021-05-24 | End: 2022-08-08

## 2021-08-11 ENCOUNTER — OFFICE VISIT (OUTPATIENT)
Dept: PLASTIC SURGERY | Facility: CLINIC | Age: 41
End: 2021-08-11
Payer: MEDICAID

## 2021-08-11 VITALS — DIASTOLIC BLOOD PRESSURE: 84 MMHG | HEART RATE: 69 BPM | SYSTOLIC BLOOD PRESSURE: 137 MMHG

## 2021-08-11 DIAGNOSIS — Z17.0 CARCINOMA OF AXILLARY TAIL OF RIGHT BREAST IN FEMALE, ESTROGEN RECEPTOR POSITIVE: Primary | ICD-10-CM

## 2021-08-11 DIAGNOSIS — C50.611 CARCINOMA OF AXILLARY TAIL OF RIGHT BREAST IN FEMALE, ESTROGEN RECEPTOR POSITIVE: Primary | ICD-10-CM

## 2021-08-11 PROCEDURE — 99999 PR PBB SHADOW E&M-EST. PATIENT-LVL III: ICD-10-PCS | Mod: PBBFAC,,, | Performed by: SURGERY

## 2021-08-11 PROCEDURE — 99213 OFFICE O/P EST LOW 20 MIN: CPT | Mod: PBBFAC | Performed by: SURGERY

## 2021-08-11 PROCEDURE — 99203 PR OFFICE/OUTPT VISIT, NEW, LEVL III, 30-44 MIN: ICD-10-PCS | Mod: S$PBB,,, | Performed by: SURGERY

## 2021-08-11 PROCEDURE — 99203 OFFICE O/P NEW LOW 30 MIN: CPT | Mod: S$PBB,,, | Performed by: SURGERY

## 2021-08-11 PROCEDURE — 99999 PR PBB SHADOW E&M-EST. PATIENT-LVL III: CPT | Mod: PBBFAC,,, | Performed by: SURGERY

## 2021-09-16 ENCOUNTER — TELEPHONE (OUTPATIENT)
Dept: INFUSION THERAPY | Facility: HOSPITAL | Age: 41
End: 2021-09-16

## 2021-09-20 ENCOUNTER — TELEPHONE (OUTPATIENT)
Dept: INFUSION THERAPY | Facility: HOSPITAL | Age: 41
End: 2021-09-20

## 2021-10-27 ENCOUNTER — TELEPHONE (OUTPATIENT)
Dept: CARDIOLOGY | Facility: CLINIC | Age: 41
End: 2021-10-27
Payer: MEDICAID

## 2021-10-29 ENCOUNTER — PATIENT MESSAGE (OUTPATIENT)
Dept: PLASTIC SURGERY | Facility: CLINIC | Age: 41
End: 2021-10-29
Payer: MEDICAID

## 2021-11-01 ENCOUNTER — TELEPHONE (OUTPATIENT)
Dept: PLASTIC SURGERY | Facility: CLINIC | Age: 41
End: 2021-11-01
Payer: MEDICAID

## 2021-11-02 DIAGNOSIS — Z85.3 HISTORY OF BREAST CANCER: Primary | ICD-10-CM

## 2021-11-05 ENCOUNTER — TELEPHONE (OUTPATIENT)
Dept: PLASTIC SURGERY | Facility: CLINIC | Age: 41
End: 2021-11-05
Payer: MEDICAID

## 2021-11-09 DIAGNOSIS — Z85.3 HISTORY OF BREAST CANCER: Primary | ICD-10-CM

## 2021-11-24 ENCOUNTER — OFFICE VISIT (OUTPATIENT)
Dept: HEMATOLOGY/ONCOLOGY | Facility: CLINIC | Age: 41
End: 2021-11-24
Payer: MEDICAID

## 2021-11-24 VITALS
SYSTOLIC BLOOD PRESSURE: 145 MMHG | WEIGHT: 165.81 LBS | HEART RATE: 78 BPM | BODY MASS INDEX: 28.31 KG/M2 | TEMPERATURE: 98 F | DIASTOLIC BLOOD PRESSURE: 75 MMHG | OXYGEN SATURATION: 98 % | HEIGHT: 64 IN | RESPIRATION RATE: 18 BRPM

## 2021-11-24 DIAGNOSIS — C50.611 CARCINOMA OF AXILLARY TAIL OF RIGHT BREAST IN FEMALE, ESTROGEN RECEPTOR POSITIVE: Primary | ICD-10-CM

## 2021-11-24 DIAGNOSIS — Z17.0 CARCINOMA OF AXILLARY TAIL OF RIGHT BREAST IN FEMALE, ESTROGEN RECEPTOR POSITIVE: Primary | ICD-10-CM

## 2021-11-24 DIAGNOSIS — Z79.811 USE OF AROMATASE INHIBITORS: ICD-10-CM

## 2021-11-24 PROCEDURE — 99214 OFFICE O/P EST MOD 30 MIN: CPT | Mod: PBBFAC | Performed by: INTERNAL MEDICINE

## 2021-11-24 PROCEDURE — 99999 PR PBB SHADOW E&M-EST. PATIENT-LVL IV: ICD-10-PCS | Mod: PBBFAC,,, | Performed by: INTERNAL MEDICINE

## 2021-11-24 PROCEDURE — 99213 PR OFFICE/OUTPT VISIT, EST, LEVL III, 20-29 MIN: ICD-10-PCS | Mod: S$PBB,,, | Performed by: INTERNAL MEDICINE

## 2021-11-24 PROCEDURE — 99213 OFFICE O/P EST LOW 20 MIN: CPT | Mod: S$PBB,,, | Performed by: INTERNAL MEDICINE

## 2021-11-24 PROCEDURE — 99999 PR PBB SHADOW E&M-EST. PATIENT-LVL IV: CPT | Mod: PBBFAC,,, | Performed by: INTERNAL MEDICINE

## 2021-12-30 ENCOUNTER — PATIENT MESSAGE (OUTPATIENT)
Dept: PLASTIC SURGERY | Facility: CLINIC | Age: 41
End: 2021-12-30
Payer: MEDICAID

## 2022-01-08 ENCOUNTER — LAB VISIT (OUTPATIENT)
Dept: FAMILY MEDICINE | Facility: CLINIC | Age: 42
End: 2022-01-08
Payer: MEDICAID

## 2022-01-08 DIAGNOSIS — Z85.3 HISTORY OF BREAST CANCER: ICD-10-CM

## 2022-01-08 LAB — SARS-COV-2 RNA RESP QL NAA+PROBE: NOT DETECTED

## 2022-01-08 PROCEDURE — U0003 INFECTIOUS AGENT DETECTION BY NUCLEIC ACID (DNA OR RNA); SEVERE ACUTE RESPIRATORY SYNDROME CORONAVIRUS 2 (SARS-COV-2) (CORONAVIRUS DISEASE [COVID-19]), AMPLIFIED PROBE TECHNIQUE, MAKING USE OF HIGH THROUGHPUT TECHNOLOGIES AS DESCRIBED BY CMS-2020-01-R: HCPCS | Performed by: SURGERY

## 2022-01-08 PROCEDURE — U0005 INFEC AGEN DETEC AMPLI PROBE: HCPCS | Performed by: SURGERY

## 2022-01-10 ENCOUNTER — ANESTHESIA EVENT (OUTPATIENT)
Dept: SURGERY | Facility: HOSPITAL | Age: 42
End: 2022-01-10
Payer: MEDICAID

## 2022-01-10 ENCOUNTER — TELEPHONE (OUTPATIENT)
Dept: PLASTIC SURGERY | Facility: CLINIC | Age: 42
End: 2022-01-10
Payer: MEDICAID

## 2022-01-10 NOTE — PRE-PROCEDURE INSTRUCTIONS
Preop instructions(bathing/wear loose fitting clothing/fasting/directions/location of surgery/ and preop medication instructions reviewed with patient). Clear liquids are allowed up to 2 hours before procedure.Clear liquids are:water,apple juice, jello, gatorade & powerade. Patient instructed to hold/stop all blood thinning medications, prior to surgery, following the pre-surgery recommended guidelines. Instructed to follow the surgeon's instructions if they differ from these.    Patient verbalized understanding.    Denies any  history of side effects or issues with anesthesia or sedation.    Patient advised of the updated visitor policy.  Patient aware of the need to have someone drive them home following same -day surgery.      Patient does not know arrival time.Explained that this information comes from the surgeon's office and if they haven't heard from them by 2 or 3 pm to call the office.Patient stated an understanding.

## 2022-01-10 NOTE — TELEPHONE ENCOUNTER
Called pt to confirm arrival time of 530am, pt told to arrive to [facility]. Pt reminded to not eat or drink after midnight. Pt verbalized understanding.

## 2022-01-11 ENCOUNTER — ANESTHESIA (OUTPATIENT)
Dept: SURGERY | Facility: HOSPITAL | Age: 42
End: 2022-01-11
Payer: MEDICAID

## 2022-01-11 ENCOUNTER — HOSPITAL ENCOUNTER (OUTPATIENT)
Facility: HOSPITAL | Age: 42
Discharge: HOME | End: 2022-01-13
Attending: SURGERY | Admitting: SURGERY
Payer: MEDICAID

## 2022-01-11 DIAGNOSIS — Z85.3 HISTORY OF BREAST CANCER: ICD-10-CM

## 2022-01-11 DIAGNOSIS — N64.89 BREAST ASYMMETRY: Primary | ICD-10-CM

## 2022-01-11 LAB — POCT GLUCOSE: 134 MG/DL (ref 70–110)

## 2022-01-11 PROCEDURE — 88305 TISSUE EXAM BY PATHOLOGIST: CPT | Mod: 26,,, | Performed by: PATHOLOGY

## 2022-01-11 PROCEDURE — 71000015 HC POSTOP RECOV 1ST HR: Performed by: SURGERY

## 2022-01-11 PROCEDURE — 64461 BILATERAL ESP SINGLE SHOT: ICD-10-PCS | Mod: 59,50,, | Performed by: ANESTHESIOLOGY

## 2022-01-11 PROCEDURE — 37000009 HC ANESTHESIA EA ADD 15 MINS: Performed by: SURGERY

## 2022-01-11 PROCEDURE — 25000003 PHARM REV CODE 250: Performed by: ANESTHESIOLOGY

## 2022-01-11 PROCEDURE — 63600175 PHARM REV CODE 636 W HCPCS: Performed by: STUDENT IN AN ORGANIZED HEALTH CARE EDUCATION/TRAINING PROGRAM

## 2022-01-11 PROCEDURE — 14302 TIS TRNFR ADDL 30 SQ CM: CPT | Mod: ,,, | Performed by: SURGERY

## 2022-01-11 PROCEDURE — 76942 ECHO GUIDE FOR BIOPSY: CPT | Mod: 59 | Performed by: STUDENT IN AN ORGANIZED HEALTH CARE EDUCATION/TRAINING PROGRAM

## 2022-01-11 PROCEDURE — 19318 PR REDUCTION OF LARGE BREAST: ICD-10-PCS | Mod: 51,LT,, | Performed by: SURGERY

## 2022-01-11 PROCEDURE — 99900035 HC TECH TIME PER 15 MIN (STAT)

## 2022-01-11 PROCEDURE — 25000003 PHARM REV CODE 250: Performed by: STUDENT IN AN ORGANIZED HEALTH CARE EDUCATION/TRAINING PROGRAM

## 2022-01-11 PROCEDURE — 64461 PVB THORACIC SINGLE INJ SITE: CPT | Mod: 59,50 | Performed by: STUDENT IN AN ORGANIZED HEALTH CARE EDUCATION/TRAINING PROGRAM

## 2022-01-11 PROCEDURE — 82962 GLUCOSE BLOOD TEST: CPT | Performed by: SURGERY

## 2022-01-11 PROCEDURE — 14301 TIS TRNFR ANY 30.1-60 SQ CM: CPT | Mod: 51,,, | Performed by: SURGERY

## 2022-01-11 PROCEDURE — D9220A PRA ANESTHESIA: Mod: ,,, | Performed by: STUDENT IN AN ORGANIZED HEALTH CARE EDUCATION/TRAINING PROGRAM

## 2022-01-11 PROCEDURE — 63600175 PHARM REV CODE 636 W HCPCS: Performed by: SURGERY

## 2022-01-11 PROCEDURE — 27201423 OPTIME MED/SURG SUP & DEVICES STERILE SUPPLY: Performed by: SURGERY

## 2022-01-11 PROCEDURE — 37000008 HC ANESTHESIA 1ST 15 MINUTES: Performed by: SURGERY

## 2022-01-11 PROCEDURE — 14302 PR ADJ TISS XFER ANY AREA,EA ADD 30.0 SQCM: ICD-10-PCS | Mod: ,,, | Performed by: SURGERY

## 2022-01-11 PROCEDURE — 00402 ANES INTEG SYS RCNSTV BREAST: CPT | Performed by: SURGERY

## 2022-01-11 PROCEDURE — 36000706: Performed by: SURGERY

## 2022-01-11 PROCEDURE — 71000033 HC RECOVERY, INTIAL HOUR: Performed by: SURGERY

## 2022-01-11 PROCEDURE — 88305 TISSUE EXAM BY PATHOLOGIST: CPT | Mod: 59 | Performed by: PATHOLOGY

## 2022-01-11 PROCEDURE — 19364 BRST RCNSTJ FREE FLAP: CPT | Mod: RT,,, | Performed by: SURGERY

## 2022-01-11 PROCEDURE — 14301 PR ADJ TISS XFER ANY AREA,30.1-60 SQCM: ICD-10-PCS | Mod: 51,,, | Performed by: SURGERY

## 2022-01-11 PROCEDURE — 36000707: Performed by: SURGERY

## 2022-01-11 PROCEDURE — 19318 BREAST REDUCTION: CPT | Mod: 51,LT,, | Performed by: SURGERY

## 2022-01-11 PROCEDURE — 88305 TISSUE EXAM BY PATHOLOGIST: ICD-10-PCS | Mod: 26,,, | Performed by: PATHOLOGY

## 2022-01-11 PROCEDURE — 94761 N-INVAS EAR/PLS OXIMETRY MLT: CPT

## 2022-01-11 PROCEDURE — C1729 CATH, DRAINAGE: HCPCS | Performed by: SURGERY

## 2022-01-11 PROCEDURE — 19364 PR BREAST RECONSTRUC W FREE FLAP: ICD-10-PCS | Mod: RT,,, | Performed by: SURGERY

## 2022-01-11 PROCEDURE — 64461 PVB THORACIC SINGLE INJ SITE: CPT | Mod: 59,50,, | Performed by: ANESTHESIOLOGY

## 2022-01-11 PROCEDURE — 25000003 PHARM REV CODE 250: Performed by: SURGERY

## 2022-01-11 PROCEDURE — D9220A PRA ANESTHESIA: ICD-10-PCS | Mod: ,,, | Performed by: STUDENT IN AN ORGANIZED HEALTH CARE EDUCATION/TRAINING PROGRAM

## 2022-01-11 RX ORDER — TIZANIDINE 4 MG/1
4 TABLET ORAL
Status: DISCONTINUED | OUTPATIENT
Start: 2022-01-11 | End: 2022-01-13 | Stop reason: HOSPADM

## 2022-01-11 RX ORDER — ONDANSETRON 2 MG/ML
4 INJECTION INTRAMUSCULAR; INTRAVENOUS EVERY 12 HOURS PRN
Status: DISCONTINUED | OUTPATIENT
Start: 2022-01-11 | End: 2022-01-13 | Stop reason: HOSPADM

## 2022-01-11 RX ORDER — LEVOTHYROXINE SODIUM 75 UG/1
75 TABLET ORAL
Status: DISCONTINUED | OUTPATIENT
Start: 2022-01-12 | End: 2022-01-13 | Stop reason: HOSPADM

## 2022-01-11 RX ORDER — FENTANYL CITRATE 50 UG/ML
INJECTION, SOLUTION INTRAMUSCULAR; INTRAVENOUS
Status: DISCONTINUED | OUTPATIENT
Start: 2022-01-11 | End: 2022-01-11

## 2022-01-11 RX ORDER — BACITRACIN ZINC 500 [USP'U]/G
OINTMENT TOPICAL
Status: DISCONTINUED | OUTPATIENT
Start: 2022-01-11 | End: 2022-01-11 | Stop reason: HOSPADM

## 2022-01-11 RX ORDER — SODIUM CHLORIDE 0.9 % (FLUSH) 0.9 %
10 SYRINGE (ML) INJECTION
Status: DISCONTINUED | OUTPATIENT
Start: 2022-01-11 | End: 2022-01-13 | Stop reason: HOSPADM

## 2022-01-11 RX ORDER — LIDOCAINE HYDROCHLORIDE 20 MG/ML
INJECTION, SOLUTION EPIDURAL; INFILTRATION; INTRACAUDAL; PERINEURAL
Status: DISCONTINUED | OUTPATIENT
Start: 2022-01-11 | End: 2022-01-11

## 2022-01-11 RX ORDER — SCOLOPAMINE TRANSDERMAL SYSTEM 1 MG/1
1 PATCH, EXTENDED RELEASE TRANSDERMAL
Status: DISCONTINUED | OUTPATIENT
Start: 2022-01-11 | End: 2022-01-13 | Stop reason: HOSPADM

## 2022-01-11 RX ORDER — NEOSTIGMINE METHYLSULFATE 0.5 MG/ML
INJECTION, SOLUTION INTRAVENOUS
Status: DISCONTINUED | OUTPATIENT
Start: 2022-01-11 | End: 2022-01-11

## 2022-01-11 RX ORDER — SODIUM CHLORIDE 9 MG/ML
INJECTION, SOLUTION INTRAVENOUS CONTINUOUS
Status: DISCONTINUED | OUTPATIENT
Start: 2022-01-11 | End: 2022-01-12

## 2022-01-11 RX ORDER — LIDOCAINE HYDROCHLORIDE AND EPINEPHRINE 10; 10 MG/ML; UG/ML
INJECTION, SOLUTION INFILTRATION; PERINEURAL
Status: DISCONTINUED | OUTPATIENT
Start: 2022-01-11 | End: 2022-01-11 | Stop reason: HOSPADM

## 2022-01-11 RX ORDER — ATORVASTATIN CALCIUM 20 MG/1
40 TABLET, FILM COATED ORAL DAILY
Status: DISCONTINUED | OUTPATIENT
Start: 2022-01-11 | End: 2022-01-13 | Stop reason: HOSPADM

## 2022-01-11 RX ORDER — PROPOFOL 10 MG/ML
VIAL (ML) INTRAVENOUS
Status: DISCONTINUED | OUTPATIENT
Start: 2022-01-11 | End: 2022-01-11

## 2022-01-11 RX ORDER — MORPHINE SULFATE 2 MG/ML
3 INJECTION, SOLUTION INTRAMUSCULAR; INTRAVENOUS
Status: DISCONTINUED | OUTPATIENT
Start: 2022-01-11 | End: 2022-01-13 | Stop reason: HOSPADM

## 2022-01-11 RX ORDER — PHENYLEPHRINE HCL IN 0.9% NACL 1 MG/10 ML
SYRINGE (ML) INTRAVENOUS
Status: DISCONTINUED | OUTPATIENT
Start: 2022-01-11 | End: 2022-01-11

## 2022-01-11 RX ORDER — KETAMINE HCL IN 0.9 % NACL 50 MG/5 ML
SYRINGE (ML) INTRAVENOUS
Status: DISCONTINUED | OUTPATIENT
Start: 2022-01-11 | End: 2022-01-11

## 2022-01-11 RX ORDER — BUPIVACAINE HYDROCHLORIDE 7.5 MG/ML
INJECTION, SOLUTION EPIDURAL; RETROBULBAR
Status: COMPLETED | OUTPATIENT
Start: 2022-01-11 | End: 2022-01-11

## 2022-01-11 RX ORDER — MUPIROCIN 20 MG/G
OINTMENT TOPICAL 2 TIMES DAILY
Status: DISCONTINUED | OUTPATIENT
Start: 2022-01-11 | End: 2022-01-13 | Stop reason: HOSPADM

## 2022-01-11 RX ORDER — ONDANSETRON 2 MG/ML
INJECTION INTRAMUSCULAR; INTRAVENOUS
Status: DISCONTINUED | OUTPATIENT
Start: 2022-01-11 | End: 2022-01-11

## 2022-01-11 RX ORDER — METOCLOPRAMIDE HYDROCHLORIDE 5 MG/ML
5 INJECTION INTRAMUSCULAR; INTRAVENOUS EVERY 6 HOURS PRN
Status: DISCONTINUED | OUTPATIENT
Start: 2022-01-11 | End: 2022-01-13 | Stop reason: HOSPADM

## 2022-01-11 RX ORDER — CALCIUM CARBONATE 200(500)MG
1 TABLET,CHEWABLE ORAL DAILY PRN
Status: DISCONTINUED | OUTPATIENT
Start: 2022-01-11 | End: 2022-01-13 | Stop reason: HOSPADM

## 2022-01-11 RX ORDER — FENTANYL CITRATE 50 UG/ML
25 INJECTION, SOLUTION INTRAMUSCULAR; INTRAVENOUS EVERY 5 MIN PRN
Status: DISCONTINUED | OUTPATIENT
Start: 2022-01-11 | End: 2022-01-11 | Stop reason: HOSPADM

## 2022-01-11 RX ORDER — CEFAZOLIN SODIUM 1 G/50ML
SOLUTION INTRAVENOUS
Status: DISCONTINUED | OUTPATIENT
Start: 2022-01-11 | End: 2022-01-11

## 2022-01-11 RX ORDER — FENTANYL CITRATE 50 UG/ML
25-200 INJECTION, SOLUTION INTRAMUSCULAR; INTRAVENOUS
Status: DISCONTINUED | OUTPATIENT
Start: 2022-01-11 | End: 2022-01-11

## 2022-01-11 RX ORDER — ENOXAPARIN SODIUM 100 MG/ML
40 INJECTION SUBCUTANEOUS EVERY 24 HOURS
Status: DISCONTINUED | OUTPATIENT
Start: 2022-01-11 | End: 2022-01-13 | Stop reason: HOSPADM

## 2022-01-11 RX ORDER — ONDANSETRON 2 MG/ML
4 INJECTION INTRAMUSCULAR; INTRAVENOUS DAILY PRN
Status: DISCONTINUED | OUTPATIENT
Start: 2022-01-11 | End: 2022-01-11 | Stop reason: HOSPADM

## 2022-01-11 RX ORDER — HYDROCODONE BITARTRATE AND ACETAMINOPHEN 5; 325 MG/1; MG/1
1 TABLET ORAL EVERY 4 HOURS PRN
Status: DISCONTINUED | OUTPATIENT
Start: 2022-01-11 | End: 2022-01-13 | Stop reason: HOSPADM

## 2022-01-11 RX ORDER — ROCURONIUM BROMIDE 10 MG/ML
INJECTION, SOLUTION INTRAVENOUS
Status: DISCONTINUED | OUTPATIENT
Start: 2022-01-11 | End: 2022-01-11

## 2022-01-11 RX ORDER — CEPHALEXIN 500 MG/1
500 CAPSULE ORAL EVERY 6 HOURS
Status: DISCONTINUED | OUTPATIENT
Start: 2022-01-11 | End: 2022-01-13 | Stop reason: HOSPADM

## 2022-01-11 RX ORDER — MIDAZOLAM HYDROCHLORIDE 1 MG/ML
.5-4 INJECTION INTRAMUSCULAR; INTRAVENOUS
Status: DISCONTINUED | OUTPATIENT
Start: 2022-01-11 | End: 2022-01-11

## 2022-01-11 RX ADMIN — SODIUM CHLORIDE: 0.9 INJECTION, SOLUTION INTRAVENOUS at 01:01

## 2022-01-11 RX ADMIN — Medication 100 MCG: at 11:01

## 2022-01-11 RX ADMIN — NEOSTIGMINE METHYLSULFATE 3 MG: 0.5 INJECTION INTRAVENOUS at 12:01

## 2022-01-11 RX ADMIN — ROCURONIUM BROMIDE 50 MG: 10 INJECTION, SOLUTION INTRAVENOUS at 08:01

## 2022-01-11 RX ADMIN — SODIUM CHLORIDE: 9 INJECTION, SOLUTION INTRAVENOUS at 08:01

## 2022-01-11 RX ADMIN — ROCURONIUM BROMIDE 20 MG: 10 INJECTION, SOLUTION INTRAVENOUS at 10:01

## 2022-01-11 RX ADMIN — MIDAZOLAM 2 MG: 1 INJECTION INTRAMUSCULAR; INTRAVENOUS at 07:01

## 2022-01-11 RX ADMIN — GLYCOPYRROLATE 0.6 MG: 0.2 INJECTION INTRAMUSCULAR; INTRAVENOUS at 12:01

## 2022-01-11 RX ADMIN — MORPHINE SULFATE 3 MG: 2 INJECTION, SOLUTION INTRAMUSCULAR; INTRAVENOUS at 03:01

## 2022-01-11 RX ADMIN — Medication 25 MG: at 08:01

## 2022-01-11 RX ADMIN — HYDROCODONE BITARTRATE AND ACETAMINOPHEN 1 TABLET: 5; 325 TABLET ORAL at 06:01

## 2022-01-11 RX ADMIN — HYDROCODONE BITARTRATE AND ACETAMINOPHEN 1 TABLET: 5; 325 TABLET ORAL at 01:01

## 2022-01-11 RX ADMIN — CEPHALEXIN 500 MG: 500 CAPSULE ORAL at 11:01

## 2022-01-11 RX ADMIN — SCOPALAMINE 1 PATCH: 1 PATCH, EXTENDED RELEASE TRANSDERMAL at 07:01

## 2022-01-11 RX ADMIN — FENTANYL CITRATE 75 MCG: 50 INJECTION INTRAMUSCULAR; INTRAVENOUS at 08:01

## 2022-01-11 RX ADMIN — ENOXAPARIN SODIUM 40 MG: 100 INJECTION SUBCUTANEOUS at 06:01

## 2022-01-11 RX ADMIN — ROCURONIUM BROMIDE 10 MG: 10 INJECTION, SOLUTION INTRAVENOUS at 11:01

## 2022-01-11 RX ADMIN — FENTANYL CITRATE 125 MCG: 50 INJECTION INTRAMUSCULAR; INTRAVENOUS at 08:01

## 2022-01-11 RX ADMIN — FENTANYL CITRATE 25 MCG: 50 INJECTION INTRAMUSCULAR; INTRAVENOUS at 01:01

## 2022-01-11 RX ADMIN — BUPIVACAINE HYDROCHLORIDE 30 ML: 7.5 INJECTION, SOLUTION EPIDURAL; RETROBULBAR at 07:01

## 2022-01-11 RX ADMIN — CEFAZOLIN SODIUM 2 G: 1 SOLUTION INTRAVENOUS at 12:01

## 2022-01-11 RX ADMIN — ONDANSETRON 4 MG: 2 INJECTION INTRAMUSCULAR; INTRAVENOUS at 08:01

## 2022-01-11 RX ADMIN — PROPOFOL 150 MG: 10 INJECTION, EMULSION INTRAVENOUS at 08:01

## 2022-01-11 RX ADMIN — MUPIROCIN: 20 OINTMENT TOPICAL at 11:01

## 2022-01-11 RX ADMIN — HYDROCODONE BITARTRATE AND ACETAMINOPHEN 1 TABLET: 5; 325 TABLET ORAL at 11:01

## 2022-01-11 RX ADMIN — CEFAZOLIN SODIUM 2 G: 1 SOLUTION INTRAVENOUS at 08:01

## 2022-01-11 RX ADMIN — LIDOCAINE HYDROCHLORIDE 100 MG: 20 INJECTION, SOLUTION EPIDURAL; INFILTRATION; INTRACAUDAL at 08:01

## 2022-01-11 RX ADMIN — ATORVASTATIN CALCIUM 40 MG: 20 TABLET, FILM COATED ORAL at 01:01

## 2022-01-11 RX ADMIN — CEPHALEXIN 500 MG: 500 CAPSULE ORAL at 06:01

## 2022-01-11 RX ADMIN — Medication 100 MCG: at 09:01

## 2022-01-11 NOTE — HPI
Patient is a 40 y.o. F presented s/p right breast cancer with lumpectomy in 2018 with adjuvant chemo and radiation and anastrozole therapy. She presents today for reconstructive options given her right breast lateral lumpectomy defect. She reports no pain, fever, chills, wound breakdown. She denies smoking history. She was informed of the possible reconstructive options including right sided TDAP, lat flap, with or without contralateral reduction mammoplasty.         Interval History(1/11/21): No major changes since last seen in clinic. No complaints this am. Here to proceed with lat rigth sided reconstruction and left side reduction mammoplasty.

## 2022-01-11 NOTE — ANESTHESIA PROCEDURE NOTES
Bilateral BETI Single shot     Patient location during procedure: pre-op   Block not for primary anesthetic.  Reason for block: at surgeon's request and post-op pain management   Post-op Pain Location: thoracic pain  Start time: 1/11/2022 7:45 AM  Timeout: 1/11/2022 7:45 AM   End time: 1/11/2022 7:50 AM    Staffing  Authorizing Provider: Citlali Garcia MD  Performing Provider: Derek Torres MD    Preanesthetic Checklist  Completed: patient identified, IV checked, site marked, risks and benefits discussed, surgical consent, monitors and equipment checked, pre-op evaluation and timeout performed  Peripheral Block  Patient position: sitting  Prep: ChloraPrep  Patient monitoring: heart rate, cardiac monitor, continuous pulse ox, continuous capnometry and frequent blood pressure checks  Block type: erector spinae plane  Laterality: bilateral  Injection technique: single shot  Interspace: T5-6    Needle  Needle type: Tuohy   Needle gauge: 17 G  Needle length: 3.5 in  Needle localization: anatomical landmarks and ultrasound guidance   -ultrasound image captured on disc.  Assessment  Injection assessment: negative aspiration, negative parasthesia and local visualized surrounding nerve  Paresthesia pain: none  Heart rate change: no  Slow fractionated injection: yes    Medications:  Medication Administration Time: 1/11/2022 7:55 AM  Medications: bupivacaine (pf) (MARCAINE) injection 0.75%, 30 mL    Additional Notes  Patient tolerated well.  [facility].

## 2022-01-11 NOTE — NURSING TRANSFER
Nursing Transfer Note      1/11/2022     Reason patient is being transferred: post op    Transfer To: [id number]    Transfer via stretcher    Transported by transport    Medicines sent: na    Any special needs or follow-up needed: na    Chart send with patient: Yes    Notified: family via text    Patient reassessed at: 1440 on 1/11/22

## 2022-01-11 NOTE — OP NOTE
Date of surgery 01/11/2022   preoperative diagnosis history of breast cancer  Postoperative diagnosis the same  Procedure performed  1. Thoraco dorsal artery fasciocutaneous  flap to the right breast  2. Release scar contracture right breast  3. Advancement flap of the back measuring 16 cm x 6 cm  4. Left breast reduction  Surgeon Jericho  Anesthesia general  Complications none  Blood loss minimal  Drains x1    The patient was placed in the supine position after adequate general endotracheal anesthesia was prepped and draped in a sterile fashion.  On the right breast the dense scar contracture was completely reexcised down to the chest wall recreating the lumpectomy defect.  On the left side markings were made for the modified Pineda breast reduction technique.  The nipple-areolar complex was de-epithelialized good punctate bleeding was noted short superomedial and superolateral flaps were created.  The pedicle was mainly debulked laterally to try to match the opposite side.  Next, the incisions were closed using interrupted 3-0 Monocryl followed by running 4-0 Monocryl subcuticular suture for the inframammary incision.  The vertical and nipple-areolar complex were closed using interrupted 4-0 Monocryl followed by running 4-0 Monocryl subcuticular suture.  The patient was then re-prepped and redraped in the left lateral decubitus position.  Markings were then made for the thoracodorsal artery fasciocutaneous  flap.  The flap was marked measuring approximately 16 cm in length and 6 cm in width.  Incisions were made the flap was dissected distal to proximal.  One single large  was identified carefully dissected back down to the thoracodorsal artery and vein.  All nerves were spared during the procedure.  Thus this flap was a complete  flap.  It was then tunneled into the breast defect.  Was de-epithelialized.  It was then inset and closed using interrupted 3-0 Monocryl followed  by running 4-0 Monocryl subcuticular suture.  An advancement flap closure on the back donor site was then performed in 3 layers using running 2-0 Vicryl interrupted 3-0 Monocryl running 4-0 Monocryl subcuticular suture.  One drain placed.  Were no complications end dictation

## 2022-01-11 NOTE — ANESTHESIA PROCEDURE NOTES
Intubation    Date/Time: 1/11/2022 8:07 AM  Performed by: Cain Witt MD  Authorized by: Cain Witt MD     Intubation:     Induction:  Intravenous    Intubated:  Postinduction    Mask Ventilation:  Easy mask    Attempts:  1    Attempted By:  Staff anesthesiologist    Method of Intubation:  Direct    Blade:  Yoli 3    Laryngeal View Grade: Grade I - full view of cords      Difficult Airway Encountered?: No      Complications:  None    Airway Device:  Oral endotracheal tube    Airway Device Size:  7.5    Style/Cuff Inflation:  Cuffed    Inflation Amount (mL):  5    Tube secured:  20    Secured at:  The teeth    Placement Verified By:  Capnometry    Complicating Factors:  None    Findings Post-Intubation:  BS equal bilateral

## 2022-01-11 NOTE — PROGRESS NOTES
Pt brought to [facility] with an outpatient order at 1257. Extended recovery order placed at 1306. Surgibra with fluff in place with ISABELA drain to right breast clean dry and intact. Report given to PACU RNx2. Pt transferred to PACU 21 @ 1330  .

## 2022-01-11 NOTE — BRIEF OP NOTE
Plastic and Reconstructive Surgery  History and Physical    HPI:  Elham Rodas is a 41 y.o. female with right breast cancer s/p lumpectomy with defect , here today for TDAP, lat flap, with or without contralateral reduction mammoplasty. Patient is s/p right breast cancer with lumpectomy in 2018 with adjuvant chemo and radiation and anastrozole therapy.    Review of Systems    Past Medical History:  Past Medical History:   Diagnosis Date    Abnormal Pap smear of cervix 05/2016    ASCUS, - HPV    Anxiety     Breast cancer 10/2018    right    Depression     Fatigue     Hypothyroidism     Neuropathy     Palpitations     Prediabetes     Psychiatric problem     Sleep difficulties     Syncope and collapse     Thyroid disease     Vitamin D deficiency disease        Past Surgical History:  Past Surgical History:   Procedure Laterality Date    AXILLARY NODE DISSECTION Right 11/21/2018    Procedure: LYMPHADENECTOMY, AXILLARY;  Surgeon: Jann Ayala MD;  Location: [facility] OR [address] [place] [address];  Service: General;  Laterality: Right;    BREAST LUMPECTOMY Right     CHOLECYSTECTOMY      HYSTERECTOMY      TLH, BSO--( breast cancer)    INJECTION FOR SENTINEL NODE IDENTIFICATION Right 11/21/2018    Procedure: INJECTION, FOR SENTINEL NODE IDENTIFICATION;  Surgeon: Jann Ayala MD;  Location: [facility] OR [address] [place] [address];  Service: General;  Laterality: Right;    INSERTION OF TUNNELED CENTRAL VENOUS CATHETER (CVC) WITH SUBCUTANEOUS PORT Left 1/2/2019    Procedure: [id number];  Surgeon: Jann Ayala MD;  Location: [address];  Service: General;  Laterality: Left;    LAPAROSCOPIC SALPINGO-OOPHORECTOMY Bilateral 11/4/2019    Procedure: SALPINGO-OOPHORECTOMY, LAPAROSCOPIC;  Surgeon: Yoni Oscar MD;  Location: [facility];  Service: OB/GYN;  Laterality: Bilateral;    LAPAROSCOPIC TOTAL HYSTERECTOMY N/A 11/4/2019    Procedure: HYSTERECTOMY, TOTAL, LAPAROSCOPIC;  Surgeon: Yoni Oscar MD;  Location:  [facility] OR;  Service: OB/GYN;  Laterality: N/A;    MASTECTOMY, PARTIAL Right 11/21/2018    Procedure: MASTECTOMY, PARTIAL - seed localized;  Surgeon: Jann Ayala MD;  Location: [facility] OR 2ND FLR;  Service: General;  Laterality: Right;  seed placement 11/14    OOPHORECTOMY      SENTINEL LYMPH NODE BIOPSY Right 11/21/2018    Procedure: BIOPSY, LYMPH NODE, SENTINEL;  Surgeon: Jann Ayala MD;  Location: [facility] OR 2ND FLR;  Service: General;  Laterality: Right;    THYROIDECTOMY      TUBAL LIGATION         Family History:  Family History   Problem Relation Age of Onset    Hypertension Mother     Heart attack Mother     Hypertension Father     Ovarian cancer Maternal Aunt     Cancer Paternal Grandfather     Breast cancer Neg Hx     Colon cancer Neg Hx        Social History:  Social History     Socioeconomic History    Marital status:     Number of children: 4   Tobacco Use    Smoking status: Never Smoker    Smokeless tobacco: Never Used   Substance and Sexual Activity    Alcohol use: Yes     Alcohol/week: 2.0 standard drinks     Types: 2 Cans of beer per week     Comment: occasionally    Drug use: No    Sexual activity: Yes     Partners: Male     Birth control/protection: See Surgical Hx     Comment:        Medications:  Current Facility-Administered Medications   Medication Dose Route Frequency Provider Last Rate Last Admin    fentaNYL 50 mcg/mL injection  mcg   mcg Intravenous PRN Shadi Soni MD        midazolam (VERSED) 1 mg/mL injection 0.5-4 mg  0.5-4 mg Intravenous PRN Shadi Soni MD        scopolamine 1.3-1.5 mg (1 mg over 3 days) 1 patch  1 patch Transdermal Q3 Days Cain Witt MD        sodium chloride 0.9% flush 10 mL  10 mL Intravenous PRN Cain Witt MD         Facility-Administered Medications Ordered in Other Encounters   Medication Dose Route Frequency Provider Last Rate Last Admin    lidocaine (PF) 10 mg/ml (1%) injection  "10 mg  1 mL Intradermal Once Cain Mcgarry MD           Allergies:  Review of patient's allergies indicates:  No Known Allergies      Physical Exam    Vital Signs: BP (!) 147/89 (BP Location: Left arm, Patient Position: Lying)   Pulse 74   Temp 98.2 °F (36.8 °C) (Oral)   Resp 14   Ht 5' 4" (1.626 m)   Wt 72.6 kg (160 lb)   LMP 03/21/2019   SpO2 100%   Breastfeeding No   BMI 27.46 kg/m²   Physical Exam    A&Ox4  Constitutional: Oriented to person, place, and time. Appears well-developed and well-nourished.   Breast: right lateral breast radial scar causing tethering of the right NAC toward the axilla, scar well healed no breakdown or signs of infection. Inverted nipple on the right. Left breast more volume with grade 2 ptosis    Diagnostics:  Lab Results   Component Value Date    WBC 5.54 10/26/2021    HGB 14.1 10/26/2021    HCT 42.6 10/26/2021    MCV 89 10/26/2021     10/26/2021     Lab Results   Component Value Date    CREATININE 0.65 10/26/2021    BUN 12 10/26/2021     10/26/2021    K 4.5 10/26/2021     10/26/2021    CO2 28 10/26/2021     Lab Results   Component Value Date    ALT 27 10/26/2021    ALT 27 10/26/2021    AST 28 10/26/2021    AST 28 10/26/2021    ALKPHOS 76 10/26/2021    ALKPHOS 76 10/26/2021    BILITOT 0.7 10/26/2021    BILITOT 0.7 10/26/2021     Lab Results   Component Value Date    ALBUMIN 4.8 10/26/2021    ALBUMIN 4.8 10/26/2021         Assessment/Plan  Elham Rodas is a 41 y.o. female with right breast cancer s/p lumpectomy with right breast scar causing tethering of right NAC needing reconstruction for symmetry     - Patient will benefit from a right lat flap vs TDAP with or without contralateral reduction mammoplasty  - Consents obtained and questions answered.  - Proceed to OR.    Renaldo Olivares  Plastic & Reconstructive Surgery    "

## 2022-01-11 NOTE — BRIEF OP NOTE
[facility] - [facility] (2nd Fl)  Brief Operative Note     SUMMARY     Surgery Date: 1/11/2022     Surgeon(s) and Role:     * Linus Echavarria MD - Primary     * Miguelangel Parra MD - Resident - Assisting    Assistant: Renaldo Mauro,     Pre-op Diagnosis:  History of breast cancer [Z85.3]    Post-op Diagnosis:  Post-Op Diagnosis Codes:     * History of breast cancer [Z85.3]    Procedure(s) (LRB):  RECONSTRUCTION, BREAST, WITH LATISSIMUS DORSI MYOCUTANEOUS FLAP (Right)  MAMMOPLASTY, REDUCTION (Left)    Anesthesia: General    Description of the findings of the procedure: see full op    Findings/Key Components: See operative report    Estimated Blood Loss: * No values recorded between 1/11/2022  8:36 AM and 1/11/2022 12:55 PM *         Specimens:   Specimen (24h ago, onward)             Start     Ordered    01/11/22 1213  Specimen to Pathology, Surgery Breast  Once        Comments: Pre-op Diagnosis: History of breast cancer [Z85.3]    Procedure(s):  RECONSTRUCTION, BREAST, WITH LATISSIMUS DORSI MYOCUTANEOUS FLAP  MAMMOPLASTY, REDUCTION     Number of specimens: 3    Name of specimens:   1) Right Breast Scar Tissue Perm.  2) Left Breast Tissue Perm.  3) Right Breast Tissue Perm.   References:    Click here for ordering Quick Tip   Question Answer Comment   Procedure Type: Breast    Specimen Class: Routine/Screening    Release to patient Immediate        01/11/22 1212                Implants: * No implants in log *    Complications: None    Disposition: admit

## 2022-01-11 NOTE — ANESTHESIA PREPROCEDURE EVALUATION
01/11/2022  Elham Rodas is a 41 y.o., female.  Pre-operative evaluation for Procedure(s) (LRB):  RECONSTRUCTION, BREAST, WITH LATISSIMUS DORSI MYOCUTANEOUS FLAP (Right)  MAMMOPLASTY, REDUCTION (Left)    Elham Rodas is a 41 y.o. female     Patient Active Problem List   Diagnosis    Postsurgical hypothyroidism    Vitamin D deficiency    Iatrogenic hypocalcemia    Family history of diabetes mellitus    Cough    Acute pharyngitis    Numbness and tingling in both hands    Malignant neoplasm of right female breast    Carcinoma of right breast in female, estrogen receptor positive    Lung nodule    Pneumonia of right lower lobe due to infectious organism    Elevated LFTs    Anemia associated with chemotherapy    IGT (impaired glucose tolerance)    BMI 29.0-29.9,adult    Use of aromatase inhibitors    Aromatase inhibitor-associated arthralgia    Vasomotor instability    Atypical mole    Pain in shoulder region, right    Decreased range of motion of right shoulder    At risk for lymphedema    Postural dizziness with presyncope    Palpitations    SOB (shortness of breath)    Bilateral foot pain    Decreased range of motion of both ankles    Impaired gait and mobility    Decreased strength    Gastro-esophageal reflux disease with esophagitis    Hypertensive disorder    Hypothyroidism    Mixed hyperlipidemia    Adjustment disorder with mixed anxiety and depressed mood       Review of patient's allergies indicates:  No Known Allergies    Current Facility-Administered Medications on File Prior to Encounter   Medication Dose Route Frequency Provider Last Rate Last Admin    lidocaine (PF) 10 mg/ml (1%) injection 10 mg  1 mL Intradermal Once Cain Mcgarry MD         Current Outpatient Medications on File Prior to Encounter   Medication Sig Dispense Refill     cholecalciferol, vitamin D3, 125 mcg (5,000 unit) Tab Vitamin D3 125 mcg (5,000 unit) tablet  Take 1 tablet every day by oral route. 30 tablet 5    levothyroxine (LEVOXYL) 75 MCG tablet Take 1 tablet (75 mcg total) by mouth before breakfast. Wait  4 hours after taking LT4 to take MVI.  Wait 1 hour to take other medications. 90 tablet 3    metFORMIN (GLUCOPHAGE-XR) 500 MG ER 24hr tablet Take 1 tablet (500 mg total) by mouth daily with breakfast. 90 tablet 3    omega-3 fatty acids 1,000 mg Cap Take 2 capsules (2,000 mg total) by mouth 2 (two) times daily. Take as directed for triglycerides and to decrease risk of heart disease and stroke.  0    prasterone, dhea, (INTRAROSA) 6.5 mg Inst Place 6.5 mg vaginally nightly. 28 each 11    rosuvastatin (CRESTOR) 20 MG tablet Take 1 tablet (20 mg total) by mouth once daily. 90 tablet 3    calcium carbonate (TUMS) 200 mg calcium (500 mg) chewable tablet Take 1 tablet by mouth.      lancets (TRUEPLUS LANCETS) 33 gauge Misc TRUEplus Lancets 33 gauge      tiZANidine (ZANAFLEX) 4 MG tablet Take 4 mg by mouth as needed.       TRUE METRIX GLUCOSE METER Misc USE TO TEST BLOOD SUGAR AS DIRECTED      TRUE METRIX GLUCOSE TEST STRIP Strp TEST BLOOD SUGAR ONCE D         Past Surgical History:   Procedure Laterality Date    AXILLARY NODE DISSECTION Right 11/21/2018    Procedure: LYMPHADENECTOMY, AXILLARY;  Surgeon: Jann Ayala MD;  Location: [facility] OR [address] [place] [address];  Service: General;  Laterality: Right;    BREAST LUMPECTOMY Right     CHOLECYSTECTOMY      HYSTERECTOMY      TLH, BSO--( breast cancer)    INJECTION FOR SENTINEL NODE IDENTIFICATION Right 11/21/2018    Procedure: INJECTION, FOR SENTINEL NODE IDENTIFICATION;  Surgeon: Jann Ayala MD;  Location: [facility] OR [address] [place] [address];  Service: General;  Laterality: Right;    INSERTION OF TUNNELED CENTRAL VENOUS CATHETER (CVC) WITH SUBCUTANEOUS PORT Left 1/2/2019    Procedure: [id number];  Surgeon: Jann Ayala MD;   Location: [facility] OR 2ND FLR;  Service: General;  Laterality: Left;    LAPAROSCOPIC SALPINGO-OOPHORECTOMY Bilateral 2019    Procedure: SALPINGO-OOPHORECTOMY, LAPAROSCOPIC;  Surgeon: Yoni Oscar MD;  Location: [facility] OR;  Service: OB/GYN;  Laterality: Bilateral;    LAPAROSCOPIC TOTAL HYSTERECTOMY N/A 2019    Procedure: HYSTERECTOMY, TOTAL, LAPAROSCOPIC;  Surgeon: Yoni Oscar MD;  Location: [facility] OR;  Service: OB/GYN;  Laterality: N/A;    MASTECTOMY, PARTIAL Right 2018    Procedure: MASTECTOMY, PARTIAL - seed localized;  Surgeon: Jann Ayala MD;  Location: [facility] OR 2ND FLR;  Service: General;  Laterality: Right;  seed placement     OOPHORECTOMY      SENTINEL LYMPH NODE BIOPSY Right 2018    Procedure: BIOPSY, LYMPH NODE, SENTINEL;  Surgeon: Jann Ayala MD;  Location: [facility] OR 2ND FLR;  Service: General;  Laterality: Right;    THYROIDECTOMY      TUBAL LIGATION         Social History     Socioeconomic History    Marital status:     Number of children: 4   Tobacco Use    Smoking status: Never Smoker    Smokeless tobacco: Never Used   Substance and Sexual Activity    Alcohol use: Yes     Alcohol/week: 2.0 standard drinks     Types: 2 Cans of beer per week     Comment: occasionally    Drug use: No    Sexual activity: Yes     Partners: Male     Birth control/protection: See Surgical Hx     Comment:          CBC: No results for input(s): WBC, RBC, HGB, HCT, PLT, MCV, MCH, MCHC in the last 72 hours.    CMP: No results for input(s): NA, K, CL, CO2, BUN, CREATININE, GLU, MG, PHOS, CALCIUM, ALBUMIN, PROT, ALKPHOS, ALT, AST, BILITOT in the last 72 hours.    INR  No results for input(s): PT, INR, PROTIME, APTT in the last 72 hours.        Diagnostic Studies:      EKD Echo:  Results for orders placed or performed during the hospital encounter of 01/08/15   2D Echo w/ Color Flow Doppler   Result Value Ref Range    EF + QEF 55 55 - 65    Diastolic  Dysfunction No     Tricuspid Valve Regurgitation TRIVIAL          Anesthesia Evaluation    I have reviewed the Patient Summary Reports.    I have reviewed the Nursing Notes. I have reviewed the NPO Status.   I have reviewed the Medications.     Review of Systems  Cardiovascular:   Hypertension    Pulmonary:   Pneumonia    Endocrine:   Hypothyroidism        Physical Exam   Airway/Jaw/Neck:  Airway Findings: Mouth Opening: Normal Tongue: Normal  General Airway Assessment: Adult  Mallampati: II     Eyes/Ears/Nose:  EYES/EARS/NOSE FINDINGS: Normal    Chest/Lungs:  Chest/Lungs Clear        Mental Status:  Mental Status Findings: Normal        Anesthesia Plan  Type of Anesthesia, risks & benefits discussed:  Anesthesia Type:  general    Patient's Preference:   Plan Factors:          Intra-op Monitoring Plan: standard ASA monitors  Intra-op Monitoring Plan Comments:   Post Op Pain Control Plan: multimodal analgesia  Post Op Pain Control Plan Comments:     Induction:   IV  Beta Blocker:  Patient is not currently on a Beta-Blocker (No further documentation required).       Informed Consent: Patient understands risks and agrees with Anesthesia plan.  Questions answered. Anesthesia consent signed with patient.  ASA Score: 3     Day of Surgery Review of History & Physical:            Ready For Surgery From Anesthesia Perspective.

## 2022-01-11 NOTE — SUBJECTIVE & OBJECTIVE
Current Facility-Administered Medications on File Prior to Encounter   Medication    lidocaine (PF) 10 mg/ml (1%) injection 10 mg     Current Outpatient Medications on File Prior to Encounter   Medication Sig    cholecalciferol, vitamin D3, 125 mcg (5,000 unit) Tab Vitamin D3 125 mcg (5,000 unit) tablet  Take 1 tablet every day by oral route.    levothyroxine (LEVOXYL) 75 MCG tablet Take 1 tablet (75 mcg total) by mouth before breakfast. Wait  4 hours after taking LT4 to take MVI.  Wait 1 hour to take other medications.    metFORMIN (GLUCOPHAGE-XR) 500 MG ER 24hr tablet Take 1 tablet (500 mg total) by mouth daily with breakfast.    omega-3 fatty acids 1,000 mg Cap Take 2 capsules (2,000 mg total) by mouth 2 (two) times daily. Take as directed for triglycerides and to decrease risk of heart disease and stroke.    prasterone, dhea, (INTRAROSA) 6.5 mg Inst Place 6.5 mg vaginally nightly.    rosuvastatin (CRESTOR) 20 MG tablet Take 1 tablet (20 mg total) by mouth once daily.    calcium carbonate (TUMS) 200 mg calcium (500 mg) chewable tablet Take 1 tablet by mouth.    lancets (TRUEPLUS LANCETS) 33 gauge Misc TRUEplus Lancets 33 gauge    tiZANidine (ZANAFLEX) 4 MG tablet Take 4 mg by mouth as needed.     TRUE METRIX GLUCOSE METER Misc USE TO TEST BLOOD SUGAR AS DIRECTED    TRUE METRIX GLUCOSE TEST STRIP Strp TEST BLOOD SUGAR ONCE D       Review of patient's allergies indicates:  No Known Allergies    Past Medical History:   Diagnosis Date    Abnormal Pap smear of cervix 05/2016    ASCUS, - HPV    Anxiety     Breast cancer 10/2018    right    Depression     Fatigue     Hypothyroidism     Neuropathy     Palpitations     Prediabetes     Psychiatric problem     Sleep difficulties     Syncope and collapse     Thyroid disease     Vitamin D deficiency disease      Past Surgical History:   Procedure Laterality Date    AXILLARY NODE DISSECTION Right 11/21/2018    Procedure: LYMPHADENECTOMY, AXILLARY;   Surgeon: Jann Ayala MD;  Location: [facility] OR 2ND FLR;  Service: General;  Laterality: Right;    BREAST LUMPECTOMY Right     CHOLECYSTECTOMY      HYSTERECTOMY      TLH, BSO--( breast cancer)    INJECTION FOR SENTINEL NODE IDENTIFICATION Right 11/21/2018    Procedure: INJECTION, FOR SENTINEL NODE IDENTIFICATION;  Surgeon: Jann Ayala MD;  Location: [facility] OR 2ND FLR;  Service: General;  Laterality: Right;    INSERTION OF TUNNELED CENTRAL VENOUS CATHETER (CVC) WITH SUBCUTANEOUS PORT Left 1/2/2019    Procedure: [id number];  Surgeon: Jann Ayala MD;  [facility];  Service: General;  Laterality: Left;    LAPAROSCOPIC SALPINGO-OOPHORECTOMY Bilateral 11/4/2019    Procedure: SALPINGO-OOPHORECTOMY, LAPAROSCOPIC;  Surgeon: Yoni Oscar MD;  Location: [facility];  Service: OB/GYN;  Laterality: Bilateral;    LAPAROSCOPIC TOTAL HYSTERECTOMY N/A 11/4/2019    Procedure: HYSTERECTOMY, TOTAL, LAPAROSCOPIC;  Surgeon: Yoni Oscar MD;  Location: [facility];  Service: OB/GYN;  Laterality: N/A;    MASTECTOMY, PARTIAL Right 11/21/2018    Procedure: MASTECTOMY, PARTIAL - seed localized;  Surgeon: Jann Ayala MD;  Location: [facility] OR [address] [place] [address];  Service: General;  Laterality: Right;  seed placement 11/14    OOPHORECTOMY      SENTINEL LYMPH NODE BIOPSY Right 11/21/2018    Procedure: BIOPSY, LYMPH NODE, SENTINEL;  Surgeon: Jann Ayala MD;  Location: [facility] OR [address] [place] [address];  Service: General;  Laterality: Right;    THYROIDECTOMY      TUBAL LIGATION       Family History     Problem Relation (Age of Onset)    Cancer Paternal Grandfather    Heart attack Mother    Hypertension Mother, Father    Ovarian cancer Maternal Aunt        Tobacco Use    Smoking status: Never Smoker    Smokeless tobacco: Never Used   Substance and Sexual Activity    Alcohol use: Yes     Alcohol/week: 2.0 standard drinks     Types: 2 Cans of beer per week     Comment: occasionally    Drug use: No    Sexual  activity: Yes     Partners: Male     Birth control/protection: See Surgical Hx     Comment:      Review of Systems   All other systems reviewed and are negative.    Objective:     Vital Signs (Most Recent):  Temp: 98.2 °F (36.8 °C) (01/11/22 0606)  Pulse: 74 (01/11/22 0606)  Resp: 14 (01/11/22 0606)  BP: (!) 147/89 (01/11/22 0606)  SpO2: 100 % (01/11/22 0606) Vital Signs (24h Range):  Temp:  [98.2 °F (36.8 °C)] 98.2 °F (36.8 °C)  Pulse:  [74] 74  Resp:  [14] 14  SpO2:  [100 %] 100 %  BP: (147)/(89) 147/89     Weight: 72.6 kg (160 lb)  Body mass index is 27.46 kg/m².    Physical Exam  Vitals reviewed.   Constitutional:       General: She is not in acute distress.  HENT:      Head: Normocephalic and atraumatic.      Nose: Nose normal.   Eyes:      General:         Right eye: No discharge.         Left eye: No discharge.   Cardiovascular:      Rate and Rhythm: Normal rate and regular rhythm.   Pulmonary:      Effort: Pulmonary effort is normal. No respiratory distress.      Breath sounds: No stridor.   Musculoskeletal:         General: Normal range of motion.      Cervical back: Normal range of motion.   Skin:     General: Skin is warm and dry.      Capillary Refill: Capillary refill takes 2 to 3 seconds.   Neurological:      General: No focal deficit present.      Mental Status: She is alert and oriented to person, place, and time.   Psychiatric:         Mood and Affect: Mood normal.         Behavior: Behavior normal.         Significant Labs:  I have reviewed all pertinent lab results within the past 24 hours.    Significant Diagnostics:  I have reviewed all pertinent imaging results/findings within the past 24 hours.

## 2022-01-11 NOTE — ASSESSMENT & PLAN NOTE
40 y.o. F with right breast cancer s/p lumpectomy with right breast scar causing tethering of right NAC needing reconstruction for symmetry     Proceed to OR for right latissimus reconstruction and left reduction mammoplasty   Consent obtained. All questions answered.

## 2022-01-11 NOTE — H&P
[facility] - [facility] ([facility])  Plastic Surgery  History & Physical    Patient Name: Elham Rodas  MRN: [id number]  Admission Date: 1/11/2022  Attending Physician: Linus Echavarria, *   Primary Care Provider: Mookie Rausch MD    Patient information was obtained from patient and past medical records.     Subjective:     Chief Complaint/Reason for Admission: Breast reconstruction for asymmetry     History of Present Illness: Patient is a 40 y.o. F presented s/p right breast cancer with lumpectomy in 2018 with adjuvant chemo and radiation and anastrozole therapy. She presents today for reconstructive options given her right breast lateral lumpectomy defect. She reports no pain, fever, chills, wound breakdown. She denies smoking history. She was informed of the possible reconstructive options including right sided TDAP, lat flap, with or without contralateral reduction mammoplasty.         Interval History(1/11/21): No major changes since last seen in clinic. No complaints this am. Here to proceed with lat rigth sided reconstruction and left side reduction mammoplasty.       Current Facility-Administered Medications on File Prior to Encounter   Medication    lidocaine (PF) 10 mg/ml (1%) injection 10 mg     Current Outpatient Medications on File Prior to Encounter   Medication Sig    cholecalciferol, vitamin D3, 125 mcg (5,000 unit) Tab Vitamin D3 125 mcg (5,000 unit) tablet  Take 1 tablet every day by oral route.    levothyroxine (LEVOXYL) 75 MCG tablet Take 1 tablet (75 mcg total) by mouth before breakfast. Wait  4 hours after taking LT4 to take MVI.  Wait 1 hour to take other medications.    metFORMIN (GLUCOPHAGE-XR) 500 MG ER 24hr tablet Take 1 tablet (500 mg total) by mouth daily with breakfast.    omega-3 fatty acids 1,000 mg Cap Take 2 capsules (2,000 mg total) by mouth 2 (two) times daily. Take as directed for triglycerides and to decrease risk of heart disease and stroke.    prasterone, dhea,  (INTRAROSA) 6.5 mg Inst Place 6.5 mg vaginally nightly.    rosuvastatin (CRESTOR) 20 MG tablet Take 1 tablet (20 mg total) by mouth once daily.    calcium carbonate (TUMS) 200 mg calcium (500 mg) chewable tablet Take 1 tablet by mouth.    lancets (TRUEPLUS LANCETS) 33 gauge Misc TRUEplus Lancets 33 gauge    tiZANidine (ZANAFLEX) 4 MG tablet Take 4 mg by mouth as needed.     TRUE METRIX GLUCOSE METER Misc USE TO TEST BLOOD SUGAR AS DIRECTED    TRUE METRIX GLUCOSE TEST STRIP Strp TEST BLOOD SUGAR ONCE D       Review of patient's allergies indicates:  No Known Allergies    Past Medical History:   Diagnosis Date    Abnormal Pap smear of cervix 05/2016    ASCUS, - HPV    Anxiety     Breast cancer 10/2018    right    Depression     Fatigue     Hypothyroidism     Neuropathy     Palpitations     Prediabetes     Psychiatric problem     Sleep difficulties     Syncope and collapse     Thyroid disease     Vitamin D deficiency disease      Past Surgical History:   Procedure Laterality Date    AXILLARY NODE DISSECTION Right 11/21/2018    Procedure: LYMPHADENECTOMY, AXILLARY;  Surgeon: Jann Ayala MD;  Location: [address];  Service: General;  Laterality: Right;    BREAST LUMPECTOMY Right     CHOLECYSTECTOMY      HYSTERECTOMY      TLH, BSO--( breast cancer)    INJECTION FOR SENTINEL NODE IDENTIFICATION Right 11/21/2018    Procedure: INJECTION, FOR SENTINEL NODE IDENTIFICATION;  Surgeon: Jann Ayala MD;  Location: [address];  Service: General;  Laterality: Right;    INSERTION OF TUNNELED CENTRAL VENOUS CATHETER (CVC) WITH SUBCUTANEOUS PORT Left 1/2/2019    Procedure: [id number]-X-CATH;  Surgeon: Jann Ayala MD;  Location: [address];  Service: General;  Laterality: Left;    LAPAROSCOPIC SALPINGO-OOPHORECTOMY Bilateral 11/4/2019    Procedure: SALPINGO-OOPHORECTOMY, LAPAROSCOPIC;  Surgeon: Yoni Oscar MD;  Location: [facility];  Service: OB/GYN;  Laterality:  Bilateral;    LAPAROSCOPIC TOTAL HYSTERECTOMY N/A 11/4/2019    Procedure: HYSTERECTOMY, TOTAL, LAPAROSCOPIC;  Surgeon: Yoni Oscar MD;  Location: [facility] OR;  Service: OB/GYN;  Laterality: N/A;    MASTECTOMY, PARTIAL Right 11/21/2018    Procedure: MASTECTOMY, PARTIAL - seed localized;  Surgeon: Jann Ayala MD;  Location: [facility] OR 2ND FLR;  Service: General;  Laterality: Right;  seed placement 11/14    OOPHORECTOMY      SENTINEL LYMPH NODE BIOPSY Right 11/21/2018    Procedure: BIOPSY, LYMPH NODE, SENTINEL;  Surgeon: Jann Ayala MD;  Location: [facility] OR 2ND FLR;  Service: General;  Laterality: Right;    THYROIDECTOMY      TUBAL LIGATION       Family History     Problem Relation (Age of Onset)    Cancer Paternal Grandfather    Heart attack Mother    Hypertension Mother, Father    Ovarian cancer Maternal Aunt        Tobacco Use    Smoking status: Never Smoker    Smokeless tobacco: Never Used   Substance and Sexual Activity    Alcohol use: Yes     Alcohol/week: 2.0 standard drinks     Types: 2 Cans of beer per week     Comment: occasionally    Drug use: No    Sexual activity: Yes     Partners: Male     Birth control/protection: See Surgical Hx     Comment:      Review of Systems   All other systems reviewed and are negative.    Objective:     Vital Signs (Most Recent):  Temp: 98.2 °F (36.8 °C) (01/11/22 0606)  Pulse: 74 (01/11/22 0606)  Resp: 14 (01/11/22 0606)  BP: (!) 147/89 (01/11/22 0606)  SpO2: 100 % (01/11/22 0606) Vital Signs (24h Range):  Temp:  [98.2 °F (36.8 °C)] 98.2 °F (36.8 °C)  Pulse:  [74] 74  Resp:  [14] 14  SpO2:  [100 %] 100 %  BP: (147)/(89) 147/89     Weight: 72.6 kg (160 lb)  Body mass index is 27.46 kg/m².    Physical Exam  Vitals reviewed.   Constitutional:       General: She is not in acute distress.  HENT:      Head: Normocephalic and atraumatic.      Nose: Nose normal.   Eyes:      General:         Right eye: No discharge.         Left eye: No discharge.    Cardiovascular:      Rate and Rhythm: Normal rate and regular rhythm.   Pulmonary:      Effort: Pulmonary effort is normal. No respiratory distress.      Breath sounds: No stridor.   Musculoskeletal:         General: Normal range of motion.      Cervical back: Normal range of motion.   Skin:     General: Skin is warm and dry.      Capillary Refill: Capillary refill takes 2 to 3 seconds.   Neurological:      General: No focal deficit present.      Mental Status: She is alert and oriented to person, place, and time.   Psychiatric:         Mood and Affect: Mood normal.         Behavior: Behavior normal.         Significant Labs:  I have reviewed all pertinent lab results within the past 24 hours.    Significant Diagnostics:  I have reviewed all pertinent imaging results/findings within the past 24 hours.    Assessment/Plan:     Breast asymmetry  40 y.o. F with right breast cancer s/p lumpectomy with right breast scar causing tethering of right NAC needing reconstruction for symmetry     Proceed to OR for right latissimus reconstruction and left reduction mammoplasty   Consent obtained. All questions answered.       VTE Risk Mitigation (From admission, onward)    None          Miguelangel Parra MD  General Surgery  [facility] - Surgery (2nd Fl)

## 2022-01-12 VITALS
OXYGEN SATURATION: 98 % | BODY MASS INDEX: 27.31 KG/M2 | TEMPERATURE: 97 F | SYSTOLIC BLOOD PRESSURE: 116 MMHG | HEART RATE: 84 BPM | WEIGHT: 160 LBS | RESPIRATION RATE: 16 BRPM | DIASTOLIC BLOOD PRESSURE: 63 MMHG | HEIGHT: 64 IN

## 2022-01-12 PROCEDURE — 63600175 PHARM REV CODE 636 W HCPCS: Performed by: STUDENT IN AN ORGANIZED HEALTH CARE EDUCATION/TRAINING PROGRAM

## 2022-01-12 PROCEDURE — 25000003 PHARM REV CODE 250: Performed by: STUDENT IN AN ORGANIZED HEALTH CARE EDUCATION/TRAINING PROGRAM

## 2022-01-12 RX ORDER — HYDROCODONE BITARTRATE AND ACETAMINOPHEN 5; 325 MG/1; MG/1
1 TABLET ORAL EVERY 4 HOURS PRN
Qty: 30 TABLET | Refills: 0 | Status: SHIPPED | OUTPATIENT
Start: 2022-01-12 | End: 2022-03-02 | Stop reason: ALTCHOICE

## 2022-01-12 RX ADMIN — HYDROCODONE BITARTRATE AND ACETAMINOPHEN 1 TABLET: 5; 325 TABLET ORAL at 03:01

## 2022-01-12 RX ADMIN — ATORVASTATIN CALCIUM 40 MG: 20 TABLET, FILM COATED ORAL at 08:01

## 2022-01-12 RX ADMIN — CEPHALEXIN 500 MG: 500 CAPSULE ORAL at 05:01

## 2022-01-12 RX ADMIN — MORPHINE SULFATE 3 MG: 2 INJECTION, SOLUTION INTRAMUSCULAR; INTRAVENOUS at 01:01

## 2022-01-12 RX ADMIN — HYDROCODONE BITARTRATE AND ACETAMINOPHEN 1 TABLET: 5; 325 TABLET ORAL at 10:01

## 2022-01-12 RX ADMIN — CEPHALEXIN 500 MG: 500 CAPSULE ORAL at 01:01

## 2022-01-12 RX ADMIN — ENOXAPARIN SODIUM 40 MG: 100 INJECTION SUBCUTANEOUS at 05:01

## 2022-01-12 RX ADMIN — HYDROCODONE BITARTRATE AND ACETAMINOPHEN 1 TABLET: 5; 325 TABLET ORAL at 05:01

## 2022-01-12 RX ADMIN — MUPIROCIN: 20 OINTMENT TOPICAL at 08:01

## 2022-01-12 RX ADMIN — LEVOTHYROXINE SODIUM 75 MCG: 75 TABLET ORAL at 05:01

## 2022-01-12 NOTE — DISCHARGE SUMMARY
[facility] - [facility]  Discharge Note  Short Stay    Procedure(s) (LRB):  RECONSTRUCTION, BREAST, WITH LATISSIMUS DORSI MYOCUTANEOUS FLAP (Right)  MAMMOPLASTY, REDUCTION (Left)    OUTCOME: Patient tolerated treatment/procedure well without complication and is now ready for discharge.    DISPOSITION: Home or Self Care    FINAL DIAGNOSIS:  <principal problem not specified>    FOLLOWUP: In clinic    DISCHARGE INSTRUCTIONS:    Discharge Procedure Orders   Diet Adult Regular       Notify your health care provider if you experience any of the following:  temperature >100.4     Notify your health care provider if you experience any of the following:  persistent nausea and vomiting or diarrhea     Notify your health care provider if you experience any of the following:  severe uncontrolled pain     Notify your health care provider if you experience any of the following:  redness, tenderness, or signs of infection (pain, swelling, redness, odor or green/yellow discharge around incision site)     Notify your health care provider if you experience any of the following:  difficulty breathing or increased cough     Notify your health care provider if you experience any of the following:  severe persistent headache     Notify your health care provider if you experience any of the following:  worsening rash     Notify your health care provider if you experience any of the following:  persistent dizziness, light-headedness, or visual disturbances     Notify your health care provider if you experience any of the following:  increased confusion or weakness     Activity as tolerated      Shower in 48 hours  Bacitracin to left nipple 3x/day  Do not wear bra to avoid pressure to right breast  Sleep on your left side or facing up, avoid pressure to right breast  Call clinic with concerns/questions    TIME SPENT ON DISCHARGE: 15 minutes

## 2022-01-12 NOTE — PLAN OF CARE
[facility] - [facility]  Discharge Assessment    Primary Care Provider: Mookie Rausch MD     Discharge Assessment (most recent)     BRIEF DISCHARGE ASSESSMENT - 01/12/22 7956        Discharge Planning    Assessment Type Discharge Planning Brief Assessment     Resource/Environmental Concerns none     Support Systems Children     Equipment Currently Used at Home none     Current Living Arrangements home/apartment/condo     Patient/Family Anticipates Transition to home with family     Patient/Family Anticipated Services at Transition none     DME Needed Upon Discharge  none     Discharge Plan A Home with family     Discharge Plan B Home with family                 Discharge orders placed per team. Patient's son will provide transportation home. No d/c needs noted.

## 2022-01-12 NOTE — PROGRESS NOTES
----- Message from Tennille Lyman MD sent at 2/23/2017  9:39 AM CST -----  Labs normal please call patient   Plastic and Reconstructive Surgery  Progress Note      Admit Date: 1/11/2022  Post-operative Day: 1 Day Post-Op   Right TDAP flap, left mastopexy  Hospital Day: 2    SUBJECTIVE:    Patient is doing well.  No acute events overnight.  This morning, tolerating regular diet and denies fevers, chills, nausea or emesis      OBJECTIVE:      Vital Signs Range (Last 24H):  Temp:  [96.6 °F (35.9 °C)-98.9 °F (37.2 °C)] 98.9 °F (37.2 °C)  Pulse:  [65-79] 71  Resp:  [12-20] 16  SpO2:  [96 %-100 %] 97 %  BP: (101-130)/(56-77) 105/60    I & O (Last 24H):    Intake/Output Summary (Last 24 hours) at 1/12/2022 1337  Last data filed at 1/12/2022 1300  Gross per 24 hour   Intake --   Output 2657.5 ml   Net -2657.5 ml     I/O last 3 completed shifts:  In: 1800 [IV Piggyback:1800]  Out: 1435 [Urine:1400; Drains:35]      Physical Exam:  Gen: WDWN in NAD  Alert and oriented  Breast:   Right: s/p right TDAP flap, well perfused, adequate cap refill, healing well.   Left: s/p mastopexy, incisions healing well, clean dry and intact  Back: Right posterior incision clean, dry and intact.    Lab Results   Component Value Date    WBC 5.54 10/26/2021    HGB 14.1 10/26/2021    HCT 42.6 10/26/2021    MCV 89 10/26/2021     10/26/2021     Lab Results   Component Value Date    CREATININE 0.65 10/26/2021    BUN 12 10/26/2021     10/26/2021    K 4.5 10/26/2021     10/26/2021    CO2 28 10/26/2021       ASSESSMENT/PLAN:    Elham Rodas is a 41 y.o. female s/p right TDAP flap, left mastopexy.    - Avoid pressure to the right breast, sleep facing up or on the left  - Pain control  - Ok to shower in 48 hours  - Antibiotic ointment to the left nipple 3 times a day  - Follow up with Dr. Echavarria in 1 week    Renaldo Olivares  Plastic Surgery Fellow

## 2022-01-12 NOTE — PLAN OF CARE
[facility] - [facility]  Discharge Final Note    Primary Care Provider: Mookie Rausch MD    Expected Discharge Date: 1/12/2022    Final Discharge Note (most recent)     Final Note - 01/12/22 1444        Final Note    Assessment Type Final Discharge Note     Anticipated Discharge Disposition Home or Self Care     What phone number can be called within the next 1-3 days to see how you are doing after discharge? --   [phone]    Hospital Resources/Appts/Education Provided Appointments scheduled and added to AVS                 Important Message from Medicare             Contact Info     Linus Echavarria MD   Specialty: Plastic Surgery    [address]  [facility] [address]   Phone: [phone]       Next Steps: Follow up in 2 week(s)    Instructions: For wound re-check            Future Appointments   Date Time Provider Department Center   1/19/2022  9:45 AM Linus Echavarria MD [facility] PLASTIC [facility]     Patient discharged home to care of family on 1/12/22.

## 2022-01-12 NOTE — ANESTHESIA POSTPROCEDURE EVALUATION
Anesthesia Post Evaluation    Patient: Elham Rodas    Procedure(s) Performed: Procedure(s) (LRB):  RECONSTRUCTION, BREAST, WITH LATISSIMUS DORSI MYOCUTANEOUS FLAP (Right)  MAMMOPLASTY, REDUCTION (Left)    Final Anesthesia Type: general      Patient location during evaluation: PACU  Patient participation: Yes- Able to Participate  Level of consciousness: awake and alert, awake and oriented  Post-procedure vital signs: reviewed and stable  Pain management: adequate  Airway patency: patent    PONV status at discharge: No PONV  Anesthetic complications: no      Cardiovascular status: blood pressure returned to baseline, hemodynamically stable and stable  Respiratory status: unassisted, spontaneous ventilation and room air  Hydration status: euvolemic  Follow-up not needed.          Vitals Value Taken Time   /56 01/12/22 0426   Temp 36.7 °C (98.1 °F) 01/12/22 0426   Pulse 70 01/12/22 0426   Resp 18 01/12/22 0542   SpO2 96 % 01/12/22 0426         Event Time   Out of Recovery 14:15:00         Pain/Solis Score: Pain Rating Prior to Med Admin: 6 (1/12/2022  5:42 AM)  Solis Score: 9 (1/11/2022  1:45 PM)

## 2022-01-13 NOTE — NURSING
Patient discharged to home.  Bedside RX delivery complete.  AVS given to patient and discussed discharge plan.  Verbalized understanding. PIV discontinued.  ISABELA drain in place, patient understands maintenance and emptying.  Awaiting transport.

## 2022-01-13 NOTE — DISCHARGE INSTRUCTIONS
Shower in 48 hours  Bacitracin to left nipple 3x/day  Do not wear bra to avoid pressure to right breast  Sleep on your left side or facing up, avoid pressure to right breast  Call clinic with concerns/questions

## 2022-01-16 ENCOUNTER — HOSPITAL ENCOUNTER (EMERGENCY)
Facility: HOSPITAL | Age: 42
Discharge: HOME | End: 2022-01-16
Attending: EMERGENCY MEDICINE
Payer: MEDICAID

## 2022-01-16 ENCOUNTER — NURSE TRIAGE (OUTPATIENT)
Dept: ADMINISTRATIVE | Facility: CLINIC | Age: 42
End: 2022-01-16
Payer: MEDICAID

## 2022-01-16 VITALS
TEMPERATURE: 99 F | OXYGEN SATURATION: 99 % | WEIGHT: 160 LBS | RESPIRATION RATE: 17 BRPM | DIASTOLIC BLOOD PRESSURE: 87 MMHG | SYSTOLIC BLOOD PRESSURE: 133 MMHG | BODY MASS INDEX: 27.46 KG/M2 | HEART RATE: 79 BPM

## 2022-01-16 DIAGNOSIS — R19.7 DIARRHEA, UNSPECIFIED TYPE: ICD-10-CM

## 2022-01-16 DIAGNOSIS — G89.18 POST-OP PAIN: ICD-10-CM

## 2022-01-16 DIAGNOSIS — M62.838 MUSCLE SPASM: Primary | ICD-10-CM

## 2022-01-16 LAB
ALBUMIN SERPL BCP-MCNC: 3.8 G/DL (ref 3.5–5.2)
ALP SERPL-CCNC: 143 U/L (ref 55–135)
ALT SERPL W/O P-5'-P-CCNC: 121 U/L (ref 10–44)
ANION GAP SERPL CALC-SCNC: 12 MMOL/L (ref 8–16)
AST SERPL-CCNC: 41 U/L (ref 10–40)
BASOPHILS # BLD AUTO: 0.03 K/UL (ref 0–0.2)
BASOPHILS NFR BLD: 0.5 % (ref 0–1.9)
BILIRUB SERPL-MCNC: 0.5 MG/DL (ref 0.1–1)
BUN SERPL-MCNC: 4 MG/DL (ref 6–20)
CALCIUM SERPL-MCNC: 9.9 MG/DL (ref 8.7–10.5)
CHLORIDE SERPL-SCNC: 105 MMOL/L (ref 95–110)
CO2 SERPL-SCNC: 23 MMOL/L (ref 23–29)
CREAT SERPL-MCNC: 0.7 MG/DL (ref 0.5–1.4)
CTP QC/QA: YES
DIFFERENTIAL METHOD: ABNORMAL
EOSINOPHIL # BLD AUTO: 0.5 K/UL (ref 0–0.5)
EOSINOPHIL NFR BLD: 8.2 % (ref 0–8)
ERYTHROCYTE [DISTWIDTH] IN BLOOD BY AUTOMATED COUNT: 13.4 % (ref 11.5–14.5)
EST. GFR  (AFRICAN AMERICAN): >60 ML/MIN/1.73 M^2
EST. GFR  (NON AFRICAN AMERICAN): >60 ML/MIN/1.73 M^2
GLUCOSE SERPL-MCNC: 118 MG/DL (ref 70–110)
HCT VFR BLD AUTO: 41.4 % (ref 37–48.5)
HGB BLD-MCNC: 13.7 G/DL (ref 12–16)
IMM GRANULOCYTES # BLD AUTO: 0.02 K/UL (ref 0–0.04)
IMM GRANULOCYTES NFR BLD AUTO: 0.3 % (ref 0–0.5)
LYMPHOCYTES # BLD AUTO: 1.6 K/UL (ref 1–4.8)
LYMPHOCYTES NFR BLD: 24.2 % (ref 18–48)
MCH RBC QN AUTO: 29.5 PG (ref 27–31)
MCHC RBC AUTO-ENTMCNC: 33.1 G/DL (ref 32–36)
MCV RBC AUTO: 89 FL (ref 82–98)
MONOCYTES # BLD AUTO: 0.5 K/UL (ref 0.3–1)
MONOCYTES NFR BLD: 7.3 % (ref 4–15)
NEUTROPHILS # BLD AUTO: 3.9 K/UL (ref 1.8–7.7)
NEUTROPHILS NFR BLD: 59.5 % (ref 38–73)
NRBC BLD-RTO: 0 /100 WBC
PLATELET # BLD AUTO: 267 K/UL (ref 150–450)
PMV BLD AUTO: 12.1 FL (ref 9.2–12.9)
POTASSIUM SERPL-SCNC: 4.1 MMOL/L (ref 3.5–5.1)
PROT SERPL-MCNC: 7.8 G/DL (ref 6–8.4)
RBC # BLD AUTO: 4.65 M/UL (ref 4–5.4)
SARS-COV-2 RDRP RESP QL NAA+PROBE: NEGATIVE
SODIUM SERPL-SCNC: 140 MMOL/L (ref 136–145)
WBC # BLD AUTO: 6.48 K/UL (ref 3.9–12.7)

## 2022-01-16 PROCEDURE — 99284 PR EMERGENCY DEPT VISIT,LEVEL IV: ICD-10-PCS | Mod: CS,,, | Performed by: EMERGENCY MEDICINE

## 2022-01-16 PROCEDURE — 99284 EMERGENCY DEPT VISIT MOD MDM: CPT | Mod: CS,,, | Performed by: EMERGENCY MEDICINE

## 2022-01-16 PROCEDURE — 99284 EMERGENCY DEPT VISIT MOD MDM: CPT | Mod: 25

## 2022-01-16 PROCEDURE — 80053 COMPREHEN METABOLIC PANEL: CPT | Performed by: STUDENT IN AN ORGANIZED HEALTH CARE EDUCATION/TRAINING PROGRAM

## 2022-01-16 PROCEDURE — 96374 THER/PROPH/DIAG INJ IV PUSH: CPT

## 2022-01-16 PROCEDURE — 63600175 PHARM REV CODE 636 W HCPCS: Performed by: STUDENT IN AN ORGANIZED HEALTH CARE EDUCATION/TRAINING PROGRAM

## 2022-01-16 PROCEDURE — 85025 COMPLETE CBC W/AUTO DIFF WBC: CPT | Performed by: STUDENT IN AN ORGANIZED HEALTH CARE EDUCATION/TRAINING PROGRAM

## 2022-01-16 PROCEDURE — U0002 COVID-19 LAB TEST NON-CDC: HCPCS | Performed by: STUDENT IN AN ORGANIZED HEALTH CARE EDUCATION/TRAINING PROGRAM

## 2022-01-16 PROCEDURE — 96375 TX/PRO/DX INJ NEW DRUG ADDON: CPT

## 2022-01-16 RX ORDER — CYCLOBENZAPRINE HCL 10 MG
10 TABLET ORAL 3 TIMES DAILY PRN
Qty: 15 TABLET | Refills: 0 | Status: SHIPPED | OUTPATIENT
Start: 2022-01-16 | End: 2022-01-21

## 2022-01-16 RX ORDER — MORPHINE SULFATE 2 MG/ML
2 INJECTION, SOLUTION INTRAMUSCULAR; INTRAVENOUS
Status: DISCONTINUED | OUTPATIENT
Start: 2022-01-16 | End: 2022-01-16

## 2022-01-16 RX ORDER — MORPHINE SULFATE 4 MG/ML
4 INJECTION, SOLUTION INTRAMUSCULAR; INTRAVENOUS
Status: COMPLETED | OUTPATIENT
Start: 2022-01-16 | End: 2022-01-16

## 2022-01-16 RX ORDER — DIAZEPAM 10 MG/2ML
5 INJECTION INTRAMUSCULAR
Status: COMPLETED | OUTPATIENT
Start: 2022-01-16 | End: 2022-01-16

## 2022-01-16 RX ADMIN — MORPHINE SULFATE 4 MG: 4 INJECTION INTRAVENOUS at 03:01

## 2022-01-16 RX ADMIN — DIAZEPAM 5 MG: 10 INJECTION, SOLUTION INTRAMUSCULAR; INTRAVENOUS at 03:01

## 2022-01-16 NOTE — ED NOTES
ED attending, Dr. Garcia notified that post IV medication administration she became sleepy, easily arousable.  Sats had decreased to 89-91% placed on 1LNC.  Sats 95-96%.  Will continue to monitor.

## 2022-01-16 NOTE — ED PROVIDER NOTES
Encounter Date: 1/16/2022       History     Chief Complaint   Patient presents with    Post-op Problem     Diarrhea and muscle spasms for a couple days     41F with PMH right breast cancer s/p lumpectomy presents after right breast reconstruction and left breast reduction on 1/11. She was discharged 1/13 in good condition but states yesterday she developed right side muscle spasms and diarrhea. She has a right drain in place that is draining a small amount of blood tinged fluid. She has been taking norco, last dose 0300 today, but this has not helped her pain that much. She describes the spasms as sudden onset sharp pain at the site of the drain insertion that lasts for a few seconds. She also had three episodes of watery nonbloody diarrhea yesterday and one today. She has not had any other hospitalizations recently other than the one for this surgery. She has not been on antibiotics recently other than one dose for the operation. She has no fevers, chills, cramping, or vomiting.        Review of patient's allergies indicates:  No Known Allergies  Past Medical History:   Diagnosis Date    Abnormal Pap smear of cervix 05/2016    ASCUS, - HPV    Anxiety     Breast cancer 10/2018    right    Depression     Fatigue     Hypothyroidism     Neuropathy     Palpitations     Prediabetes     Psychiatric problem     Sleep difficulties     Syncope and collapse     Thyroid disease     Vitamin D deficiency disease      Past Surgical History:   Procedure Laterality Date    AXILLARY NODE DISSECTION Right 11/21/2018    Procedure: LYMPHADENECTOMY, AXILLARY;  Surgeon: Jann Ayala MD;  Location: [facility] OR [address] [place] [address];  Service: General;  Laterality: Right;    BREAST LUMPECTOMY Right     CHOLECYSTECTOMY      HYSTERECTOMY      TLH, BSO--( breast cancer)    INJECTION FOR SENTINEL NODE IDENTIFICATION Right 11/21/2018    Procedure: INJECTION, FOR SENTINEL NODE IDENTIFICATION;  Surgeon: Jann Ayala MD;  Location:  [facility] OR 2ND FLR;  Service: General;  Laterality: Right;    INSERTION OF TUNNELED CENTRAL VENOUS CATHETER (CVC) WITH SUBCUTANEOUS PORT Left 1/2/2019    Procedure: [id number];  Surgeon: Jann Ayala MD;  Location: [facility] OR 2ND FLR;  Service: General;  Laterality: Left;    LAPAROSCOPIC SALPINGO-OOPHORECTOMY Bilateral 11/4/2019    Procedure: SALPINGO-OOPHORECTOMY, LAPAROSCOPIC;  Surgeon: Yoni Oscar MD;  Location: [facility] OR;  Service: OB/GYN;  Laterality: Bilateral;    LAPAROSCOPIC TOTAL HYSTERECTOMY N/A 11/4/2019    Procedure: HYSTERECTOMY, TOTAL, LAPAROSCOPIC;  Surgeon: Yoni Oscar MD;  Location: [facility] OR;  Service: OB/GYN;  Laterality: N/A;    MASTECTOMY, PARTIAL Right 11/21/2018    Procedure: MASTECTOMY, PARTIAL - seed localized;  Surgeon: Jann Ayala MD;  [facility];  Service: General;  Laterality: Right;  seed placement 11/14    OOPHORECTOMY      RECONSTRUCTION OF BREAST WITH LATISSIMUS DORSI MYOCUTANEOUS FLAP Right 1/11/2022    Procedure: RECONSTRUCTION, BREAST, WITH LATISSIMUS DORSI MYOCUTANEOUS FLAP;  Surgeon: Linus Echavarria MD;  [facility];  Service: Plastics;  Laterality: Right;  R lat flap vs. TDAP    SENTINEL LYMPH NODE BIOPSY Right 11/21/2018    Procedure: BIOPSY, LYMPH NODE, SENTINEL;  Surgeon: Jann Ayala MD;  [facility];  Service: General;  Laterality: Right;    THYROIDECTOMY      TOTAL REDUCTION MAMMOPLASTY Left 1/11/2022    Procedure: MAMMOPLASTY, REDUCTION;  Surgeon: Linus Echavarria MD;  Location: [facility] OR [address] [place] [address];  Service: Plastics;  Laterality: Left;    TUBAL LIGATION       Family History   Problem Relation Age of Onset    Hypertension Mother     Heart attack Mother     Hypertension Father     Ovarian cancer Maternal Aunt     Cancer Paternal Grandfather     Breast cancer Neg Hx     Colon cancer Neg Hx      Social History     Tobacco Use    Smoking status: Never Smoker     Smokeless tobacco: Never Used   Substance Use Topics    Alcohol use: Yes     Alcohol/week: 2.0 standard drinks     Types: 2 Cans of beer per week     Comment: occasionally    Drug use: No     Review of Systems   Constitutional: Positive for appetite change. Negative for activity change, chills and fever.   HENT: Negative for congestion and sore throat.    Respiratory: Negative for cough, shortness of breath and wheezing.    Cardiovascular: Negative for chest pain and palpitations.   Gastrointestinal: Positive for diarrhea and nausea. Negative for abdominal pain, blood in stool, constipation and vomiting.   Genitourinary: Negative for difficulty urinating, dysuria, frequency and urgency.   Musculoskeletal: Positive for back pain. Negative for neck pain.   Skin: Negative for color change and rash.   Neurological: Negative for dizziness, light-headedness, numbness and headaches.       Physical Exam     Initial Vitals [01/16/22 1437]   BP Pulse Resp Temp SpO2   (!) 143/92 82 16 98.9 °F (37.2 °C) 99 %      MAP       --         Physical Exam    Constitutional: She appears well-developed and well-nourished. She appears distressed.   HENT:   Head: Normocephalic and atraumatic.   Eyes: EOM are normal.   Neck: Neck supple.   Normal range of motion.  Cardiovascular: Normal rate, regular rhythm and intact distal pulses.   Pulmonary/Chest: No respiratory distress. She has no wheezes.   Abdominal: Abdomen is soft. Bowel sounds are normal. She exhibits no distension. There is no abdominal tenderness. There is no guarding.   Musculoskeletal:         General: No edema.      Cervical back: Normal range of motion and neck supple.      Thoracic back: Tenderness (R side mid thoracic) present.        Back:      Neurological: She is alert and oriented to person, place, and time.   Skin: Skin is warm and dry.         ED Course   Procedures  Labs Reviewed   CBC W/ AUTO DIFFERENTIAL - Abnormal; Notable for the following components:        Result Value    Eosinophil % 8.2 (*)     All other components within normal limits   COMPREHENSIVE METABOLIC PANEL - Abnormal; Notable for the following components:    Glucose 118 (*)     BUN 4 (*)     Alkaline Phosphatase 143 (*)     AST 41 (*)      (*)     All other components within normal limits   SARS-COV-2 RDRP GENE    Narrative:     This test utilizes isothermal nucleic acid amplification   technology to detect the SARS-CoV-2 RdRp nucleic acid segment.   The analytical sensitivity (limit of detection) is 125 genome   equivalents/mL.   A POSITIVE result implies infection with the SARS-CoV-2 virus;   the patient is presumed to be contagious.     A NEGATIVE result means that SARS-CoV-2 nucleic acids are not   present above the limit of detection. A NEGATIVE result should be   treated as presumptive. It does not rule out the possibility of   COVID-19 and should not be the sole basis for treatment decisions.   If COVID-19 is strongly suspected based on clinical and exposure   history, re-testing using an alternate molecular assay should be   considered.   This test is only for use under the Food and Drug   Administration s Emergency Use Authorization (EUA).   Commercial kits are provided by [employer].   Performance characteristics of the EUA have been independently   verified by [facility] Department of   Pathology and Laboratory Medicine.   _________________________________________________________________   The authorized Fact Sheet for Healthcare Providers and the authorized Fact   Sheet for Patients of the ID NOW COVID-19 are available on the FDA   website:     https://www.fda.gov/media/592371/download  https://www.fda.gov/media/178795/download              Imaging Results          X-Ray Chest AP Portable (Final result)  Result time 01/16/22 17:55:23    Final result by Deon Hess MD (01/16/22 17:55:23)                 Impression:      1. No acute radiographic abnormality.  2.  Postoperative changes with right chest wall drain associated with recent breast reconstruction      Electronically signed by: Deon Cocroran  Date:    01/16/2022  Time:    17:55             Narrative:    EXAMINATION:  XR CHEST AP PORTABLE    CLINICAL HISTORY:  R side rib pain;    TECHNIQUE:  Single frontal view of the chest was performed.    COMPARISON:  10/28/2019    FINDINGS:  Status post recent bilateral breast reconstruction with surgical drain on the right.    Heart is normal size.    Suboptimal inspiration.    No mass, consolidation or significant infiltrate.    Minimal atelectasis at the left lung base.    No acute osseous abnormality.                                 Medications   diazePAM injection 5 mg (5 mg Intravenous Given 1/16/22 1540)   morphine injection 4 mg (4 mg Intravenous Given 1/16/22 1549)     Medical Decision Making:   Initial Assessment:   41F with hx right breast cancer s/p lumpectomy and recent R reconstruction and L reduction presents with muscle spasms at the site of drainage catheter and diarrhea x 2 days. She has not been on a full course of antibiotics or had prolonged hospitalization recently. She has no fevers, chills, abdominal pain, vomiting.  Differential Diagnosis:   Ddx include but not limited to:  - muscle spasms  - post-op pain  - seroma vs hematoma  - gastroenteritis  - c diff colitis  ED Management:  Patient appears acutely distressed due to pain, she is hemodynamically stable and afebrile. She has a well dressed drainage catheter with clean, dry gauze; it is draining a small amount of lightly blood tinged fluid. She is having tenderness to palpation around the site of drain insertion. No signs of hematoma, cellulitis, or dehiscence. Abdominal exam is benign, no tenderness or guarding. Less suspicion for c diff as patient has only had 4 episodes in last 48 hours and no true risk factors. However, if patient has episode of watery diarrhea here we will send sample.  - given 5  IV valium for muscle relaxant, 4 IV morphine for pain control -- re-evaluated and patient states improvement in pain  - CBC looks good, no leukocytosis or leukopenia indicating underlying infection as cause of diarrhea; CMP shows mild elevation in LFTs, suspect related to know steatohepatitis vs recent surgery; schedule f/u with PCP for repeat labs and management  - no episodes of diarrhea while in the ED  - discussed with plastic surgery who agree with workup and dispo; they have also evaluated patient in ED today  - will discharge home with flexeril for muscle spasms and to continue norco as needed for pain; she has follow-up scheduled with plastic surgery for Wednesday of this week                      Clinical Impression:   Final diagnoses:  [G89.18] Post-op pain  [M62.838] Muscle spasm (Primary)  [R19.7] Diarrhea, unspecified type                 Trina Jiménez DO  Resident  01/16/22 1800

## 2022-01-16 NOTE — TELEPHONE ENCOUNTER
Pt calling post op from 1/11/22 has been having diarrhea and lightheadness/dizziness to the point of needing support when she walks and feeling like she could pass out. Per protocol advised of ED NOW. Verbalized understanding. Will route message.     Reason for Disposition   SEVERE dizziness (e.g., unable to stand, requires support to walk, feels like passing out now)    Additional Information   Negative: SEVERE difficulty breathing (e.g., struggling for each breath, speaks in single words)   Negative: [1] Difficulty breathing or swallowing AND [2] started suddenly after medicine, an allergic food or bee sting   Negative: Shock suspected (e.g., cold/pale/clammy skin, too weak to stand, low BP, rapid pulse)   Negative: Difficult to awaken or acting confused (e.g., disoriented, slurred speech)   Negative: [1] Weakness (i.e., paralysis, loss of muscle strength) of the face, arm or leg on one side of the body AND [2] sudden onset AND [3] present now   Negative: [1] Numbness (i.e., loss of sensation) of the face, arm or leg on one side of the body AND [2] sudden onset AND [3] present now   Negative: [1] Loss of speech or garbled speech AND [2] sudden onset AND [3] present now   Negative: Overdose (accidental or intentional) of medications   Negative: [1] Fainted > 15 minutes ago AND [2] still feels too weak or dizzy to stand   Negative: Heart beating < 50 beats per minute OR > 140 beats per minute   Negative: Sounds like a life-threatening emergency to the triager   Negative: Difficulty breathing    Protocols used: DIZZINESS - [id number]

## 2022-01-16 NOTE — ED NOTES
Patient identifiers verified and correct, ID bracelet checked at this time.  Patient has reports of increased pain and muscle spasms on the R side of her chest, S/P breast reconstruction/reduction on 1/11 with no other reported complications.  There is a ISABELA drain on the R side that has small amount of SS drainage.  Dressings to the surgical wounds are intact.   Patient denies any chest pain, fevers, chills or SOB.  Has been having some watery stools at home as well.      LOC: The patient is awake, alert and aware of environment with an appropriate affect, the patient is oriented x 4 and speaking appropriately.   APPEARANCE: Patient appears to be uncomfortable, she is holding her R side and reporting significant pan.  Patient is clean and well groomed.  SKIN: The skin is warm and dry, color consistent with ethnicity, patient has normal skin turgor and moist mucus membranes, skin intact, no breakdown or bruising noted. Dressings intact over surgical wounds, area was not assessed by me at this time. Patient denies any issues with the incisions.   MUSCULOSKELETAL: Patient moving all extremities spontaneously, no swelling noted.  RESPIRATORY: Airway is open and patent, respirations are spontaneous, patient has a normal effort and rate, no accessory muscle use noted, pt placed on continuous pulse ox with O2 sats noted at 96% on room air. Prior to being medicated with IV meds.   CARDIAC: Patient has a normal rate, no edema noted, capillary refill < 3 seconds.   GASTRO: Soft and non tender to palpation, no distention noted, normoactive bowel sounds present in all four quadrants. Report of watery diarrhea.  : Pt denies any pain or frequency with urination.  NEURO: Pt opens eyes spontaneously, behavior appropriate to situation, follows commands, facial expression symmetrical.       L FA 20G placed and labs were obtained.  COVID screening obtained at this time.  Patient being medicated for pain and the spasms with Morphine  and Valium as ordered Morphine will be pushed over 1-2 minutes, valium over 4-5 minutes.

## 2022-01-17 NOTE — ED NOTES
Patient's LFA/AC 20G was dc'd intact at this time.  Patient was provided with her discharge and follow-up instructions as well as a prescription for flexeril.  She denied any questions or concerns and ambulated out of the ED with her son at this time without difficulty.

## 2022-01-17 NOTE — ED NOTES
1700 - Pt continues to be drowsy, yet easily woke with verbal stimuli. Reports that her pain is 2-3 at this time.  Denies any needs.     1720 - O2 sats 96% RA at this time.        1825 - Plastic service at bedside.  Evaluating patient.

## 2022-01-17 NOTE — CONSULTS
Plastic and Reconstructive Surgery  Consult    HPI:  Elham Rodas is a 41 y.o. female s/p left sided breast reduction, right sided TDAP flap 1/11 presenting to the emergency room with right sided back muscle spasms that lasts for a few seconds and resolve as well as diarrhea that began today.  She denies any fever, chills, nausea or vomiting. Otherwise continues to feel well.     Review of Systems   Constitutional: Negative for chills and fever.   Respiratory: Negative for cough and shortness of breath.    Cardiovascular: Negative for chest pain.   Gastrointestinal: Positive for diarrhea. Negative for abdominal pain.   Genitourinary: Negative for flank pain.   Musculoskeletal: Positive for back pain. Negative for neck pain.   Skin:        S/p left breast reduction, right TDAP   Neurological: Negative for headaches.   Psychiatric/Behavioral: Negative for confusion.       Past Medical History:  Past Medical History:   Diagnosis Date    Abnormal Pap smear of cervix 05/2016    ASCUS, - HPV    Anxiety     Breast cancer 10/2018    right    Depression     Fatigue     Hypothyroidism     Neuropathy     Palpitations     Prediabetes     Psychiatric problem     Sleep difficulties     Syncope and collapse     Thyroid disease     Vitamin D deficiency disease        Past Surgical History:  Past Surgical History:   Procedure Laterality Date    AXILLARY NODE DISSECTION Right 11/21/2018    Procedure: LYMPHADENECTOMY, AXILLARY;  Surgeon: Jann Ayala MD;  Location: [facility] OR [address] [place] [address];  Service: General;  Laterality: Right;    BREAST LUMPECTOMY Right     CHOLECYSTECTOMY      HYSTERECTOMY      TLH, BSO--( breast cancer)    INJECTION FOR SENTINEL NODE IDENTIFICATION Right 11/21/2018    Procedure: INJECTION, FOR SENTINEL NODE IDENTIFICATION;  Surgeon: Jann Ayala MD;  Location: [facility] OR [address] [place] [address];  Service: General;  Laterality: Right;    INSERTION OF TUNNELED CENTRAL VENOUS CATHETER (CVC) WITH SUBCUTANEOUS  PORT Left 1/2/2019    Procedure: [id number];  Surgeon: Jann Ayala MD;  Location: [facility] OR 2ND FLR;  Service: General;  Laterality: Left;    LAPAROSCOPIC SALPINGO-OOPHORECTOMY Bilateral 11/4/2019    Procedure: SALPINGO-OOPHORECTOMY, LAPAROSCOPIC;  Surgeon: Yoni Osacr MD;  Location: [facility] OR;  Service: OB/GYN;  Laterality: Bilateral;    LAPAROSCOPIC TOTAL HYSTERECTOMY N/A 11/4/2019    Procedure: HYSTERECTOMY, TOTAL, LAPAROSCOPIC;  Surgeon: Yoni Oscar MD;  Location: [facility] OR;  Service: OB/GYN;  Laterality: N/A;    MASTECTOMY, PARTIAL Right 11/21/2018    Procedure: MASTECTOMY, PARTIAL - seed localized;  Surgeon: Jann Ayala MD;  [facility];  Service: General;  Laterality: Right;  seed placement 11/14    OOPHORECTOMY      RECONSTRUCTION OF BREAST WITH LATISSIMUS DORSI MYOCUTANEOUS FLAP Right 1/11/2022    Procedure: RECONSTRUCTION, BREAST, WITH LATISSIMUS DORSI MYOCUTANEOUS FLAP;  Surgeon: Linus Echavarria MD;  [facility];  Service: Plastics;  Laterality: Right;  R lat flap vs. TDAP    SENTINEL LYMPH NODE BIOPSY Right 11/21/2018    Procedure: BIOPSY, LYMPH NODE, SENTINEL;  Surgeon: Jann Ayala MD;  [facility];  Service: General;  Laterality: Right;    THYROIDECTOMY      TOTAL REDUCTION MAMMOPLASTY Left 1/11/2022    Procedure: MAMMOPLASTY, REDUCTION;  Surgeon: Linus Echavarria MD;  Location: [facility] OR [address] [place] [address];  Service: Plastics;  Laterality: Left;    TUBAL LIGATION         Family History:  Family History   Problem Relation Age of Onset    Hypertension Mother     Heart attack Mother     Hypertension Father     Ovarian cancer Maternal Aunt     Cancer Paternal Grandfather     Breast cancer Neg Hx     Colon cancer Neg Hx        Social History:  Social History     Socioeconomic History    Marital status:     Number of children: 4   Tobacco Use    Smoking status: Never Smoker    Smokeless tobacco:  Never Used   Substance and Sexual Activity    Alcohol use: Yes     Alcohol/week: 2.0 standard drinks     Types: 2 Cans of beer per week     Comment: occasionally    Drug use: No    Sexual activity: Yes     Partners: Male     Birth control/protection: See Surgical Hx     Comment:        Medications:  No current facility-administered medications for this encounter.     Current Outpatient Medications   Medication Sig Dispense Refill    anastrozole (ARIMIDEX) 1 mg Tab TAKE 1 TABLET BY MOUTH EVERY DAY 90 tablet 2    calcium carbonate (TUMS) 200 mg calcium (500 mg) chewable tablet Take 1 tablet by mouth.      cholecalciferol, vitamin D3, 125 mcg (5,000 unit) Tab Vitamin D3 125 mcg (5,000 unit) tablet  Take 1 tablet every day by oral route. 30 tablet 5    cyclobenzaprine (FLEXERIL) 10 MG tablet Take 1 tablet (10 mg total) by mouth 3 (three) times daily as needed for Muscle spasms. 15 tablet 0    HYDROcodone-acetaminophen (NORCO) 5-325 mg per tablet Take 1 tablet by mouth every 4 (four) hours as needed. 30 tablet 0    lancets (TRUEPLUS LANCETS) 33 gauge Misc TRUEplus Lancets 33 gauge      levothyroxine (LEVOXYL) 75 MCG tablet Take 1 tablet (75 mcg total) by mouth before breakfast. Wait  4 hours after taking LT4 to take MVI.  Wait 1 hour to take other medications. 90 tablet 3    metFORMIN (GLUCOPHAGE-XR) 500 MG ER 24hr tablet Take 1 tablet (500 mg total) by mouth daily with breakfast. 90 tablet 3    omega-3 fatty acids 1,000 mg Cap Take 2 capsules (2,000 mg total) by mouth 2 (two) times daily. Take as directed for triglycerides and to decrease risk of heart disease and stroke.  0    prasterone, dhea, (INTRAROSA) 6.5 mg Inst Place 6.5 mg vaginally nightly. 28 each 11    rosuvastatin (CRESTOR) 20 MG tablet Take 1 tablet (20 mg total) by mouth once daily. 90 tablet 3    tiZANidine (ZANAFLEX) 4 MG tablet Take 4 mg by mouth as needed.       TRUE METRIX GLUCOSE METER Misc USE TO TEST BLOOD SUGAR AS DIRECTED       TRUE METRIX GLUCOSE TEST STRIP Strp TEST BLOOD SUGAR ONCE D       Facility-Administered Medications Ordered in Other Encounters   Medication Dose Route Frequency Provider Last Rate Last Admin    lidocaine (PF) 10 mg/ml (1%) injection 10 mg  1 mL Intradermal Once Cain Mcgarry MD           Allergies:  Review of patient's allergies indicates:  No Known Allergies    Physical Exam    Vital Signs: /80   Pulse 74   Temp 98.9 °F (37.2 °C) (Oral)   Resp 16   Wt 72.6 kg (160 lb)   LMP 03/21/2019   SpO2 96%   BMI 27.46 kg/m²   Physical Exam    A&Ox4  Patient in NAD, resting comfortably in bed  Heart: Well perfused  Lungs: Aerating well  Skin: is warm and well-perfused. ISABELA drain to the right back with serosanguinous fluid.  Breast:   Left breast: s/p  Breast reduction. Nipple incisions clean, dry and intact.   Right breast: s/p TDAP flap. Well perfused, good capillary refill.    Diagnostics:  Lab Results   Component Value Date    WBC 6.48 01/16/2022    HGB 13.7 01/16/2022    HCT 41.4 01/16/2022    MCV 89 01/16/2022     01/16/2022     Lab Results   Component Value Date    CREATININE 0.7 01/16/2022    BUN 4 (L) 01/16/2022     01/16/2022    K 4.1 01/16/2022     01/16/2022    CO2 23 01/16/2022     Lab Results   Component Value Date     (H) 01/16/2022    AST 41 (H) 01/16/2022    ALKPHOS 143 (H) 01/16/2022    BILITOT 0.5 01/16/2022     Lab Results   Component Value Date    ALBUMIN 3.8 01/16/2022       Assessment/Plan  Elham Rodas is a 41 y.o. female s/p left sided breast reduction, right sided TDAP flap 1/11.    - Muscle relaxant PRN   - Right sided TDAP doing well, well perfused flap  - Continue to avoid pressure to the right sided of her breast. Sleep supine or laying to the left side.  - Follow up with Dr Echavarria 1/19, in the plastic surgery office    [facility]  [facility] & Reconstructive Surgery

## 2022-01-19 ENCOUNTER — OFFICE VISIT (OUTPATIENT)
Dept: PLASTIC SURGERY | Facility: CLINIC | Age: 42
End: 2022-01-19
Payer: MEDICAID

## 2022-01-19 VITALS
DIASTOLIC BLOOD PRESSURE: 89 MMHG | HEIGHT: 64 IN | SYSTOLIC BLOOD PRESSURE: 121 MMHG | BODY MASS INDEX: 27.31 KG/M2 | HEART RATE: 87 BPM | WEIGHT: 160 LBS

## 2022-01-19 DIAGNOSIS — Z09 SURGERY FOLLOW-UP EXAMINATION: Primary | ICD-10-CM

## 2022-01-19 LAB
FINAL PATHOLOGIC DIAGNOSIS: NORMAL
GROSS: NORMAL
Lab: NORMAL

## 2022-01-19 PROCEDURE — 99999 PR PBB SHADOW E&M-EST. PATIENT-LVL III: CPT | Mod: PBBFAC,,, | Performed by: PHYSICIAN ASSISTANT

## 2022-01-19 PROCEDURE — 1159F MED LIST DOCD IN RCRD: CPT | Mod: CPTII,,, | Performed by: PHYSICIAN ASSISTANT

## 2022-01-19 PROCEDURE — 99999 PR PBB SHADOW E&M-EST. PATIENT-LVL III: ICD-10-PCS | Mod: PBBFAC,,, | Performed by: PHYSICIAN ASSISTANT

## 2022-01-19 PROCEDURE — 1160F RVW MEDS BY RX/DR IN RCRD: CPT | Mod: CPTII,,, | Performed by: PHYSICIAN ASSISTANT

## 2022-01-19 PROCEDURE — 99213 OFFICE O/P EST LOW 20 MIN: CPT | Mod: PBBFAC | Performed by: PHYSICIAN ASSISTANT

## 2022-01-19 PROCEDURE — 3079F PR MOST RECENT DIASTOLIC BLOOD PRESSURE 80-89 MM HG: ICD-10-PCS | Mod: CPTII,,, | Performed by: PHYSICIAN ASSISTANT

## 2022-01-19 PROCEDURE — 1160F PR REVIEW ALL MEDS BY PRESCRIBER/CLIN PHARMACIST DOCUMENTED: ICD-10-PCS | Mod: CPTII,,, | Performed by: PHYSICIAN ASSISTANT

## 2022-01-19 PROCEDURE — 1159F PR MEDICATION LIST DOCUMENTED IN MEDICAL RECORD: ICD-10-PCS | Mod: CPTII,,, | Performed by: PHYSICIAN ASSISTANT

## 2022-01-19 PROCEDURE — 3008F BODY MASS INDEX DOCD: CPT | Mod: CPTII,,, | Performed by: PHYSICIAN ASSISTANT

## 2022-01-19 PROCEDURE — 3008F PR BODY MASS INDEX (BMI) DOCUMENTED: ICD-10-PCS | Mod: CPTII,,, | Performed by: PHYSICIAN ASSISTANT

## 2022-01-19 PROCEDURE — 99024 POSTOP FOLLOW-UP VISIT: CPT | Mod: ,,, | Performed by: PHYSICIAN ASSISTANT

## 2022-01-19 PROCEDURE — 3074F PR MOST RECENT SYSTOLIC BLOOD PRESSURE < 130 MM HG: ICD-10-PCS | Mod: CPTII,,, | Performed by: PHYSICIAN ASSISTANT

## 2022-01-19 PROCEDURE — 3079F DIAST BP 80-89 MM HG: CPT | Mod: CPTII,,, | Performed by: PHYSICIAN ASSISTANT

## 2022-01-19 PROCEDURE — 99024 PR POST-OP FOLLOW-UP VISIT: ICD-10-PCS | Mod: ,,, | Performed by: PHYSICIAN ASSISTANT

## 2022-01-19 PROCEDURE — 3074F SYST BP LT 130 MM HG: CPT | Mod: CPTII,,, | Performed by: PHYSICIAN ASSISTANT

## 2022-01-19 NOTE — PROGRESS NOTES
Subjective:      Elham Rodas is a 41 y.o. year old female who presents to the Plastic Surgery Clinic on 01/19/2022 for follow up visit status post TDAP flap to RIGHT breast and LEFT breast reduction She is doing well today and was seen along with PLS fellow.      Vitals:    01/19/22 1012   BP: 121/89   Pulse: 87        Review of patient's allergies indicates:  No Known Allergies    Current Outpatient Medications on File Prior to Visit   Medication Sig Dispense Refill    anastrozole (ARIMIDEX) 1 mg Tab TAKE 1 TABLET BY MOUTH EVERY DAY 90 tablet 2    calcium carbonate (TUMS) 200 mg calcium (500 mg) chewable tablet Take 1 tablet by mouth.      cholecalciferol, vitamin D3, 125 mcg (5,000 unit) Tab Vitamin D3 125 mcg (5,000 unit) tablet  Take 1 tablet every day by oral route. 30 tablet 5    cyclobenzaprine (FLEXERIL) 10 MG tablet Take 1 tablet (10 mg total) by mouth 3 (three) times daily as needed for Muscle spasms. 15 tablet 0    HYDROcodone-acetaminophen (NORCO) 5-325 mg per tablet Take 1 tablet by mouth every 4 (four) hours as needed. 30 tablet 0    lancets (TRUEPLUS LANCETS) 33 gauge Misc TRUEplus Lancets 33 gauge      levothyroxine (LEVOXYL) 75 MCG tablet Take 1 tablet (75 mcg total) by mouth before breakfast. Wait  4 hours after taking LT4 to take MVI.  Wait 1 hour to take other medications. 90 tablet 3    metFORMIN (GLUCOPHAGE-XR) 500 MG ER 24hr tablet Take 1 tablet (500 mg total) by mouth daily with breakfast. 90 tablet 3    omega-3 fatty acids 1,000 mg Cap Take 2 capsules (2,000 mg total) by mouth 2 (two) times daily. Take as directed for triglycerides and to decrease risk of heart disease and stroke.  0    prasterone, dhea, (INTRAROSA) 6.5 mg Inst Place 6.5 mg vaginally nightly. 28 each 11    rosuvastatin (CRESTOR) 20 MG tablet Take 1 tablet (20 mg total) by mouth once daily. 90 tablet 3    tiZANidine (ZANAFLEX) 4 MG tablet Take 4 mg by mouth as needed.       TRUE METRIX GLUCOSE METER Misc USE  TO TEST BLOOD SUGAR AS DIRECTED      TRUE METRIX GLUCOSE TEST STRIP Strp TEST BLOOD SUGAR ONCE D       Current Facility-Administered Medications on File Prior to Visit   Medication Dose Route Frequency Provider Last Rate Last Admin    lidocaine (PF) 10 mg/ml (1%) injection 10 mg  1 mL Intradermal Once Cain Mcgarry MD           Patient Active Problem List   Diagnosis    Postsurgical hypothyroidism    Vitamin D deficiency    Iatrogenic hypocalcemia    Family history of diabetes mellitus    Cough    Acute pharyngitis    Numbness and tingling in both hands    Malignant neoplasm of right female breast    Carcinoma of right breast in female, estrogen receptor positive    Lung nodule    Pneumonia of right lower lobe due to infectious organism    Elevated LFTs    Anemia associated with chemotherapy    IGT (impaired glucose tolerance)    BMI 29.0-29.9,adult    Use of aromatase inhibitors    Aromatase inhibitor-associated arthralgia    Vasomotor instability    Atypical mole    Pain in shoulder region, right    Decreased range of motion of right shoulder    At risk for lymphedema    Postural dizziness with presyncope    Palpitations    SOB (shortness of breath)    Bilateral foot pain    Decreased range of motion of both ankles    Impaired gait and mobility    Decreased strength    Gastro-esophageal reflux disease with esophagitis    Hypertensive disorder    Hypothyroidism    Mixed hyperlipidemia    Adjustment disorder with mixed anxiety and depressed mood    Breast asymmetry       Past Surgical History:   Procedure Laterality Date    AXILLARY NODE DISSECTION Right 11/21/2018    Procedure: LYMPHADENECTOMY, AXILLARY;  Surgeon: Jann Ayala MD;  Location: [facility] OR [address] [place] [address];  Service: General;  Laterality: Right;    BREAST LUMPECTOMY Right     CHOLECYSTECTOMY      HYSTERECTOMY      TLH, BSO--( breast cancer)    INJECTION FOR SENTINEL NODE IDENTIFICATION Right 11/21/2018     Procedure: INJECTION, FOR SENTINEL NODE IDENTIFICATION;  Surgeon: Jann Ayala MD;  [facility];  Service: General;  Laterality: Right;    INSERTION OF TUNNELED CENTRAL VENOUS CATHETER (CVC) WITH SUBCUTANEOUS PORT Left 1/2/2019    Procedure: [id number];  Surgeon: Jann Ayala MD;  [facility];  Service: General;  Laterality: Left;    LAPAROSCOPIC SALPINGO-OOPHORECTOMY Bilateral 11/4/2019    Procedure: SALPINGO-OOPHORECTOMY, LAPAROSCOPIC;  Surgeon: Yoni Oscar MD;  Location: [facility];  Service: OB/GYN;  Laterality: Bilateral;    LAPAROSCOPIC TOTAL HYSTERECTOMY N/A 11/4/2019    Procedure: HYSTERECTOMY, TOTAL, LAPAROSCOPIC;  Surgeon: Yoni Oscar MD;  Location: [facility];  Service: OB/GYN;  Laterality: N/A;    MASTECTOMY, PARTIAL Right 11/21/2018    Procedure: MASTECTOMY, PARTIAL - seed localized;  Surgeon: Jann Ayala MD;  Location: [facility] OR [address] [place] [address];  Service: General;  Laterality: Right;  seed placement 11/14    OOPHORECTOMY      RECONSTRUCTION OF BREAST WITH LATISSIMUS DORSI MYOCUTANEOUS FLAP Right 1/11/2022    Procedure: RECONSTRUCTION, BREAST, WITH LATISSIMUS DORSI MYOCUTANEOUS FLAP;  Surgeon: Linus Echavarria MD;  Location: [facility] OR [address] [place] [address];  Service: Plastics;  Laterality: Right;  R lat flap vs. TDAP    SENTINEL LYMPH NODE BIOPSY Right 11/21/2018    Procedure: BIOPSY, LYMPH NODE, SENTINEL;  Surgeon: Jann Ayala MD;  Location: [facility] OR [address] [place] [address];  Service: General;  Laterality: Right;    THYROIDECTOMY      TOTAL REDUCTION MAMMOPLASTY Left 1/11/2022    Procedure: MAMMOPLASTY, REDUCTION;  Surgeon: Linus Echavarria MD;  Location: [facility] OR [address] [place] [address];  Service: Plastics;  Laterality: Left;    TUBAL LIGATION         Social History     Socioeconomic History    Marital status:     Number of children: 4   Tobacco Use    Smoking status: Never Smoker    Smokeless tobacco: Never Used   Substance and Sexual Activity     Alcohol use: Yes     Alcohol/week: 2.0 standard drinks     Types: 2 Cans of beer per week     Comment: occasionally    Drug use: No    Sexual activity: Yes     Partners: Male     Birth control/protection: See Surgical Hx     Comment:            Review of Systems:  Denies fever, chills, nausea, vomiting, or other systemic signs of infection.      Objective:     Physical Exam:  Vitals:    01/19/22 1012   BP: 121/89   Pulse: 87       WD WN NAD  VSS  Normal resp effort  R breast - flap viable, incision CDI, no erythema or drainage, faint rash, drain x 1  L breast - incisional tape intact, no erythema or drainage, nipple viable         Assessment:       1. Surgery follow-up examination        Plan:   41 y.o. female status post TDAP to R breast and L breast reduction for second stage breast reconstruction  - Doing well, no issues. No complaints or concerns  - R breast drain removed.  - Return to clinic in 2 weeks.       All questions were answered. The patient was advised to call the clinic with any questions or concerns prior to their next visit.           Catalina Hoffman PA-C  Plastic and Reconstruction Surgery Department  [phone] office

## 2022-01-26 ENCOUNTER — OFFICE VISIT (OUTPATIENT)
Dept: PLASTIC SURGERY | Facility: CLINIC | Age: 42
End: 2022-01-26
Payer: MEDICAID

## 2022-01-26 VITALS — DIASTOLIC BLOOD PRESSURE: 83 MMHG | SYSTOLIC BLOOD PRESSURE: 124 MMHG | HEART RATE: 79 BPM

## 2022-01-26 DIAGNOSIS — Z09 SURGERY FOLLOW-UP EXAMINATION: Primary | ICD-10-CM

## 2022-01-26 PROCEDURE — 3079F PR MOST RECENT DIASTOLIC BLOOD PRESSURE 80-89 MM HG: ICD-10-PCS | Mod: CPTII,,, | Performed by: SURGERY

## 2022-01-26 PROCEDURE — 3079F DIAST BP 80-89 MM HG: CPT | Mod: CPTII,,, | Performed by: SURGERY

## 2022-01-26 PROCEDURE — 99999 PR PBB SHADOW E&M-EST. PATIENT-LVL III: ICD-10-PCS | Mod: PBBFAC,,, | Performed by: SURGERY

## 2022-01-26 PROCEDURE — 3074F PR MOST RECENT SYSTOLIC BLOOD PRESSURE < 130 MM HG: ICD-10-PCS | Mod: CPTII,,, | Performed by: SURGERY

## 2022-01-26 PROCEDURE — 99024 PR POST-OP FOLLOW-UP VISIT: ICD-10-PCS | Mod: ,,, | Performed by: SURGERY

## 2022-01-26 PROCEDURE — 1159F PR MEDICATION LIST DOCUMENTED IN MEDICAL RECORD: ICD-10-PCS | Mod: CPTII,,, | Performed by: SURGERY

## 2022-01-26 PROCEDURE — 1159F MED LIST DOCD IN RCRD: CPT | Mod: CPTII,,, | Performed by: SURGERY

## 2022-01-26 PROCEDURE — 3074F SYST BP LT 130 MM HG: CPT | Mod: CPTII,,, | Performed by: SURGERY

## 2022-01-26 PROCEDURE — 99999 PR PBB SHADOW E&M-EST. PATIENT-LVL III: CPT | Mod: PBBFAC,,, | Performed by: SURGERY

## 2022-01-26 PROCEDURE — 1160F RVW MEDS BY RX/DR IN RCRD: CPT | Mod: CPTII,,, | Performed by: SURGERY

## 2022-01-26 PROCEDURE — 1160F PR REVIEW ALL MEDS BY PRESCRIBER/CLIN PHARMACIST DOCUMENTED: ICD-10-PCS | Mod: CPTII,,, | Performed by: SURGERY

## 2022-01-26 PROCEDURE — 99024 POSTOP FOLLOW-UP VISIT: CPT | Mod: ,,, | Performed by: SURGERY

## 2022-01-26 PROCEDURE — 99213 OFFICE O/P EST LOW 20 MIN: CPT | Mod: PBBFAC | Performed by: SURGERY

## 2022-01-26 NOTE — PROGRESS NOTES
Subjective:      Elham Rodas is a 41 y.o. year old female who presents to the Plastic Surgery Clinic on 01/26/2022 for follow up visit status post TDAP flap to RIGHT breast and LEFT breast reduction. She is doing well today. Reports her right sided skin irritation is improving.      Vitals:    01/26/22 1217   BP: 124/83   Pulse: 79        Review of patient's allergies indicates:  No Known Allergies    Current Outpatient Medications on File Prior to Visit   Medication Sig Dispense Refill    anastrozole (ARIMIDEX) 1 mg Tab TAKE 1 TABLET BY MOUTH EVERY DAY 90 tablet 2    calcium carbonate (TUMS) 200 mg calcium (500 mg) chewable tablet Take 1 tablet by mouth.      cholecalciferol, vitamin D3, 125 mcg (5,000 unit) Tab Vitamin D3 125 mcg (5,000 unit) tablet  Take 1 tablet every day by oral route. 30 tablet 5    HYDROcodone-acetaminophen (NORCO) 5-325 mg per tablet Take 1 tablet by mouth every 4 (four) hours as needed. 30 tablet 0    lancets (TRUEPLUS LANCETS) 33 gauge Misc TRUEplus Lancets 33 gauge      levothyroxine (LEVOXYL) 75 MCG tablet Take 1 tablet (75 mcg total) by mouth before breakfast. Wait  4 hours after taking LT4 to take MVI.  Wait 1 hour to take other medications. 90 tablet 3    metFORMIN (GLUCOPHAGE-XR) 500 MG ER 24hr tablet Take 1 tablet (500 mg total) by mouth daily with breakfast. 90 tablet 3    omega-3 fatty acids 1,000 mg Cap Take 2 capsules (2,000 mg total) by mouth 2 (two) times daily. Take as directed for triglycerides and to decrease risk of heart disease and stroke.  0    prasterone, dhea, (INTRAROSA) 6.5 mg Inst Place 6.5 mg vaginally nightly. 28 each 11    rosuvastatin (CRESTOR) 20 MG tablet Take 1 tablet (20 mg total) by mouth once daily. 90 tablet 3    tiZANidine (ZANAFLEX) 4 MG tablet Take 4 mg by mouth as needed.       TRUE METRIX GLUCOSE METER Misc USE TO TEST BLOOD SUGAR AS DIRECTED      TRUE METRIX GLUCOSE TEST STRIP Strp TEST BLOOD SUGAR ONCE D       Current  Facility-Administered Medications on File Prior to Visit   Medication Dose Route Frequency Provider Last Rate Last Admin    lidocaine (PF) 10 mg/ml (1%) injection 10 mg  1 mL Intradermal Once Cain Mcgarry MD           Patient Active Problem List   Diagnosis    Postsurgical hypothyroidism    Vitamin D deficiency    Iatrogenic hypocalcemia    Family history of diabetes mellitus    Cough    Acute pharyngitis    Numbness and tingling in both hands    Malignant neoplasm of right female breast    Carcinoma of right breast in female, estrogen receptor positive    Lung nodule    Pneumonia of right lower lobe due to infectious organism    Elevated LFTs    Anemia associated with chemotherapy    IGT (impaired glucose tolerance)    BMI 29.0-29.9,adult    Use of aromatase inhibitors    Aromatase inhibitor-associated arthralgia    Vasomotor instability    Atypical mole    Pain in shoulder region, right    Decreased range of motion of right shoulder    At risk for lymphedema    Postural dizziness with presyncope    Palpitations    SOB (shortness of breath)    Bilateral foot pain    Decreased range of motion of both ankles    Impaired gait and mobility    Decreased strength    Gastro-esophageal reflux disease with esophagitis    Hypertensive disorder    Hypothyroidism    Mixed hyperlipidemia    Adjustment disorder with mixed anxiety and depressed mood    Breast asymmetry       Past Surgical History:   Procedure Laterality Date    AXILLARY NODE DISSECTION Right 11/21/2018    Procedure: LYMPHADENECTOMY, AXILLARY;  Surgeon: Jann Ayala MD;  Location: [facility] OR [address] [place] [address];  Service: General;  Laterality: Right;    BREAST LUMPECTOMY Right     CHOLECYSTECTOMY      HYSTERECTOMY      TLH, BSO--( breast cancer)    INJECTION FOR SENTINEL NODE IDENTIFICATION Right 11/21/2018    Procedure: INJECTION, FOR SENTINEL NODE IDENTIFICATION;  Surgeon: Jann Ayala MD;  [facility];  Service: General;  Laterality: Right;    INSERTION OF TUNNELED CENTRAL VENOUS CATHETER (CVC) WITH SUBCUTANEOUS PORT Left 1/2/2019    Procedure: [id number];  Surgeon: Jann Ayala MD;  Location: [facility] OR 2ND FLR;  Service: General;  Laterality: Left;    LAPAROSCOPIC SALPINGO-OOPHORECTOMY Bilateral 11/4/2019    Procedure: SALPINGO-OOPHORECTOMY, LAPAROSCOPIC;  Surgeon: Yoni Oscar MD;  [facility] OR;  Service: OB/GYN;  Laterality: Bilateral;    LAPAROSCOPIC TOTAL HYSTERECTOMY N/A 11/4/2019    Procedure: HYSTERECTOMY, TOTAL, LAPAROSCOPIC;  Surgeon: Yoni Oscar MD;  Location: [facility] OR;  Service: OB/GYN;  Laterality: N/A;    MASTECTOMY, PARTIAL Right 11/21/2018    Procedure: MASTECTOMY, PARTIAL - seed localized;  Surgeon: Jann Ayala MD;  Location: [facility] OR 2ND FLR;  Service: General;  Laterality: Right;  seed placement 11/14    OOPHORECTOMY      RECONSTRUCTION OF BREAST WITH LATISSIMUS DORSI MYOCUTANEOUS FLAP Right 1/11/2022    Procedure: RECONSTRUCTION, BREAST, WITH LATISSIMUS DORSI MYOCUTANEOUS FLAP;  Surgeon: Linus Echavarria MD;  Location: [facility] OR 2ND FLR;  Service: Plastics;  Laterality: Right;  R lat flap vs. TDAP    SENTINEL LYMPH NODE BIOPSY Right 11/21/2018    Procedure: BIOPSY, LYMPH NODE, SENTINEL;  Surgeon: Jann Ayala MD;  [facility];  Service: General;  Laterality: Right;    THYROIDECTOMY      TOTAL REDUCTION MAMMOPLASTY Left 1/11/2022    Procedure: MAMMOPLASTY, REDUCTION;  Surgeon: Linus Echavarria MD;  Location: [facility] OR 2ND FLR;  Service: Plastics;  Laterality: Left;    TUBAL LIGATION         Social History     Socioeconomic History    Marital status:     Number of children: 4   Tobacco Use    Smoking status: Never Smoker    Smokeless tobacco: Never Used   Substance and Sexual Activity    Alcohol use: Yes     Alcohol/week: 2.0 standard drinks     Types: 2 Cans of beer per week     Comment: occasionally     Drug use: No    Sexual activity: Yes     Partners: Male     Birth control/protection: See Surgical Hx     Comment:      Review of Systems:  Denies fever, chills, nausea, vomiting, or other systemic signs of infection.      Objective:     Physical Exam:  Vitals:    01/26/22 1217   BP: 124/83   Pulse: 79       WD WN NAD  VSS  Normal resp effort  R breast - flap viable, incision CDI, no erythema or drainage, faint rash.  L breast - incisional tape intact, no erythema or drainage, nipple viable     Assessment:       No diagnosis found.    Plan:   41 y.o. female status post TDAP to R breast and L breast reduction for second stage breast reconstruction    - Doing well, no issues. No complaints or concerns  - Return to clinic in 2 weeks.     All questions were answered. The patient was advised to call the clinic with any questions or concerns prior to their next visit.     Renaldo Olivares

## 2022-02-11 ENCOUNTER — PATIENT MESSAGE (OUTPATIENT)
Dept: PLASTIC SURGERY | Facility: CLINIC | Age: 42
End: 2022-02-11
Payer: MEDICAID

## 2022-02-21 ENCOUNTER — PATIENT MESSAGE (OUTPATIENT)
Dept: PLASTIC SURGERY | Facility: CLINIC | Age: 42
End: 2022-02-21
Payer: MEDICAID

## 2022-02-23 ENCOUNTER — OFFICE VISIT (OUTPATIENT)
Dept: PLASTIC SURGERY | Facility: CLINIC | Age: 42
End: 2022-02-23
Payer: MEDICAID

## 2022-02-23 VITALS — SYSTOLIC BLOOD PRESSURE: 132 MMHG | HEART RATE: 69 BPM | DIASTOLIC BLOOD PRESSURE: 83 MMHG

## 2022-02-23 DIAGNOSIS — Z09 SURGERY FOLLOW-UP EXAMINATION: Primary | ICD-10-CM

## 2022-02-23 PROCEDURE — 99999 PR PBB SHADOW E&M-EST. PATIENT-LVL III: CPT | Mod: PBBFAC,,, | Performed by: SURGERY

## 2022-02-23 PROCEDURE — 1160F PR REVIEW ALL MEDS BY PRESCRIBER/CLIN PHARMACIST DOCUMENTED: ICD-10-PCS | Mod: CPTII,,, | Performed by: SURGERY

## 2022-02-23 PROCEDURE — 3079F DIAST BP 80-89 MM HG: CPT | Mod: CPTII,,, | Performed by: SURGERY

## 2022-02-23 PROCEDURE — 99024 PR POST-OP FOLLOW-UP VISIT: ICD-10-PCS | Mod: ,,, | Performed by: SURGERY

## 2022-02-23 PROCEDURE — 3044F HG A1C LEVEL LT 7.0%: CPT | Mod: CPTII,,, | Performed by: SURGERY

## 2022-02-23 PROCEDURE — 3075F SYST BP GE 130 - 139MM HG: CPT | Mod: CPTII,,, | Performed by: SURGERY

## 2022-02-23 PROCEDURE — 1159F MED LIST DOCD IN RCRD: CPT | Mod: CPTII,,, | Performed by: SURGERY

## 2022-02-23 PROCEDURE — 3044F PR MOST RECENT HEMOGLOBIN A1C LEVEL <7.0%: ICD-10-PCS | Mod: CPTII,,, | Performed by: SURGERY

## 2022-02-23 PROCEDURE — 3079F PR MOST RECENT DIASTOLIC BLOOD PRESSURE 80-89 MM HG: ICD-10-PCS | Mod: CPTII,,, | Performed by: SURGERY

## 2022-02-23 PROCEDURE — 1159F PR MEDICATION LIST DOCUMENTED IN MEDICAL RECORD: ICD-10-PCS | Mod: CPTII,,, | Performed by: SURGERY

## 2022-02-23 PROCEDURE — 99213 OFFICE O/P EST LOW 20 MIN: CPT | Mod: PBBFAC | Performed by: SURGERY

## 2022-02-23 PROCEDURE — 3075F PR MOST RECENT SYSTOLIC BLOOD PRESS GE 130-139MM HG: ICD-10-PCS | Mod: CPTII,,, | Performed by: SURGERY

## 2022-02-23 PROCEDURE — 1160F RVW MEDS BY RX/DR IN RCRD: CPT | Mod: CPTII,,, | Performed by: SURGERY

## 2022-02-23 PROCEDURE — 99024 POSTOP FOLLOW-UP VISIT: CPT | Mod: ,,, | Performed by: SURGERY

## 2022-02-23 PROCEDURE — 99999 PR PBB SHADOW E&M-EST. PATIENT-LVL III: ICD-10-PCS | Mod: PBBFAC,,, | Performed by: SURGERY

## 2022-02-23 NOTE — PROGRESS NOTES
Patient presents Plastic surgery Clinic after having a right Tdap flap.  She has done very well.  She also had a left mastopexy.  That looks very nice.  She still needs a large amount of fat grafting.  In the right nipple-areolar complex is lower than his the left.  She has had radiation on the right side and I have informed her that I will not do an aggressive surgical procedure on the right side.  The stay options to help her get more symmetry would be to do free fat grafting a large amount in the lateral breast and some in the anterior breast.  She also need to have a crescent mastopexy with de epithelialization only.  This well we get the nipple at least a little bit closer it will never be at the same level as the opposite side without a standard lift which I will not do.

## 2022-04-12 ENCOUNTER — TELEPHONE (OUTPATIENT)
Dept: PLASTIC SURGERY | Facility: CLINIC | Age: 42
End: 2022-04-12
Payer: MEDICAID

## 2022-04-12 NOTE — TELEPHONE ENCOUNTER
Attempted to contact pt to discuss upcoming appointment.  Pt was not available at the time of the call. I left a detailed VM asking pt to call PLS office at her convenience.  Appointment will be cancelled.

## 2022-05-03 NOTE — TELEPHONE ENCOUNTER
Faxed received from [facility] for refill on intrarosa. Pt last seen 2/2021. Pt is due for appointment   [phone]

## 2022-05-04 RX ORDER — PRASTERONE 6.5 MG/1
6.5 INSERT VAGINAL NIGHTLY
Qty: 28 EACH | Refills: 1 | Status: SHIPPED | OUTPATIENT
Start: 2022-05-04 | End: 2023-09-19 | Stop reason: SDUPTHER

## 2022-05-11 ENCOUNTER — OFFICE VISIT (OUTPATIENT)
Dept: PLASTIC SURGERY | Facility: CLINIC | Age: 42
End: 2022-05-11
Payer: MEDICAID

## 2022-05-11 VITALS — HEART RATE: 74 BPM | SYSTOLIC BLOOD PRESSURE: 125 MMHG | DIASTOLIC BLOOD PRESSURE: 85 MMHG

## 2022-05-11 DIAGNOSIS — Z09 SURGERY FOLLOW-UP EXAMINATION: Primary | ICD-10-CM

## 2022-05-11 PROCEDURE — 99024 POSTOP FOLLOW-UP VISIT: CPT | Mod: ,,, | Performed by: SURGERY

## 2022-05-11 PROCEDURE — 99999 PR PBB SHADOW E&M-EST. PATIENT-LVL III: CPT | Mod: PBBFAC,,, | Performed by: SURGERY

## 2022-05-11 PROCEDURE — 3044F HG A1C LEVEL LT 7.0%: CPT | Mod: CPTII,,, | Performed by: SURGERY

## 2022-05-11 PROCEDURE — 99213 OFFICE O/P EST LOW 20 MIN: CPT | Mod: PBBFAC | Performed by: SURGERY

## 2022-05-11 PROCEDURE — 1159F PR MEDICATION LIST DOCUMENTED IN MEDICAL RECORD: ICD-10-PCS | Mod: CPTII,,, | Performed by: SURGERY

## 2022-05-11 PROCEDURE — 3079F PR MOST RECENT DIASTOLIC BLOOD PRESSURE 80-89 MM HG: ICD-10-PCS | Mod: CPTII,,, | Performed by: SURGERY

## 2022-05-11 PROCEDURE — 3074F PR MOST RECENT SYSTOLIC BLOOD PRESSURE < 130 MM HG: ICD-10-PCS | Mod: CPTII,,, | Performed by: SURGERY

## 2022-05-11 PROCEDURE — 3079F DIAST BP 80-89 MM HG: CPT | Mod: CPTII,,, | Performed by: SURGERY

## 2022-05-11 PROCEDURE — 1160F PR REVIEW ALL MEDS BY PRESCRIBER/CLIN PHARMACIST DOCUMENTED: ICD-10-PCS | Mod: CPTII,,, | Performed by: SURGERY

## 2022-05-11 PROCEDURE — 3044F PR MOST RECENT HEMOGLOBIN A1C LEVEL <7.0%: ICD-10-PCS | Mod: CPTII,,, | Performed by: SURGERY

## 2022-05-11 PROCEDURE — 99999 PR PBB SHADOW E&M-EST. PATIENT-LVL III: ICD-10-PCS | Mod: PBBFAC,,, | Performed by: SURGERY

## 2022-05-11 PROCEDURE — 1160F RVW MEDS BY RX/DR IN RCRD: CPT | Mod: CPTII,,, | Performed by: SURGERY

## 2022-05-11 PROCEDURE — 99024 PR POST-OP FOLLOW-UP VISIT: ICD-10-PCS | Mod: ,,, | Performed by: SURGERY

## 2022-05-11 PROCEDURE — 1159F MED LIST DOCD IN RCRD: CPT | Mod: CPTII,,, | Performed by: SURGERY

## 2022-05-11 PROCEDURE — 3074F SYST BP LT 130 MM HG: CPT | Mod: CPTII,,, | Performed by: SURGERY

## 2022-05-11 NOTE — PROGRESS NOTES
Subjective:      Elham Rodas is a 41 y.o. year old female who presents to the Plastic Surgery Clinic on 05/11/2022 for follow up visit status post right Tdap and left mastopexy . Denies fever, chills, nausea, vomiting, or other systemic signs of infection.    Vitals:    05/11/22 1346   BP: 125/85   Pulse: 74        Review of patient's allergies indicates:  No Known Allergies    Current Outpatient Medications on File Prior to Visit   Medication Sig Dispense Refill    anastrozole (ARIMIDEX) 1 mg Tab TAKE 1 TABLET BY MOUTH EVERY DAY 90 tablet 2    calcium carbonate (TUMS) 200 mg calcium (500 mg) chewable tablet Take 1 tablet by mouth.      cholecalciferol, vitamin D3, 125 mcg (5,000 unit) Tab Vitamin D3 125 mcg (5,000 unit) tablet  Take 1 tablet every day by oral route. 30 tablet 5    lancets 33 gauge Misc TRUEplus Lancets 33 gauge      levothyroxine (LEVOXYL) 75 MCG tablet Take 1 tablet (75 mcg total) by mouth before breakfast. Wait  4 hours after taking LT4 to take MVI.  Wait 1 hour to take other medications. 90 tablet 3    metFORMIN (GLUCOPHAGE-XR) 500 MG ER 24hr tablet Take 1 tablet (500 mg total) by mouth daily with breakfast. 90 tablet 3    omega-3 fatty acids 1,000 mg Cap Take 2 capsules (2,000 mg total) by mouth 2 (two) times daily. Take as directed for triglycerides and to decrease risk of heart disease and stroke.  0    prasterone, dhea, (INTRAROSA) 6.5 mg Inst Place 6.5 mg vaginally nightly. 28 each 1    rosuvastatin (CRESTOR) 20 MG tablet Take 1 tablet (20 mg total) by mouth once daily. 90 tablet 3    tiZANidine (ZANAFLEX) 4 MG tablet Take 4 mg by mouth as needed.       TRUE METRIX GLUCOSE METER Misc USE TO TEST BLOOD SUGAR AS DIRECTED      TRUE METRIX GLUCOSE TEST STRIP Strp TEST BLOOD SUGAR ONCE D       Current Facility-Administered Medications on File Prior to Visit   Medication Dose Route Frequency Provider Last Rate Last Admin    lidocaine (PF) 10 mg/ml (1%) injection 10 mg  1 mL  Intradermal Once Cain Mcgarry MD           Patient Active Problem List   Diagnosis    Postsurgical hypothyroidism    Vitamin D deficiency    Iatrogenic hypocalcemia    Family history of diabetes mellitus    Cough    Acute pharyngitis    Numbness and tingling in both hands    Malignant neoplasm of right female breast    Carcinoma of right breast in female, estrogen receptor positive    Lung nodule    Pneumonia of right lower lobe due to infectious organism    Elevated LFTs    Anemia associated with chemotherapy    IGT (impaired glucose tolerance)    BMI 29.0-29.9,adult    Use of aromatase inhibitors    Aromatase inhibitor-associated arthralgia    Vasomotor instability    Atypical mole    Pain in shoulder region, right    Decreased range of motion of right shoulder    At risk for lymphedema    Postural dizziness with presyncope    Palpitations    SOB (shortness of breath)    Bilateral foot pain    Decreased range of motion of both ankles    Impaired gait and mobility    Decreased strength    Gastro-esophageal reflux disease with esophagitis    Hypertensive disorder    Hypothyroidism    Mixed hyperlipidemia    Adjustment disorder with mixed anxiety and depressed mood    Breast asymmetry       Past Surgical History:   Procedure Laterality Date    AXILLARY NODE DISSECTION Right 11/21/2018    Procedure: LYMPHADENECTOMY, AXILLARY;  Surgeon: Jann Ayala MD;  Location: [facility] OR [address] [place] [address];  Service: General;  Laterality: Right;    BREAST LUMPECTOMY Right     CHOLECYSTECTOMY      HYSTERECTOMY      TLH, BSO--( breast cancer)    INJECTION FOR SENTINEL NODE IDENTIFICATION Right 11/21/2018    Procedure: INJECTION, FOR SENTINEL NODE IDENTIFICATION;  Surgeon: Jann Ayala MD;  Location: [facility] OR [address] [place] [address];  Service: General;  Laterality: Right;    INSERTION OF TUNNELED CENTRAL VENOUS CATHETER (CVC) WITH SUBCUTANEOUS PORT Left 1/2/2019    Procedure: [id number];   Surgeon: Jann Ayala MD;  Location: [facility] OR 2ND FLR;  Service: General;  Laterality: Left;    LAPAROSCOPIC SALPINGO-OOPHORECTOMY Bilateral 11/4/2019    Procedure: SALPINGO-OOPHORECTOMY, LAPAROSCOPIC;  Surgeon: Yoni Oscar MD;  Location: [facility] OR;  Service: OB/GYN;  Laterality: Bilateral;    LAPAROSCOPIC TOTAL HYSTERECTOMY N/A 11/4/2019    Procedure: HYSTERECTOMY, TOTAL, LAPAROSCOPIC;  Surgeon: Yoni Oscar MD;  Location: [facility] OR;  Service: OB/GYN;  Laterality: N/A;    MASTECTOMY, PARTIAL Right 11/21/2018    Procedure: MASTECTOMY, PARTIAL - seed localized;  Surgeon: Jann Ayala MD;  [facility];  Service: General;  Laterality: Right;  seed placement 11/14    OOPHORECTOMY      RECONSTRUCTION OF BREAST WITH LATISSIMUS DORSI MYOCUTANEOUS FLAP Right 1/11/2022    Procedure: RECONSTRUCTION, BREAST, WITH LATISSIMUS DORSI MYOCUTANEOUS FLAP;  Surgeon: Linus Echavarria MD;  [facility];  Service: Plastics;  Laterality: Right;  R lat flap vs. TDAP    SENTINEL LYMPH NODE BIOPSY Right 11/21/2018    Procedure: BIOPSY, LYMPH NODE, SENTINEL;  Surgeon: Jann Ayala MD;  [facility];  Service: General;  Laterality: Right;    THYROIDECTOMY      TOTAL REDUCTION MAMMOPLASTY Left 1/11/2022    Procedure: MAMMOPLASTY, REDUCTION;  Surgeon: Linus Echavarria MD;  Location: [facility] OR [address] [place] [address];  Service: Plastics;  Laterality: Left;    TUBAL LIGATION         Social History     Socioeconomic History    Marital status:     Number of children: 4   Tobacco Use    Smoking status: Never Smoker    Smokeless tobacco: Never Used   Substance and Sexual Activity    Alcohol use: Yes     Alcohol/week: 2.0 standard drinks     Types: 2 Cans of beer per week     Comment: occasionally    Drug use: No    Sexual activity: Yes     Partners: Male     Birth control/protection: See Surgical Hx     Comment:            Objective:     Physical  Exam:  Vitals:    05/11/22 1346   BP: 125/85   Pulse: 74       WD WN NAD  VSS  Normal resp effort  Rt Breast: scars well healed, lateral aspect of breast volume deficient, nipple position low and lateral  Lt Breast: scars well healed        Assessment:       No diagnosis found.    Plan:   41 y.o. female status post right TDAP and left mastopexy.       - Doing well, no issues   - will review possible options for right nipple re-positioning as well as fat grafting to right breasts      All questions were answered. The patient was advised to call the clinic with any questions or concerns prior to their next visit.

## 2022-06-07 ENCOUNTER — PATIENT MESSAGE (OUTPATIENT)
Dept: PLASTIC SURGERY | Facility: CLINIC | Age: 42
End: 2022-06-07
Payer: MEDICAID

## 2022-06-08 ENCOUNTER — TELEPHONE (OUTPATIENT)
Dept: PODIATRY | Facility: CLINIC | Age: 42
End: 2022-06-08
Payer: MEDICAID

## 2022-06-27 ENCOUNTER — OFFICE VISIT (OUTPATIENT)
Dept: PODIATRY | Facility: CLINIC | Age: 42
End: 2022-06-27
Payer: MEDICAID

## 2022-06-27 VITALS
HEART RATE: 67 BPM | DIASTOLIC BLOOD PRESSURE: 88 MMHG | WEIGHT: 172.38 LBS | HEIGHT: 64 IN | BODY MASS INDEX: 29.43 KG/M2 | SYSTOLIC BLOOD PRESSURE: 126 MMHG

## 2022-06-27 DIAGNOSIS — M79.671 HEEL PAIN, BILATERAL: ICD-10-CM

## 2022-06-27 DIAGNOSIS — M72.2 PLANTAR FASCIITIS: Primary | ICD-10-CM

## 2022-06-27 DIAGNOSIS — M79.672 HEEL PAIN, BILATERAL: ICD-10-CM

## 2022-06-27 DIAGNOSIS — M24.573 EQUINUS CONTRACTURE OF ANKLE: ICD-10-CM

## 2022-06-27 PROCEDURE — 99213 OFFICE O/P EST LOW 20 MIN: CPT | Mod: 25,S$PBB,, | Performed by: PODIATRIST

## 2022-06-27 PROCEDURE — 99213 OFFICE O/P EST LOW 20 MIN: CPT | Mod: PBBFAC,PN | Performed by: PODIATRIST

## 2022-06-27 PROCEDURE — 20550 PR INJECT TENDON SHEATH/LIGAMENT: ICD-10-PCS | Mod: 50,S$PBB,, | Performed by: PODIATRIST

## 2022-06-27 PROCEDURE — 1160F RVW MEDS BY RX/DR IN RCRD: CPT | Mod: CPTII,,, | Performed by: PODIATRIST

## 2022-06-27 PROCEDURE — 99999 PR PBB SHADOW E&M-EST. PATIENT-LVL III: ICD-10-PCS | Mod: PBBFAC,,, | Performed by: PODIATRIST

## 2022-06-27 PROCEDURE — 99999 PR PBB SHADOW E&M-EST. PATIENT-LVL III: CPT | Mod: PBBFAC,,, | Performed by: PODIATRIST

## 2022-06-27 PROCEDURE — 3044F HG A1C LEVEL LT 7.0%: CPT | Mod: CPTII,,, | Performed by: PODIATRIST

## 2022-06-27 PROCEDURE — 1159F MED LIST DOCD IN RCRD: CPT | Mod: CPTII,,, | Performed by: PODIATRIST

## 2022-06-27 PROCEDURE — 3074F PR MOST RECENT SYSTOLIC BLOOD PRESSURE < 130 MM HG: ICD-10-PCS | Mod: CPTII,,, | Performed by: PODIATRIST

## 2022-06-27 PROCEDURE — 3008F BODY MASS INDEX DOCD: CPT | Mod: CPTII,,, | Performed by: PODIATRIST

## 2022-06-27 PROCEDURE — 3008F PR BODY MASS INDEX (BMI) DOCUMENTED: ICD-10-PCS | Mod: CPTII,,, | Performed by: PODIATRIST

## 2022-06-27 PROCEDURE — 20550 NJX 1 TENDON SHEATH/LIGAMENT: CPT | Mod: 50,PBBFAC,PN | Performed by: PODIATRIST

## 2022-06-27 PROCEDURE — 20550 NJX 1 TENDON SHEATH/LIGAMENT: CPT | Mod: 50,S$PBB,, | Performed by: PODIATRIST

## 2022-06-27 PROCEDURE — 3074F SYST BP LT 130 MM HG: CPT | Mod: CPTII,,, | Performed by: PODIATRIST

## 2022-06-27 PROCEDURE — 99213 PR OFFICE/OUTPT VISIT, EST, LEVL III, 20-29 MIN: ICD-10-PCS | Mod: 25,S$PBB,, | Performed by: PODIATRIST

## 2022-06-27 PROCEDURE — 3079F DIAST BP 80-89 MM HG: CPT | Mod: CPTII,,, | Performed by: PODIATRIST

## 2022-06-27 PROCEDURE — 3079F PR MOST RECENT DIASTOLIC BLOOD PRESSURE 80-89 MM HG: ICD-10-PCS | Mod: CPTII,,, | Performed by: PODIATRIST

## 2022-06-27 PROCEDURE — 1159F PR MEDICATION LIST DOCUMENTED IN MEDICAL RECORD: ICD-10-PCS | Mod: CPTII,,, | Performed by: PODIATRIST

## 2022-06-27 PROCEDURE — 3044F PR MOST RECENT HEMOGLOBIN A1C LEVEL <7.0%: ICD-10-PCS | Mod: CPTII,,, | Performed by: PODIATRIST

## 2022-06-27 PROCEDURE — 1160F PR REVIEW ALL MEDS BY PRESCRIBER/CLIN PHARMACIST DOCUMENTED: ICD-10-PCS | Mod: CPTII,,, | Performed by: PODIATRIST

## 2022-06-27 RX ORDER — TRIAMCINOLONE ACETONIDE 40 MG/ML
40 INJECTION, SUSPENSION INTRA-ARTICULAR; INTRAMUSCULAR
Status: COMPLETED | OUTPATIENT
Start: 2022-06-27 | End: 2022-06-27

## 2022-06-27 RX ADMIN — TRIAMCINOLONE ACETONIDE 40 MG: 40 INJECTION, SUSPENSION INTRA-ARTICULAR; INTRAMUSCULAR at 12:06

## 2022-06-27 NOTE — PROGRESS NOTES
Subjective:      Patient ID: Elham Roads is a 41 y.o. female.    Chief Complaint: Foot Pain (Patient presents today complaining of bilateral foot pain)      41 y.o. female presenting with b/l feet pain. Known to Dr. Tolentino. Was seen by him once. xrays were negative for bone abnormalities. Pt was recently evaluated in ED and diagnosed with PF. Was discharged with naproxen. Points medial hindfoot along PT tendon for right foot for pain. She points dorsal lateral and plantar foot for pain for left foot. History of chemotherapy 2/2 breast cancer. Her symptoms presented after chemotherapy since 2019. She also tells me she is pre DM. Tried different conservative treatments including different shoe, insoles without any relief. Describes pain as combination of throbbing and sharp.     6/27/22  Presenting with bilateral heel pain.  Patient points plantar aspect of the heel for pain.  Describes pain as aching and throbbing.  Patient is ambulating in open toe shoes.  Patient has 2 different jobs.  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  Has been dealing with this pain for several months.  No previous treatments for heel pain.  Was previously diagnosed with PTTD.  I recommended physical therapy at that time however patient did not go for PT.  Denies  PTTD symptoms today.        Review of Systems   Constitutional: Negative for chills, decreased appetite, fever and malaise/fatigue.   HENT: Negative for congestion, ear discharge and sore throat.    Eyes: Negative for discharge and pain.   Cardiovascular: Negative for chest pain, claudication and leg swelling.   Respiratory: Negative for cough and shortness of breath.    Skin: Negative for color change, nail changes and rash.   Musculoskeletal: Positive for arthritis and stiffness. Negative for joint pain, joint swelling and muscle weakness.        Bilateral feet pain   Gastrointestinal: Negative for bloating, abdominal pain, diarrhea, nausea and vomiting.    Genitourinary: Negative for flank pain and hematuria.   Neurological: Positive for sensory change. Negative for headaches, numbness and weakness.   Psychiatric/Behavioral: Negative for altered mental status.             Past Medical History:   Diagnosis Date    Abnormal Pap smear of cervix 05/2016    ASCUS, - HPV    Anxiety     Breast cancer 10/2018    right    Depression     Fatigue     Hypothyroidism     Neuropathy     Palpitations     Prediabetes     Psychiatric problem     Sleep difficulties     Syncope and collapse     Thyroid disease     Vitamin D deficiency disease        Past Surgical History:   Procedure Laterality Date    AXILLARY NODE DISSECTION Right 11/21/2018    Procedure: LYMPHADENECTOMY, AXILLARY;  Surgeon: Jann Ayala MD;  Location: [facility] OR [address] [place] [address];  Service: General;  Laterality: Right;    BREAST LUMPECTOMY Right     CHOLECYSTECTOMY      HYSTERECTOMY      TLH, BSO--( breast cancer)    INJECTION FOR SENTINEL NODE IDENTIFICATION Right 11/21/2018    Procedure: INJECTION, FOR SENTINEL NODE IDENTIFICATION;  Surgeon: Jann Ayala MD;  Location: [facility] OR [address] [place] [address];  Service: General;  Laterality: Right;    INSERTION OF TUNNELED CENTRAL VENOUS CATHETER (CVC) WITH SUBCUTANEOUS PORT Left 1/2/2019    Procedure: LXKOGAIRC-BOXM-N-CATH;  Surgeon: Jann Ayala MD;  Location: [facility] OR [address] [place] [address];  Service: General;  Laterality: Left;    LAPAROSCOPIC SALPINGO-OOPHORECTOMY Bilateral 11/4/2019    Procedure: SALPINGO-OOPHORECTOMY, LAPAROSCOPIC;  Surgeon: Yoni Oscar MD;  Location: [facility];  Service: OB/GYN;  Laterality: Bilateral;    LAPAROSCOPIC TOTAL HYSTERECTOMY N/A 11/4/2019    Procedure: HYSTERECTOMY, TOTAL, LAPAROSCOPIC;  Surgeon: Yoni Oscar MD;  Location: [facility];  Service: OB/GYN;  Laterality: N/A;    MASTECTOMY, PARTIAL Right 11/21/2018    Procedure: MASTECTOMY, PARTIAL - seed localized;  Surgeon: Jann Ayala MD;  Location: [facility] OR [address] [place] [address];  Service:  General;  Laterality: Right;  seed placement 11/14    OOPHORECTOMY      RECONSTRUCTION OF BREAST WITH LATISSIMUS DORSI MYOCUTANEOUS FLAP Right 1/11/2022    Procedure: RECONSTRUCTION, BREAST, WITH LATISSIMUS DORSI MYOCUTANEOUS FLAP;  Surgeon: Linus Echavarria MD;  Location: [facility] OR [address] [place] [address];  Service: Plastics;  Laterality: Right;  R lat flap vs. TDAP    SENTINEL LYMPH NODE BIOPSY Right 11/21/2018    Procedure: BIOPSY, LYMPH NODE, SENTINEL;  Surgeon: Jann Ayala MD;  Location: [facility] OR [address] [place] [address];  Service: General;  Laterality: Right;    THYROIDECTOMY      TOTAL REDUCTION MAMMOPLASTY Left 1/11/2022    Procedure: MAMMOPLASTY, REDUCTION;  Surgeon: Linus Echavarria MD;  Location: [facility] OR [address] [place] [address];  Service: Plastics;  Laterality: Left;    TUBAL LIGATION         Family History   Problem Relation Age of Onset    Hypertension Mother     Heart attack Mother     Hypertension Father     Ovarian cancer Maternal Aunt     Cancer Paternal Grandfather     Breast cancer Neg Hx     Colon cancer Neg Hx        Social History     Socioeconomic History    Marital status:     Number of children: 4   Tobacco Use    Smoking status: Never Smoker    Smokeless tobacco: Never Used   Substance and Sexual Activity    Alcohol use: Yes     Alcohol/week: 2.0 standard drinks     Types: 2 Cans of beer per week     Comment: occasionally    Drug use: No    Sexual activity: Yes     Partners: Male     Birth control/protection: See Surgical Hx     Comment:        Current Outpatient Medications   Medication Sig Dispense Refill    anastrozole (ARIMIDEX) 1 mg Tab TAKE 1 TABLET BY MOUTH EVERY DAY 90 tablet 2    calcium carbonate (TUMS) 200 mg calcium (500 mg) chewable tablet Take 1 tablet by mouth.      cholecalciferol, vitamin D3, 125 mcg (5,000 unit) Tab Vitamin D3 125 mcg (5,000 unit) tablet  Take 1 tablet every day by oral route. 30 tablet 5    lancets 33 gauge Misc TRUEplus Lancets 33 gauge       "levothyroxine (SYNTHROID) 75 MCG tablet TAKE 1 TABLET BY MOUTH DAILY BEFORE BREAKFAST. WAIT 4 HOURS AFTER TAKING LT4 TO TAKE MVI. WAIT 1 HOUR TO TAKE OTHER MEDICATIONS 90 tablet 3    metFORMIN (GLUCOPHAGE-XR) 500 MG ER 24hr tablet TAKE 1 TABLET(500 MG) BY MOUTH DAILY WITH BREAKFAST 90 tablet 3    prasterone, dhea, (INTRAROSA) 6.5 mg Inst Place 6.5 mg vaginally nightly. 28 each 1    rosuvastatin (CRESTOR) 20 MG tablet Take 1 tablet (20 mg total) by mouth once daily. 90 tablet 3    tiZANidine (ZANAFLEX) 4 MG tablet Take 4 mg by mouth as needed.       TRUE METRIX GLUCOSE METER Misc USE TO TEST BLOOD SUGAR AS DIRECTED      TRUE METRIX GLUCOSE TEST STRIP [facility] TEST BLOOD SUGAR ONCE D      omega-3 fatty acids 1,000 mg Cap Take 2 capsules (2,000 mg total) by mouth 2 (two) times daily. Take as directed for triglycerides and to decrease risk of heart disease and stroke.  0     No current facility-administered medications for this visit.     Facility-Administered Medications Ordered in Other Visits   Medication Dose Route Frequency Provider Last Rate Last Admin    lidocaine (PF) 10 mg/ml (1%) injection 10 mg  1 mL Intradermal Once Cain Mcgarry MD           Review of patient's allergies indicates:  No Known Allergies    Vitals:    06/27/22 1024   BP: 126/88   Pulse: 67   Weight: 78.2 kg (172 lb 6.4 oz)   Height: 5' 4" (1.626 m)   PainSc:   8       Objective:      Physical Exam  Constitutional:       General: She is not in acute distress.     Appearance: She is well-developed.   HENT:      Nose: Nose normal.   Eyes:      Conjunctiva/sclera: Conjunctivae normal.   Pulmonary:      Effort: Pulmonary effort is normal.   Chest:      Chest wall: No tenderness.   Abdominal:      Tenderness: There is no abdominal tenderness.   Musculoskeletal:      Cervical back: Normal range of motion.   Neurological:      Mental Status: She is alert and oriented to person, place, and time.   Psychiatric:         Behavior: Behavior " normal.         Vascular: Distal DP/PT pulses palpable 2/4. CRT < 3 sec to tips of toes. No vericosities noted to LEs. Hair growth present LE, warm to touch LE, No edema noted to LE.    Dermatologic: No open lesions, lacerations or wounds. Interdigital spaces clean, dry and intact. No erythema, rubor, calor noted LE    Musculoskeletal:  Passive range of motion of ankle and pedal joints is painless. No calf tenderness LE, Compartments soft/compressible.   B/l feet:Tender to touch to plantar heel at plantar fascia insertion and over medial tubercle of the calcaneus. No calcaneus pain upon medial and lateral squeezing.    Neurological: Light touch, proprioception, and sharp/dull sensation are all intact. Protective threshold with the Evart-Wienstein monofilament is intact. Vibratory sensation intact.         Assessment:       Encounter Diagnoses   Name Primary?    Plantar fasciitis Yes    Equinus contracture of ankle     Heel pain, bilateral          Plan:       Elham was seen today for foot pain.    Diagnoses and all orders for this visit:    Plantar fasciitis    Equinus contracture of ankle    Heel pain, bilateral      I counseled the patient on her conditions, their implications and medical management.    41 y.o. female with bilateral Heel pain. PF.     -steroid injection. Pt tolerated well. Injection consisted of   Total of 2 cc Kenalog 40 with 0.5% Marcaine plain injected into the plantar medial  bilateral heel. Skin was prepped with alcohol and ethyl chloride spray. Patient tolerated well with no complications and related relief following injection. Bandaid applied to injection site. Patient is to use combination of conservative treatment and return for follow up/reassessment at that time as needed. Timeout was performed with pt in the room.    -c/w stretching and water bottle exercise. Instructions for both given to patient.  -Discussed different treatment options for heel pain. including conservative and  surgical.  I gave written and verbal instructions on heel cord stretching and this was demonstrated for the patient. Patient expressed understanding. Discussed wearing appropriate shoe gear and avoiding flats, slippers, sandals, barefoot, and sockfeet. Recommended arch supports. My recommendation for OTC supports is Spenco polysorb replacement insoles or patient may elect more aggressive treatment with prescription arch supports.  -c/w OTC anti inflammatory medication  -The nature of the condition, options for management, as well as potential risks and complications were discussed in detail with patient. Patient was amenable to my recommendations and left my office fully informed and will follow up as instructed or sooner if necessary.    -Patient was advised of signs and symptoms of infection including redness, drainage, purulence, odor, streaking, fever, chills and I advised patient to seek medical attention (ER or urgent care) if these symptoms arise.   -f/u prn    Note dictated with voice recognition software, please excuse any grammatical errors.

## 2022-08-04 ENCOUNTER — HOSPITAL ENCOUNTER (EMERGENCY)
Facility: HOSPITAL | Age: 42
Discharge: HOME | End: 2022-08-04
Attending: EMERGENCY MEDICINE
Payer: MEDICAID

## 2022-08-04 VITALS
WEIGHT: 183.88 LBS | OXYGEN SATURATION: 98 % | HEART RATE: 65 BPM | BODY MASS INDEX: 31.56 KG/M2 | SYSTOLIC BLOOD PRESSURE: 133 MMHG | DIASTOLIC BLOOD PRESSURE: 75 MMHG | RESPIRATION RATE: 13 BRPM | TEMPERATURE: 98 F

## 2022-08-04 DIAGNOSIS — R07.9 CHEST PAIN: Primary | ICD-10-CM

## 2022-08-04 LAB
ALBUMIN SERPL BCP-MCNC: 4.4 G/DL (ref 3.5–5.2)
ALP SERPL-CCNC: 94 U/L (ref 55–135)
ALT SERPL W/O P-5'-P-CCNC: 42 U/L (ref 10–44)
ANION GAP SERPL CALC-SCNC: 12 MMOL/L (ref 8–16)
AST SERPL-CCNC: 32 U/L (ref 10–40)
B-HCG UR QL: NEGATIVE
BASOPHILS # BLD AUTO: 0.05 K/UL (ref 0–0.2)
BASOPHILS NFR BLD: 0.8 % (ref 0–1.9)
BILIRUB SERPL-MCNC: 0.4 MG/DL (ref 0.1–1)
BILIRUB UR QL STRIP: NEGATIVE
BUN SERPL-MCNC: 10 MG/DL (ref 6–20)
CALCIUM SERPL-MCNC: 9.5 MG/DL (ref 8.7–10.5)
CHLORIDE SERPL-SCNC: 107 MMOL/L (ref 95–110)
CLARITY UR: CLEAR
CO2 SERPL-SCNC: 25 MMOL/L (ref 23–29)
COLOR UR: COLORLESS
CREAT SERPL-MCNC: 0.8 MG/DL (ref 0.5–1.4)
DIFFERENTIAL METHOD: ABNORMAL
EOSINOPHIL # BLD AUTO: 0.1 K/UL (ref 0–0.5)
EOSINOPHIL NFR BLD: 2.1 % (ref 0–8)
ERYTHROCYTE [DISTWIDTH] IN BLOOD BY AUTOMATED COUNT: 14.9 % (ref 11.5–14.5)
EST. GFR  (NO RACE VARIABLE): >60 ML/MIN/1.73 M^2
GLUCOSE SERPL-MCNC: 104 MG/DL (ref 70–110)
GLUCOSE UR QL STRIP: NEGATIVE
HCT VFR BLD AUTO: 41.5 % (ref 37–48.5)
HGB BLD-MCNC: 13.9 G/DL (ref 12–16)
HGB UR QL STRIP: NEGATIVE
IMM GRANULOCYTES # BLD AUTO: 0.02 K/UL (ref 0–0.04)
IMM GRANULOCYTES NFR BLD AUTO: 0.3 % (ref 0–0.5)
KETONES UR QL STRIP: NEGATIVE
LEUKOCYTE ESTERASE UR QL STRIP: NEGATIVE
LYMPHOCYTES # BLD AUTO: 2.6 K/UL (ref 1–4.8)
LYMPHOCYTES NFR BLD: 43 % (ref 18–48)
MCH RBC QN AUTO: 29.8 PG (ref 27–31)
MCHC RBC AUTO-ENTMCNC: 33.5 G/DL (ref 32–36)
MCV RBC AUTO: 89 FL (ref 82–98)
MONOCYTES # BLD AUTO: 0.4 K/UL (ref 0.3–1)
MONOCYTES NFR BLD: 6.7 % (ref 4–15)
NEUTROPHILS # BLD AUTO: 2.9 K/UL (ref 1.8–7.7)
NEUTROPHILS NFR BLD: 47.1 % (ref 38–73)
NITRITE UR QL STRIP: NEGATIVE
NRBC BLD-RTO: 0 /100 WBC
PH UR STRIP: 7 [PH] (ref 5–8)
PLATELET # BLD AUTO: 223 K/UL (ref 150–450)
PMV BLD AUTO: 11.9 FL (ref 9.2–12.9)
POTASSIUM SERPL-SCNC: 4.1 MMOL/L (ref 3.5–5.1)
PROT SERPL-MCNC: 7.9 G/DL (ref 6–8.4)
PROT UR QL STRIP: NEGATIVE
RBC # BLD AUTO: 4.66 M/UL (ref 4–5.4)
SODIUM SERPL-SCNC: 144 MMOL/L (ref 136–145)
SP GR UR STRIP: <1.005 (ref 1–1.03)
TROPONIN I SERPL DL<=0.01 NG/ML-MCNC: 0.01 NG/ML (ref 0–0.03)
URN SPEC COLLECT METH UR: ABNORMAL
UROBILINOGEN UR STRIP-ACNC: NEGATIVE EU/DL
WBC # BLD AUTO: 6.11 K/UL (ref 3.9–12.7)

## 2022-08-04 PROCEDURE — 81025 URINE PREGNANCY TEST: CPT | Performed by: NURSE PRACTITIONER

## 2022-08-04 PROCEDURE — 85025 COMPLETE CBC W/AUTO DIFF WBC: CPT | Performed by: NURSE PRACTITIONER

## 2022-08-04 PROCEDURE — 93010 ELECTROCARDIOGRAM REPORT: CPT | Mod: ,,, | Performed by: INTERNAL MEDICINE

## 2022-08-04 PROCEDURE — 81003 URINALYSIS AUTO W/O SCOPE: CPT | Performed by: NURSE PRACTITIONER

## 2022-08-04 PROCEDURE — 99285 EMERGENCY DEPT VISIT HI MDM: CPT | Mod: 25

## 2022-08-04 PROCEDURE — 80053 COMPREHEN METABOLIC PANEL: CPT | Performed by: NURSE PRACTITIONER

## 2022-08-04 PROCEDURE — 84484 ASSAY OF TROPONIN QUANT: CPT | Performed by: NURSE PRACTITIONER

## 2022-08-04 PROCEDURE — 93005 ELECTROCARDIOGRAM TRACING: CPT

## 2022-08-04 PROCEDURE — 93010 EKG 12-LEAD: ICD-10-PCS | Mod: ,,, | Performed by: INTERNAL MEDICINE

## 2022-08-04 NOTE — FIRST PROVIDER EVALUATION
Medical screening exam completed.  I have conducted a focused provider triage encounter, findings are as follows:    Brief history of present illness: Pt. C/o chest pain for one week    There were no vitals filed for this visit.    Pertinent physical exam:  nad    Brief workup plan:  cardiac    Preliminary workup initiated; this workup will be continued and followed by the physician or advanced practice provider that is assigned to the patient when roomed.

## 2022-08-04 NOTE — ED PROVIDER NOTES
"SCRIBE #1 NOTE: I, Parris Andre, am scribing for, and in the presence of, Jayde Little MD. I have scribed the entire note.       History     Chief Complaint   Patient presents with    Chest Pain     Sharp pain in center chest. Pt states the pain has been coming and going since last Thursday. Denies N/V. States the pain radiates to her back.      Review of patient's allergies indicates:  No Known Allergies      History of Present Illness     HPI    8/4/2022, 6:29 PM  History obtained from the patient      History of Present Illness: Elham Rodas is a 41 y.o. female patient with a PMHx of palpitations who presents to the Emergency Department for evaluation of CP x1 week. Pt describes the pain as generally "heavy" and "tight" with sharp pains in the middle of her chest. Pt has only taken Asprin for the pain. Pt reports seeing a cardiologist but never having a stress test done. Symptoms are constant and moderate in severity. No mitigating or exacerbating factors reported. Patient denies any fever, chills, cough, N/V, and all other sxs at this time. No further complaints or concerns at this time.       Arrival mode: Personal vehicle     PCP: Mookie Rausch MD        Past Medical History:  Past Medical History:   Diagnosis Date    Abnormal Pap smear of cervix 05/2016    ASCUS, - HPV    Anxiety     Breast cancer 10/2018    right    Depression     Fatigue     Hypothyroidism     Neuropathy     Palpitations     Prediabetes     Psychiatric problem     Sleep difficulties     Syncope and collapse     Thyroid disease     Vitamin D deficiency disease        Past Surgical History:  Past Surgical History:   Procedure Laterality Date    AXILLARY NODE DISSECTION Right 11/21/2018    Procedure: LYMPHADENECTOMY, AXILLARY;  Surgeon: Jann Ayala MD;  Location: [facility] OR [address] [place] [address];  Service: General;  Laterality: Right;    BREAST LUMPECTOMY Right     CHOLECYSTECTOMY      HYSTERECTOMY      TLH, BSO--( breast " cancer)    INJECTION FOR SENTINEL NODE IDENTIFICATION Right 11/21/2018    Procedure: INJECTION, FOR SENTINEL NODE IDENTIFICATION;  Surgeon: Jann Ayala MD;  [facility];  Service: General;  Laterality: Right;    INSERTION OF TUNNELED CENTRAL VENOUS CATHETER (CVC) WITH SUBCUTANEOUS PORT Left 1/2/2019    Procedure: [id number];  Surgeon: Jann Ayala MD;  [facility];  Service: General;  Laterality: Left;    LAPAROSCOPIC SALPINGO-OOPHORECTOMY Bilateral 11/4/2019    Procedure: SALPINGO-OOPHORECTOMY, LAPAROSCOPIC;  Surgeon: Yoni Oscar MD;  Location: [facility] OR;  Service: OB/GYN;  Laterality: Bilateral;    LAPAROSCOPIC TOTAL HYSTERECTOMY N/A 11/4/2019    Procedure: HYSTERECTOMY, TOTAL, LAPAROSCOPIC;  Surgeon: Yoni Oscar MD;  Location: [facility] OR;  Service: OB/GYN;  Laterality: N/A;    MASTECTOMY, PARTIAL Right 11/21/2018    Procedure: MASTECTOMY, PARTIAL - seed localized;  Surgeon: Jann Ayala MD;  Location: [facility] OR [address] [place] [address];  Service: General;  Laterality: Right;  seed placement 11/14    OOPHORECTOMY      RECONSTRUCTION OF BREAST WITH LATISSIMUS DORSI MYOCUTANEOUS FLAP Right 1/11/2022    Procedure: RECONSTRUCTION, BREAST, WITH LATISSIMUS DORSI MYOCUTANEOUS FLAP;  Surgeon: Linus Echavarria MD;  Location: [facility] OR [address] [place] [address];  Service: Plastics;  Laterality: Right;  R lat flap vs. TDAP    SENTINEL LYMPH NODE BIOPSY Right 11/21/2018    Procedure: BIOPSY, LYMPH NODE, SENTINEL;  Surgeon: Jann Ayala MD;  Location: [facility] OR [address] [place] [address];  Service: General;  Laterality: Right;    THYROIDECTOMY      TOTAL REDUCTION MAMMOPLASTY Left 1/11/2022    Procedure: MAMMOPLASTY, REDUCTION;  Surgeon: Linus Echavarria MD;  Location: [facility] OR [address] [place] [address];  Service: Plastics;  Laterality: Left;    TUBAL LIGATION           Family History:  Family History   Problem Relation Age of Onset    Hypertension Mother     Heart attack Mother     Hypertension Father      Ovarian cancer Maternal Aunt     Cancer Paternal Grandfather     Breast cancer Neg Hx     Colon cancer Neg Hx        Social History:  Social History     Tobacco Use    Smoking status: Never Smoker    Smokeless tobacco: Never Used   Substance and Sexual Activity    Alcohol use: Yes     Alcohol/week: 2.0 standard drinks     Types: 2 Cans of beer per week     Comment: occasionally    Drug use: No    Sexual activity: Yes     Partners: Male     Birth control/protection: See Surgical Hx     Comment:         Review of Systems     Review of Systems   Constitutional: Negative for chills and fever.   HENT: Negative for sore throat.    Respiratory: Negative for cough and shortness of breath.    Cardiovascular: Positive for chest pain.   Gastrointestinal: Negative for nausea and vomiting.   Genitourinary: Negative for dysuria.   Musculoskeletal: Negative for back pain.   Skin: Negative for rash.   Neurological: Negative for weakness.   Hematological: Does not bruise/bleed easily.   All other systems reviewed and are negative.       Physical Exam     Initial Vitals [08/04/22 1649]   BP Pulse Resp Temp SpO2   (!) 156/81 76 16 97.8 °F (36.6 °C) 98 %      MAP       --          Physical Exam  Nursing Notes and Vital Signs Reviewed.  Constitutional: Patient is in no acute distress. Well-developed and well-nourished.  Head: Atraumatic. Normocephalic.  Eyes:  EOM intact. Conjunctivae are not pale. No scleral icterus.  ENT: Mucous membranes are moist. Oropharynx is clear and symmetric.    Neck: Supple. Full ROM.   Cardiovascular: Regular rate. Regular rhythm. No murmurs, rubs, or gallops. Distal pulses are 2+ and symmetric.  Pulmonary/Chest: No respiratory distress. Clear to auscultation bilaterally. No wheezing or rales.  Abdominal: Soft and non-distended.  There is no tenderness.  No rebound, guarding, or rigidity.   Musculoskeletal: Moves all extremities. No obvious deformities. No edema.  Skin: Warm and  dry.  Neurological:  Alert, awake, and appropriate.  Normal speech.  No acute focal neurological deficits are appreciated.  Psychiatric: Normal affect. Good eye contact. Appropriate in content.     ED Course   Procedures  ED Vital Signs:  Vitals:    08/04/22 1649 08/04/22 1843   BP: (!) 156/81 133/85   Pulse: 76 66   Resp: 16 18   Temp: 97.8 °F (36.6 °C)    TempSrc: Oral    SpO2: 98% 100%   Weight: 83.4 kg (183 lb 13.8 oz)        Abnormal Lab Results:  Labs Reviewed   CBC W/ AUTO DIFFERENTIAL - Abnormal; Notable for the following components:       Result Value    RDW 14.9 (*)     All other components within normal limits   URINALYSIS, REFLEX TO URINE CULTURE - Abnormal; Notable for the following components:    Color, UA Colorless (*)     Specific Gravity, UA <1.005 (*)     All other components within normal limits    Narrative:     Specimen Source->Urine   COMPREHENSIVE METABOLIC PANEL   TROPONIN I   PREGNANCY TEST, URINE RAPID    Narrative:     Specimen Source->Urine        All Lab Results:  Results for orders placed or performed during the hospital encounter of 08/04/22   CBC auto differential   Result Value Ref Range    WBC 6.11 3.90 - 12.70 K/uL    RBC 4.66 4.00 - 5.40 M/uL    Hemoglobin 13.9 12.0 - 16.0 g/dL    Hematocrit 41.5 37.0 - 48.5 %    MCV 89 82 - 98 fL    MCH 29.8 27.0 - 31.0 pg    MCHC 33.5 32.0 - 36.0 g/dL    RDW 14.9 (H) 11.5 - 14.5 %    Platelets 223 150 - 450 K/uL    MPV 11.9 9.2 - 12.9 fL    Immature Granulocytes 0.3 0.0 - 0.5 %    Gran # (ANC) 2.9 1.8 - 7.7 K/uL    Immature Grans (Abs) 0.02 0.00 - 0.04 K/uL    Lymph # 2.6 1.0 - 4.8 K/uL    Mono # 0.4 0.3 - 1.0 K/uL    Eos # 0.1 0.0 - 0.5 K/uL    Baso # 0.05 0.00 - 0.20 K/uL    nRBC 0 0 /100 WBC    Gran % 47.1 38.0 - 73.0 %    Lymph % 43.0 18.0 - 48.0 %    Mono % 6.7 4.0 - 15.0 %    Eosinophil % 2.1 0.0 - 8.0 %    Basophil % 0.8 0.0 - 1.9 %    Differential Method Automated    Comprehensive metabolic panel   Result Value Ref Range    Sodium 144  136 - 145 mmol/L    Potassium 4.1 3.5 - 5.1 mmol/L    Chloride 107 95 - 110 mmol/L    CO2 25 23 - 29 mmol/L    Glucose 104 70 - 110 mg/dL    BUN 10 6 - 20 mg/dL    Creatinine 0.8 0.5 - 1.4 mg/dL    Calcium 9.5 8.7 - 10.5 mg/dL    Total Protein 7.9 6.0 - 8.4 g/dL    Albumin 4.4 3.5 - 5.2 g/dL    Total Bilirubin 0.4 0.1 - 1.0 mg/dL    Alkaline Phosphatase 94 55 - 135 U/L    AST 32 10 - 40 U/L    ALT 42 10 - 44 U/L    Anion Gap 12 8 - 16 mmol/L    eGFR >60 >60 mL/min/1.73 m^2   Troponin I   Result Value Ref Range    Troponin I 0.007 0.000 - 0.026 ng/mL   Urinalysis, Reflex to Urine Culture Urine, Clean Catch    Specimen: Urine   Result Value Ref Range    Specimen UA Urine, Clean Catch     Color, UA Colorless (A) Yellow, Straw, Heaven    Appearance, UA Clear Clear    pH, UA 7.0 5.0 - 8.0    Specific Gravity, UA <1.005 (A) 1.005 - 1.030    Protein, UA Negative Negative    Glucose, UA Negative Negative    Ketones, UA Negative Negative    Bilirubin (UA) Negative Negative    Occult Blood UA Negative Negative    Nitrite, UA Negative Negative    Urobilinogen, UA Negative <2.0 EU/dL    Leukocytes, UA Negative Negative   Pregnancy, urine rapid   Result Value Ref Range    Preg Test, Ur Negative          Imaging Results:  Imaging Results          X-Ray Chest 1 View (Final result)  Result time 08/04/22 18:00:38    Final result by Sammy Borrego MD (08/04/22 18:00:38)                 Impression:      No acute abnormality.      Electronically signed by: Elmo Christianson  Date:    08/04/2022  Time:    18:00             Narrative:    EXAMINATION:  XR CHEST 1 VIEW    CLINICAL HISTORY:  Chest pain, unspecified    TECHNIQUE:  Single frontal view of the chest was performed.    COMPARISON:  None    FINDINGS:  This is postsurgical changes right lateral chest wall.  Mild right basilar scarring or atelectasis.  Otherwisethe lungs are clear, with normal appearance of pulmonary vasculature and no pleural effusion or pneumothorax.    The cardiac  silhouette is normal in size. The hilar and mediastinal contours are unremarkable.    Bones are intact.                                 The EKG was ordered, reviewed, and independently interpreted by the ED provider.  Interpretation time: 16:34  Rate: 78 BPM  Rhythm: normal sinus rhythm  Interpretation: Cannot rule out anterior infarct, age undetermined. Abnormal ECG. No STEMI.             The Emergency Provider reviewed the vital signs and test results, which are outlined above.     ED Discussion       6:54 PM: Reassessed pt at this time,symptoms seem related to anxiety and or stress, ECG does not show any ischemia, trop negative. Discussed with pt all pertinent ED information and results. Discussed pt dx and plan of tx. Gave pt all f/u and return to the ED instructions. All questions and concerns were addressed at this time. Pt expresses understanding of information and instructions, and is comfortable with plan to discharge. Pt is stable for discharge.    I discussed with patient and/or family/caretaker that evaluation in the ED does not suggest any emergent or life threatening medical conditions requiring immediate intervention beyond what was provided in the ED, and I believe patient is safe for discharge.  Regardless, an unremarkable evaluation in the ED does not preclude the development or presence of a serious of life threatening condition. As such, patient was instructed to return immediately for any worsening or change in current symptoms.         Medical Decision Making:   Clinical Tests:   Lab Tests: Reviewed and Ordered  Radiological Study: Ordered and Reviewed  Medical Tests: Ordered and Reviewed     Additional MDM:   Heart Score:    History:          Slightly suspicious.  ECG:             Normal  Age:               Less than 45 years  Risk factors: no risk factors known  Troponin:       Less than or equal to normal limit  Final Score: 0           ED Medication(s):  Medications - No data to  display    New Prescriptions    No medications on file        Follow-up Information     [facility] Schedule an appointment as soon as possible for a visit in 2 days.    Specialty: Cardiology  Why: Return to the Emergency Room, If symptoms worsen  Contact information:  [address] [facility] [id number] [phone]  Additional information:  Please park on the [address] side and use the Clinic entrance. Check in on the 3rd floor, to the right.                           Scribe Attestation:   Scribe #1: I performed the above scribed service and the documentation accurately describes the services I performed. I attest to the accuracy of the note.     Attending:   Physician Attestation Statement for Scribe #1: I, Jayde Little MD, personally performed the services described in this documentation, as scribed by Parris Vogt, in my presence, and it is both accurate and complete.           Clinical Impression       ICD-10-CM ICD-9-CM   1. Chest pain  R07.9 786.50       Disposition:   Disposition: Discharged  Condition: Stable         Jayde Little MD  08/04/22 9402

## 2022-08-08 ENCOUNTER — OFFICE VISIT (OUTPATIENT)
Dept: OBSTETRICS AND GYNECOLOGY | Facility: CLINIC | Age: 42
End: 2022-08-08
Payer: MEDICAID

## 2022-08-08 VITALS
HEART RATE: 90 BPM | DIASTOLIC BLOOD PRESSURE: 82 MMHG | BODY MASS INDEX: 30.67 KG/M2 | SYSTOLIC BLOOD PRESSURE: 124 MMHG | RESPIRATION RATE: 18 BRPM | WEIGHT: 179.63 LBS | HEIGHT: 64 IN

## 2022-08-08 DIAGNOSIS — Z85.3 PERSONAL HISTORY OF BREAST CANCER: ICD-10-CM

## 2022-08-08 DIAGNOSIS — Z12.31 ENCOUNTER FOR SCREENING MAMMOGRAM FOR MALIGNANT NEOPLASM OF BREAST: Primary | ICD-10-CM

## 2022-08-08 DIAGNOSIS — Z12.4 CERVICAL CANCER SCREENING: ICD-10-CM

## 2022-08-08 DIAGNOSIS — Z01.419 ENCOUNTER FOR GYNECOLOGICAL EXAMINATION WITHOUT ABNORMAL FINDING: ICD-10-CM

## 2022-08-08 PROCEDURE — 3044F PR MOST RECENT HEMOGLOBIN A1C LEVEL <7.0%: ICD-10-PCS | Mod: CPTII,,, | Performed by: OBSTETRICS & GYNECOLOGY

## 2022-08-08 PROCEDURE — 1159F PR MEDICATION LIST DOCUMENTED IN MEDICAL RECORD: ICD-10-PCS | Mod: CPTII,,, | Performed by: OBSTETRICS & GYNECOLOGY

## 2022-08-08 PROCEDURE — 3079F PR MOST RECENT DIASTOLIC BLOOD PRESSURE 80-89 MM HG: ICD-10-PCS | Mod: CPTII,,, | Performed by: OBSTETRICS & GYNECOLOGY

## 2022-08-08 PROCEDURE — 99999 PR PBB SHADOW E&M-EST. PATIENT-LVL III: CPT | Mod: PBBFAC,,, | Performed by: OBSTETRICS & GYNECOLOGY

## 2022-08-08 PROCEDURE — 99999 PR PBB SHADOW E&M-EST. PATIENT-LVL III: ICD-10-PCS | Mod: PBBFAC,,, | Performed by: OBSTETRICS & GYNECOLOGY

## 2022-08-08 PROCEDURE — 3074F PR MOST RECENT SYSTOLIC BLOOD PRESSURE < 130 MM HG: ICD-10-PCS | Mod: CPTII,,, | Performed by: OBSTETRICS & GYNECOLOGY

## 2022-08-08 PROCEDURE — 99396 PREV VISIT EST AGE 40-64: CPT | Mod: S$PBB,,, | Performed by: OBSTETRICS & GYNECOLOGY

## 2022-08-08 PROCEDURE — 3008F BODY MASS INDEX DOCD: CPT | Mod: CPTII,,, | Performed by: OBSTETRICS & GYNECOLOGY

## 2022-08-08 PROCEDURE — 3044F HG A1C LEVEL LT 7.0%: CPT | Mod: CPTII,,, | Performed by: OBSTETRICS & GYNECOLOGY

## 2022-08-08 PROCEDURE — 99213 OFFICE O/P EST LOW 20 MIN: CPT | Mod: PBBFAC | Performed by: OBSTETRICS & GYNECOLOGY

## 2022-08-08 PROCEDURE — 3008F PR BODY MASS INDEX (BMI) DOCUMENTED: ICD-10-PCS | Mod: CPTII,,, | Performed by: OBSTETRICS & GYNECOLOGY

## 2022-08-08 PROCEDURE — 1160F PR REVIEW ALL MEDS BY PRESCRIBER/CLIN PHARMACIST DOCUMENTED: ICD-10-PCS | Mod: CPTII,,, | Performed by: OBSTETRICS & GYNECOLOGY

## 2022-08-08 PROCEDURE — 3079F DIAST BP 80-89 MM HG: CPT | Mod: CPTII,,, | Performed by: OBSTETRICS & GYNECOLOGY

## 2022-08-08 PROCEDURE — 3074F SYST BP LT 130 MM HG: CPT | Mod: CPTII,,, | Performed by: OBSTETRICS & GYNECOLOGY

## 2022-08-08 PROCEDURE — 1159F MED LIST DOCD IN RCRD: CPT | Mod: CPTII,,, | Performed by: OBSTETRICS & GYNECOLOGY

## 2022-08-08 PROCEDURE — 88175 CYTOPATH C/V AUTO FLUID REDO: CPT | Performed by: OBSTETRICS & GYNECOLOGY

## 2022-08-08 PROCEDURE — 99396 PR PREVENTIVE VISIT,EST,40-64: ICD-10-PCS | Mod: S$PBB,,, | Performed by: OBSTETRICS & GYNECOLOGY

## 2022-08-08 PROCEDURE — 1160F RVW MEDS BY RX/DR IN RCRD: CPT | Mod: CPTII,,, | Performed by: OBSTETRICS & GYNECOLOGY

## 2022-08-08 NOTE — PROGRESS NOTES
Subjective:       Patient ID: Elham Rodas is a 41 y.o. female.    Chief Complaint:  Annual Exam (Pt states she have been having headaches and nausea )      History of Present Illness  Patient presents for annual exam.  Patient has a personal history of breast cancer and is still being followed by her oncologist.  She is scheduled for a no other surgery in the upcoming months.  Patient will follow-up with them for her mammogram.  She is otherwise without gyn complaints.    Menstrual History:  OB History        4    Para   4    Term   4            AB        Living   4       SAB        IAB        Ectopic        Multiple        Live Births                    Menarche age:  Patient's last menstrual period was 2019.         Review of Systems  Review of Systems   Constitutional: Negative for activity change, appetite change, chills, diaphoresis, fatigue, fever and unexpected weight change.   HENT: Negative for congestion, dental problem, drooling, ear discharge, ear pain, facial swelling, hearing loss, mouth sores, nosebleeds, postnasal drip, rhinorrhea, sinus pressure, sneezing, sore throat, tinnitus, trouble swallowing and voice change.    Eyes: Negative for photophobia, pain, discharge, redness, itching and visual disturbance.   Respiratory: Negative for apnea, cough, choking, chest tightness, shortness of breath, wheezing and stridor.    Cardiovascular: Negative for chest pain, palpitations and leg swelling.   Gastrointestinal: Negative for abdominal distention, abdominal pain, anal bleeding, blood in stool, constipation, diarrhea, nausea, rectal pain and vomiting.   Endocrine: Negative for cold intolerance, heat intolerance, polydipsia, polyphagia and polyuria.   Genitourinary: Negative for decreased urine volume, difficulty urinating, dyspareunia, dysuria, enuresis, flank pain, frequency, genital sores, hematuria, menstrual problem, pelvic pain, urgency, vaginal bleeding, vaginal discharge and  vaginal pain.   Musculoskeletal: Negative for arthralgias, back pain, gait problem, joint swelling, myalgias, neck pain and neck stiffness.   Skin: Negative for color change, pallor, rash and wound.   Allergic/Immunologic: Negative for environmental allergies, food allergies and immunocompromised state.   Neurological: Positive for headaches. Negative for dizziness, tremors, seizures, syncope, facial asymmetry, speech difficulty, weakness, light-headedness and numbness.   Hematological: Negative for adenopathy. Does not bruise/bleed easily.   Psychiatric/Behavioral: Negative for agitation, behavioral problems, confusion, decreased concentration, dysphoric mood, hallucinations, self-injury, sleep disturbance and suicidal ideas. The patient is not nervous/anxious and is not hyperactive.            Objective:      Physical Exam  Vitals and nursing note reviewed. Exam conducted with a chaperone present.   Constitutional:       Appearance: She is well-developed.   Neck:      Thyroid: No thyromegaly.   Cardiovascular:      Rate and Rhythm: Normal rate and regular rhythm.   Pulmonary:      Effort: Pulmonary effort is normal.      Breath sounds: Normal breath sounds.   Chest:   Breasts: Breasts are symmetrical.      Right: No inverted nipple, mass, nipple discharge, skin change or tenderness.      Left: No inverted nipple, mass, nipple discharge, skin change or tenderness.       Abdominal:      General: Bowel sounds are normal.      Palpations: Abdomen is soft. There is no mass.      Tenderness: There is no abdominal tenderness.      Hernia: There is no hernia in the left inguinal area or right inguinal area.   Genitourinary:     General: Normal vulva.      Labia:         Right: No rash, tenderness, lesion or injury.         Left: No rash, tenderness, lesion or injury.       Urethra: No prolapse, urethral pain, urethral swelling or urethral lesion.      Vagina: Normal. No signs of injury and foreign body. No vaginal  discharge, erythema, tenderness, bleeding, lesions or prolapsed vaginal walls.      Cervix: Cervical motion tenderness ( surgically absent) present.      Uterus: Deviated ( surgically absent).       Adnexa:         Right: No mass, tenderness or fullness.          Left: No mass, tenderness or fullness.        Rectum: No external hemorrhoid.   Musculoskeletal:         General: Normal range of motion.   Lymphadenopathy:      Lower Body: No right inguinal adenopathy.   Skin:     General: Skin is dry.   Neurological:      Mental Status: She is alert and oriented to person, place, and time.      Deep Tendon Reflexes: Reflexes are normal and symmetric.   Psychiatric:         Behavior: Behavior normal.         Thought Content: Thought content normal.         Judgment: Judgment normal.             Assessment:        1. Encounter for screening mammogram for malignant neoplasm of breast    2. Encounter for gynecological examination without abnormal finding    3. Personal history of breast cancer                Plan:         Elham was seen today for annual exam.    Diagnoses and all orders for this visit:    Encounter for screening mammogram for malignant neoplasm of breast    Encounter for gynecological examination without abnormal finding    Personal history of breast cancer

## 2022-08-14 LAB
FINAL PATHOLOGIC DIAGNOSIS: NORMAL
Lab: NORMAL

## 2022-09-07 ENCOUNTER — OFFICE VISIT (OUTPATIENT)
Dept: PLASTIC SURGERY | Facility: CLINIC | Age: 42
End: 2022-09-07
Payer: MEDICAID

## 2022-09-07 VITALS
DIASTOLIC BLOOD PRESSURE: 83 MMHG | SYSTOLIC BLOOD PRESSURE: 176 MMHG | WEIGHT: 179 LBS | HEIGHT: 64 IN | BODY MASS INDEX: 30.56 KG/M2 | HEART RATE: 72 BPM

## 2022-09-07 DIAGNOSIS — Z85.3 HISTORY OF BREAST CANCER: Primary | ICD-10-CM

## 2022-09-07 PROCEDURE — 3008F BODY MASS INDEX DOCD: CPT | Mod: CPTII,,, | Performed by: SURGERY

## 2022-09-07 PROCEDURE — 3044F PR MOST RECENT HEMOGLOBIN A1C LEVEL <7.0%: ICD-10-PCS | Mod: CPTII,,, | Performed by: SURGERY

## 2022-09-07 PROCEDURE — 3008F PR BODY MASS INDEX (BMI) DOCUMENTED: ICD-10-PCS | Mod: CPTII,,, | Performed by: SURGERY

## 2022-09-07 PROCEDURE — 3079F DIAST BP 80-89 MM HG: CPT | Mod: CPTII,,, | Performed by: SURGERY

## 2022-09-07 PROCEDURE — 99212 OFFICE O/P EST SF 10 MIN: CPT | Mod: S$PBB,,, | Performed by: SURGERY

## 2022-09-07 PROCEDURE — 3044F HG A1C LEVEL LT 7.0%: CPT | Mod: CPTII,,, | Performed by: SURGERY

## 2022-09-07 PROCEDURE — 1159F PR MEDICATION LIST DOCUMENTED IN MEDICAL RECORD: ICD-10-PCS | Mod: CPTII,,, | Performed by: SURGERY

## 2022-09-07 PROCEDURE — 3079F PR MOST RECENT DIASTOLIC BLOOD PRESSURE 80-89 MM HG: ICD-10-PCS | Mod: CPTII,,, | Performed by: SURGERY

## 2022-09-07 PROCEDURE — 3077F SYST BP >= 140 MM HG: CPT | Mod: CPTII,,, | Performed by: SURGERY

## 2022-09-07 PROCEDURE — 1160F RVW MEDS BY RX/DR IN RCRD: CPT | Mod: CPTII,,, | Performed by: SURGERY

## 2022-09-07 PROCEDURE — 1159F MED LIST DOCD IN RCRD: CPT | Mod: CPTII,,, | Performed by: SURGERY

## 2022-09-07 PROCEDURE — 99213 OFFICE O/P EST LOW 20 MIN: CPT | Mod: PBBFAC | Performed by: SURGERY

## 2022-09-07 PROCEDURE — 99999 PR PBB SHADOW E&M-EST. PATIENT-LVL III: CPT | Mod: PBBFAC,,, | Performed by: SURGERY

## 2022-09-07 PROCEDURE — 1160F PR REVIEW ALL MEDS BY PRESCRIBER/CLIN PHARMACIST DOCUMENTED: ICD-10-PCS | Mod: CPTII,,, | Performed by: SURGERY

## 2022-09-07 PROCEDURE — 99999 PR PBB SHADOW E&M-EST. PATIENT-LVL III: ICD-10-PCS | Mod: PBBFAC,,, | Performed by: SURGERY

## 2022-09-07 PROCEDURE — 3077F PR MOST RECENT SYSTOLIC BLOOD PRESSURE >= 140 MM HG: ICD-10-PCS | Mod: CPTII,,, | Performed by: SURGERY

## 2022-09-07 PROCEDURE — 99212 PR OFFICE/OUTPT VISIT, EST, LEVL II, 10-19 MIN: ICD-10-PCS | Mod: S$PBB,,, | Performed by: SURGERY

## 2022-09-07 NOTE — PROGRESS NOTES
Patient presents to Plastic surgery Clinic after having a Tdap flap.  She has a history of having an oncologic breast reduction with radiation treatment.  She had a very large divot in the right lateral breast.  We performed a Tdap flap which helped fill this out.  The real problem now is the position of the right nipple which is low and lateral.  The patient is bulky laterally especially where the flap moves under the axilla.  We will have to debulk this area.  We can also move her nipple through crescent mastopexy but I do not think we can move it enough.  We will go ahead and present her at conference.

## 2022-10-14 ENCOUNTER — TELEPHONE (OUTPATIENT)
Dept: HEMATOLOGY/ONCOLOGY | Facility: CLINIC | Age: 42
End: 2022-10-14
Payer: MEDICAID

## 2022-11-03 PROBLEM — E66.09 CLASS 1 OBESITY DUE TO EXCESS CALORIES WITH SERIOUS COMORBIDITY AND BODY MASS INDEX (BMI) OF 30.0 TO 30.9 IN ADULT: Status: ACTIVE | Noted: 2022-11-03

## 2022-11-03 PROBLEM — Z79.899 LONG TERM CURRENT USE OF THERAPEUTIC DRUG: Status: ACTIVE | Noted: 2022-11-03

## 2022-11-03 PROBLEM — E66.811 CLASS 1 OBESITY DUE TO EXCESS CALORIES WITH SERIOUS COMORBIDITY AND BODY MASS INDEX (BMI) OF 30.0 TO 30.9 IN ADULT: Status: ACTIVE | Noted: 2022-11-03

## 2023-02-15 DIAGNOSIS — Z91.89 AT RISK FOR LYMPHEDEMA: ICD-10-CM

## 2023-02-15 DIAGNOSIS — Z85.3 PERSONAL HISTORY OF BREAST CANCER: Primary | ICD-10-CM

## 2023-02-23 ENCOUNTER — CLINICAL SUPPORT (OUTPATIENT)
Dept: REHABILITATION | Facility: HOSPITAL | Age: 43
End: 2023-02-23
Payer: MEDICAID

## 2023-02-23 DIAGNOSIS — Z91.89 AT RISK FOR LYMPHEDEMA: ICD-10-CM

## 2023-02-23 DIAGNOSIS — Z85.3 PERSONAL HISTORY OF BREAST CANCER: ICD-10-CM

## 2023-02-23 DIAGNOSIS — Z74.09 IMPAIRED FUNCTIONAL MOBILITY AND ACTIVITY TOLERANCE: ICD-10-CM

## 2023-02-23 DIAGNOSIS — M25.611 DECREASED ROM OF RIGHT SHOULDER: Primary | ICD-10-CM

## 2023-02-23 DIAGNOSIS — R53.1 DECREASED STRENGTH: ICD-10-CM

## 2023-02-23 DIAGNOSIS — R29.3 POOR POSTURE: ICD-10-CM

## 2023-02-23 PROBLEM — R26.89 IMPAIRED GAIT AND MOBILITY: Status: RESOLVED | Noted: 2020-09-16 | Resolved: 2023-02-23

## 2023-02-23 PROBLEM — M25.511 PAIN IN SHOULDER REGION, RIGHT: Status: RESOLVED | Noted: 2020-08-11 | Resolved: 2023-02-23

## 2023-02-23 PROBLEM — M79.672 BILATERAL FOOT PAIN: Status: RESOLVED | Noted: 2020-09-16 | Resolved: 2023-02-23

## 2023-02-23 PROBLEM — M25.671 DECREASED RANGE OF MOTION OF BOTH ANKLES: Status: RESOLVED | Noted: 2020-09-16 | Resolved: 2023-02-23

## 2023-02-23 PROBLEM — M25.672 DECREASED RANGE OF MOTION OF BOTH ANKLES: Status: RESOLVED | Noted: 2020-09-16 | Resolved: 2023-02-23

## 2023-02-23 PROBLEM — M79.671 BILATERAL FOOT PAIN: Status: RESOLVED | Noted: 2020-09-16 | Resolved: 2023-02-23

## 2023-02-23 PROCEDURE — 97162 PT EVAL MOD COMPLEX 30 MIN: CPT

## 2023-02-23 NOTE — PLAN OF CARE
"[facility]  Physical Therapy Initial Evaluation    Date: 2/23/2023   Name: Elham Rodas  Clinic Number: [id number]    Therapy Diagnosis:   Encounter Diagnoses   Name Primary?    Personal history of breast cancer     At risk for lymphedema     Decreased ROM of right shoulder Yes    Impaired functional mobility and activity tolerance     Poor posture     Decreased strength      Physician: Julissa Rosario, NP    Physician Orders: PT Eval and Treat   Medical Diagnosis: Personal history of breast cancer [Z85.3], At risk for lymphedema [Z91.89]  Evaluation Date: 2/23/2023  Authorization Period Expiration: 2/15/24  Plan of Care Certification Period: 5/5/23  Visit # / Visits authorized: 1 / 1  Insurance: Medicaid/[place] [employer]  FOTO: 1/3    Time In: 8:00 AM  Time Out: 9:10 AM  Total Billable Time: 70 minutes    Precautions: Standard and cancer      History   History of Present Illness: Elham is a 42 y.o. female that presents to  [facility] at the [facility] clinic s/p R breast surgery. Pt with history of right partial mastectomy and SLNB on 11/21/18. Pt underwent a TDAP flap reconstruction on Right with left breast reduction on 1/11/22 to fill deficit left by partial mastectomy. She has revision surgery scheduled on 5/30/23.  Diagnosis: Personal history of breast cancer [Z85.3], At risk for lymphedema [Z91.89]  Chief complaint: I'm hoping to get my right arm measured. I've been having a lot of tingling and aching. Pt states this has been going on every day for the past 2-3 weeks. Before this would happen but not as long. Pt states nothing helps. Pt sates reaching overhead and behind her are most limited. "I'm not sure if it's lymphedema."  Surgical History:  Elham Rodas  has a past surgical history that includes Thyroidectomy; Tubal ligation; Cholecystectomy; Mastectomy, partial (Right, 11/21/2018); [place] lymph node biopsy (Right, 11/21/2018); " Injection for sentinel node identification (Right, 11/21/2018); Axillary node dissection (Right, 11/21/2018); Insertion of tunneled central venous catheter (CVC) with subcutaneous port (Left, 1/2/2019); Breast lumpectomy (Right); Laparoscopic total hysterectomy (N/A, 11/4/2019); Laparoscopic salpingo-oophorectomy (Bilateral, 11/4/2019); Oophorectomy; Hysterectomy; Reconstruction of breast with latissimus dorsi myocutaneous flap (Right, 1/11/2022); and Total Reduction Mammoplasty (Left, 1/11/2022).    Chemotherapy: Completed adjuvantly  Radiation: completed adjuvantly  Endocrine therapy: pt is currently on Arimidex     Past Medical History:   Diagnosis Date    Abnormal Pap smear of cervix 05/2016    ASCUS, - HPV    Anxiety     Breast cancer 10/2018    right    Depression     Fatigue     Hypothyroidism     Neuropathy     Palpitations     Prediabetes     Psychiatric problem     Sleep difficulties     Syncope and collapse     Thyroid disease     Vitamin D deficiency disease           Medications:  Elham has a current medication list which includes the following prescription(s): anastrozole, calcium carbonate, cholecalciferol (vitamin d3), lancets, levothyroxine, metformin, omega-3 fatty acids, intrarosa, rosuvastatin, true metrix glucose meter, and true metrix glucose test strip, and the following Facility-Administered Medications: lidocaine (pf) 10 mg/ml (1%).    Allergies:  Review of patient's allergies indicates:  No Known Allergies     Prior Therapy: yes  Social History: pt lives with her boyfriend  Durable Medical Equipment: Pt states she has tubigrip from prior PT session, but no official compression sleeve.  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 She has to do a lot of repetitive lifting overhead- nothing too heavy  Prior Level of Function: had less frequent tingling and aching, had some right arm ROM limitations.  Current Level of Function: has daily aching and tingling. Pt also  "endorses issues with carpal tunnel in her right hand- this has been going on for 1.5 years.  Exercise routine prior to onset: none  Hand dominance: right      Patient's Goals: See if she has lymphedema and can maybe get a sleeve. Reduce her symptoms         Subjective   Pt states:   Pain: 2/10 on VAS currently.   Best: 2/10  Worst: 10/10   Pain location: right arm (more in her upper arm)   Objective   Mental status :A+O x 3, pleasant, appropriate    Postural examination/scapula alignment: Rounded shoulder and Slouched posture    Skin Integrity:   Scar Location: B breast and lateral chest wall  Appearance: healed  Signs of infection: No  Drainage: N/A  Color:N/A    Muscle quality:  Upper trap tightness B, R> L  R pec tightness observed.    Edema: None noted  Location: N/A    Axillary Web Syndrome/Cording:   Location: N/A  Degree of Cording: N/A (mild, moderate etc...)   Number of cords present: 0    Sensation: tingling noted in entire right arm    Range of Motion:      Shoulder Range of Motion:   Active /Passive ROM Right Left   Flexion 118* 140^   Abduction 80* 160^   Extension TBA TBA   IR/90deg 90 @ 55 of ABD 90   ER/90deg 15 @ 55 of ABD* 80   *pain, ^pull/discomfort    **pt presents with substantial upper trap compensation/shoulder hiking when reaching overhead with right arm, especially ABD       Strength: manual muscle test grades below   Upper Extremity Strength   (R) UE (L) UE   Shoulder flexion: 5/5* 5/5   Shoulder Abduction: 3-/5* 5/5   Shoulder IR 5/5 5/5   Shoulder ER 5/5* 5/5   Elbow flexion: 5/5 5/5   Elbow extension: 5/5 5/5   Wrist flexion: 5/5 5/5   Wrist extension: 5/5 5/5    Fair to good good   *pain/discomfort in right shoulder/upper arm    Baseline Measurements of BL UE's for early detection of Lymphedema:     LANDMARK RIGHT UE LEFT UE DIFFERENCE   E + 8" 34 cm 35 cm 1 cm   E + 6" 31.5 cm 31 cm 0.5 cm   E + 4" 29 cm 29 cm 0 cm   E + 2" 25.5 cm 27.5 cm 2 cm   Elbow 25 cm 25.5 cm 0.5 cm   W+ 8" " "26 cm 25.5 cm 0.5 cm   W +  6" 25 cm 25 cm 0 cm   W + 4" 20 cm 21 cm 1 cm   Wrist 16.5 cm 16 cm 0.5 cm   DPC 20.5 cm 20 cm 0.5 cm   IP Thumb 6.5 cm 6.5 cm 0 cm     Functional Mobility (Bed mobility, transfers)  Mod I to     ADL's:  Mod I to independent    Gait Assessment:   - AD used: none  - Assistance: independent  - Distance: community distances     Patient Education Provided   - role of therapy in multi - disciplinary team, goals for therapy  -HEP, Plan of care, scheduling  - Pt was educated in lymphedema etiology and management plans.    - Pt was provided with written risk reductions and precautions for managing lymphedema.   -postural awareness with use of towel roll along length of spine    Pt has no cultural, educational or language barriers to learning provided.  Treatment and Instruction of Home Exercise Program      Total Time Separate from Evaluation: 25 minutes (no charges dropped)    Elham received individual therapeutic exercises to improve postural correction and alignment, stretching and soft tissue mobility, and strengthening for 15 minutes including the following:     -pt performs 5-8 reps of butterflies with hands resting on forehead  -scapular retractions x 8 reps  -chicken wings x 8 reps with verbal and tactile cues to prevent upper trap compensation  -median nerve glide A x 5 reps  -PROM by PT, 5-8 reps each of Flexion and ER at 45 degrees  -pt attempts shoulder flexion walk away stretch at table, but endorses pain, so it ws stopped        Elham received the following manual therapy techniques were performed to increased myofascial/soft tissue length, mobility and pliability, increase PROM, AROM and function as well as to decrease pain for 10 minutes    -Gentle STM to R upper trap   -R posterior capsule stretch    Pt was provided with tubigrip size E for right arm. Pt was advised they can wear day and night, as long as it does not cause pain, numbness, tingling, or increased swelling. Pt can " hand wash with gentle detergent and air dry. Pt verbalizes understanding of all topics.       Written Home Exercises Provided: yes.  Exercises were reviewed and Elham was able to demonstrate them prior to the end of the session.  Elham demonstrated good  understanding of the education provided.     See EMR under Patient Instructions for exercises provided 2/23/2023.      Functional Limitations Reporting      CMS Impairment/Limitation/Restriction for FOTO Survey    Therapist reviewed FOTO scores for Elham Rodas on 2/23/2023.   FOTO documents entered into [employer] - see Media section.    Limitations Score: 49%  Category: Carrying           Assessment   This is a 42 y.o. female referred to outpatient physical therapy and presents with a medical diagnosis of right breast cancer and was seen today post-operatively to establish PT plan of care for impairments following surgery including: right shoulder region and arm pain, decreased strength, poor posture, decreased ROM, impaired functional mobility, and is at risk for lymphedema. She does not present with lymphedema at this time. She presents with likely neural tension in her RUE as well. She tolerated exercises and interventions well- she needs occasional cues to avoid upper trap compensation.      Pt instructed in HEP this session and was able to perform all exercises given independently. Pt instructed to follow up with therapist if any concerns arise with program established. Pt will continue to benefit from skilled physical therapy to address the impairments stated in chart below, provide patient/family education and to maximize pt's level of independence in home and community environment     Pt prognosis is Good.    Anticipated barriers to physical therapy: length of time from surgery     Pt's spiritual, cultural and educational needs considered and pt agreeable to plan of care and goals as stated below:     Medical necessity is demonstrated by the following  IMPAIRMENTS/PROMBLEM LIST:    History  Co-morbidities and personal factors that may impact the plan of care Co-morbidities:   anxiety, depression, history of cancer, and thyroid disease    Personal Factors:   no deficits     high   Examination  Body Structures and Functions, activity limitations and participation restrictions that may impact the plan of care Body Regions:   upper extremities  trunk    Body Systems:    ROM  strength  transitions  motor control  motor learning  Posture, at risk for lymphedema    Participation Restrictions:   None anticipated    Activity limitations:   Learning and applying knowledge  no deficits    General Tasks and Commands  no deficits    Communication  no deficits    Mobility  lifting and carrying objects  fine hand use (grasping/picking up)    Self care  dressing    Domestic Life  shopping  cooking  doing house work (cleaning house, washing dishes, laundry)    Interactions/Relationships  no deficits    Life Areas  employment    Community and Social Life  community life  recreation and leisure         high   Clinical Presentation evolving clinical presentation with changing clinical characteristics moderate   Decision Making/ Complexity Score: moderate       Goals: Pt agrees with goals set    Short Term goals: 5 weeks  1. Patient will demonstrate 100% understanding of lymphedema risk reduction practices to include self monitoring for lymphedema. (progressing, not met)  2. Patient will demonstrate independence with Home Exercise program established. (progressing, not met)  3. Pt will increase AROM/PROM in shoulder abduction ROM to >/=120 degrees on right to improve functional reach, carry, push, pull pain free. (progressing, not met)  4. Pt will increase AROM/PROM in shoulder flexion to >/=140 degrees on right to improve functional reach, carry, push, pull pain free.(progressing, not met)  5. Pt will increase strength to >/= 4-/5 in gross UE musculature to improve tolerance to all  functional activities pain free. (progressing, not met)  6.Pt will increase AROM/PROM in shoulder ER at 90 to 40 degrees on right to improve functional reach, carry, push, pull pain free.(progressing, not met)    Long Term Goals: 10 weeks   1.  Pt will increase AROM/PROM in shoulder flexion to >/= 160 degrees on right to improve functional reach, carry, push, pull pain free. (progressing, not met)  2. Pt will increase strength to 4+/5 in gross UE musculature to improve tolerance to all functional activities pain free. (progressing, not met)  3. Pt will demonstrate full/maximized tissue mobility to increase ROM and promote healthy tissue to be pain free at discharge. (progressing, not met)  4. Pt will report decrease in overall worst pain to 4/10 at discharge. (progressing, not met)  5. Pt will increase AROM/PROM in shoulder abduction ROM to >/=150 degrees on right to improve functional reach, carry, push, pull pain free. (progressing, not met)  6. Patient will report compliance with walking program 5x week for 20-30 min each day to improve overall cardiovascular function and decrease cancer related fatigue at discharge. (progressing, not met)  7. Pt will increase AROM/PROM in shoulder ER at 90 to >/=70 degrees on right to improve functional reach, carry, push, pull pain free.(progressing, not met)      Plan   Outpatient physical therapy 2x week for 10 weeks to include the following:   Manual Therapy, Neuromuscular Re-ed, Orthotic Management and Training, Patient Education, Self Care, Therapeutic Activities, and Therapeutic Exercise.    Plan of care Certification Period: 2/23/2023 to 5/5/23.    Therapist: Catalina Vargas, PT  2/23/2023

## 2023-02-23 NOTE — PATIENT INSTRUCTIONS
When to call your doctor   - if any part of your affected arm or axilla feels hot, is reddened or has increased swelling   - if you develop a temperature over 101 degrees Fahrenheit      Lymphedema - Identification and Prevention     Lymphedema - is the swelling of a body area or extremity caused by the accumulation of lymphatic fluid.  There is a risk for lymphedema with the removal of lymph nodes, trauma or radiation therapy.  Treatment of breast cancer often involves surgery: mastectomy or lumpectomy. Some of the lymph nodes in the underarm (called axillary lymph nodes) may be removed and checked to see if they contain cancer cells.     During breast surgery when axillary lymph nodes are removed (with sentinel node biopsy or axillary dissection) or are treated with radiation therapy, the lymphatic system may become impaired. This may prevent lymphatic fluid from leaving the area therefore, causing lymphedema.     Lymphatic fluid is a normal part of the circulatory system. Its function is to remove waste products and to produce cells vital to fighting infection. Swelling occurs when the vessels become restricted and the lymphatic fluid is unable to freely flow through them.  If lymphedema is left untreated, the affected limb could progressively become more swollen, which could lead to hardening of the skin, bulkiness in the limb, infection and impaired wound healing.         There are things you can do to decrease the chance of developing lymphedema.                                          [email or web address]                                                                                                                                                  The information presented is intended for general information and educational purposes. It is not intended to replace the  advice of your health care provider. Contact your health care provider if you believe you have a health  problem.                                              Scapular Retraction (Standing)    With arms at sides, squeeze shoulder blades together. Do not shrug shoulders and do not hold your breath. Hold 5 seconds. Repeat 10 times, 1 set, 4x day.       Exaggerated Breathing and Relaxation      Practice deep breathing frequently in the first few days following surgery even before you begin exercising. This exercise helps with tissue extensibility in the chest wall.  Inhale slowly and deeply through the nose and exhale through pursed lips. Concentrate on relaxing as you let the air out of your lungs. Repeat three (3) to four (4) times, remembering to breath in deeply and then relaxing. This exercise helps to ease the sensation of pulling and discomfort that may be experienced while exercising.                            Butterfly  Start with your arms resting on your forehead and progress to hands behind your head.

## 2023-02-25 NOTE — PLAN OF CARE
Problem: Adult Inpatient Plan of Care  Goal: Plan of Care Review  Outcome: Outcome(s) achieved Date Met: 08/06/19  Pt. Completed outpt xrt to breast.  RTC 6 weeks.       6 (moderate pain)

## 2023-03-02 ENCOUNTER — LAB VISIT (OUTPATIENT)
Dept: LAB | Facility: HOSPITAL | Age: 43
End: 2023-03-02
Attending: INTERNAL MEDICINE
Payer: MEDICAID

## 2023-03-02 DIAGNOSIS — R73.02 IGT (IMPAIRED GLUCOSE TOLERANCE): ICD-10-CM

## 2023-03-02 DIAGNOSIS — E83.51 IATROGENIC HYPOCALCEMIA: ICD-10-CM

## 2023-03-02 DIAGNOSIS — Z79.899 LONG TERM CURRENT USE OF THERAPEUTIC DRUG: ICD-10-CM

## 2023-03-02 DIAGNOSIS — E89.0 POSTSURGICAL HYPOTHYROIDISM: Chronic | ICD-10-CM

## 2023-03-02 LAB
ALBUMIN SERPL BCP-MCNC: 4.2 G/DL (ref 3.5–5.2)
ALP SERPL-CCNC: 88 U/L (ref 55–135)
ALT SERPL W/O P-5'-P-CCNC: 33 U/L (ref 10–44)
ANION GAP SERPL CALC-SCNC: 13 MMOL/L (ref 8–16)
AST SERPL-CCNC: 25 U/L (ref 10–40)
BASOPHILS # BLD AUTO: 0.04 K/UL (ref 0–0.2)
BASOPHILS NFR BLD: 0.7 % (ref 0–1.9)
BILIRUB SERPL-MCNC: 0.5 MG/DL (ref 0.1–1)
BUN SERPL-MCNC: 10 MG/DL (ref 6–20)
CALCIUM SERPL-MCNC: 9.3 MG/DL (ref 8.7–10.5)
CHLORIDE SERPL-SCNC: 107 MMOL/L (ref 95–110)
CO2 SERPL-SCNC: 22 MMOL/L (ref 23–29)
CREAT SERPL-MCNC: 0.7 MG/DL (ref 0.5–1.4)
DIFFERENTIAL METHOD: NORMAL
EOSINOPHIL # BLD AUTO: 0.1 K/UL (ref 0–0.5)
EOSINOPHIL NFR BLD: 2.1 % (ref 0–8)
ERYTHROCYTE [DISTWIDTH] IN BLOOD BY AUTOMATED COUNT: 13.4 % (ref 11.5–14.5)
EST. GFR  (NO RACE VARIABLE): >60 ML/MIN/1.73 M^2
GLUCOSE SERPL-MCNC: 113 MG/DL (ref 70–110)
HCT VFR BLD AUTO: 38 % (ref 37–48.5)
HGB BLD-MCNC: 12.7 G/DL (ref 12–16)
IMM GRANULOCYTES # BLD AUTO: 0 K/UL (ref 0–0.04)
IMM GRANULOCYTES NFR BLD AUTO: 0 % (ref 0–0.5)
LYMPHOCYTES # BLD AUTO: 2.4 K/UL (ref 1–4.8)
LYMPHOCYTES NFR BLD: 44.8 % (ref 18–48)
MCH RBC QN AUTO: 28.9 PG (ref 27–31)
MCHC RBC AUTO-ENTMCNC: 33.4 G/DL (ref 32–36)
MCV RBC AUTO: 87 FL (ref 82–98)
MONOCYTES # BLD AUTO: 0.4 K/UL (ref 0.3–1)
MONOCYTES NFR BLD: 7.9 % (ref 4–15)
NEUTROPHILS # BLD AUTO: 2.4 K/UL (ref 1.8–7.7)
NEUTROPHILS NFR BLD: 44.5 % (ref 38–73)
NRBC BLD-RTO: 0 /100 WBC
PLATELET # BLD AUTO: 224 K/UL (ref 150–450)
PMV BLD AUTO: 11.4 FL (ref 9.2–12.9)
POTASSIUM SERPL-SCNC: 3.9 MMOL/L (ref 3.5–5.1)
PROT SERPL-MCNC: 7.2 G/DL (ref 6–8.4)
RBC # BLD AUTO: 4.39 M/UL (ref 4–5.4)
SODIUM SERPL-SCNC: 142 MMOL/L (ref 136–145)
TSH SERPL DL<=0.005 MIU/L-ACNC: 1.4 UIU/ML (ref 0.4–4)
VIT B12 SERPL-MCNC: 321 PG/ML (ref 210–950)
WBC # BLD AUTO: 5.34 K/UL (ref 3.9–12.7)

## 2023-03-02 PROCEDURE — 82607 VITAMIN B-12: CPT | Performed by: INTERNAL MEDICINE

## 2023-03-02 PROCEDURE — 85025 COMPLETE CBC W/AUTO DIFF WBC: CPT | Performed by: INTERNAL MEDICINE

## 2023-03-02 PROCEDURE — 36415 COLL VENOUS BLD VENIPUNCTURE: CPT | Performed by: INTERNAL MEDICINE

## 2023-03-02 PROCEDURE — 83036 HEMOGLOBIN GLYCOSYLATED A1C: CPT | Performed by: INTERNAL MEDICINE

## 2023-03-02 PROCEDURE — 84443 ASSAY THYROID STIM HORMONE: CPT | Performed by: INTERNAL MEDICINE

## 2023-03-02 PROCEDURE — 80053 COMPREHEN METABOLIC PANEL: CPT | Performed by: INTERNAL MEDICINE

## 2023-03-03 LAB
ESTIMATED AVG GLUCOSE: 143 MG/DL (ref 68–131)
HBA1C MFR BLD: 6.6 % (ref 4–5.6)

## 2023-03-03 NOTE — PROGRESS NOTES
Labs reviewed.  The metabolic abnormalities will be reviewed at the upcoming Endocrinology clinic visit.   There is no indication for specific treatment changes at this time.

## 2023-03-10 ENCOUNTER — DOCUMENTATION ONLY (OUTPATIENT)
Dept: REHABILITATION | Facility: HOSPITAL | Age: 43
End: 2023-03-10
Payer: MEDICAID

## 2023-03-10 NOTE — PROGRESS NOTES
No Show Note/Documentation    Patient: Elham Rodas  Date of Session: 3/10/2023  Diagnosis: No diagnosis found.  MRN: [id number]    Elham Rodas did not attend her scheduled therapy appointment today. She did not call to cancel nor reschedule. Next appointment is scheduled for 03/17/2023 at 8:00 AM and will follow up with patient at that time. No charges have been posted today.     Daisy Gastelum PT, DPT  3/10/2023

## 2023-03-24 ENCOUNTER — DOCUMENTATION ONLY (OUTPATIENT)
Dept: REHABILITATION | Facility: HOSPITAL | Age: 43
End: 2023-03-24
Payer: MEDICAID

## 2023-03-24 NOTE — PROGRESS NOTES
No Show Note/Documentation    Patient: Elham Rodas  Date of Session: 3/24/2023  Diagnosis: No diagnosis found.  MRN: [id number]    Elham Rodas did not attend her scheduled therapy appointment today. She did not call to cancel nor reschedule. Next appointment is scheduled for 03/31/2023 at 8:00 AM and will follow up with patient at that time. No charges have been posted today.     Daisy Gastelum PT, DPT  3/24/2023

## 2023-06-06 NOTE — TELEPHONE ENCOUNTER
Contacted patient employer disability department to discuss documents that were received by patient yesterday during consult visit. Wanted to get further information. Initially no answer, voicemail being left. During process of leaving message, melba picked up and intervened. She is to call me back given the message is still recording. Office number provided and verbally confirmed.      Incidental Actinic Keratosis Histology Text: Incidental AK was noted at the periphery of the Mohs section destruction was performed, pt was was advised to monitor, and/or patient was advised to considered topical 5FU once fully healed.

## 2023-07-17 ENCOUNTER — LAB VISIT (OUTPATIENT)
Dept: LAB | Facility: HOSPITAL | Age: 43
End: 2023-07-17
Attending: INTERNAL MEDICINE
Payer: MEDICAID

## 2023-07-17 DIAGNOSIS — R73.02 IGT (IMPAIRED GLUCOSE TOLERANCE): ICD-10-CM

## 2023-07-17 DIAGNOSIS — E89.0 POSTSURGICAL HYPOTHYROIDISM: ICD-10-CM

## 2023-07-17 LAB
ALBUMIN SERPL BCP-MCNC: 4.5 G/DL (ref 3.5–5.2)
ALBUMIN/CREAT UR: NORMAL UG/MG (ref 0–30)
ALP SERPL-CCNC: 96 U/L (ref 55–135)
ALT SERPL W/O P-5'-P-CCNC: 42 U/L (ref 10–44)
ANION GAP SERPL CALC-SCNC: 12 MMOL/L (ref 8–16)
AST SERPL-CCNC: 27 U/L (ref 10–40)
BASOPHILS # BLD AUTO: 0.05 K/UL (ref 0–0.2)
BASOPHILS NFR BLD: 1.1 % (ref 0–1.9)
BILIRUB SERPL-MCNC: 0.5 MG/DL (ref 0.1–1)
BUN SERPL-MCNC: 8 MG/DL (ref 6–20)
CALCIUM SERPL-MCNC: 9.1 MG/DL (ref 8.7–10.5)
CHLORIDE SERPL-SCNC: 106 MMOL/L (ref 95–110)
CHOLEST SERPL-MCNC: 148 MG/DL (ref 120–199)
CHOLEST/HDLC SERPL: 3 {RATIO} (ref 2–5)
CO2 SERPL-SCNC: 24 MMOL/L (ref 23–29)
CREAT SERPL-MCNC: 0.8 MG/DL (ref 0.5–1.4)
CREAT UR-MCNC: 54 MG/DL (ref 15–325)
DIFFERENTIAL METHOD: NORMAL
EOSINOPHIL # BLD AUTO: 0.2 K/UL (ref 0–0.5)
EOSINOPHIL NFR BLD: 4.2 % (ref 0–8)
ERYTHROCYTE [DISTWIDTH] IN BLOOD BY AUTOMATED COUNT: 14 % (ref 11.5–14.5)
EST. GFR  (NO RACE VARIABLE): >60 ML/MIN/1.73 M^2
ESTIMATED AVG GLUCOSE: 134 MG/DL (ref 68–131)
GLUCOSE SERPL-MCNC: 134 MG/DL (ref 70–110)
HBA1C MFR BLD: 6.3 % (ref 4–5.6)
HCT VFR BLD AUTO: 40.8 % (ref 37–48.5)
HDLC SERPL-MCNC: 50 MG/DL (ref 40–75)
HDLC SERPL: 33.8 % (ref 20–50)
HGB BLD-MCNC: 13.2 G/DL (ref 12–16)
IMM GRANULOCYTES # BLD AUTO: 0.01 K/UL (ref 0–0.04)
IMM GRANULOCYTES NFR BLD AUTO: 0.2 % (ref 0–0.5)
LDLC SERPL CALC-MCNC: 74.8 MG/DL (ref 63–159)
LYMPHOCYTES # BLD AUTO: 2 K/UL (ref 1–4.8)
LYMPHOCYTES NFR BLD: 43.5 % (ref 18–48)
MCH RBC QN AUTO: 28.6 PG (ref 27–31)
MCHC RBC AUTO-ENTMCNC: 32.4 G/DL (ref 32–36)
MCV RBC AUTO: 88 FL (ref 82–98)
MICROALBUMIN UR DL<=1MG/L-MCNC: <5 UG/ML
MONOCYTES # BLD AUTO: 0.3 K/UL (ref 0.3–1)
MONOCYTES NFR BLD: 7.4 % (ref 4–15)
NEUTROPHILS # BLD AUTO: 2 K/UL (ref 1.8–7.7)
NEUTROPHILS NFR BLD: 43.6 % (ref 38–73)
NONHDLC SERPL-MCNC: 98 MG/DL
NRBC BLD-RTO: 0 /100 WBC
PLATELET # BLD AUTO: 206 K/UL (ref 150–450)
PMV BLD AUTO: 12.3 FL (ref 9.2–12.9)
POTASSIUM SERPL-SCNC: 4.1 MMOL/L (ref 3.5–5.1)
PROT SERPL-MCNC: 7.6 G/DL (ref 6–8.4)
RBC # BLD AUTO: 4.62 M/UL (ref 4–5.4)
SODIUM SERPL-SCNC: 142 MMOL/L (ref 136–145)
TRIGL SERPL-MCNC: 116 MG/DL (ref 30–150)
TSH SERPL DL<=0.005 MIU/L-ACNC: 2.45 UIU/ML (ref 0.4–4)
VIT B12 SERPL-MCNC: 570 PG/ML (ref 210–950)
WBC # BLD AUTO: 4.48 K/UL (ref 3.9–12.7)

## 2023-07-17 PROCEDURE — 80061 LIPID PANEL: CPT | Performed by: INTERNAL MEDICINE

## 2023-07-17 PROCEDURE — 36415 COLL VENOUS BLD VENIPUNCTURE: CPT | Performed by: INTERNAL MEDICINE

## 2023-07-17 PROCEDURE — 83036 HEMOGLOBIN GLYCOSYLATED A1C: CPT | Performed by: INTERNAL MEDICINE

## 2023-07-17 PROCEDURE — 82570 ASSAY OF URINE CREATININE: CPT | Performed by: INTERNAL MEDICINE

## 2023-07-17 PROCEDURE — 85025 COMPLETE CBC W/AUTO DIFF WBC: CPT | Performed by: INTERNAL MEDICINE

## 2023-07-17 PROCEDURE — 80053 COMPREHEN METABOLIC PANEL: CPT | Performed by: INTERNAL MEDICINE

## 2023-07-17 PROCEDURE — 82607 VITAMIN B-12: CPT | Performed by: INTERNAL MEDICINE

## 2023-07-17 PROCEDURE — 84443 ASSAY THYROID STIM HORMONE: CPT | Performed by: INTERNAL MEDICINE

## 2023-08-10 ENCOUNTER — OFFICE VISIT (OUTPATIENT)
Dept: OBSTETRICS AND GYNECOLOGY | Facility: CLINIC | Age: 43
End: 2023-08-10
Payer: MEDICAID

## 2023-08-10 VITALS
WEIGHT: 174.81 LBS | BODY MASS INDEX: 29.84 KG/M2 | DIASTOLIC BLOOD PRESSURE: 74 MMHG | SYSTOLIC BLOOD PRESSURE: 128 MMHG | HEIGHT: 64 IN | HEART RATE: 79 BPM

## 2023-08-10 DIAGNOSIS — Z85.3 PERSONAL HISTORY OF BREAST CANCER: ICD-10-CM

## 2023-08-10 DIAGNOSIS — N63.22 MASS OF UPPER INNER QUADRANT OF LEFT BREAST: Primary | ICD-10-CM

## 2023-08-10 PROCEDURE — 1159F MED LIST DOCD IN RCRD: CPT | Mod: CPTII,,, | Performed by: OBSTETRICS & GYNECOLOGY

## 2023-08-10 PROCEDURE — 3044F HG A1C LEVEL LT 7.0%: CPT | Mod: CPTII,,, | Performed by: OBSTETRICS & GYNECOLOGY

## 2023-08-10 PROCEDURE — 99213 PR OFFICE/OUTPT VISIT, EST, LEVL III, 20-29 MIN: ICD-10-PCS | Mod: S$PBB,,, | Performed by: OBSTETRICS & GYNECOLOGY

## 2023-08-10 PROCEDURE — 3066F NEPHROPATHY DOC TX: CPT | Mod: CPTII,,, | Performed by: OBSTETRICS & GYNECOLOGY

## 2023-08-10 PROCEDURE — 1160F PR REVIEW ALL MEDS BY PRESCRIBER/CLIN PHARMACIST DOCUMENTED: ICD-10-PCS | Mod: CPTII,,, | Performed by: OBSTETRICS & GYNECOLOGY

## 2023-08-10 PROCEDURE — 3044F PR MOST RECENT HEMOGLOBIN A1C LEVEL <7.0%: ICD-10-PCS | Mod: CPTII,,, | Performed by: OBSTETRICS & GYNECOLOGY

## 2023-08-10 PROCEDURE — 99213 OFFICE O/P EST LOW 20 MIN: CPT | Mod: S$PBB,,, | Performed by: OBSTETRICS & GYNECOLOGY

## 2023-08-10 PROCEDURE — 3008F PR BODY MASS INDEX (BMI) DOCUMENTED: ICD-10-PCS | Mod: CPTII,,, | Performed by: OBSTETRICS & GYNECOLOGY

## 2023-08-10 PROCEDURE — 99999 PR PBB SHADOW E&M-EST. PATIENT-LVL IV: ICD-10-PCS | Mod: PBBFAC,,, | Performed by: OBSTETRICS & GYNECOLOGY

## 2023-08-10 PROCEDURE — 3078F DIAST BP <80 MM HG: CPT | Mod: CPTII,,, | Performed by: OBSTETRICS & GYNECOLOGY

## 2023-08-10 PROCEDURE — 3066F PR DOCUMENTATION OF TREATMENT FOR NEPHROPATHY: ICD-10-PCS | Mod: CPTII,,, | Performed by: OBSTETRICS & GYNECOLOGY

## 2023-08-10 PROCEDURE — 99999 PR PBB SHADOW E&M-EST. PATIENT-LVL IV: CPT | Mod: PBBFAC,,, | Performed by: OBSTETRICS & GYNECOLOGY

## 2023-08-10 PROCEDURE — 1160F RVW MEDS BY RX/DR IN RCRD: CPT | Mod: CPTII,,, | Performed by: OBSTETRICS & GYNECOLOGY

## 2023-08-10 PROCEDURE — 99214 OFFICE O/P EST MOD 30 MIN: CPT | Mod: PBBFAC | Performed by: OBSTETRICS & GYNECOLOGY

## 2023-08-10 PROCEDURE — 3074F SYST BP LT 130 MM HG: CPT | Mod: CPTII,,, | Performed by: OBSTETRICS & GYNECOLOGY

## 2023-08-10 PROCEDURE — 3078F PR MOST RECENT DIASTOLIC BLOOD PRESSURE < 80 MM HG: ICD-10-PCS | Mod: CPTII,,, | Performed by: OBSTETRICS & GYNECOLOGY

## 2023-08-10 PROCEDURE — 1159F PR MEDICATION LIST DOCUMENTED IN MEDICAL RECORD: ICD-10-PCS | Mod: CPTII,,, | Performed by: OBSTETRICS & GYNECOLOGY

## 2023-08-10 PROCEDURE — 3008F BODY MASS INDEX DOCD: CPT | Mod: CPTII,,, | Performed by: OBSTETRICS & GYNECOLOGY

## 2023-08-10 PROCEDURE — 3061F PR NEG MICROALBUMINURIA RESULT DOCUMENTED/REVIEW: ICD-10-PCS | Mod: CPTII,,, | Performed by: OBSTETRICS & GYNECOLOGY

## 2023-08-10 PROCEDURE — 3074F PR MOST RECENT SYSTOLIC BLOOD PRESSURE < 130 MM HG: ICD-10-PCS | Mod: CPTII,,, | Performed by: OBSTETRICS & GYNECOLOGY

## 2023-08-10 PROCEDURE — 3061F NEG MICROALBUMINURIA REV: CPT | Mod: CPTII,,, | Performed by: OBSTETRICS & GYNECOLOGY

## 2023-08-10 NOTE — PROGRESS NOTES
Subjective:       Patient ID: Elham Rodas is a 42 y.o. female.    Chief Complaint:  Annual Exam (Well woman exam, Pt states she had breast surgery in May and does not wish to have mmg at this time )      History of Present Illness  Patient presents after noticing a mass in her left breast.  Patient has a personal history of breast cancer on the right and had reconstructive surgery in May of this year which included her left breast.  Patient is unsure if this mass might be from that reconstruction.  She is also due for an annual exam.  She is otherwise without gyn complaints.  Patient has not had a mammogram since .    Menstrual History:  OB History          4    Para   4    Term   4            AB        Living   4         SAB        IAB        Ectopic        Multiple        Live Births                    Menarche age:  Patient's last menstrual period was 2019 (approximate).         Review of Systems  Review of Systems   Constitutional:  Negative for activity change, appetite change, chills, diaphoresis, fatigue, fever and unexpected weight change.   HENT:  Negative for congestion, dental problem, drooling, ear discharge, ear pain, facial swelling, hearing loss, mouth sores, nosebleeds, postnasal drip, rhinorrhea, sinus pressure, sneezing, sore throat, tinnitus, trouble swallowing and voice change.    Eyes:  Negative for photophobia, pain, discharge, redness, itching and visual disturbance.   Respiratory:  Negative for apnea, cough, choking, chest tightness, shortness of breath, wheezing and stridor.    Cardiovascular:  Negative for chest pain, palpitations and leg swelling.   Gastrointestinal:  Negative for abdominal distention, abdominal pain, anal bleeding, blood in stool, constipation, diarrhea, nausea, rectal pain and vomiting.   Endocrine: Negative for cold intolerance, heat intolerance, polydipsia, polyphagia and polyuria.   Genitourinary:  Negative for decreased urine volume,  difficulty urinating, dyspareunia, dysuria, enuresis, flank pain, frequency, genital sores, hematuria, menstrual problem, pelvic pain, urgency, vaginal bleeding, vaginal discharge and vaginal pain.   Musculoskeletal:  Negative for arthralgias, back pain, gait problem, joint swelling, myalgias, neck pain and neck stiffness.   Skin:  Negative for color change, pallor, rash and wound.   Allergic/Immunologic: Negative for environmental allergies, food allergies and immunocompromised state.   Neurological:  Positive for dizziness and headaches. Negative for tremors, seizures, syncope, facial asymmetry, speech difficulty, weakness, light-headedness and numbness.   Hematological:  Negative for adenopathy. Does not bruise/bleed easily.   Psychiatric/Behavioral:  Negative for agitation, behavioral problems, confusion, decreased concentration, dysphoric mood, hallucinations, self-injury, sleep disturbance and suicidal ideas. The patient is not nervous/anxious and is not hyperactive.          Objective:      Physical Exam  Vitals and nursing note reviewed. Exam conducted with a chaperone present.   Constitutional:       Appearance: She is well-developed.   Neck:      Thyroid: No thyromegaly.   Cardiovascular:      Rate and Rhythm: Normal rate and regular rhythm.   Pulmonary:      Effort: Pulmonary effort is normal.      Breath sounds: Normal breath sounds.   Chest:   Breasts:     Breasts are symmetrical.      Right: No inverted nipple, mass, nipple discharge, skin change or tenderness.      Left: No inverted nipple, mass, nipple discharge, skin change or tenderness.       Abdominal:      General: Bowel sounds are normal.      Palpations: Abdomen is soft. There is no mass.      Tenderness: There is no abdominal tenderness.      Hernia: There is no hernia in the left inguinal area or right inguinal area.   Genitourinary:     General: Normal vulva.      Labia:         Right: No rash, tenderness, lesion or injury.         Left: No  rash, tenderness, lesion or injury.       Urethra: No prolapse, urethral pain, urethral swelling or urethral lesion.      Vagina: No signs of injury and foreign body. No vaginal discharge, erythema, tenderness, bleeding, lesions or prolapsed vaginal walls.      Cervix: No cervical motion tenderness, discharge, friability, lesion, erythema, cervical bleeding or eversion.      Uterus: Not deviated, not enlarged, not fixed, not tender and no uterine prolapse.       Adnexa:         Right: No mass, tenderness or fullness.          Left: No mass, tenderness or fullness.        Rectum: No external hemorrhoid.   Musculoskeletal:         General: Normal range of motion.   Lymphadenopathy:      Lower Body: No right inguinal adenopathy. No left inguinal adenopathy.   Skin:     General: Skin is dry.   Neurological:      Mental Status: She is alert and oriented to person, place, and time.      Deep Tendon Reflexes: Reflexes are normal and symmetric.   Psychiatric:         Behavior: Behavior normal.         Thought Content: Thought content normal.         Judgment: Judgment normal.         Assessment:        1. Mass of upper inner quadrant of left breast    2. Personal history of breast cancer                Plan:         Elham was seen today for annual exam.    Diagnoses and all orders for this visit:    Mass of upper inner quadrant of left breast  -     Mammo Digital Diagnostic Bilat with [facility]; Future  -     US Breast Left Complete; Future    Personal history of breast cancer  -     Mammo Digital Diagnostic Bilat with [facility]; Future  -     US Breast Left Complete; Future

## 2023-08-16 ENCOUNTER — HOSPITAL ENCOUNTER (OUTPATIENT)
Dept: RADIOLOGY | Facility: HOSPITAL | Age: 43
Discharge: HOME | End: 2023-08-16
Attending: OBSTETRICS & GYNECOLOGY
Payer: MEDICAID

## 2023-08-16 VITALS — BODY MASS INDEX: 29.71 KG/M2 | WEIGHT: 174 LBS | HEIGHT: 64 IN

## 2023-08-16 DIAGNOSIS — Z85.3 PERSONAL HISTORY OF BREAST CANCER: ICD-10-CM

## 2023-08-16 DIAGNOSIS — N63.22 MASS OF UPPER INNER QUADRANT OF LEFT BREAST: ICD-10-CM

## 2023-08-16 PROCEDURE — 77066 DX MAMMO INCL CAD BI: CPT | Mod: 26,,, | Performed by: RADIOLOGY

## 2023-08-16 PROCEDURE — 77066 DX MAMMO INCL CAD BI: CPT | Mod: TC

## 2023-08-16 PROCEDURE — 77062 BREAST TOMOSYNTHESIS BI: CPT | Mod: 26,,, | Performed by: RADIOLOGY

## 2023-08-16 PROCEDURE — 77062 MAMMO DIGITAL DIAGNOSTIC BILAT WITH TOMO: ICD-10-PCS | Mod: 26,,, | Performed by: RADIOLOGY

## 2023-08-16 PROCEDURE — 76642 US BREAST LEFT LIMITED: ICD-10-PCS | Mod: 26,LT,, | Performed by: RADIOLOGY

## 2023-08-16 PROCEDURE — 76642 ULTRASOUND BREAST LIMITED: CPT | Mod: 26,LT,, | Performed by: RADIOLOGY

## 2023-08-16 PROCEDURE — 76642 ULTRASOUND BREAST LIMITED: CPT | Mod: TC,LT

## 2023-08-16 PROCEDURE — 77066 MAMMO DIGITAL DIAGNOSTIC BILAT WITH TOMO: ICD-10-PCS | Mod: 26,,, | Performed by: RADIOLOGY

## 2023-08-28 DIAGNOSIS — Z17.0 CARCINOMA OF AXILLARY TAIL OF RIGHT BREAST IN FEMALE, ESTROGEN RECEPTOR POSITIVE: ICD-10-CM

## 2023-08-28 DIAGNOSIS — C50.611 CARCINOMA OF AXILLARY TAIL OF RIGHT BREAST IN FEMALE, ESTROGEN RECEPTOR POSITIVE: ICD-10-CM

## 2023-08-28 RX ORDER — ANASTROZOLE 1 MG/1
TABLET ORAL
Qty: 90 TABLET | Refills: 2 | Status: SHIPPED | OUTPATIENT
Start: 2023-08-28

## 2023-08-29 NOTE — PROGRESS NOTES
LOV:11/22/2022  Called patient unable to leave voicemail at this time mailbox is currently full.   Pt was No Show 9/21 and 9/25/20 with no contact to department-  Chart review shows COVID + 9/23/20 and 10 day quarantine.  Pt will need to reschedule missed visits.

## 2023-09-01 NOTE — PLAN OF CARE
Problem: Adult Inpatient Plan of Care  Goal: Plan of Care Review  Outcome: Ongoing (interventions implemented as appropriate)  Day 23 of outpatient radiation to the rt breasr brisk erythema noted  Given gel sheets       Hydroxyzine Counseling: Patient advised that the medication is sedating and not to drive a car after taking this medication.  Patient informed of potential adverse effects including but not limited to dry mouth, urinary retention, and blurry vision.  The patient verbalized understanding of the proper use and possible adverse effects of hydroxyzine.  All of the patient's questions and concerns were addressed.

## 2023-09-19 DIAGNOSIS — I10 HYPERTENSION, UNSPECIFIED TYPE: ICD-10-CM

## 2023-09-19 DIAGNOSIS — R00.2 PALPITATIONS: Primary | ICD-10-CM

## 2023-09-19 NOTE — TELEPHONE ENCOUNTER
Faxed received from [facility] for refill on intrarosa. Pt last seen by Dr Trinidad for survivorship 4/16/21. Last annual 8/10/23 with Dr Oscar. Pt is due for appointment. Will send pt message to [facility]ago

## 2023-09-20 RX ORDER — PRASTERONE 6.5 MG/1
6.5 INSERT VAGINAL NIGHTLY
Qty: 28 EACH | Refills: 1 | Status: SHIPPED | OUTPATIENT
Start: 2023-09-20

## 2023-09-25 RX ORDER — ROSUVASTATIN CALCIUM 20 MG/1
20 TABLET, COATED ORAL DAILY
Qty: 30 TABLET | Refills: 0 | Status: SHIPPED | OUTPATIENT
Start: 2023-09-25 | End: 2023-10-18 | Stop reason: SDUPTHER

## 2023-10-10 ENCOUNTER — TELEPHONE (OUTPATIENT)
Dept: HEMATOLOGY/ONCOLOGY | Facility: CLINIC | Age: 43
End: 2023-10-10
Payer: MEDICAID

## 2023-10-11 NOTE — TELEPHONE ENCOUNTER
Spoke with pt. to confirm upcoming appt. on Friday, October 13. Pt. confirmed.    BLD, [place] Ext. [phone]

## 2023-10-13 ENCOUNTER — TELEPHONE (OUTPATIENT)
Dept: HEMATOLOGY/ONCOLOGY | Facility: CLINIC | Age: 43
End: 2023-10-13
Payer: MEDICAID

## 2023-10-13 ENCOUNTER — OFFICE VISIT (OUTPATIENT)
Dept: HEMATOLOGY/ONCOLOGY | Facility: CLINIC | Age: 43
End: 2023-10-13
Payer: MEDICAID

## 2023-10-13 VITALS
DIASTOLIC BLOOD PRESSURE: 87 MMHG | OXYGEN SATURATION: 98 % | HEIGHT: 64 IN | WEIGHT: 161.63 LBS | SYSTOLIC BLOOD PRESSURE: 137 MMHG | HEART RATE: 73 BPM | BODY MASS INDEX: 27.59 KG/M2

## 2023-10-13 DIAGNOSIS — M85.80 OSTEOPENIA: Primary | ICD-10-CM

## 2023-10-13 DIAGNOSIS — Z79.811 AROMATASE INHIBITOR USE: ICD-10-CM

## 2023-10-13 DIAGNOSIS — C50.611 CARCINOMA OF AXILLARY TAIL OF RIGHT BREAST IN FEMALE, ESTROGEN RECEPTOR POSITIVE: ICD-10-CM

## 2023-10-13 DIAGNOSIS — Z17.0 CARCINOMA OF AXILLARY TAIL OF RIGHT BREAST IN FEMALE, ESTROGEN RECEPTOR POSITIVE: ICD-10-CM

## 2023-10-13 DIAGNOSIS — R63.4 WEIGHT LOSS: ICD-10-CM

## 2023-10-13 PROCEDURE — 99999 PR PBB SHADOW E&M-EST. PATIENT-LVL IV: CPT | Mod: PBBFAC,,, | Performed by: OBSTETRICS & GYNECOLOGY

## 2023-10-13 PROCEDURE — 3066F PR DOCUMENTATION OF TREATMENT FOR NEPHROPATHY: ICD-10-PCS | Mod: CPTII,,, | Performed by: OBSTETRICS & GYNECOLOGY

## 2023-10-13 PROCEDURE — 1159F MED LIST DOCD IN RCRD: CPT | Mod: CPTII,,, | Performed by: OBSTETRICS & GYNECOLOGY

## 2023-10-13 PROCEDURE — 99214 OFFICE O/P EST MOD 30 MIN: CPT | Mod: PBBFAC | Performed by: OBSTETRICS & GYNECOLOGY

## 2023-10-13 PROCEDURE — 99214 OFFICE O/P EST MOD 30 MIN: CPT | Mod: S$PBB,,, | Performed by: OBSTETRICS & GYNECOLOGY

## 2023-10-13 PROCEDURE — 3008F BODY MASS INDEX DOCD: CPT | Mod: CPTII,,, | Performed by: OBSTETRICS & GYNECOLOGY

## 2023-10-13 PROCEDURE — 3075F SYST BP GE 130 - 139MM HG: CPT | Mod: CPTII,,, | Performed by: OBSTETRICS & GYNECOLOGY

## 2023-10-13 PROCEDURE — 3061F PR NEG MICROALBUMINURIA RESULT DOCUMENTED/REVIEW: ICD-10-PCS | Mod: CPTII,,, | Performed by: OBSTETRICS & GYNECOLOGY

## 2023-10-13 PROCEDURE — 3079F PR MOST RECENT DIASTOLIC BLOOD PRESSURE 80-89 MM HG: ICD-10-PCS | Mod: CPTII,,, | Performed by: OBSTETRICS & GYNECOLOGY

## 2023-10-13 PROCEDURE — 3061F NEG MICROALBUMINURIA REV: CPT | Mod: CPTII,,, | Performed by: OBSTETRICS & GYNECOLOGY

## 2023-10-13 PROCEDURE — 99214 PR OFFICE/OUTPT VISIT, EST, LEVL IV, 30-39 MIN: ICD-10-PCS | Mod: S$PBB,,, | Performed by: OBSTETRICS & GYNECOLOGY

## 2023-10-13 PROCEDURE — 3044F PR MOST RECENT HEMOGLOBIN A1C LEVEL <7.0%: ICD-10-PCS | Mod: CPTII,,, | Performed by: OBSTETRICS & GYNECOLOGY

## 2023-10-13 PROCEDURE — 1159F PR MEDICATION LIST DOCUMENTED IN MEDICAL RECORD: ICD-10-PCS | Mod: CPTII,,, | Performed by: OBSTETRICS & GYNECOLOGY

## 2023-10-13 PROCEDURE — 3044F HG A1C LEVEL LT 7.0%: CPT | Mod: CPTII,,, | Performed by: OBSTETRICS & GYNECOLOGY

## 2023-10-13 PROCEDURE — 99999 PR PBB SHADOW E&M-EST. PATIENT-LVL IV: ICD-10-PCS | Mod: PBBFAC,,, | Performed by: OBSTETRICS & GYNECOLOGY

## 2023-10-13 PROCEDURE — 3066F NEPHROPATHY DOC TX: CPT | Mod: CPTII,,, | Performed by: OBSTETRICS & GYNECOLOGY

## 2023-10-13 PROCEDURE — 3008F PR BODY MASS INDEX (BMI) DOCUMENTED: ICD-10-PCS | Mod: CPTII,,, | Performed by: OBSTETRICS & GYNECOLOGY

## 2023-10-13 PROCEDURE — 3075F PR MOST RECENT SYSTOLIC BLOOD PRESS GE 130-139MM HG: ICD-10-PCS | Mod: CPTII,,, | Performed by: OBSTETRICS & GYNECOLOGY

## 2023-10-13 PROCEDURE — 3079F DIAST BP 80-89 MM HG: CPT | Mod: CPTII,,, | Performed by: OBSTETRICS & GYNECOLOGY

## 2023-10-13 RX ORDER — CALCIUM CARBONATE 200(500)MG
1 TABLET,CHEWABLE ORAL DAILY
COMMUNITY

## 2023-10-13 RX ORDER — ERGOCALCIFEROL 1.25 MG/1
1 CAPSULE ORAL
COMMUNITY

## 2023-10-13 NOTE — TELEPHONE ENCOUNTER
----- Message from Artemio Restrepo MD sent at 10/13/2023 12:48 PM CDT -----  Thank you Elsie for bringing this to our attention.  Steven, can we please bring her in within the next 2 weeks.  Thanks,    Artemio MA  ----- Message -----  From: Lynn Trinidad MD  Sent: 10/13/2023  12:46 PM CDT  To: Artemio Restrepo MD; Lauro Ulloa [facility]! This patient has not been seen since 11/2021. She is still on Arimidex- diagnosed in 2018.  Can she please get scheduled for a follow up visit?  Thank you!  Elsie Trinidad MD

## 2023-10-13 NOTE — PROGRESS NOTES
SUBJECTIVE:   43 y.o.  female with a medical diagnosis of invasive mammary carcinoma ER positive, LA positive, and HER-2 negative. She is s/p right partial mastectomy followed by adjuvant dose dense taxol, XRT, currently on Arimidex. Last seen in 2021 for hot flashes, joint pain and vaginal dryness. Using Intrarosa for vaginal dryness. Taking Metformin and Ozempic for Type II DM. Has seen plastic surgery and our dietitian. Participated in Breast Cancer [employer].   She reports hot flashes are better and she does not have joint pain.   She has met with nutrition in the past  She has osteopenia- due for Dexa scan in [facility]  PCP: None  Dexa: due 23  C-Scope: due now  MedOnc: Dr. Mcintosh, last 21, overdue  Surgeon: Dr. Ayala  Psych: Dr. Hui, 21  Endo: Dr. Moulton, 3/9/23  GYN: Dr. Thom Oscar  WWE: 8/10/23    Past Medical History:   Diagnosis Date    Abnormal Pap smear of cervix 2016    ASCUS, - HPV    Anxiety     Breast cancer 10/2018    right    Depression     Diabetes mellitus     Fatigue     Hypothyroidism     Neuropathy     Palpitations     Prediabetes     Psychiatric problem     Sleep difficulties     Syncope and collapse     Thyroid disease     Vitamin D deficiency disease      Past Surgical History:   Procedure Laterality Date    AXILLARY NODE DISSECTION Right 2018    Procedure: LYMPHADENECTOMY, AXILLARY;  Surgeon: Jann Ayala MD;  Location: [facility] OR [address] [place] [address];  Service: General;  Laterality: Right;    BREAST LUMPECTOMY Right     BREAST RECONSTRUCTION Bilateral 2023    2nd reconstructive surgery    CHOLECYSTECTOMY      HYSTERECTOMY      TLH, BSO--( breast cancer)    INJECTION FOR SENTINEL NODE IDENTIFICATION Right 2018    Procedure: INJECTION, FOR SENTINEL NODE IDENTIFICATION;  Surgeon: Jann Ayala MD;  Location: [facility] OR [address] [place] [address];  Service: General;  Laterality: Right;    INSERTION OF TUNNELED CENTRAL VENOUS CATHETER (CVC) WITH SUBCUTANEOUS  PORT Left 01/02/2019    Procedure: [id number];  Surgeon: Jann Ayala MD;  Location: [facility] OR 2ND FLR;  Service: General;  Laterality: Left;    LAPAROSCOPIC SALPINGO-OOPHORECTOMY Bilateral 11/04/2019    Procedure: SALPINGO-OOPHORECTOMY, LAPAROSCOPIC;  Surgeon: Yoni Oscar MD;  Location: [facility] OR;  Service: OB/GYN;  Laterality: Bilateral;    LAPAROSCOPIC TOTAL HYSTERECTOMY N/A 11/04/2019    Procedure: HYSTERECTOMY, TOTAL, LAPAROSCOPIC;  Surgeon: Yoni Oscar MD;  Location: [facility] OR;  Service: OB/GYN;  Laterality: N/A;    MASTECTOMY, PARTIAL Right 11/21/2018    Procedure: MASTECTOMY, PARTIAL - seed localized;  Surgeon: Jann Ayala MD;  [facility];  Service: General;  Laterality: Right;  seed placement 11/14    OOPHORECTOMY      RECONSTRUCTION OF BREAST WITH LATISSIMUS DORSI MYOCUTANEOUS FLAP Right 01/11/2022    Procedure: RECONSTRUCTION, BREAST, WITH LATISSIMUS DORSI MYOCUTANEOUS FLAP;  Surgeon: Linus Echavarria MD;  Location: [facility] OR [address] [place] [address];  Service: Plastics;  Laterality: Right;  R lat flap vs. TDAP    REVISION OF FLAP RECONSTRUCTION OF BREAST Bilateral 05/2023    SENTINEL LYMPH NODE BIOPSY Right 11/21/2018    Procedure: BIOPSY, LYMPH NODE, SENTINEL;  Surgeon: Jann Ayala MD;  Location: [facility] OR [address] [place] [address];  Service: General;  Laterality: Right;    THYROIDECTOMY      TOTAL REDUCTION MAMMOPLASTY Left 01/11/2022    Procedure: MAMMOPLASTY, REDUCTION;  Surgeon: Linus Echavarria MD;  Location: [facility] OR [address] [place] [address];  Service: Plastics;  Laterality: Left;    TUBAL LIGATION       Social History     Socioeconomic History    Marital status:     Number of children: 4   Tobacco Use    Smoking status: Never    Smokeless tobacco: Never   Substance and Sexual Activity    Alcohol use: Yes     Alcohol/week: 2.0 standard drinks of alcohol     Types: 2 Cans of beer per week     Comment: occasionally    Drug use: No    Sexual activity: Not Currently     Partners:  Male     Birth control/protection: See Surgical Hx     Comment:      Family History   Problem Relation Age of Onset    Hypertension Mother     Heart attack Mother     Hypertension Father     Ovarian cancer Maternal Aunt     Cancer Paternal Grandfather     Breast cancer Neg Hx     Colon cancer Neg Hx      OB History    Para Term  AB Living   4 4 4     4   SAB IAB Ectopic Multiple Live Births                  # Outcome Date GA Lbr Tray/2nd Weight Sex Delivery Anes PTL Lv   4 Term      Vag-Spont      3 Term      Vag-Spont      2 Term      Vag-Spont      1 Term      Vag-Spont              Current Outpatient Medications   Medication Sig Dispense Refill    anastrozole (ARIMIDEX) 1 mg Tab TAKE 1 TABLET BY MOUTH EVERY DAY 90 tablet 2    calcium carbonate (TUMS) 200 mg calcium (500 mg) chewable tablet Take 1 tablet by mouth.      cholecalciferol, vitamin D3, 125 mcg (5,000 unit) Tab Vitamin D3 125 mcg (5,000 unit) tablet  Take 1 tablet every day by oral route. 30 tablet 5    lancets (ULTRA THIN LANCETS) Misc Inject 1 each into the skin Daily. 100 each 3    lancets 33 gauge Misc TRUEplus Lancets 33 gauge      levothyroxine (EUTHYROX) 75 MCG tablet Take 1 tablet (75 mcg total) by mouth before breakfast. Take on an empty stomach. Wait 4 hours after taking LT4 to take any multivitamins or nutritional supplements and wait 1 hour to take other medications. 30 tablet 11    metFORMIN (GLUCOPHAGE-XR) 500 MG ER 24hr tablet Take 2 tablets (1,000 mg total) by mouth 2 (two) times a day. 120 tablet 11    omega-3 fatty acids 1,000 mg Cap Take 2 capsules (2,000 mg total) by mouth 2 (two) times daily. Take as directed for triglycerides and to decrease risk of heart disease and stroke.  0    prasterone, dhea, (INTRAROSA) 6.5 mg Inst Place 6.5 mg vaginally nightly. 28 each 1    rosuvastatin (CRESTOR) 20 MG tablet Take 1 tablet (20 mg total) by mouth once daily. 30 tablet 0    semaglutide (OZEMPIC) 0.25 mg or 0.5 mg (2 mg/3  mL) pen injector Inject 0.25 mg into the skin every 7 days for 28 days, THEN 0.5 mg every 7 days. 3 mL 11    TRUE METRIX GLUCOSE METER Misc USE TO TEST BLOOD SUGAR AS DIRECTED      TRUE METRIX GLUCOSE TEST STRIP Strp TEST BLOOD SUGAR ONCE DAILY. Dx Code: R73.02 100 strip 3     No current facility-administered medications for this visit.     Facility-Administered Medications Ordered in Other Visits   Medication Dose Route Frequency Provider Last Rate Last Admin    lidocaine (PF) 10 mg/ml (1%) injection 10 mg  1 mL Intradermal Once Cain Mcgarry MD         Allergies: Patient has no known allergies.     The 10-year ASCVD risk score (Nas RODRIGUEZ, et al., 2019) is: 1%    Values used to calculate the score:      Age: 43 years      Sex: Female      Is Non- : No      Diabetic: Yes      Tobacco smoker: No      Systolic Blood Pressure: 119 mmHg      Is BP treated: No      HDL Cholesterol: 50 mg/dL      Total Cholesterol: 148 mg/dL      ROS:  Constitutional: no weight loss,+ weight loss, fever,+ fatigue    Cardiovascular: No inability to lie flat, chest pain, exercise intolerance, swelling, heart palpitations  Respiratory: No wheezing, coughing blood, shortness of breath, or cough  Gastrointestinal: No diarrhea, bloody stool, nausea/vomiting, constipation, gas, hemorrhoids  Genitourinary: No blood in urine, painful urination, urgency of urination, frequency of urination, incomplete emptying, incontinence, abnormal bleeding, painful periods, heavy periods, vaginal discharge, vaginal odor, painful intercourse, sexual problems, bleeding after intercourse, +vaginal dryness.  Musculoskeletal: No muscle weakness  Skin/Breast: +breast cancer  Neurological: No passing out, seizures, numbness, headache  Endocrine: No diabetes, +hypothyroid, hyperthyroid, +hot flashes, hair loss, abnormal hair growth, acne  Psychiatric: No depression, crying  Hematologic: No bruises, bleeding, swollen lymph nodes,  anemia.      Physical Exam  deferred    ASSESSMENT:   Breast cancer  Weight loss  Use of aromatase inhibitor  Vaginal dryness  Hypothyroid  Diabetes    PLAN:   Counseled patient on use on aromatase inhibitor and side effects.   Discussed safety and efficacy of use of Intravaginal DHEA=- can use nightly  Counseled her on use on vaginal moisturizer  Recommend PT to help with deconditioning  Message send to Dr. Mcintosh for Oncology follow up    Follow up with nutrition- she has lost 20 pounds on Ozempic since August- counseled her on the importance of not losing muscle mass. Message send to Pearl Collier  Total time 25 minutes- face to face , review of medical record and arranging follow up

## 2023-10-18 ENCOUNTER — HOSPITAL ENCOUNTER (OUTPATIENT)
Dept: CARDIOLOGY | Facility: CLINIC | Age: 43
Discharge: HOME | End: 2023-10-18
Payer: MEDICAID

## 2023-10-18 ENCOUNTER — OFFICE VISIT (OUTPATIENT)
Dept: CARDIOLOGY | Facility: CLINIC | Age: 43
End: 2023-10-18
Payer: MEDICAID

## 2023-10-18 VITALS
HEART RATE: 76 BPM | BODY MASS INDEX: 28.19 KG/M2 | OXYGEN SATURATION: 98 % | DIASTOLIC BLOOD PRESSURE: 94 MMHG | HEIGHT: 64 IN | WEIGHT: 165.13 LBS | SYSTOLIC BLOOD PRESSURE: 133 MMHG

## 2023-10-18 DIAGNOSIS — I10 PRIMARY HYPERTENSION: ICD-10-CM

## 2023-10-18 DIAGNOSIS — R00.2 PALPITATIONS: ICD-10-CM

## 2023-10-18 DIAGNOSIS — E78.2 MIXED HYPERLIPIDEMIA: Primary | Chronic | ICD-10-CM

## 2023-10-18 DIAGNOSIS — I10 HYPERTENSION, UNSPECIFIED TYPE: ICD-10-CM

## 2023-10-18 DIAGNOSIS — Z92.3 HISTORY OF RADIATION THERAPY: ICD-10-CM

## 2023-10-18 PROCEDURE — 3061F NEG MICROALBUMINURIA REV: CPT | Mod: CPTII,,, | Performed by: PHYSICIAN ASSISTANT

## 2023-10-18 PROCEDURE — 1159F PR MEDICATION LIST DOCUMENTED IN MEDICAL RECORD: ICD-10-PCS | Mod: CPTII,,, | Performed by: PHYSICIAN ASSISTANT

## 2023-10-18 PROCEDURE — 3066F PR DOCUMENTATION OF TREATMENT FOR NEPHROPATHY: ICD-10-PCS | Mod: CPTII,,, | Performed by: PHYSICIAN ASSISTANT

## 2023-10-18 PROCEDURE — 99214 PR OFFICE/OUTPT VISIT, EST, LEVL IV, 30-39 MIN: ICD-10-PCS | Mod: S$PBB,,, | Performed by: PHYSICIAN ASSISTANT

## 2023-10-18 PROCEDURE — 3075F PR MOST RECENT SYSTOLIC BLOOD PRESS GE 130-139MM HG: ICD-10-PCS | Mod: CPTII,,, | Performed by: PHYSICIAN ASSISTANT

## 2023-10-18 PROCEDURE — 3008F PR BODY MASS INDEX (BMI) DOCUMENTED: ICD-10-PCS | Mod: CPTII,,, | Performed by: PHYSICIAN ASSISTANT

## 2023-10-18 PROCEDURE — 3080F PR MOST RECENT DIASTOLIC BLOOD PRESSURE >= 90 MM HG: ICD-10-PCS | Mod: CPTII,,, | Performed by: PHYSICIAN ASSISTANT

## 2023-10-18 PROCEDURE — 3080F DIAST BP >= 90 MM HG: CPT | Mod: CPTII,,, | Performed by: PHYSICIAN ASSISTANT

## 2023-10-18 PROCEDURE — 99999 PR PBB SHADOW E&M-EST. PATIENT-LVL IV: CPT | Mod: PBBFAC,,, | Performed by: PHYSICIAN ASSISTANT

## 2023-10-18 PROCEDURE — 99214 OFFICE O/P EST MOD 30 MIN: CPT | Mod: S$PBB,,, | Performed by: PHYSICIAN ASSISTANT

## 2023-10-18 PROCEDURE — 3044F PR MOST RECENT HEMOGLOBIN A1C LEVEL <7.0%: ICD-10-PCS | Mod: CPTII,,, | Performed by: PHYSICIAN ASSISTANT

## 2023-10-18 PROCEDURE — 3044F HG A1C LEVEL LT 7.0%: CPT | Mod: CPTII,,, | Performed by: PHYSICIAN ASSISTANT

## 2023-10-18 PROCEDURE — 1159F MED LIST DOCD IN RCRD: CPT | Mod: CPTII,,, | Performed by: PHYSICIAN ASSISTANT

## 2023-10-18 PROCEDURE — 99214 OFFICE O/P EST MOD 30 MIN: CPT | Mod: PBBFAC | Performed by: PHYSICIAN ASSISTANT

## 2023-10-18 PROCEDURE — 3008F BODY MASS INDEX DOCD: CPT | Mod: CPTII,,, | Performed by: PHYSICIAN ASSISTANT

## 2023-10-18 PROCEDURE — 93005 ELECTROCARDIOGRAM TRACING: CPT | Mod: PBBFAC | Performed by: INTERNAL MEDICINE

## 2023-10-18 PROCEDURE — 3061F PR NEG MICROALBUMINURIA RESULT DOCUMENTED/REVIEW: ICD-10-PCS | Mod: CPTII,,, | Performed by: PHYSICIAN ASSISTANT

## 2023-10-18 PROCEDURE — 3075F SYST BP GE 130 - 139MM HG: CPT | Mod: CPTII,,, | Performed by: PHYSICIAN ASSISTANT

## 2023-10-18 PROCEDURE — 3066F NEPHROPATHY DOC TX: CPT | Mod: CPTII,,, | Performed by: PHYSICIAN ASSISTANT

## 2023-10-18 PROCEDURE — 93010 EKG 12-LEAD: ICD-10-PCS | Mod: S$PBB,,, | Performed by: INTERNAL MEDICINE

## 2023-10-18 PROCEDURE — 99999 PR PBB SHADOW E&M-EST. PATIENT-LVL IV: ICD-10-PCS | Mod: PBBFAC,,, | Performed by: PHYSICIAN ASSISTANT

## 2023-10-18 PROCEDURE — 93010 ELECTROCARDIOGRAM REPORT: CPT | Mod: S$PBB,,, | Performed by: INTERNAL MEDICINE

## 2023-10-18 RX ORDER — ROSUVASTATIN CALCIUM 20 MG/1
20 TABLET, COATED ORAL DAILY
Qty: 90 TABLET | Refills: 3 | Status: SHIPPED | OUTPATIENT
Start: 2023-10-18 | End: 2024-10-17

## 2023-10-18 NOTE — PATIENT INSTRUCTIONS
Dietary guidance to reduce your risk of heart disease:    LDL - bad type - lower is better.   HDL good type - higher is better  Your LDL should be less than 70    LDL - bad type - improves with diet and medications: typically statins; most other medications that lower LDL have not been proven to prevent heart attacks.  May not improve significantly with exercise alone.  Should be less than 70 mg/dl, but the lower the better. Statins (cholesterol lowering meds) lower LDL and also reduce inflammation within blood vessels.    HDL - good type - improves with exercise.  Ideally greater than 50 mg/dl. The proportion of HDL to the total cholesterol is more important than the absolute HDL.  This means a HDL of 45 out of a total cholesterol of 130 mg'dl is pretty good, but the same HDL out of a total of  200 mg/dl is not quite as good. A level of 30% or higher is ideal.    TGs (triglycerides) - also bad - can change very quickly and considerably with certain foods. Improve with diet, exercise and high dose fish oil.  In some cases a low carbohydrate diet will lower TGs better than a low fat diet.  Ideal range  mg/dl.      Sugar, fat and cholesterol in food:     A sensible diet that limits the intake of sugars, saturated (bad) fats and trans fats while increasing the intake of unsaturated (good) fats and plant proteins is the basis of the current dietary recommendations.      We now recommend drastically reducing the intake of sugar and sugary drinks. There is less emphasis on excluding all fat and more emphasis on the types of different fats. We continue to recommend eating more vegetables.    Cholesterol in our food is generally present in relatively small amounts. New dietary guidelines are less obsessed with the amount of cholesterol. However please do not confuse this with the role of cholesterol in our blood and arteries. The liver converts certain foods into cholesterol.  It is this cholesterol and other fats  "that clog up our arteries.      Most foods that are high in cholesterol are also high in saturated fat. But there is much more saturated fat than cholesterol in these foods. The saturated fat content matters more than cholesterol. On the other hand, there are a handful of foods that are high in cholesterol but do not contain much saturated fat: eggs, shrimp, crab legs and crawfish are OK to eat in small quantities as long as you do not deep eckert them. So a few (2-3) eggs a week are fine (both the white and the yolk), but you can eat as many egg whites as you want. Also, some of these same foods irritate the inner lining of blood vessels by inducing inflammation (see below).  This occurs even if your blood cholesterol levels are "normal". So you should avoid foods that are high in saturated fat and sugar even if your blood cholesterol levels are normal.      Saturated fat is the bad fat - you should limit your intake of this. Deep fried foods, meats and other animal fats are high in saturated fat. Cookies, doughnuts and cakes are usually high in both saturated fat and sugar.       Unsaturated fat is the good fat. It contains the same number of calories as saturated fat but these fats do not get deposited in our arteries. The Mediterranean style diet encourages the intake of unsaturated fat - olive oil, avocado and unsalted nuts. So instead of baking a piece of fish, consider pan-frying it using olive oil.     You should eat a few servings of vegetables (and fruit as long as you are not diabetic) everyday. Substitute some plant proteins in place of meat: beans, lentils, quinoa and oatmeal. They are lean proteins.    Vegetables (particularly green vegetables such as broccoli, kale and spinach) have anti-inflammatory properties. Some fruits (certain berries) have anti-oxidant properties. However I think foods that reduce vascular inflammation are much  more important than the anti-oxidant effect of fruits and I predict " that we will be prescribing anti inflammatory medication specifically for blood vessels to prevent heart attacks in the future. In the mean time eat more of the vegetables with anti-oxidant properties.     Do not use stick butter or stick margarine. Butter that comes in a tub is soft butter and consists of 1/2 butter and 1/2 vegetable oil (either canola or olive oil). It is preferable to use soft butter in small quantities. Avocado butter is also an option.      Trans fats should definitely be avoided. Most foods that are labelled as containing 0 gms of trans fat may still contain several hundred milligrams of trans fat: creamer, margarine, dough, deep fried foods, ready made frosting, potato, corn and torilla chips, cakes, cookies, pie crusts and crackers containing shortening made with hydrogenated vegetable oil.       Avoid excessive red meat and pork as much as possible. Turkey, chicken and fish, if prepared properly, are ok.     More info:   [email or web address]-diet

## 2023-10-18 NOTE — PROGRESS NOTES
Cardiology Clinic Note  Reason for Visit: F/u history of radiation therapy   LOV w/ Cardiology: 8/6/2021    HPI:     PMHx:  Hx Right Breast Ca  -s/p lumpectomy, chemo and XRT  - now on Arimidex  +Fhx CAD, mother had MI at 48 yo  Hypothyroid  IFG  HLD        Elham Rodas is a 43 y.o. F, who presents for follow up. She denies acute complaints today. She was advised to continue to follow up with cardiology given her history of right breast radiation. Holter monitor and echo from 2020 are without abnormalities. Review of available CT imaging shows no coronary or aortic calcifications. She is not exercising, but works in a ware house at night. Walks all day and up and down stairs. She denies chest pain/pressure/tightness/discomfort, dyspnea on regular exertion, orthopnea, PND, peripheral edema, sustained palpitations, syncope or claudication symptoms.       The 10-year ASCVD risk score (Nas RODRIGUEZ, et al., 2019) is: 1.2%    Values used to calculate the score:      Age: 43 years      Sex: Female      Is Non- : No      Diabetic: Yes      Tobacco smoker: No      Systolic Blood Pressure: 133 mmHg      Is BP treated: No      HDL Cholesterol: 50 mg/dL      Total Cholesterol: 148 mg/dL      ROS:    Pertinent ROS included in HPI and below.  PMH:     Past Medical History:   Diagnosis Date    Abnormal Pap smear of cervix 05/2016    ASCUS, - HPV    Anxiety     Breast cancer 10/2018    right    Depression     Diabetes mellitus     Fatigue     Hypothyroidism     Neuropathy     Palpitations     Prediabetes     Psychiatric problem     Sleep difficulties     Syncope and collapse     Thyroid disease     Vitamin D deficiency disease      Past Surgical History:   Procedure Laterality Date    AXILLARY NODE DISSECTION Right 11/21/2018    Procedure: LYMPHADENECTOMY, AXILLARY;  Surgeon: Jann Ayala MD;  Location: [facility] OR [address] [place] [address];  Service: General;  Laterality: Right;    BREAST LUMPECTOMY Right     BREAST  RECONSTRUCTION Bilateral 05/23/2023    2nd reconstructive surgery    CHOLECYSTECTOMY      HYSTERECTOMY      TLH, BSO--( breast cancer)    INJECTION FOR SENTINEL NODE IDENTIFICATION Right 11/21/2018    Procedure: INJECTION, FOR SENTINEL NODE IDENTIFICATION;  Surgeon: Jann Ayala MD;  Location: [facility] OR 2ND FLR;  Service: General;  Laterality: Right;    INSERTION OF TUNNELED CENTRAL VENOUS CATHETER (CVC) WITH SUBCUTANEOUS PORT Left 01/02/2019    Procedure: [id number];  Surgeon: Jann Ayala MD;  Location: [facility] OR 2ND FLR;  Service: General;  Laterality: Left;    LAPAROSCOPIC SALPINGO-OOPHORECTOMY Bilateral 11/04/2019    Procedure: SALPINGO-OOPHORECTOMY, LAPAROSCOPIC;  Surgeon: Yoni Oscar MD;  Location: [facility] OR;  Service: OB/GYN;  Laterality: Bilateral;    LAPAROSCOPIC TOTAL HYSTERECTOMY N/A 11/04/2019    Procedure: HYSTERECTOMY, TOTAL, LAPAROSCOPIC;  Surgeon: Yoni Oscar MD;  Location: [facility] OR;  Service: OB/GYN;  Laterality: N/A;    MASTECTOMY, PARTIAL Right 11/21/2018    Procedure: MASTECTOMY, PARTIAL - seed localized;  Surgeon: Jann Ayala MD;  Location: [facility] OR [address] [place] [address];  Service: General;  Laterality: Right;  seed placement 11/14    OOPHORECTOMY      RECONSTRUCTION OF BREAST WITH LATISSIMUS DORSI MYOCUTANEOUS FLAP Right 01/11/2022    Procedure: RECONSTRUCTION, BREAST, WITH LATISSIMUS DORSI MYOCUTANEOUS FLAP;  Surgeon: Linus Echavarria MD;  Location: [facility] OR [address] [place] [address];  Service: Plastics;  Laterality: Right;  R lat flap vs. TDAP    REVISION OF FLAP RECONSTRUCTION OF BREAST Bilateral 05/2023    SENTINEL LYMPH NODE BIOPSY Right 11/21/2018    Procedure: BIOPSY, LYMPH NODE, SENTINEL;  Surgeon: Jann Ayala MD;  [facility];  Service: General;  Laterality: Right;    THYROIDECTOMY      TOTAL REDUCTION MAMMOPLASTY Left 01/11/2022    Procedure: MAMMOPLASTY, REDUCTION;  Surgeon: Linus Echavarria MD;  Location: [facility] OR [address] [place] [address];  Service: Plastics;   "Laterality: Left;    TUBAL LIGATION       Allergies:     Review of patient's allergies indicates:   Allergen Reactions    Adhesive Rash     PT STATES PAPER TAPE IS "OK"     Medications:     Current Outpatient Medications on File Prior to Visit   Medication Sig Dispense Refill    anastrozole (ARIMIDEX) 1 mg Tab TAKE 1 TABLET BY MOUTH EVERY DAY 90 tablet 2    calcium carbonate (TUMS) 200 mg calcium (500 mg) chewable tablet Take 1 tablet by mouth.      calcium carbonate (TUMS) 200 mg calcium (500 mg) chewable tablet Take 1 tablet by mouth once daily.      cholecalciferol, vitamin D3, 125 mcg (5,000 unit) Tab Vitamin D3 125 mcg (5,000 unit) tablet  Take 1 tablet every day by oral route. 30 tablet 5    ergocalciferol (ERGOCALCIFEROL) 50,000 unit Cap Take 1 capsule by mouth twice a week.      lancets (ULTRA THIN LANCETS) Misc Inject 1 each into the skin Daily. 100 each 3    lancets 33 gauge Misc TRUEplus Lancets 33 gauge      levothyroxine (EUTHYROX) 75 MCG tablet Take 1 tablet (75 mcg total) by mouth before breakfast. Take on an empty stomach. Wait 4 hours after taking LT4 to take any multivitamins or nutritional supplements and wait 1 hour to take other medications. 30 tablet 11    metFORMIN (GLUCOPHAGE-XR) 500 MG ER 24hr tablet Take 2 tablets (1,000 mg total) by mouth 2 (two) times a day. 120 tablet 11    prasterone, dhea, (INTRAROSA) 6.5 mg Inst Place 6.5 mg vaginally nightly. 28 each 1    semaglutide (OZEMPIC) 0.25 mg or 0.5 mg (2 mg/3 mL) pen injector Inject 0.25 mg into the skin every 7 days for 28 days, THEN 0.5 mg every 7 days. 3 mL 11    TRUE METRIX GLUCOSE METER Misc USE TO TEST BLOOD SUGAR AS DIRECTED      TRUE METRIX GLUCOSE TEST STRIP Strp TEST BLOOD SUGAR ONCE DAILY. Dx Code: R73.02 100 strip 3    [DISCONTINUED] rosuvastatin (CRESTOR) 20 MG tablet Take 1 tablet (20 mg total) by mouth once daily. 30 tablet 0    omega-3 fatty acids 1,000 mg Cap Take 2 capsules (2,000 mg total) by mouth 2 (two) times daily. " "Take as directed for triglycerides and to decrease risk of heart disease and stroke.  0     Current Facility-Administered Medications on File Prior to Visit   Medication Dose Route Frequency Provider Last Rate Last Admin    lidocaine (PF) 10 mg/ml (1%) injection 10 mg  1 mL Intradermal Once Cain Mcgarry MD         Social History:     Social History     Tobacco Use    Smoking status: Never    Smokeless tobacco: Never   Substance Use Topics    Alcohol use: Yes     Alcohol/week: 2.0 standard drinks of alcohol     Types: 2 Cans of beer per week     Comment: occasionally     Family History:     Family History   Problem Relation Age of Onset    Hypertension Mother     Heart attack Mother     Hypertension Father     Ovarian cancer Maternal Aunt     Cancer Paternal Grandfather     Breast cancer Neg Hx     Colon cancer Neg Hx      Physical Exam:   BP (!) 133/94   Pulse 76   Ht 5' 4" (1.626 m)   Wt 74.9 kg (165 lb 2 oz)   LMP 03/01/2019 (Approximate)   SpO2 98%   BMI 28.34 kg/m²      Physical Exam  Vitals and nursing note reviewed.   Constitutional:       Appearance: Normal appearance.   HENT:      Head: Normocephalic and atraumatic.   Neck:      Vascular: No carotid bruit or hepatojugular reflux.   Cardiovascular:      Rate and Rhythm: Normal rate and regular rhythm.      Pulses:           Carotid pulses are 2+ on the right side and 2+ on the left side.       Radial pulses are 2+ on the right side and 2+ on the left side.      Heart sounds: S1 normal and S2 normal.   Pulmonary:      Effort: Pulmonary effort is normal.      Breath sounds: Normal breath sounds.   Abdominal:      General: Bowel sounds are normal.      Palpations: Abdomen is soft.      Tenderness: There is no abdominal tenderness.   Feet:      Right foot:      Skin integrity: Skin integrity normal.      Left foot:      Skin integrity: Skin integrity normal.   Neurological:      Mental Status: She is alert.   Psychiatric:         Behavior: " Behavior is cooperative.          Labs:     Blood Tests:  Lab Results   Component Value Date     07/17/2023    K 4.1 07/17/2023     07/17/2023    CO2 24 07/17/2023    BUN 8 07/17/2023    CREATININE 0.8 07/17/2023     (H) 07/17/2023    HGBA1C 6.3 (H) 07/17/2023    MG 1.8 08/08/2020    AST 27 07/17/2023    ALT 42 07/17/2023    ALBUMIN 4.5 07/17/2023    PROT 7.6 07/17/2023    BILITOT 0.5 07/17/2023    WBC 4.48 07/17/2023    HGB 13.2 07/17/2023    HCT 40.8 07/17/2023    MCV 88 07/17/2023     07/17/2023    INR 0.9 04/25/2021    TSH 2.451 07/17/2023       Lab Results   Component Value Date    CHOL 148 07/17/2023    HDL 50 07/17/2023    TRIG 116 07/17/2023       Lab Results   Component Value Date    LDLCALC 74.8 07/17/2023         Imaging:     Echocardiogram  TTE 8/31/2020  Normal left ventricular systolic function. The estimated ejection fraction is 55%.  Concentric left ventricular remodeling.  Normal LV diastolic function.  No wall motion abnormalities.  Normal right ventricular systolic function.  Normal central venous pressure (3 mmHg).    Stress testing  None    Cath Lab  None    Other  Cardiac Monitor 10/16/2020  Patient symptoms correlating with sinus rhythm and sinus tachycardia.    EKG:   10/18/2023  NSR (personally reviewed)    Assessment:     1. Mixed hyperlipidemia    2. Primary hypertension    3. BMI 28.0-28.9,adult    4. History of radiation therapy        Plan:     Mixed hyperlipidemia  -     rosuvastatin (CRESTOR) 20 MG tablet; Take 1 tablet (20 mg total) by mouth once daily.  Dispense: 90 tablet; Refill: 3  Last FLP 7/17/2023: LDL 74.8  Discussed and recommended patient adhere to Mediterranean and DASH diets.     Primary hypertension  Stable, recommend monitoring at home     BMI 28.0-28.9,adult  Weight loss through a combination of diet and exercise encouraged  Recommend 150 minutes a week (30 minutes per day, 5 days per week) of moderate intensity exercise    History of  radiation therapy  Continue to follow with cardiology q2 yrs to monitor for symptoms although radiation to right chest does not necessarily increase her CV risk per my understanding      Signed:  Aileen Olivo PA-C  Cardiology     10/18/2023 12:36 PM    Follow-up:     Future Appointments   Date Time Provider Department Center   10/20/2023  7:30 AM Artemio Restrepo MD [facility] [id number] [facility]   10/26/2023  1:00 PM NOMCK, DEXA1 [facility] BMD [address]   11/1/2023  7:30 AM LABORATORY, [facility] LAB [place]   11/8/2023 10:00 AM Nati Moulton MD [facility] ENDOCRN [facility]   10/8/2024  9:00 AM MARKIE Vieira IV, MD [facility] ASHWINI MENDES

## 2023-10-23 ENCOUNTER — PATIENT MESSAGE (OUTPATIENT)
Dept: HEMATOLOGY/ONCOLOGY | Facility: CLINIC | Age: 43
End: 2023-10-23
Payer: MEDICAID

## 2023-11-01 ENCOUNTER — LAB VISIT (OUTPATIENT)
Dept: LAB | Facility: HOSPITAL | Age: 43
End: 2023-11-01
Attending: INTERNAL MEDICINE
Payer: MEDICAID

## 2023-11-01 DIAGNOSIS — E11.9 TYPE 2 DIABETES MELLITUS WITHOUT COMPLICATION, WITHOUT LONG-TERM CURRENT USE OF INSULIN: ICD-10-CM

## 2023-11-01 LAB
ALBUMIN SERPL BCP-MCNC: 4.4 G/DL (ref 3.5–5.2)
ALP SERPL-CCNC: 78 U/L (ref 55–135)
ALT SERPL W/O P-5'-P-CCNC: 30 U/L (ref 10–44)
ANION GAP SERPL CALC-SCNC: 11 MMOL/L (ref 8–16)
AST SERPL-CCNC: 25 U/L (ref 10–40)
BASOPHILS # BLD AUTO: 0.02 K/UL (ref 0–0.2)
BASOPHILS NFR BLD: 0.4 % (ref 0–1.9)
BILIRUB SERPL-MCNC: 1 MG/DL (ref 0.1–1)
BUN SERPL-MCNC: 6 MG/DL (ref 6–20)
CALCIUM SERPL-MCNC: 9.1 MG/DL (ref 8.7–10.5)
CHLORIDE SERPL-SCNC: 107 MMOL/L (ref 95–110)
CO2 SERPL-SCNC: 26 MMOL/L (ref 23–29)
CREAT SERPL-MCNC: 0.7 MG/DL (ref 0.5–1.4)
DIFFERENTIAL METHOD: NORMAL
EOSINOPHIL # BLD AUTO: 0.1 K/UL (ref 0–0.5)
EOSINOPHIL NFR BLD: 1.9 % (ref 0–8)
ERYTHROCYTE [DISTWIDTH] IN BLOOD BY AUTOMATED COUNT: 13.5 % (ref 11.5–14.5)
EST. GFR  (NO RACE VARIABLE): >60 ML/MIN/1.73 M^2
ESTIMATED AVG GLUCOSE: 123 MG/DL (ref 68–131)
GLUCOSE SERPL-MCNC: 104 MG/DL (ref 70–110)
HBA1C MFR BLD: 5.9 % (ref 4–5.6)
HCT VFR BLD AUTO: 42.4 % (ref 37–48.5)
HGB BLD-MCNC: 14.4 G/DL (ref 12–16)
IMM GRANULOCYTES # BLD AUTO: 0 K/UL (ref 0–0.04)
IMM GRANULOCYTES NFR BLD AUTO: 0 % (ref 0–0.5)
LYMPHOCYTES # BLD AUTO: 2 K/UL (ref 1–4.8)
LYMPHOCYTES NFR BLD: 38.3 % (ref 18–48)
MCH RBC QN AUTO: 29.1 PG (ref 27–31)
MCHC RBC AUTO-ENTMCNC: 34 G/DL (ref 32–36)
MCV RBC AUTO: 86 FL (ref 82–98)
MONOCYTES # BLD AUTO: 0.3 K/UL (ref 0.3–1)
MONOCYTES NFR BLD: 5.4 % (ref 4–15)
NEUTROPHILS # BLD AUTO: 2.8 K/UL (ref 1.8–7.7)
NEUTROPHILS NFR BLD: 54 % (ref 38–73)
NRBC BLD-RTO: 0 /100 WBC
PLATELET # BLD AUTO: 215 K/UL (ref 150–450)
PMV BLD AUTO: 11.5 FL (ref 9.2–12.9)
POTASSIUM SERPL-SCNC: 4.7 MMOL/L (ref 3.5–5.1)
PROT SERPL-MCNC: 7.7 G/DL (ref 6–8.4)
RBC # BLD AUTO: 4.95 M/UL (ref 4–5.4)
SODIUM SERPL-SCNC: 144 MMOL/L (ref 136–145)
TSH SERPL DL<=0.005 MIU/L-ACNC: 0.4 UIU/ML (ref 0.4–4)
WBC # BLD AUTO: 5.22 K/UL (ref 3.9–12.7)

## 2023-11-01 PROCEDURE — 84443 ASSAY THYROID STIM HORMONE: CPT | Performed by: INTERNAL MEDICINE

## 2023-11-01 PROCEDURE — 36415 COLL VENOUS BLD VENIPUNCTURE: CPT | Performed by: INTERNAL MEDICINE

## 2023-11-01 PROCEDURE — 83036 HEMOGLOBIN GLYCOSYLATED A1C: CPT | Performed by: INTERNAL MEDICINE

## 2023-11-01 PROCEDURE — 80053 COMPREHEN METABOLIC PANEL: CPT | Performed by: INTERNAL MEDICINE

## 2023-11-01 PROCEDURE — 85025 COMPLETE CBC W/AUTO DIFF WBC: CPT | Performed by: INTERNAL MEDICINE

## 2024-04-30 ENCOUNTER — PATIENT OUTREACH (OUTPATIENT)
Dept: ADMINISTRATIVE | Facility: OTHER | Age: 44
End: 2024-04-30
Payer: MEDICAID

## 2024-04-30 ENCOUNTER — OFFICE VISIT (OUTPATIENT)
Dept: PRIMARY CARE CLINIC | Facility: CLINIC | Age: 44
End: 2024-04-30
Payer: MEDICAID

## 2024-04-30 ENCOUNTER — LAB VISIT (OUTPATIENT)
Dept: LAB | Facility: HOSPITAL | Age: 44
End: 2024-04-30
Attending: NURSE PRACTITIONER
Payer: MEDICAID

## 2024-04-30 VITALS
DIASTOLIC BLOOD PRESSURE: 84 MMHG | TEMPERATURE: 98 F | BODY MASS INDEX: 27.59 KG/M2 | HEIGHT: 64 IN | WEIGHT: 161.63 LBS | OXYGEN SATURATION: 96 % | HEART RATE: 76 BPM | SYSTOLIC BLOOD PRESSURE: 124 MMHG

## 2024-04-30 DIAGNOSIS — Z00.00 GENERAL MEDICAL EXAM: ICD-10-CM

## 2024-04-30 DIAGNOSIS — Z11.59 NEED FOR HEPATITIS C SCREENING TEST: ICD-10-CM

## 2024-04-30 DIAGNOSIS — E11.9 TYPE 2 DIABETES MELLITUS WITHOUT COMPLICATION, WITHOUT LONG-TERM CURRENT USE OF INSULIN: ICD-10-CM

## 2024-04-30 DIAGNOSIS — E89.0 POSTSURGICAL HYPOTHYROIDISM: ICD-10-CM

## 2024-04-30 DIAGNOSIS — E55.9 VITAMIN D DEFICIENCY: Chronic | ICD-10-CM

## 2024-04-30 DIAGNOSIS — E11.65 TYPE 2 DIABETES MELLITUS WITH HYPERGLYCEMIA, WITHOUT LONG-TERM CURRENT USE OF INSULIN: Primary | ICD-10-CM

## 2024-04-30 DIAGNOSIS — E11.65 TYPE 2 DIABETES MELLITUS WITH HYPERGLYCEMIA, WITHOUT LONG-TERM CURRENT USE OF INSULIN: ICD-10-CM

## 2024-04-30 DIAGNOSIS — E78.2 MIXED HYPERLIPIDEMIA: Chronic | ICD-10-CM

## 2024-04-30 DIAGNOSIS — Z11.4 SCREENING FOR HIV (HUMAN IMMUNODEFICIENCY VIRUS): ICD-10-CM

## 2024-04-30 DIAGNOSIS — Z12.12 SCREENING FOR COLORECTAL CANCER: ICD-10-CM

## 2024-04-30 DIAGNOSIS — Z12.4 CERVICAL CANCER SCREENING: ICD-10-CM

## 2024-04-30 DIAGNOSIS — E03.9 HYPOTHYROIDISM, UNSPECIFIED TYPE: ICD-10-CM

## 2024-04-30 DIAGNOSIS — Z12.11 SCREENING FOR COLORECTAL CANCER: ICD-10-CM

## 2024-04-30 DIAGNOSIS — E11.9 ENCOUNTER FOR DIABETIC FOOT EXAM: ICD-10-CM

## 2024-04-30 PROCEDURE — 1159F MED LIST DOCD IN RCRD: CPT | Mod: CPTII,,, | Performed by: NURSE PRACTITIONER

## 2024-04-30 PROCEDURE — 85025 COMPLETE CBC W/AUTO DIFF WBC: CPT | Performed by: NURSE PRACTITIONER

## 2024-04-30 PROCEDURE — 80061 LIPID PANEL: CPT | Performed by: NURSE PRACTITIONER

## 2024-04-30 PROCEDURE — 3044F HG A1C LEVEL LT 7.0%: CPT | Mod: CPTII,,, | Performed by: NURSE PRACTITIONER

## 2024-04-30 PROCEDURE — 87389 HIV-1 AG W/HIV-1&-2 AB AG IA: CPT | Performed by: NURSE PRACTITIONER

## 2024-04-30 PROCEDURE — 86803 HEPATITIS C AB TEST: CPT | Performed by: NURSE PRACTITIONER

## 2024-04-30 PROCEDURE — 3074F SYST BP LT 130 MM HG: CPT | Mod: CPTII,,, | Performed by: NURSE PRACTITIONER

## 2024-04-30 PROCEDURE — 36415 COLL VENOUS BLD VENIPUNCTURE: CPT | Mod: PN | Performed by: NURSE PRACTITIONER

## 2024-04-30 PROCEDURE — 3072F LOW RISK FOR RETINOPATHY: CPT | Mod: CPTII,,, | Performed by: NURSE PRACTITIONER

## 2024-04-30 PROCEDURE — 99214 OFFICE O/P EST MOD 30 MIN: CPT | Mod: PBBFAC,PN | Performed by: NURSE PRACTITIONER

## 2024-04-30 PROCEDURE — 3008F BODY MASS INDEX DOCD: CPT | Mod: CPTII,,, | Performed by: NURSE PRACTITIONER

## 2024-04-30 PROCEDURE — 99204 OFFICE O/P NEW MOD 45 MIN: CPT | Mod: S$PBB,,, | Performed by: NURSE PRACTITIONER

## 2024-04-30 PROCEDURE — 99999 PR PBB SHADOW E&M-EST. PATIENT-LVL IV: CPT | Mod: PBBFAC,,, | Performed by: NURSE PRACTITIONER

## 2024-04-30 PROCEDURE — 1160F RVW MEDS BY RX/DR IN RCRD: CPT | Mod: CPTII,,, | Performed by: NURSE PRACTITIONER

## 2024-04-30 PROCEDURE — 84439 ASSAY OF FREE THYROXINE: CPT | Performed by: NURSE PRACTITIONER

## 2024-04-30 PROCEDURE — 84443 ASSAY THYROID STIM HORMONE: CPT | Performed by: NURSE PRACTITIONER

## 2024-04-30 PROCEDURE — 80053 COMPREHEN METABOLIC PANEL: CPT | Performed by: NURSE PRACTITIONER

## 2024-04-30 PROCEDURE — 84481 FREE ASSAY (FT-3): CPT | Performed by: NURSE PRACTITIONER

## 2024-04-30 PROCEDURE — 3079F DIAST BP 80-89 MM HG: CPT | Mod: CPTII,,, | Performed by: NURSE PRACTITIONER

## 2024-04-30 PROCEDURE — 83036 HEMOGLOBIN GLYCOSYLATED A1C: CPT | Performed by: NURSE PRACTITIONER

## 2024-04-30 RX ORDER — LANCETS
1 EACH MISCELLANEOUS DAILY
Qty: 100 EACH | Refills: 3 | Status: SHIPPED | OUTPATIENT
Start: 2024-04-30 | End: 2025-04-30

## 2024-04-30 RX ORDER — ACETAMINOPHEN 500 MG
TABLET ORAL
Qty: 30 TABLET | Refills: 5 | Status: SHIPPED | OUTPATIENT
Start: 2024-04-30

## 2024-04-30 RX ORDER — METFORMIN HYDROCHLORIDE 500 MG/1
1000 TABLET, EXTENDED RELEASE ORAL 2 TIMES DAILY
Qty: 120 TABLET | Refills: 11 | Status: SHIPPED | OUTPATIENT
Start: 2024-04-30 | End: 2024-06-17 | Stop reason: SDUPTHER

## 2024-04-30 RX ORDER — OMEGA-3 FATTY ACIDS 1000 MG
2 CAPSULE ORAL 2 TIMES DAILY
COMMUNITY
Start: 2024-04-30 | End: 2025-04-30

## 2024-04-30 RX ORDER — LEVOTHYROXINE SODIUM 75 UG/1
75 TABLET ORAL
Qty: 90 TABLET | Refills: 3 | Status: SHIPPED | OUTPATIENT
Start: 2024-04-30 | End: 2025-04-30

## 2024-04-30 RX ORDER — SEMAGLUTIDE 1.34 MG/ML
1 INJECTION, SOLUTION SUBCUTANEOUS
Qty: 3 ML | Refills: 11 | Status: SHIPPED | OUTPATIENT
Start: 2024-04-30 | End: 2025-04-30

## 2024-04-30 RX ORDER — ROSUVASTATIN CALCIUM 20 MG/1
20 TABLET, COATED ORAL DAILY
Qty: 90 TABLET | Refills: 3 | Status: SHIPPED | OUTPATIENT
Start: 2024-04-30 | End: 2025-04-30

## 2024-04-30 RX ORDER — CALCIUM CITRATE/VITAMIN D3 200MG-6.25
TABLET ORAL
Qty: 100 STRIP | Refills: 3 | Status: SHIPPED | OUTPATIENT
Start: 2024-04-30

## 2024-04-30 NOTE — PROGRESS NOTES
CHW - Initial Contact    This Community Health Worker completed the Social Determinant of Health questionnaire with patient during clinic visit today.    Pt identified barriers of most importance are none at this time.   Referrals to community agencies completed with patient consent outside of [facility] include: No outside referrals at this time.  Referrals were put through [facility] - no  Support and Services: None  Other information discussed the patient needs help with: No other information was shared at this time.   Follow up required: No  No future outreach task assigned    SDOH was done on this patient she do not need any help at this time.

## 2024-05-01 LAB
ALBUMIN SERPL BCP-MCNC: 4.5 G/DL (ref 3.5–5.2)
ALP SERPL-CCNC: 71 U/L (ref 55–135)
ALT SERPL W/O P-5'-P-CCNC: 26 U/L (ref 10–44)
ANION GAP SERPL CALC-SCNC: 15 MMOL/L (ref 8–16)
AST SERPL-CCNC: 24 U/L (ref 10–40)
BASOPHILS # BLD AUTO: 0.04 K/UL (ref 0–0.2)
BASOPHILS NFR BLD: 0.8 % (ref 0–1.9)
BILIRUB SERPL-MCNC: 0.5 MG/DL (ref 0.1–1)
BUN SERPL-MCNC: 7 MG/DL (ref 6–20)
CALCIUM SERPL-MCNC: 9.6 MG/DL (ref 8.7–10.5)
CHLORIDE SERPL-SCNC: 107 MMOL/L (ref 95–110)
CHOLEST SERPL-MCNC: 194 MG/DL (ref 120–199)
CHOLEST/HDLC SERPL: 3.3 {RATIO} (ref 2–5)
CO2 SERPL-SCNC: 20 MMOL/L (ref 23–29)
CREAT SERPL-MCNC: 0.7 MG/DL (ref 0.5–1.4)
DIFFERENTIAL METHOD BLD: NORMAL
EOSINOPHIL # BLD AUTO: 0.1 K/UL (ref 0–0.5)
EOSINOPHIL NFR BLD: 2.1 % (ref 0–8)
ERYTHROCYTE [DISTWIDTH] IN BLOOD BY AUTOMATED COUNT: 13.8 % (ref 11.5–14.5)
EST. GFR  (NO RACE VARIABLE): >60 ML/MIN/1.73 M^2
ESTIMATED AVG GLUCOSE: 120 MG/DL (ref 68–131)
GLUCOSE SERPL-MCNC: 69 MG/DL (ref 70–110)
HBA1C MFR BLD: 5.8 % (ref 4–5.6)
HCT VFR BLD AUTO: 43.1 % (ref 37–48.5)
HCV AB SERPL QL IA: NORMAL
HDLC SERPL-MCNC: 59 MG/DL (ref 40–75)
HDLC SERPL: 30.4 % (ref 20–50)
HGB BLD-MCNC: 14.2 G/DL (ref 12–16)
HIV 1+2 AB+HIV1 P24 AG SERPL QL IA: NORMAL
IMM GRANULOCYTES # BLD AUTO: 0.01 K/UL (ref 0–0.04)
IMM GRANULOCYTES NFR BLD AUTO: 0.2 % (ref 0–0.5)
LDLC SERPL CALC-MCNC: 99.8 MG/DL (ref 63–159)
LYMPHOCYTES # BLD AUTO: 2.3 K/UL (ref 1–4.8)
LYMPHOCYTES NFR BLD: 44.7 % (ref 18–48)
MCH RBC QN AUTO: 30.2 PG (ref 27–31)
MCHC RBC AUTO-ENTMCNC: 32.9 G/DL (ref 32–36)
MCV RBC AUTO: 92 FL (ref 82–98)
MONOCYTES # BLD AUTO: 0.3 K/UL (ref 0.3–1)
MONOCYTES NFR BLD: 6 % (ref 4–15)
NEUTROPHILS # BLD AUTO: 2.4 K/UL (ref 1.8–7.7)
NEUTROPHILS NFR BLD: 46.2 % (ref 38–73)
NONHDLC SERPL-MCNC: 135 MG/DL
NRBC BLD-RTO: 0 /100 WBC
PLATELET # BLD AUTO: 231 K/UL (ref 150–450)
PMV BLD AUTO: 12.7 FL (ref 9.2–12.9)
POTASSIUM SERPL-SCNC: 4 MMOL/L (ref 3.5–5.1)
PROT SERPL-MCNC: 7.7 G/DL (ref 6–8.4)
RBC # BLD AUTO: 4.7 M/UL (ref 4–5.4)
SODIUM SERPL-SCNC: 142 MMOL/L (ref 136–145)
T3FREE SERPL-MCNC: 3 PG/ML (ref 2.3–4.2)
T4 FREE SERPL-MCNC: 1.05 NG/DL (ref 0.71–1.51)
TRIGL SERPL-MCNC: 176 MG/DL (ref 30–150)
TSH SERPL DL<=0.005 MIU/L-ACNC: 0.54 UIU/ML (ref 0.4–4)
WBC # BLD AUTO: 5.19 K/UL (ref 3.9–12.7)

## 2024-05-07 ENCOUNTER — LAB VISIT (OUTPATIENT)
Dept: LAB | Facility: HOSPITAL | Age: 44
End: 2024-05-07
Payer: MEDICAID

## 2024-05-07 DIAGNOSIS — E11.65 TYPE 2 DIABETES MELLITUS WITH HYPERGLYCEMIA, WITHOUT LONG-TERM CURRENT USE OF INSULIN: ICD-10-CM

## 2024-05-07 LAB — HEMOCCULT STL QL IA: NEGATIVE

## 2024-05-07 PROCEDURE — 82274 ASSAY TEST FOR BLOOD FECAL: CPT | Performed by: NURSE PRACTITIONER

## 2024-05-13 NOTE — PROGRESS NOTES
Subjective:       Patient ID: Elham Rodas is a 43 y.o. female.    Chief Complaint: Annual Exam and Establish Care      History of Present Illness:   Elham Rodas 43 y.o. female presents to clinic for medication management and refills for Diabetes Mellitus Type II, HLD,and hypothyroidism. Previous HGA1c resulted: 5.9. Patient reports adherence to treatment plan. Will obtain labs. Denies any other problems or concerns at this time. Treatment options and alternatives were discussed with the patient. Patient provided opportunity to ask additional questions.  All questions were answered. Voices understanding and acceptance of this advice. Instructed to call back if any further questions or concerns.      Past Medical History:   Diagnosis Date    Abnormal Pap smear of cervix 05/2016    ASCUS, - HPV    Anxiety     Breast cancer 10/2018    right    Depression     Diabetes mellitus     Fatigue     Hypothyroidism     Neuropathy     Palpitations     Prediabetes     Psychiatric problem     Sleep difficulties     Syncope and collapse     Thyroid disease     Vitamin D deficiency disease      Family History   Problem Relation Name Age of Onset    Hypertension Mother      Heart attack Mother      Hypertension Father      Ovarian cancer Maternal Aunt      Cancer Paternal Grandfather      Breast cancer Neg Hx      Colon cancer Neg Hx       Social History     Socioeconomic History    Marital status:     Number of children: 4   Tobacco Use    Smoking status: Never    Smokeless tobacco: Never   Substance and Sexual Activity    Alcohol use: Yes     Alcohol/week: 2.0 standard drinks of alcohol     Types: 2 Cans of beer per week     Comment: occasionally    Drug use: No    Sexual activity: Not Currently     Partners: Male     Birth control/protection: See Surgical Hx     Comment:      Social Determinants of Health     Financial Resource Strain: Low Risk  (4/30/2024)    Overall Financial Resource Strain (CARDIA)      Difficulty of Paying Living Expenses: Not hard at all   Food Insecurity: No Food Insecurity (4/30/2024)    Hunger Vital Sign     Worried About Running Out of Food in the Last Year: Never true     Ran Out of Food in the Last Year: Never true   Transportation Needs: No Transportation Needs (4/30/2024)    PRAPARE - Transportation     Lack of Transportation (Medical): No     Lack of Transportation (Non-Medical): No   Physical Activity: Sufficiently Active (4/30/2024)    Exercise Vital Sign     Days of Exercise per Week: 5 days     Minutes of Exercise per Session: 30 min   Stress: No Stress Concern Present (4/30/2024)    [employer] - Occupational Stress Questionnaire     Feeling of Stress : Not at all     Outpatient Encounter Medications as of 4/30/2024   Medication Sig Dispense Refill    anastrozole (ARIMIDEX) 1 mg Tab TAKE 1 TABLET BY MOUTH EVERY DAY 90 tablet 2    calcium carbonate (TUMS) 200 mg calcium (500 mg) chewable tablet Take 1 tablet by mouth.      calcium carbonate (TUMS) 200 mg calcium (500 mg) chewable tablet Take 1 tablet by mouth once daily.      ergocalciferol (ERGOCALCIFEROL) 50,000 unit Cap Take 1 capsule by mouth twice a week.      lancets 33 gauge Misc TRUEplus Lancets 33 gauge      prasterone, dhea, (INTRAROSA) 6.5 mg Inst Place 6.5 mg vaginally nightly. 28 each 1    TRUE METRIX GLUCOSE METER Misc USE TO TEST BLOOD SUGAR AS DIRECTED      [DISCONTINUED] cholecalciferol, vitamin D3, 125 mcg (5,000 unit) Tab Vitamin D3 125 mcg (5,000 unit) tablet  Take 1 tablet every day by oral route. 30 tablet 5    [DISCONTINUED] levothyroxine (SYNTHROID) 75 MCG tablet Take 1 tablet (75 mcg total) by mouth before breakfast. Take on an empty stomach. Wait 4 hours after taking LT4 to take any multivitamins or nutritional supplements and wait 1 hour to take other medications. 90 tablet 3    [DISCONTINUED] metFORMIN (GLUCOPHAGE-XR) 500 MG ER 24hr tablet Take 2 tablets (1,000 mg total) by  mouth 2 (two) times a day. 120 tablet 11    [DISCONTINUED] rosuvastatin (CRESTOR) 20 MG tablet Take 1 tablet (20 mg total) by mouth once daily. 90 tablet 3    [DISCONTINUED] semaglutide (OZEMPIC) 1 mg/dose (4 mg/3 mL) Inject 1 mg into the skin every 7 days. Stay well hydrated while taking this medication, focus on protein and fiber intake and take an OTC MVI.   Advise the patient to contact office in 3 weeks to determine if the dose of medication can be titrated up. 3 mL 11    [DISCONTINUED] TRUE METRIX GLUCOSE TEST STRIP Strp TEST BLOOD SUGAR ONCE DAILY. Dx Code: R73.02 100 strip 3    cholecalciferol, vitamin D3, 125 mcg (5,000 unit) Tab Vitamin D3 125 mcg (5,000 unit) tablet  Take 1 tablet every day by oral route. 30 tablet 5    lancets (ULTRA THIN LANCETS) Misc Inject 1 each into the skin Daily. 100 each 3    levothyroxine (SYNTHROID) 75 MCG tablet Take 1 tablet (75 mcg total) by mouth before breakfast. Take on an empty stomach. Wait 4 hours after taking LT4 to take any multivitamins or nutritional supplements and wait 1 hour to take other medications. 90 tablet 3    metFORMIN (GLUCOPHAGE-XR) 500 MG ER 24hr tablet Take 2 tablets (1,000 mg total) by mouth 2 (two) times a day. 120 tablet 11    omega-3 fatty acids 1,000 mg Cap Take 2 capsules (2,000 mg total) by mouth 2 (two) times daily. Take as directed for triglycerides and to decrease risk of heart disease and stroke.      rosuvastatin (CRESTOR) 20 MG tablet Take 1 tablet (20 mg total) by mouth once daily. 90 tablet 3    semaglutide (OZEMPIC) 1 mg/dose (4 mg/3 mL) Inject 1 mg into the skin every 7 days. Stay well hydrated while taking this medication, focus on protein and fiber intake and take an OTC MVI.   Advise the patient to contact office in 3 weeks to determine if the dose of medication can be titrated up. 3 mL 11    TRUE METRIX GLUCOSE TEST STRIP Strp TEST BLOOD SUGAR ONCE DAILY. Dx Code: R73.02 100 strip 3    [DISCONTINUED] lancets (ULTRA THIN LANCETS)  "Misc Inject 1 each into the skin Daily. 100 each 3    [DISCONTINUED] omega-3 fatty acids 1,000 mg Cap Take 2 capsules (2,000 mg total) by mouth 2 (two) times daily. Take as directed for triglycerides and to decrease risk of heart disease and stroke.  0     Facility-Administered Encounter Medications as of 4/30/2024   Medication Dose Route Frequency Provider Last Rate Last Admin    lidocaine (PF) 10 mg/ml (1%) injection 10 mg  1 mL Intradermal Once Cain Mcgarry MD           Review of Systems   Constitutional:  Negative for appetite change, chills and fever.   HENT:  Negative for ear pain, sinus pressure, sore throat and trouble swallowing.    Eyes:  Negative for visual disturbance.   Respiratory:  Negative for shortness of breath.    Cardiovascular:  Negative for chest pain.   Gastrointestinal:  Negative for abdominal pain, diarrhea, nausea and vomiting.   Endocrine: Negative for cold intolerance, polyphagia and polyuria.   Genitourinary:  Negative for decreased urine volume and dysuria.   Musculoskeletal:  Negative for back pain.   Skin:  Negative for rash.   Allergic/Immunologic: Negative for environmental allergies and food allergies.   Neurological:  Negative for dizziness, tremors, weakness and numbness.   Hematological:  Does not bruise/bleed easily.   Psychiatric/Behavioral:  Negative for confusion and hallucinations. The patient is not nervous/anxious and is not hyperactive.    All other systems reviewed and are negative.      Objective:      /84 (BP Location: Left arm, Patient Position: Sitting, BP Method: Medium (Manual))   Pulse 76   Temp 97.9 °F (36.6 °C) (Oral)   Ht 5' 4" (1.626 m)   Wt 73.3 kg (161 lb 9.6 oz)   LMP 03/01/2019 (Approximate)   SpO2 96%   BMI 27.74 kg/m²   Physical Exam  Constitutional:       Appearance: She is well-developed. She is obese.   HENT:      Head: Normocephalic.      Nose: Nose normal.   Eyes:      Pupils: Pupils are equal, round, and reactive to " light.   Cardiovascular:      Rate and Rhythm: Normal rate.      Heart sounds: Normal heart sounds.   Pulmonary:      Effort: Pulmonary effort is normal.      Breath sounds: Normal breath sounds.   Abdominal:      General: Bowel sounds are normal.      Palpations: Abdomen is soft.   Musculoskeletal:         General: Normal range of motion.      Cervical back: Normal range of motion.   Skin:     General: Skin is warm and dry.   Neurological:      Mental Status: She is alert and oriented to person, place, and time.   Psychiatric:         Behavior: Behavior normal.         Results for orders placed or performed in visit on 11/01/23   CBC Auto Differential   Result Value Ref Range    WBC 5.22 3.90 - 12.70 K/uL    RBC 4.95 4.00 - 5.40 M/uL    Hemoglobin 14.4 12.0 - 16.0 g/dL    Hematocrit 42.4 37.0 - 48.5 %    MCV 86 82 - 98 fL    MCH 29.1 27.0 - 31.0 pg    MCHC 34.0 32.0 - 36.0 g/dL    RDW 13.5 11.5 - 14.5 %    Platelets 215 150 - 450 K/uL    MPV 11.5 9.2 - 12.9 fL    Immature Granulocytes 0.0 0.0 - 0.5 %    Gran # (ANC) 2.8 1.8 - 7.7 K/uL    Immature Grans (Abs) 0.00 0.00 - 0.04 K/uL    Lymph # 2.0 1.0 - 4.8 K/uL    Mono # 0.3 0.3 - 1.0 K/uL    Eos # 0.1 0.0 - 0.5 K/uL    Baso # 0.02 0.00 - 0.20 K/uL    nRBC 0 0 /100 WBC    Gran % 54.0 38.0 - 73.0 %    Lymph % 38.3 18.0 - 48.0 %    Mono % 5.4 4.0 - 15.0 %    Eosinophil % 1.9 0.0 - 8.0 %    Basophil % 0.4 0.0 - 1.9 %    Differential Method Automated    Comprehensive Metabolic Panel   Result Value Ref Range    Sodium 144 136 - 145 mmol/L    Potassium 4.7 3.5 - 5.1 mmol/L    Chloride 107 95 - 110 mmol/L    CO2 26 23 - 29 mmol/L    Glucose 104 70 - 110 mg/dL    BUN 6 6 - 20 mg/dL    Creatinine 0.7 0.5 - 1.4 mg/dL    Calcium 9.1 8.7 - 10.5 mg/dL    Total Protein 7.7 6.0 - 8.4 g/dL    Albumin 4.4 3.5 - 5.2 g/dL    Total Bilirubin 1.0 0.1 - 1.0 mg/dL    Alkaline Phosphatase 78 55 - 135 U/L    AST 25 10 - 40 U/L    ALT 30 10 - 44 U/L    eGFR >60 >60 mL/min/1.73 m^2    Anion  Gap 11 8 - 16 mmol/L   Hemoglobin A1C   Result Value Ref Range    Hemoglobin A1C 5.9 (H) 4.0 - 5.6 %    Estimated Avg Glucose 123 68 - 131 mg/dL   TSH   Result Value Ref Range    TSH 0.403 0.400 - 4.000 uIU/mL     Assessment:       1. Type 2 diabetes mellitus with hyperglycemia, without long-term current use of insulin    2. Vitamin D deficiency    3. Mixed hyperlipidemia    4. Hypothyroidism, unspecified type    5. General medical exam    6. Encounter for diabetic foot exam    7. Screening for colorectal cancer    8. Screening for HIV (human immunodeficiency virus)    9. Need for hepatitis C screening test    10. Cervical cancer screening    11. Postsurgical hypothyroidism    12. Type 2 diabetes mellitus without complication, without long-term current use of insulin        Plan:   Type 2 diabetes mellitus with hyperglycemia, without long-term current use of insulin  -     Hemoglobin A1C; Future; Expected date: 04/30/2024  -     TSH; Future; Expected date: 04/30/2024  -     T3, Free; Future; Expected date: 04/30/2024  -     T4, Free; Future; Expected date: 04/30/2024  -     Foot Exam Performed  -     Fecal Immunochemical Test (iFOBT); Future; Expected date: 04/30/2024    Vitamin D deficiency  -     cholecalciferol, vitamin D3, 125 mcg (5,000 unit) Tab; Vitamin D3 125 mcg (5,000 unit) tablet  Take 1 tablet every day by oral route.  Dispense: 30 tablet; Refill: 5    Mixed hyperlipidemia  -     Lipid Panel; Future; Expected date: 04/30/2024  -     omega-3 fatty acids 1,000 mg Cap; Take 2 capsules (2,000 mg total) by mouth 2 (two) times daily. Take as directed for triglycerides and to decrease risk of heart disease and stroke.  -     rosuvastatin (CRESTOR) 20 MG tablet; Take 1 tablet (20 mg total) by mouth once daily.  Dispense: 90 tablet; Refill: 3    Hypothyroidism, unspecified type    General medical exam  -     CBC Auto Differential; Future; Expected date: 04/30/2024  -     Comprehensive Metabolic Panel; Future;  Expected date: 04/30/2024    Encounter for diabetic foot exam    Screening for colorectal cancer    Screening for HIV (human immunodeficiency virus)  -     HIV 1/2 Ag/Ab (4th Gen); Future; Expected date: 04/30/2024    Need for hepatitis C screening test  -     Hepatitis C Antibody; Future; Expected date: 04/30/2024    Cervical cancer screening  -     Ambulatory referral/consult to Gynecology; Future; Expected date: 05/07/2024    Postsurgical hypothyroidism  -     levothyroxine (SYNTHROID) 75 MCG tablet; Take 1 tablet (75 mcg total) by mouth before breakfast. Take on an empty stomach. Wait 4 hours after taking LT4 to take any multivitamins or nutritional supplements and wait 1 hour to take other medications.  Dispense: 90 tablet; Refill: 3    Type 2 diabetes mellitus without complication, without long-term current use of insulin  -     metFORMIN (GLUCOPHAGE-XR) 500 MG ER 24hr tablet; Take 2 tablets (1,000 mg total) by mouth 2 (two) times a day.  Dispense: 120 tablet; Refill: 11  -     semaglutide (OZEMPIC) 1 mg/dose (4 mg/3 mL); Inject 1 mg into the skin every 7 days. Stay well hydrated while taking this medication, focus on protein and fiber intake and take an OTC MVI.   Advise the patient to contact office in 3 weeks to determine if the dose of medication can be titrated up.  Dispense: 3 mL; Refill: 11    Other orders  -     lancets (ULTRA THIN LANCETS) Misc; Inject 1 each into the skin Daily.  Dispense: 100 each; Refill: 3  -     TRUE METRIX GLUCOSE TEST STRIP Strp; TEST BLOOD SUGAR ONCE DAILY. Dx Code: R73.02  Dispense: 100 strip; Refill: 3              [facility]   [address]  [place] [address]  Office [phone]  Fax [phone]

## 2024-07-09 ENCOUNTER — PATIENT MESSAGE (OUTPATIENT)
Dept: ADMINISTRATIVE | Facility: HOSPITAL | Age: 44
End: 2024-07-09
Payer: MEDICAID

## 2024-08-21 ENCOUNTER — TELEPHONE (OUTPATIENT)
Dept: HEMATOLOGY/ONCOLOGY | Facility: CLINIC | Age: 44
End: 2024-08-21
Payer: COMMERCIAL

## 2024-08-21 NOTE — TELEPHONE ENCOUNTER
----- Message from Inna Ngo sent at 8/21/2024  2:06 PM CDT -----  Regarding: Is a f/u needed?  Contact: Pt @[phone]  Pt is calling to speak with Dr. Restrepo about if she needs to continue her care. Please c/b to advise.. Thanks

## 2024-08-21 NOTE — TELEPHONE ENCOUNTER
Pt wanting requesting follow up appt with Dr. Mcintosh. Pt confirmed and v/u soonest appt available with Dr. Mcintosh on 10/31 at 8:30 am.

## 2024-09-03 ENCOUNTER — OFFICE VISIT (OUTPATIENT)
Dept: URGENT CARE | Facility: CLINIC | Age: 44
End: 2024-09-03
Payer: MEDICAID

## 2024-09-03 VITALS
DIASTOLIC BLOOD PRESSURE: 87 MMHG | HEIGHT: 64 IN | BODY MASS INDEX: 25.93 KG/M2 | SYSTOLIC BLOOD PRESSURE: 142 MMHG | WEIGHT: 151.88 LBS | OXYGEN SATURATION: 97 % | HEART RATE: 93 BPM | RESPIRATION RATE: 18 BRPM | TEMPERATURE: 100 F

## 2024-09-03 DIAGNOSIS — H66.001 NON-RECURRENT ACUTE SUPPURATIVE OTITIS MEDIA OF RIGHT EAR WITHOUT SPONTANEOUS RUPTURE OF TYMPANIC MEMBRANE: ICD-10-CM

## 2024-09-03 DIAGNOSIS — U07.1 COVID-19: Primary | ICD-10-CM

## 2024-09-03 DIAGNOSIS — U07.1 COVID-19 VIRUS DETECTED: ICD-10-CM

## 2024-09-03 LAB
CTP QC/QA: YES
SARS-COV-2 AG RESP QL IA.RAPID: POSITIVE

## 2024-09-03 PROCEDURE — 87811 SARS-COV-2 COVID19 W/OPTIC: CPT | Mod: QW,S$GLB,, | Performed by: PHYSICIAN ASSISTANT

## 2024-09-03 PROCEDURE — 99214 OFFICE O/P EST MOD 30 MIN: CPT | Mod: S$GLB,,, | Performed by: PHYSICIAN ASSISTANT

## 2024-09-03 RX ORDER — AMOXICILLIN AND CLAVULANATE POTASSIUM 875; 125 MG/1; MG/1
1 TABLET, FILM COATED ORAL 2 TIMES DAILY
Qty: 20 TABLET | Refills: 0 | Status: SHIPPED | OUTPATIENT
Start: 2024-09-03 | End: 2024-09-13

## 2024-09-03 RX ORDER — PROMETHAZINE HYDROCHLORIDE AND DEXTROMETHORPHAN HYDROBROMIDE 6.25; 15 MG/5ML; MG/5ML
5 SYRUP ORAL EVERY 6 HOURS PRN
Qty: 140 ML | Refills: 0 | Status: SHIPPED | OUTPATIENT
Start: 2024-09-03 | End: 2024-09-10

## 2024-09-03 RX ORDER — FLUTICASONE PROPIONATE 50 MCG
2 SPRAY, SUSPENSION (ML) NASAL DAILY
Qty: 9.9 ML | Refills: 0 | Status: SHIPPED | OUTPATIENT
Start: 2024-09-03 | End: 2024-09-10

## 2024-09-03 NOTE — PATIENT INSTRUCTIONS
"You have tested positive for COVID-19 today.  Take your vitamins, rest and drink plenty of fluids. Tylenol (acetaminophen) or Motrin (Ibuprofen) for fever or pain. Nasal saline and flonase for congestion. Promethazine dm every 4-6 hours as needed for cough. Augmentin for ear infection.    PAXLOVID Covid antiviral to help fight COVID infection.If you are taking a cholesterol medication (a statin, please discontinue while taking PAXLOVID and for 5 days following treatment. Side effects include nausea and rarely allergic reactions. Please contact your provider if any significant side effects. Occasionally, people have mild "rebound" COVID symptoms after completing PAXLOVID. These symptoms tend to be mild and not been shown to increase risk of hospitalizations. If you have any rebound symptoms, re-isolate until symptoms improve.    You need urgent re-evaluation for any chest tightness, shortness of breath or oxygen saturations persistently < 93%.      ISOLATION    Recent CDC guidelines have been updated:     If you test positive for COVID-19 you may return to normal activities when, for at least 24 hours, both are true:     Your symptoms are getting better overall, and:  You have not had a fever AND are not using fever reducing medication     The CDC also recommends added precautions in the 5 days after return to normal activity including frequent hand washing, mask wearing, physical distancing.      If you develop a fever or start to feel worse after you have returned to normal activities, you should return home and away from others for at least another 24 hours. The link below is a direct link to the CDC with all this information.     https://www.cdc.gov/respiratory-viruses/prevention/precautions-when-sick.html        "

## 2024-09-03 NOTE — PROGRESS NOTES
"Subjective:      Patient ID: Elham Rodas is a 43 y.o. female.    Vitals:  height is 5' 4" (1.626 m) and weight is 68.9 kg (151 lb 14.4 oz). Her tympanic temperature is 99.6 °F (37.6 °C). Her blood pressure is 142/87 (abnormal) and her pulse is 93. Her respiration is 18 and oxygen saturation is 97%.     Chief Complaint: Sinus Problem and Cough    Sinus congestion, cough, body aches x 4 days. Fever. No CP or SOB    Sinus Problem  The current episode started in the past 7 days. The problem has been gradually worsening since onset. The maximum temperature recorded prior to her arrival was 100.4 - 100.9 F. The fever has been present for 3 to 4 days. Her pain is at a severity of 0/10. Associated symptoms include chills, congestion, coughing, ear pain, a hoarse voice, sinus pressure, sneezing, a sore throat and swollen glands. Pertinent negatives include no diaphoresis, headaches, neck pain or shortness of breath. Treatments tried: Tylenol and ibuprofen, nyquil. The treatment provided mild relief.   Cough  Associated symptoms include chills, ear pain, a fever and a sore throat. Pertinent negatives include no headaches or shortness of breath.       Constitution: Positive for chills and fever. Negative for sweating.   HENT:  Positive for ear pain, congestion, sinus pressure and sore throat.    Neck: Negative for neck pain.   Respiratory:  Positive for cough and sputum production. Negative for shortness of breath.    Allergic/Immunologic: Positive for sneezing.   Neurological:  Negative for headaches.      Objective:     Physical Exam   Constitutional: She is oriented to person, place, and time. She appears well-developed. She is cooperative.  Non-toxic appearance. She does not appear ill. No distress.   HENT:   Head: Normocephalic and atraumatic.   Ears:   Right Ear: Hearing, external ear and ear canal normal. No no drainage or swelling. Tympanic membrane is bulging. A middle ear effusion is present. no impacted " cerumen  Left Ear: Hearing, tympanic membrane, external ear and ear canal normal.   Nose: Congestion present. No mucosal edema, rhinorrhea or nasal deformity. No epistaxis. Right sinus exhibits no maxillary sinus tenderness and no frontal sinus tenderness. Left sinus exhibits no maxillary sinus tenderness and no frontal sinus tenderness.   Mouth/Throat: Uvula is midline, oropharynx is clear and moist and mucous membranes are normal. No trismus in the jaw. Normal dentition. No uvula swelling. No oropharyngeal exudate, posterior oropharyngeal edema or posterior oropharyngeal erythema.   Eyes: Conjunctivae and lids are normal. No scleral icterus.   Neck: Trachea normal and phonation normal. Neck supple. No edema present. No erythema present. No neck rigidity present.   Cardiovascular: Normal rate, regular rhythm, normal heart sounds and normal pulses.   Pulmonary/Chest: Effort normal and breath sounds normal. No respiratory distress. She has no decreased breath sounds. She has no wheezes. She has no rhonchi.   Abdominal: Normal appearance.   Musculoskeletal: Normal range of motion.         General: No deformity. Normal range of motion.   Neurological: She is alert and oriented to person, place, and time. She exhibits normal muscle tone. Coordination normal.   Skin: Skin is warm, dry, intact, not diaphoretic and not pale.   Psychiatric: Her speech is normal and behavior is normal. Judgment and thought content normal.   Nursing note and vitals reviewed.    Results for orders placed or performed in visit on 09/03/24   SARS Coronavirus 2 Antigen, POCT Manual Read   Result Value Ref Range    SARS Coronavirus 2 Antigen Positive Negative     Acceptable Yes        Assessment:     1. COVID-19    2. Non-recurrent acute suppurative otitis media of right ear without spontaneous rupture of tympanic membrane        Plan:       COVID-19  -     SARS Coronavirus 2 Antigen, POCT Manual Read  -     nirmatrelvir-ritonavir  "300 mg (150 mg x 2)-100 mg copackaged tablets (EUA); Take 3 tablets by mouth 2 (two) times daily for 5 days. Each dose contains 2 nirmatrelvir (pink tablets) and 1 ritonavir (white tablet). Take all 3 tablets together  Dispense: 30 tablet; Refill: 0  -     promethazine-dextromethorphan (PROMETHAZINE-DM) 6.25-15 mg/5 mL Syrp; Take 5 mLs by mouth every 6 (six) hours as needed (cough).  Dispense: 140 mL; Refill: 0  -     fluticasone propionate (FLONASE) 50 mcg/actuation nasal spray; 2 sprays (100 mcg total) by Each Nostril route once daily. Dispense 1 bottle for 7 days  Dispense: 9.9 mL; Refill: 0    Non-recurrent acute suppurative otitis media of right ear without spontaneous rupture of tympanic membrane  -     amoxicillin-clavulanate 875-125mg (AUGMENTIN) 875-125 mg per tablet; Take 1 tablet by mouth 2 (two) times daily. for 10 days  Dispense: 20 tablet; Refill: 0          Medical Decision Making:   Urgent Care Management:  4 = covid risk score. Explained that she does qualify for Paxlovid. To hold statin while taking and 5 days post tx. Explained side effect profile and possible "rebound" covid symptoms.      [facility]       Patient Instructions   You have tested positive for COVID-19 today.  Take your vitamins, rest and drink plenty of fluids. Tylenol (acetaminophen) or Motrin (Ibuprofen) for fever or pain. Nasal saline and flonase for congestion. Promethazine dm every 4-6 hours as needed for cough. Augmentin for ear infection.    PAXLOVID Covid antiviral to help fight COVID infection.If you are taking a cholesterol medication (a statin, please discontinue while taking PAXLOVID and for 5 days following treatment. Side effects include nausea and rarely allergic reactions. Please contact your provider if any significant side effects. Occasionally, people have mild "rebound" COVID symptoms after completing PAXLOVID. These symptoms tend to be mild and not been shown to increase " risk of hospitalizations. If you have any rebound symptoms, re-isolate until symptoms improve.    You need urgent re-evaluation for any chest tightness, shortness of breath or oxygen saturations persistently < 93%.      ISOLATION    Recent CDC guidelines have been updated:     If you test positive for COVID-19 you may return to normal activities when, for at least 24 hours, both are true:     Your symptoms are getting better overall, and:  You have not had a fever AND are not using fever reducing medication     The CDC also recommends added precautions in the 5 days after return to normal activity including frequent hand washing, mask wearing, physical distancing.      If you develop a fever or start to feel worse after you have returned to normal activities, you should return home and away from others for at least another 24 hours. The link below is a direct link to the CDC with all this information.     https://www.cdc.gov/respiratory-viruses/prevention/precautions-when-sick.html

## 2024-09-08 DIAGNOSIS — Z17.0 CARCINOMA OF AXILLARY TAIL OF RIGHT BREAST IN FEMALE, ESTROGEN RECEPTOR POSITIVE: ICD-10-CM

## 2024-09-08 DIAGNOSIS — C50.611 CARCINOMA OF AXILLARY TAIL OF RIGHT BREAST IN FEMALE, ESTROGEN RECEPTOR POSITIVE: ICD-10-CM

## 2024-09-09 RX ORDER — ANASTROZOLE 1 MG/1
TABLET ORAL
Qty: 90 TABLET | Refills: 2 | Status: SHIPPED | OUTPATIENT
Start: 2024-09-09

## 2024-10-08 ENCOUNTER — HOSPITAL ENCOUNTER (OUTPATIENT)
Dept: RADIOLOGY | Facility: OTHER | Age: 44
Discharge: HOME | End: 2024-10-08
Attending: PLASTIC SURGERY
Payer: COMMERCIAL

## 2024-10-08 DIAGNOSIS — Z85.3 PERSONAL HISTORY OF MALIGNANT NEOPLASM OF BREAST: ICD-10-CM

## 2024-10-08 DIAGNOSIS — Z01.818 OTHER SPECIFIED PRE-OPERATIVE EXAMINATION: ICD-10-CM

## 2024-10-08 PROCEDURE — 74174 CTA ABD&PLVS W/CONTRAST: CPT | Mod: TC

## 2024-10-08 PROCEDURE — 25500020 PHARM REV CODE 255: Performed by: PLASTIC SURGERY

## 2024-10-08 PROCEDURE — 74174 CTA ABD&PLVS W/CONTRAST: CPT | Mod: 26,,, | Performed by: RADIOLOGY

## 2024-10-08 RX ADMIN — IOHEXOL 100 ML: 350 INJECTION, SOLUTION INTRAVENOUS at 01:10

## 2024-10-18 ENCOUNTER — LAB VISIT (OUTPATIENT)
Dept: LAB | Facility: HOSPITAL | Age: 44
End: 2024-10-18
Attending: INTERNAL MEDICINE
Payer: COMMERCIAL

## 2024-10-18 DIAGNOSIS — Z79.899 LONG TERM CURRENT USE OF THERAPEUTIC DRUG: ICD-10-CM

## 2024-10-18 DIAGNOSIS — E78.2 MIXED HYPERLIPIDEMIA DUE TO TYPE 2 DIABETES MELLITUS: ICD-10-CM

## 2024-10-18 DIAGNOSIS — E89.0 POSTSURGICAL HYPOTHYROIDISM: Chronic | ICD-10-CM

## 2024-10-18 DIAGNOSIS — E11.69 MIXED HYPERLIPIDEMIA DUE TO TYPE 2 DIABETES MELLITUS: ICD-10-CM

## 2024-10-18 DIAGNOSIS — E11.9 TYPE 2 DIABETES MELLITUS WITHOUT COMPLICATION, WITHOUT LONG-TERM CURRENT USE OF INSULIN: ICD-10-CM

## 2024-10-18 LAB
ALBUMIN/CREAT UR: 4.9 UG/MG (ref 0–30)
BASOPHILS # BLD AUTO: 0.05 K/UL (ref 0–0.2)
BASOPHILS NFR BLD: 1.2 % (ref 0–1.9)
CREAT UR-MCNC: 285 MG/DL (ref 15–325)
DIFFERENTIAL METHOD BLD: NORMAL
EOSINOPHIL # BLD AUTO: 0.1 K/UL (ref 0–0.5)
EOSINOPHIL NFR BLD: 2.4 % (ref 0–8)
ERYTHROCYTE [DISTWIDTH] IN BLOOD BY AUTOMATED COUNT: 14.5 % (ref 11.5–14.5)
HCT VFR BLD AUTO: 41 % (ref 37–48.5)
HGB BLD-MCNC: 13.5 G/DL (ref 12–16)
IMM GRANULOCYTES # BLD AUTO: 0.01 K/UL (ref 0–0.04)
IMM GRANULOCYTES NFR BLD AUTO: 0.2 % (ref 0–0.5)
LYMPHOCYTES # BLD AUTO: 1.9 K/UL (ref 1–4.8)
LYMPHOCYTES NFR BLD: 45.3 % (ref 18–48)
MCH RBC QN AUTO: 29.6 PG (ref 27–31)
MCHC RBC AUTO-ENTMCNC: 32.9 G/DL (ref 32–36)
MCV RBC AUTO: 90 FL (ref 82–98)
MICROALBUMIN UR DL<=1MG/L-MCNC: 14 UG/ML
MONOCYTES # BLD AUTO: 0.3 K/UL (ref 0.3–1)
MONOCYTES NFR BLD: 7.8 % (ref 4–15)
NEUTROPHILS # BLD AUTO: 1.8 K/UL (ref 1.8–7.7)
NEUTROPHILS NFR BLD: 43.1 % (ref 38–73)
NRBC BLD-RTO: 0 /100 WBC
PLATELET # BLD AUTO: 236 K/UL (ref 150–450)
PMV BLD AUTO: 12.9 FL (ref 9.2–12.9)
RBC # BLD AUTO: 4.56 M/UL (ref 4–5.4)
WBC # BLD AUTO: 4.24 K/UL (ref 3.9–12.7)

## 2024-10-18 PROCEDURE — 84443 ASSAY THYROID STIM HORMONE: CPT | Performed by: INTERNAL MEDICINE

## 2024-10-18 PROCEDURE — 82570 ASSAY OF URINE CREATININE: CPT | Performed by: INTERNAL MEDICINE

## 2024-10-18 PROCEDURE — 82043 UR ALBUMIN QUANTITATIVE: CPT | Performed by: INTERNAL MEDICINE

## 2024-10-18 PROCEDURE — 85025 COMPLETE CBC W/AUTO DIFF WBC: CPT | Performed by: INTERNAL MEDICINE

## 2024-10-18 PROCEDURE — 36415 COLL VENOUS BLD VENIPUNCTURE: CPT | Mod: PN | Performed by: INTERNAL MEDICINE

## 2024-10-18 PROCEDURE — 80053 COMPREHEN METABOLIC PANEL: CPT | Performed by: INTERNAL MEDICINE

## 2024-10-18 PROCEDURE — 82607 VITAMIN B-12: CPT | Performed by: INTERNAL MEDICINE

## 2024-10-18 PROCEDURE — 83036 HEMOGLOBIN GLYCOSYLATED A1C: CPT | Performed by: INTERNAL MEDICINE

## 2024-10-18 PROCEDURE — 80061 LIPID PANEL: CPT | Performed by: INTERNAL MEDICINE

## 2024-10-19 LAB
ALBUMIN SERPL BCP-MCNC: 4.4 G/DL (ref 3.5–5.2)
ALP SERPL-CCNC: 83 U/L (ref 40–150)
ALT SERPL W/O P-5'-P-CCNC: 22 U/L (ref 10–44)
ANION GAP SERPL CALC-SCNC: 12 MMOL/L (ref 8–16)
AST SERPL-CCNC: 22 U/L (ref 10–40)
BILIRUB SERPL-MCNC: 0.7 MG/DL (ref 0.1–1)
BUN SERPL-MCNC: 8 MG/DL (ref 6–20)
CALCIUM SERPL-MCNC: 9.5 MG/DL (ref 8.7–10.5)
CHLORIDE SERPL-SCNC: 108 MMOL/L (ref 95–110)
CHOLEST SERPL-MCNC: 155 MG/DL (ref 120–199)
CHOLEST/HDLC SERPL: 3 {RATIO} (ref 2–5)
CO2 SERPL-SCNC: 22 MMOL/L (ref 23–29)
CREAT SERPL-MCNC: 0.8 MG/DL (ref 0.5–1.4)
EST. GFR  (NO RACE VARIABLE): >60 ML/MIN/1.73 M^2
ESTIMATED AVG GLUCOSE: 114 MG/DL (ref 68–131)
GLUCOSE SERPL-MCNC: 81 MG/DL (ref 70–110)
HBA1C MFR BLD: 5.6 % (ref 4–5.6)
HDLC SERPL-MCNC: 51 MG/DL (ref 40–75)
HDLC SERPL: 32.9 % (ref 20–50)
LDLC SERPL CALC-MCNC: 87 MG/DL (ref 63–159)
NONHDLC SERPL-MCNC: 104 MG/DL
POTASSIUM SERPL-SCNC: 4.3 MMOL/L (ref 3.5–5.1)
PROT SERPL-MCNC: 7.4 G/DL (ref 6–8.4)
SODIUM SERPL-SCNC: 142 MMOL/L (ref 136–145)
TRIGL SERPL-MCNC: 85 MG/DL (ref 30–150)
TSH SERPL DL<=0.005 MIU/L-ACNC: 0.81 UIU/ML (ref 0.4–4)
VIT B12 SERPL-MCNC: 652 PG/ML (ref 210–950)

## 2024-10-23 ENCOUNTER — TELEPHONE (OUTPATIENT)
Dept: HEMATOLOGY/ONCOLOGY | Facility: CLINIC | Age: 44
End: 2024-10-23
Payer: COMMERCIAL

## 2024-10-31 ENCOUNTER — OFFICE VISIT (OUTPATIENT)
Dept: HEMATOLOGY/ONCOLOGY | Facility: CLINIC | Age: 44
End: 2024-10-31
Payer: COMMERCIAL

## 2024-10-31 VITALS
WEIGHT: 148.13 LBS | BODY MASS INDEX: 25.29 KG/M2 | TEMPERATURE: 98 F | HEART RATE: 89 BPM | SYSTOLIC BLOOD PRESSURE: 118 MMHG | HEIGHT: 64 IN | OXYGEN SATURATION: 97 % | DIASTOLIC BLOOD PRESSURE: 80 MMHG | RESPIRATION RATE: 16 BRPM

## 2024-10-31 DIAGNOSIS — C50.611 CARCINOMA OF AXILLARY TAIL OF RIGHT BREAST IN FEMALE, ESTROGEN RECEPTOR POSITIVE: Primary | ICD-10-CM

## 2024-10-31 DIAGNOSIS — Z17.0 CARCINOMA OF AXILLARY TAIL OF RIGHT BREAST IN FEMALE, ESTROGEN RECEPTOR POSITIVE: Primary | ICD-10-CM

## 2024-10-31 DIAGNOSIS — Z79.811 USE OF AROMATASE INHIBITORS: ICD-10-CM

## 2024-10-31 PROCEDURE — 3072F LOW RISK FOR RETINOPATHY: CPT | Mod: CPTII,S$GLB,, | Performed by: INTERNAL MEDICINE

## 2024-10-31 PROCEDURE — 3074F SYST BP LT 130 MM HG: CPT | Mod: CPTII,S$GLB,, | Performed by: INTERNAL MEDICINE

## 2024-10-31 PROCEDURE — 3044F HG A1C LEVEL LT 7.0%: CPT | Mod: CPTII,S$GLB,, | Performed by: INTERNAL MEDICINE

## 2024-10-31 PROCEDURE — 3079F DIAST BP 80-89 MM HG: CPT | Mod: CPTII,S$GLB,, | Performed by: INTERNAL MEDICINE

## 2024-10-31 PROCEDURE — G2211 COMPLEX E/M VISIT ADD ON: HCPCS | Mod: S$GLB,,, | Performed by: INTERNAL MEDICINE

## 2024-10-31 PROCEDURE — 1159F MED LIST DOCD IN RCRD: CPT | Mod: CPTII,S$GLB,, | Performed by: INTERNAL MEDICINE

## 2024-10-31 PROCEDURE — 99999 PR PBB SHADOW E&M-EST. PATIENT-LVL IV: CPT | Mod: PBBFAC,,, | Performed by: INTERNAL MEDICINE

## 2024-10-31 PROCEDURE — 99215 OFFICE O/P EST HI 40 MIN: CPT | Mod: S$GLB,,, | Performed by: INTERNAL MEDICINE

## 2024-10-31 PROCEDURE — 3008F BODY MASS INDEX DOCD: CPT | Mod: CPTII,S$GLB,, | Performed by: INTERNAL MEDICINE

## 2024-10-31 PROCEDURE — 3066F NEPHROPATHY DOC TX: CPT | Mod: CPTII,S$GLB,, | Performed by: INTERNAL MEDICINE

## 2024-10-31 PROCEDURE — 3061F NEG MICROALBUMINURIA REV: CPT | Mod: CPTII,S$GLB,, | Performed by: INTERNAL MEDICINE

## 2024-11-04 ENCOUNTER — TELEPHONE (OUTPATIENT)
Dept: OBSTETRICS AND GYNECOLOGY | Facility: CLINIC | Age: 44
End: 2024-11-04
Payer: COMMERCIAL

## 2024-11-04 DIAGNOSIS — Z12.31 SCREENING MAMMOGRAM FOR BREAST CANCER: Primary | ICD-10-CM

## 2024-11-04 NOTE — TELEPHONE ENCOUNTER
----- Message from Med Assistant Borja sent at 11/4/2024  3:23 PM CST -----  Contact: self  Elham Rodas  MRN: [id number]  Home Phone      [phone]  Work Phone      Not on file.  Mobile          [phone]    Patient Care Team:  Cassia Ramsey DNP as PCP - General (Family Medicine)  Yoni Oscar MD as Obstetrician (Obstetrics)  Christa Boogie LCSW as   OB? No  What phone number can you be reached at? [phone]  Message: Please link mammo orders to appt 11/08/24.

## 2024-11-06 ENCOUNTER — DOCUMENTATION ONLY (OUTPATIENT)
Dept: SURGERY | Facility: CLINIC | Age: 44
End: 2024-11-06
Payer: COMMERCIAL

## 2024-11-06 NOTE — H&P
HPI: This is an insurance patient who was previously seen as a cosmetic consult.   Ms. Rodas is a 43-year-old female with a history of right breast cancer in which was a lumpectomy was performed, and now she has a deformity on that right lower pole of the breast with scar tissue and firm underlying tissue as well. We had a discussion at her cosmetic consult with regard to reconstructing this entire lower pole of the breast with a free flap which would cause her to have symmetry and the fact that she has a history of cancer and this deformity is due to that, that this should be covered by insurance. She is here today to start the process of authorization and scheduling. She has been in remission from cancer since 2020.   PAST SURGICAL HISTORY: Thyroid removal.   SOCIAL HISTORY: No nicotine use.   MEDICATIONS: Ozempic, metformin, levothyroxine, Crestor, and fish   oil.   ALLERGIES: adhesive    PHYSICAL EXAM:   GENERAL: The patient is 5 ft 4 in, 181 pounds. She is alert and oriented x3.   HEART: Regular rate and rhythm.   LUNGS: Clear to auscultation.   BREASTS: The right breast has a lower pole deformity with inversion and lack of tissue as well as hard firm tissue underneath, as well as the left breast has a history of a lift on it and overall looks good. ABDOMEN: The abdomen shows adequate tissue for JOSE breast reconstruction.   ASSESSMENT/PLAN: This is a 43-year-old female with a history of breast cancer with a deformity of the right breast.   RECOMMENDATIONS: I recommend reconstructing the lower pole of this breast with a JOSE free flap. We will seek authorization and proceed with surgery as soon as possible. I am ordering a CTA today. We are looking for 11/18/2024 as a possible surgery date.

## 2024-11-08 ENCOUNTER — HOSPITAL ENCOUNTER (OUTPATIENT)
Dept: RADIOLOGY | Facility: HOSPITAL | Age: 44
Discharge: HOME | End: 2024-11-08
Attending: OBSTETRICS & GYNECOLOGY
Payer: COMMERCIAL

## 2024-11-08 VITALS — WEIGHT: 148 LBS | BODY MASS INDEX: 25.27 KG/M2 | HEIGHT: 64 IN

## 2024-11-08 DIAGNOSIS — Z12.31 SCREENING MAMMOGRAM FOR BREAST CANCER: ICD-10-CM

## 2024-11-08 PROCEDURE — 77063 BREAST TOMOSYNTHESIS BI: CPT | Mod: 26,,, | Performed by: RADIOLOGY

## 2024-11-08 PROCEDURE — 77063 BREAST TOMOSYNTHESIS BI: CPT | Mod: TC

## 2024-11-08 PROCEDURE — 77067 SCR MAMMO BI INCL CAD: CPT | Mod: 26,,, | Performed by: RADIOLOGY

## 2024-11-11 ENCOUNTER — ANESTHESIA EVENT (OUTPATIENT)
Dept: SURGERY | Facility: OTHER | Age: 44
End: 2024-11-11
Payer: COMMERCIAL

## 2024-11-11 RX ORDER — LIDOCAINE HYDROCHLORIDE 10 MG/ML
0.5 INJECTION, SOLUTION EPIDURAL; INFILTRATION; INTRACAUDAL; PERINEURAL ONCE
OUTPATIENT
Start: 2024-11-11 | End: 2024-11-11

## 2024-11-11 RX ORDER — ACETAMINOPHEN 500 MG
1000 TABLET ORAL
OUTPATIENT
Start: 2024-11-11 | End: 2024-11-11

## 2024-11-11 RX ORDER — PREGABALIN 75 MG/1
75 CAPSULE ORAL ONCE
OUTPATIENT
Start: 2024-11-11 | End: 2024-11-11

## 2024-11-11 RX ORDER — SODIUM CHLORIDE, SODIUM LACTATE, POTASSIUM CHLORIDE, CALCIUM CHLORIDE 600; 310; 30; 20 MG/100ML; MG/100ML; MG/100ML; MG/100ML
INJECTION, SOLUTION INTRAVENOUS CONTINUOUS
OUTPATIENT
Start: 2024-11-11

## 2024-11-12 ENCOUNTER — HOSPITAL ENCOUNTER (OUTPATIENT)
Dept: PREADMISSION TESTING | Facility: OTHER | Age: 44
Discharge: HOME | End: 2024-11-12
Attending: PLASTIC SURGERY
Payer: COMMERCIAL

## 2024-11-12 VITALS
RESPIRATION RATE: 16 BRPM | OXYGEN SATURATION: 100 % | HEART RATE: 79 BPM | BODY MASS INDEX: 24.75 KG/M2 | DIASTOLIC BLOOD PRESSURE: 89 MMHG | HEIGHT: 64 IN | SYSTOLIC BLOOD PRESSURE: 134 MMHG | TEMPERATURE: 98 F | WEIGHT: 145 LBS

## 2024-11-12 DIAGNOSIS — Z01.818 PREOP TESTING: Primary | ICD-10-CM

## 2024-11-12 LAB
ABO + RH BLD: NORMAL
BLD GP AB SCN CELLS X3 SERPL QL: NORMAL
SPECIMEN OUTDATE: NORMAL

## 2024-11-12 PROCEDURE — 36415 COLL VENOUS BLD VENIPUNCTURE: CPT | Performed by: ANESTHESIOLOGY

## 2024-11-12 PROCEDURE — 86900 BLOOD TYPING SEROLOGIC ABO: CPT | Performed by: ANESTHESIOLOGY

## 2024-11-12 NOTE — DISCHARGE INSTRUCTIONS
Information to Prepare you for your Surgery    PRE-ADMIT TESTING -  [phone]   -[facility]  [address] [place] [address] [facility] [address]  [facility]      Your surgery has been scheduled at [facility]. We are pleased to have the opportunity to serve you. For Further Information please call [phone].    On the day of surgery please report to the Information Desk on the 1st floor.    CONTACT YOUR PHYSICIAN'S OFFICE THE DAY PRIOR TO YOUR SURGERY TO OBTAIN YOUR ARRIVAL TIME.     The evening before surgery do not eat anything after 9 p.m. ( this includes hard candy, chewing gum and mints).  You may only have water from 9pm until you leave your house.   AT LEAST 12-24 OUNCES BEFORE YOU LEAVE YOU HOUSE IN THE MORNING TO COME TO SURGERY.    DO NOT DRINK ANY LIQUIDS ON THE WAY TO THE HOSPITAL.      If you are a diabetic-drink only water prior to surgery.    Outpatient Surgery- May allow 2 adult (18 and older) Support Persons (1 being the designated ) for all surgical/procedural patients. A breastfeeding mother will be allowed her infant and 2 adult Support Persons. No one under the age of 18 will be allowed in the building. No swapping out of visitors in the [facility] Emergency Department.      SPECIAL MEDICATION INSTRUCTIONS: TAKE medications checked off by the Anesthesiologist on your Medication List.    Please bring  diabetes medications  the day of surgery.      Surgery Patients:    If you take ASPIRIN - Your PHYSICIAN/SURGEON will need to inform you IF/OR when you need to stop taking aspirin prior to your surgery.     The week prior to surgery do not ot take any medications containing IBUPROFEN or NSAIDS ( Advil, Motrin, Goodys, BC, Aleve, Naproxen,  Ketorolac, Meloxicam, Mobic, Toradol,etc) If you are not sure if you should take a medicine please call your surgeon's office.  Ok to take Tylenol    Do Not Wear any make-up (especially eye  make-up) to surgery. Please remove any false eyelashes or eyelash extensions. If you arrive the day of surgery with makeup/eyelashes on you will be required to remove prior to surgery. (There is a risk of corneal abrasions if eye makeup/eyelash extensions are not removed)      Leave all valuables at home.   Do Not wear any jewelry or watches, including any metal in body piercings. Jewelry must be removed prior to coming to the hospital.  There is a possibility that rings that are unable to be removed may be cut off if they are on the surgical extremity.    Please remove all hair extensions, wigs, clips and any other metal accessories/ ornaments from your hair.  These items may pose a flammable/fire risk in Surgery and must be removed.    Do not shave your surgical area at least 5 days prior to your surgery. The surgical prep will be performed at the hospital according to Infection Control regulations.    Contact Lens must be removed before surgery. Either do not wear the contact lens or bring a case and solution for storage.  Please bring a container for eyeglasses or dentures as required.  Bring any paperwork your physician has provided, such as consent forms,  history and physicals, doctor's orders, etc.   Bring comfortable clothes that are loose fitting to wear upon discharge. Take into consideration the type of surgery being performed.  Maintain your diet as advised per your physician the day prior to surgery.      Adequate rest the night before surgery is advised.   Park in the Parking lot behind the hospital or in the [place] across the street from the parking lot. Parking is complimentary.  If you will be discharged the same day as your procedure, please arrange for a responsible adult to drive you home or to accompany you if traveling by taxi.   YOU WILL NOT BE PERMITTED TO DRIVE OR TO LEAVE THE HOSPITAL ALONE AFTER SURGERY.   If you are being discharged the same day, it is strongly  recommended that you arrange for someone to remain with you for the first 24 hrs following your surgery.    The Surgeon will speak to your family/visitor after your surgery regarding the outcome of your surgery and post op care.  The Surgeon may speak to you after your surgery, but there is a possibility you may not remember the details.  Please check with your family members regarding the conversation with the Surgeon.    We strongly recommend whoever is bringing you home be present for discharge instructions.  This will ensure a thorough understanding for your post op home care.    If the patient has fever, cough, or signs/symptoms of Flu or Covid please do not come in for your surgery. Contact your surgeon and your primary care physician for further instructions.           Thank you for your cooperation.  The Staff of [facility].            Bathing Instructions with Hibiclens or Dial Soap    Shower the evening before and morning of your procedure with Chlorhexidine (Hibiclens)  do not use Chlorhexidine on your face or genitals. Do not get in your eyes.  Wash your face with water and your regular face wash/soap  Use your regular shampoo  Apply Chlorhexidine (Hibiclens) directly on your skin or on a wet washcloth and wash gently. When showering: Move away from the shower stream when applying Chlorhexidine (Hibiclens) to avoid rinsing off too soon.  Rinse thoroughly with warm water  Do not dilute Chlorhexidine (Hibiclens)   Dry off as usual, do not use any deodorant, powder, body lotions, perfume, after shave or cologne.     Thanks ,   [facility]

## 2024-11-12 NOTE — ANESTHESIA PREPROCEDURE EVALUATION
11/12/2024  Elham Rodas is a 44 y.o., female.      Pre-op Assessment    I have reviewed the Patient Summary Reports.     I have reviewed the Nursing Notes. I have reviewed the NPO Status.   I have reviewed the Medications.     Review of Systems  Anesthesia Hx:  No problems with previous Anesthesia   History of prior surgery of interest to airway management or planning:  Previous anesthesia: General 2022 procedure at [facility] with general anesthesia.        Denies Family Hx of Anesthesia complications.    Denies Personal Hx of Anesthesia complications.                    Social:  Non-Smoker       Hematology/Oncology:  Hematology Normal                     Current/Recent Cancer.  Breast    right          EENT/Dental:  EENT/Dental Normal           Cardiovascular:  Cardiovascular Normal                                              Pulmonary:  Pulmonary Normal                       Renal/:  Renal/ Normal                 Hepatic/GI:  Hepatic/GI Normal                    Musculoskeletal:  Musculoskeletal Normal                Neurological:  Neurology Normal                                      Endocrine:  Diabetes Hypothyroidism          Dermatological:  Skin Normal    Psych:  Psychiatric Normal                    Physical Exam  General: Well nourished and Alert    Airway:  Mallampati: II   Mouth Opening: Normal  Tongue: Normal    Dental:  Intact      Anesthesia Plan  Type of Anesthesia, risks & benefits discussed:    Anesthesia Type: Gen ETT  Intra-op Monitoring Plan: Standard ASA Monitors  Post Op Pain Control Plan: multimodal analgesia  Induction:  IV  Airway Plan: Video  Informed Consent: Informed consent signed with the Patient and all parties understand the risks and agree with anesthesia plan.  All questions answered.   ASA Score: 3  Anesthesia Plan Notes: Labs/EKG in Epic- pending T&S  Last GLP-1 agonist  taken on 11/4      Ready For Surgery From Anesthesia Perspective.     .

## 2024-11-18 ENCOUNTER — ANESTHESIA (OUTPATIENT)
Dept: SURGERY | Facility: OTHER | Age: 44
End: 2024-11-18
Payer: COMMERCIAL

## 2024-11-18 ENCOUNTER — HOSPITAL ENCOUNTER (INPATIENT)
Facility: OTHER | Age: 44
LOS: 3 days | Discharge: HOME | DRG: 585 | End: 2024-11-21
Attending: PLASTIC SURGERY | Admitting: PLASTIC SURGERY
Payer: COMMERCIAL

## 2024-11-18 DIAGNOSIS — Z85.3 PERSONAL HISTORY OF BREAST CANCER: Primary | ICD-10-CM

## 2024-11-18 LAB
POCT GLUCOSE: 100 MG/DL (ref 70–110)
POCT GLUCOSE: 113 MG/DL (ref 70–110)
POCT GLUCOSE: 158 MG/DL (ref 70–110)

## 2024-11-18 PROCEDURE — 37000009 HC ANESTHESIA EA ADD 15 MINS: Performed by: PLASTIC SURGERY

## 2024-11-18 PROCEDURE — 99900035 HC TECH TIME PER 15 MIN (STAT)

## 2024-11-18 PROCEDURE — 0WQF0ZZ REPAIR ABDOMINAL WALL, OPEN APPROACH: ICD-10-PCS | Performed by: PLASTIC SURGERY

## 2024-11-18 PROCEDURE — 63600175 PHARM REV CODE 636 W HCPCS: Performed by: STUDENT IN AN ORGANIZED HEALTH CARE EDUCATION/TRAINING PROGRAM

## 2024-11-18 PROCEDURE — C1729 CATH, DRAINAGE: HCPCS | Performed by: PLASTIC SURGERY

## 2024-11-18 PROCEDURE — 25000003 PHARM REV CODE 250: Performed by: STUDENT IN AN ORGANIZED HEALTH CARE EDUCATION/TRAINING PROGRAM

## 2024-11-18 PROCEDURE — 71000039 HC RECOVERY, EACH ADD'L HOUR: Performed by: PLASTIC SURGERY

## 2024-11-18 PROCEDURE — 63600175 PHARM REV CODE 636 W HCPCS: Mod: JZ,JG | Performed by: NURSE ANESTHETIST, CERTIFIED REGISTERED

## 2024-11-18 PROCEDURE — 88304 TISSUE EXAM BY PATHOLOGIST: CPT | Mod: 26,,, | Performed by: PATHOLOGY

## 2024-11-18 PROCEDURE — 11000001 HC ACUTE MED/SURG PRIVATE ROOM

## 2024-11-18 PROCEDURE — 71000033 HC RECOVERY, INTIAL HOUR: Performed by: PLASTIC SURGERY

## 2024-11-18 PROCEDURE — 36000708 HC OR TIME LEV III 1ST 15 MIN: Performed by: PLASTIC SURGERY

## 2024-11-18 PROCEDURE — 88304 TISSUE EXAM BY PATHOLOGIST: CPT | Performed by: PATHOLOGY

## 2024-11-18 PROCEDURE — 88302 TISSUE EXAM BY PATHOLOGIST: CPT | Mod: 26,,, | Performed by: PATHOLOGY

## 2024-11-18 PROCEDURE — 63600175 PHARM REV CODE 636 W HCPCS: Performed by: ANESTHESIOLOGY

## 2024-11-18 PROCEDURE — 27201423 OPTIME MED/SURG SUP & DEVICES STERILE SUPPLY: Performed by: PLASTIC SURGERY

## 2024-11-18 PROCEDURE — 25000003 PHARM REV CODE 250: Performed by: NURSE ANESTHETIST, CERTIFIED REGISTERED

## 2024-11-18 PROCEDURE — 36000709 HC OR TIME LEV III EA ADD 15 MIN: Performed by: PLASTIC SURGERY

## 2024-11-18 PROCEDURE — 0HRT077 REPLACEMENT OF RIGHT BREAST USING DEEP INFERIOR EPIGASTRIC ARTERY PERFORATOR FLAP, OPEN APPROACH: ICD-10-PCS | Performed by: PLASTIC SURGERY

## 2024-11-18 PROCEDURE — 94799 UNLISTED PULMONARY SVC/PX: CPT

## 2024-11-18 PROCEDURE — C9290 INJ, BUPIVACAINE LIPOSOME: HCPCS | Performed by: PLASTIC SURGERY

## 2024-11-18 PROCEDURE — 94761 N-INVAS EAR/PLS OXIMETRY MLT: CPT

## 2024-11-18 PROCEDURE — A6010 COLLAGEN BASED WOUND FILLER: HCPCS | Performed by: PLASTIC SURGERY

## 2024-11-18 PROCEDURE — 25000003 PHARM REV CODE 250: Performed by: ANESTHESIOLOGY

## 2024-11-18 PROCEDURE — 88302 TISSUE EXAM BY PATHOLOGIST: CPT | Performed by: PATHOLOGY

## 2024-11-18 PROCEDURE — 37000008 HC ANESTHESIA 1ST 15 MINUTES: Performed by: PLASTIC SURGERY

## 2024-11-18 PROCEDURE — 82962 GLUCOSE BLOOD TEST: CPT | Performed by: PLASTIC SURGERY

## 2024-11-18 PROCEDURE — 63600175 PHARM REV CODE 636 W HCPCS: Performed by: PLASTIC SURGERY

## 2024-11-18 PROCEDURE — 25000003 PHARM REV CODE 250: Performed by: PLASTIC SURGERY

## 2024-11-18 PROCEDURE — P9045 ALBUMIN (HUMAN), 5%, 250 ML: HCPCS | Mod: JZ,JG | Performed by: NURSE ANESTHETIST, CERTIFIED REGISTERED

## 2024-11-18 DEVICE — COUPLER MICROVAS ANSTMS 3.0MM: Type: IMPLANTABLE DEVICE | Site: BREAST | Status: FUNCTIONAL

## 2024-11-18 RX ORDER — CEFAZOLIN 2 G/1
2 INJECTION, POWDER, FOR SOLUTION INTRAMUSCULAR; INTRAVENOUS
Status: COMPLETED | OUTPATIENT
Start: 2024-11-18 | End: 2024-11-18

## 2024-11-18 RX ORDER — DEXAMETHASONE SODIUM PHOSPHATE 4 MG/ML
INJECTION, SOLUTION INTRA-ARTICULAR; INTRALESIONAL; INTRAMUSCULAR; INTRAVENOUS; SOFT TISSUE
Status: DISCONTINUED | OUTPATIENT
Start: 2024-11-18 | End: 2024-11-18

## 2024-11-18 RX ORDER — VECURONIUM BROMIDE 1 MG/ML
INJECTION, POWDER, LYOPHILIZED, FOR SOLUTION INTRAVENOUS
Status: DISCONTINUED | OUTPATIENT
Start: 2024-11-18 | End: 2024-11-18

## 2024-11-18 RX ORDER — SODIUM CHLORIDE 0.9 % (FLUSH) 0.9 %
10 SYRINGE (ML) INJECTION
Status: DISCONTINUED | OUTPATIENT
Start: 2024-11-18 | End: 2024-11-18 | Stop reason: HOSPADM

## 2024-11-18 RX ORDER — MUPIROCIN 20 MG/G
OINTMENT TOPICAL
Status: DISCONTINUED | OUTPATIENT
Start: 2024-11-18 | End: 2024-11-18 | Stop reason: HOSPADM

## 2024-11-18 RX ORDER — ALBUMIN HUMAN 50 G/1000ML
SOLUTION INTRAVENOUS CONTINUOUS PRN
Status: DISCONTINUED | OUTPATIENT
Start: 2024-11-18 | End: 2024-11-18

## 2024-11-18 RX ORDER — LIDOCAINE HYDROCHLORIDE 10 MG/ML
1 INJECTION, SOLUTION EPIDURAL; INFILTRATION; INTRACAUDAL; PERINEURAL ONCE
Status: DISCONTINUED | OUTPATIENT
Start: 2024-11-18 | End: 2024-11-18 | Stop reason: HOSPADM

## 2024-11-18 RX ORDER — OXYCODONE HYDROCHLORIDE 5 MG/1
5 TABLET ORAL
Status: DISCONTINUED | OUTPATIENT
Start: 2024-11-18 | End: 2024-11-18 | Stop reason: HOSPADM

## 2024-11-18 RX ORDER — HYDROMORPHONE HYDROCHLORIDE 2 MG/ML
0.4 INJECTION, SOLUTION INTRAMUSCULAR; INTRAVENOUS; SUBCUTANEOUS EVERY 5 MIN PRN
Status: DISCONTINUED | OUTPATIENT
Start: 2024-11-18 | End: 2024-11-18 | Stop reason: HOSPADM

## 2024-11-18 RX ORDER — OXYCODONE HYDROCHLORIDE 5 MG/1
5 TABLET ORAL EVERY 4 HOURS PRN
Status: DISCONTINUED | OUTPATIENT
Start: 2024-11-18 | End: 2024-11-21 | Stop reason: HOSPADM

## 2024-11-18 RX ORDER — ONDANSETRON HYDROCHLORIDE 2 MG/ML
4 INJECTION, SOLUTION INTRAVENOUS EVERY 12 HOURS PRN
Status: DISCONTINUED | OUTPATIENT
Start: 2024-11-18 | End: 2024-11-21 | Stop reason: HOSPADM

## 2024-11-18 RX ORDER — KETOROLAC TROMETHAMINE 30 MG/ML
15 INJECTION, SOLUTION INTRAMUSCULAR; INTRAVENOUS EVERY 6 HOURS
Status: DISPENSED | OUTPATIENT
Start: 2024-11-18 | End: 2024-11-21

## 2024-11-18 RX ORDER — GLUCAGON 1 MG
1 KIT INJECTION
Status: DISCONTINUED | OUTPATIENT
Start: 2024-11-18 | End: 2024-11-21 | Stop reason: HOSPADM

## 2024-11-18 RX ORDER — PREGABALIN 75 MG/1
75 CAPSULE ORAL ONCE
Status: COMPLETED | OUTPATIENT
Start: 2024-11-18 | End: 2024-11-18

## 2024-11-18 RX ORDER — LIDOCAINE HYDROCHLORIDE 10 MG/ML
0.5 INJECTION, SOLUTION EPIDURAL; INFILTRATION; INTRACAUDAL; PERINEURAL ONCE
Status: DISCONTINUED | OUTPATIENT
Start: 2024-11-18 | End: 2024-11-18 | Stop reason: HOSPADM

## 2024-11-18 RX ORDER — FENTANYL CITRATE 50 UG/ML
INJECTION, SOLUTION INTRAMUSCULAR; INTRAVENOUS
Status: DISCONTINUED | OUTPATIENT
Start: 2024-11-18 | End: 2024-11-18

## 2024-11-18 RX ORDER — CHOLECALCIFEROL (VITAMIN D3) 25 MCG
5000 TABLET ORAL DAILY
Status: DISCONTINUED | OUTPATIENT
Start: 2024-11-19 | End: 2024-11-21 | Stop reason: HOSPADM

## 2024-11-18 RX ORDER — CEFAZOLIN SODIUM 1 G/3ML
1 INJECTION, POWDER, FOR SOLUTION INTRAMUSCULAR; INTRAVENOUS
Status: DISCONTINUED | OUTPATIENT
Start: 2024-11-18 | End: 2024-11-21 | Stop reason: HOSPADM

## 2024-11-18 RX ORDER — EPHEDRINE SULFATE 50 MG/ML
INJECTION, SOLUTION INTRAVENOUS
Status: DISCONTINUED | OUTPATIENT
Start: 2024-11-18 | End: 2024-11-18

## 2024-11-18 RX ORDER — HEPARIN SODIUM 5000 [USP'U]/ML
5000 INJECTION, SOLUTION INTRAVENOUS; SUBCUTANEOUS EVERY 8 HOURS
Status: DISCONTINUED | OUTPATIENT
Start: 2024-11-18 | End: 2024-11-18

## 2024-11-18 RX ORDER — MEPERIDINE HYDROCHLORIDE 25 MG/ML
12.5 INJECTION INTRAMUSCULAR; INTRAVENOUS; SUBCUTANEOUS ONCE AS NEEDED
Status: DISCONTINUED | OUTPATIENT
Start: 2024-11-18 | End: 2024-11-18 | Stop reason: HOSPADM

## 2024-11-18 RX ORDER — MUPIROCIN 20 MG/G
OINTMENT TOPICAL 2 TIMES DAILY
Status: DISCONTINUED | OUTPATIENT
Start: 2024-11-18 | End: 2024-11-21 | Stop reason: HOSPADM

## 2024-11-18 RX ORDER — SODIUM CHLORIDE, SODIUM LACTATE, POTASSIUM CHLORIDE, CALCIUM CHLORIDE 600; 310; 30; 20 MG/100ML; MG/100ML; MG/100ML; MG/100ML
INJECTION, SOLUTION INTRAVENOUS CONTINUOUS
Status: DISCONTINUED | OUTPATIENT
Start: 2024-11-18 | End: 2024-11-18

## 2024-11-18 RX ORDER — ANASTROZOLE 1 MG/1
1 TABLET ORAL DAILY
Status: DISCONTINUED | OUTPATIENT
Start: 2024-11-19 | End: 2024-11-21 | Stop reason: HOSPADM

## 2024-11-18 RX ORDER — ACETAMINOPHEN 500 MG
1000 TABLET ORAL
Status: COMPLETED | OUTPATIENT
Start: 2024-11-18 | End: 2024-11-18

## 2024-11-18 RX ORDER — INSULIN ASPART 100 [IU]/ML
0-10 INJECTION, SOLUTION INTRAVENOUS; SUBCUTANEOUS
Status: DISCONTINUED | OUTPATIENT
Start: 2024-11-18 | End: 2024-11-21 | Stop reason: HOSPADM

## 2024-11-18 RX ORDER — MIDAZOLAM HYDROCHLORIDE 1 MG/ML
INJECTION INTRAMUSCULAR; INTRAVENOUS
Status: DISCONTINUED | OUTPATIENT
Start: 2024-11-18 | End: 2024-11-18

## 2024-11-18 RX ORDER — IBUPROFEN 200 MG
16 TABLET ORAL
Status: DISCONTINUED | OUTPATIENT
Start: 2024-11-18 | End: 2024-11-21 | Stop reason: HOSPADM

## 2024-11-18 RX ORDER — PROPOFOL 10 MG/ML
VIAL (ML) INTRAVENOUS
Status: DISCONTINUED | OUTPATIENT
Start: 2024-11-18 | End: 2024-11-18

## 2024-11-18 RX ORDER — ATORVASTATIN CALCIUM 20 MG/1
80 TABLET, FILM COATED ORAL DAILY
Status: DISCONTINUED | OUTPATIENT
Start: 2024-11-18 | End: 2024-11-21 | Stop reason: HOSPADM

## 2024-11-18 RX ORDER — CYCLOBENZAPRINE HCL 5 MG
10 TABLET ORAL 3 TIMES DAILY
Status: DISCONTINUED | OUTPATIENT
Start: 2024-11-18 | End: 2024-11-21 | Stop reason: HOSPADM

## 2024-11-18 RX ORDER — ONDANSETRON HYDROCHLORIDE 2 MG/ML
INJECTION, SOLUTION INTRAVENOUS
Status: DISCONTINUED | OUTPATIENT
Start: 2024-11-18 | End: 2024-11-18

## 2024-11-18 RX ORDER — KETAMINE HCL IN 0.9 % NACL 50 MG/5 ML
SYRINGE (ML) INTRAVENOUS
Status: DISCONTINUED | OUTPATIENT
Start: 2024-11-18 | End: 2024-11-18

## 2024-11-18 RX ORDER — BUPIVACAINE 13.3 MG/ML
INJECTION, SUSPENSION, LIPOSOMAL INFILTRATION
Status: DISCONTINUED | OUTPATIENT
Start: 2024-11-18 | End: 2024-11-18 | Stop reason: HOSPADM

## 2024-11-18 RX ORDER — DOCUSATE SODIUM 100 MG/1
100 CAPSULE, LIQUID FILLED ORAL EVERY 12 HOURS
Status: DISCONTINUED | OUTPATIENT
Start: 2024-11-18 | End: 2024-11-21 | Stop reason: HOSPADM

## 2024-11-18 RX ORDER — GLUCAGON 1 MG
1 KIT INJECTION
Status: DISCONTINUED | OUTPATIENT
Start: 2024-11-18 | End: 2024-11-18 | Stop reason: HOSPADM

## 2024-11-18 RX ORDER — PHENYLEPHRINE HYDROCHLORIDE 10 MG/ML
INJECTION INTRAVENOUS
Status: DISCONTINUED | OUTPATIENT
Start: 2024-11-18 | End: 2024-11-18

## 2024-11-18 RX ORDER — SODIUM CHLORIDE 0.9 % (FLUSH) 0.9 %
3 SYRINGE (ML) INJECTION
Status: DISCONTINUED | OUTPATIENT
Start: 2024-11-18 | End: 2024-11-18 | Stop reason: HOSPADM

## 2024-11-18 RX ORDER — TALC
6 POWDER (GRAM) TOPICAL NIGHTLY PRN
Status: DISCONTINUED | OUTPATIENT
Start: 2024-11-18 | End: 2024-11-21 | Stop reason: HOSPADM

## 2024-11-18 RX ORDER — ACETAMINOPHEN 500 MG
1000 TABLET ORAL EVERY 6 HOURS
Status: DISCONTINUED | OUTPATIENT
Start: 2024-11-18 | End: 2024-11-21 | Stop reason: HOSPADM

## 2024-11-18 RX ORDER — HEPARIN SODIUM 10000 [USP'U]/ML
INJECTION, SOLUTION INTRAVENOUS; SUBCUTANEOUS
Status: DISCONTINUED | OUTPATIENT
Start: 2024-11-18 | End: 2024-11-18 | Stop reason: HOSPADM

## 2024-11-18 RX ORDER — PROCHLORPERAZINE EDISYLATE 5 MG/ML
5 INJECTION INTRAMUSCULAR; INTRAVENOUS EVERY 30 MIN PRN
Status: DISCONTINUED | OUTPATIENT
Start: 2024-11-18 | End: 2024-11-18 | Stop reason: HOSPADM

## 2024-11-18 RX ORDER — LIDOCAINE HYDROCHLORIDE 20 MG/ML
INJECTION INTRAVENOUS
Status: DISCONTINUED | OUTPATIENT
Start: 2024-11-18 | End: 2024-11-18

## 2024-11-18 RX ORDER — PROCHLORPERAZINE EDISYLATE 5 MG/ML
5 INJECTION INTRAMUSCULAR; INTRAVENOUS EVERY 6 HOURS PRN
Status: DISCONTINUED | OUTPATIENT
Start: 2024-11-18 | End: 2024-11-21 | Stop reason: HOSPADM

## 2024-11-18 RX ORDER — CALCIUM CARBONATE 200(500)MG
1 TABLET,CHEWABLE ORAL DAILY
Status: DISCONTINUED | OUTPATIENT
Start: 2024-11-19 | End: 2024-11-21 | Stop reason: HOSPADM

## 2024-11-18 RX ORDER — LIDOCAINE HYDROCHLORIDE AND EPINEPHRINE 10; 10 UG/ML; MG/ML
INJECTION, SOLUTION INFILTRATION; PERINEURAL
Status: DISCONTINUED | OUTPATIENT
Start: 2024-11-18 | End: 2024-11-18 | Stop reason: HOSPADM

## 2024-11-18 RX ORDER — SODIUM CHLORIDE, SODIUM LACTATE, POTASSIUM CHLORIDE, CALCIUM CHLORIDE 600; 310; 30; 20 MG/100ML; MG/100ML; MG/100ML; MG/100ML
INJECTION, SOLUTION INTRAVENOUS CONTINUOUS
Status: DISCONTINUED | OUTPATIENT
Start: 2024-11-18 | End: 2024-11-19

## 2024-11-18 RX ORDER — HEPARIN SODIUM 5000 [USP'U]/ML
5000 INJECTION, SOLUTION INTRAVENOUS; SUBCUTANEOUS
Status: DISCONTINUED | OUTPATIENT
Start: 2024-11-18 | End: 2024-11-18 | Stop reason: HOSPADM

## 2024-11-18 RX ORDER — ROCURONIUM BROMIDE 10 MG/ML
INJECTION, SOLUTION INTRAVENOUS
Status: DISCONTINUED | OUTPATIENT
Start: 2024-11-18 | End: 2024-11-18

## 2024-11-18 RX ORDER — LEVOTHYROXINE SODIUM 75 UG/1
75 TABLET ORAL
Status: DISCONTINUED | OUTPATIENT
Start: 2024-11-19 | End: 2024-11-21 | Stop reason: HOSPADM

## 2024-11-18 RX ORDER — IBUPROFEN 200 MG
24 TABLET ORAL
Status: DISCONTINUED | OUTPATIENT
Start: 2024-11-18 | End: 2024-11-21 | Stop reason: HOSPADM

## 2024-11-18 RX ORDER — PAPAVERINE HYDROCHLORIDE 30 MG/ML
INJECTION INTRAMUSCULAR; INTRAVENOUS
Status: DISCONTINUED | OUTPATIENT
Start: 2024-11-18 | End: 2024-11-18 | Stop reason: HOSPADM

## 2024-11-18 RX ORDER — OXYCODONE HYDROCHLORIDE 10 MG/1
10 TABLET ORAL EVERY 4 HOURS PRN
Status: DISCONTINUED | OUTPATIENT
Start: 2024-11-18 | End: 2024-11-21 | Stop reason: HOSPADM

## 2024-11-18 RX ADMIN — CEFAZOLIN 1 G: 330 INJECTION, POWDER, FOR SOLUTION INTRAMUSCULAR; INTRAVENOUS at 10:11

## 2024-11-18 RX ADMIN — MUPIROCIN: 20 OINTMENT TOPICAL at 08:11

## 2024-11-18 RX ADMIN — FENTANYL CITRATE 50 MCG: 50 INJECTION, SOLUTION INTRAMUSCULAR; INTRAVENOUS at 02:11

## 2024-11-18 RX ADMIN — ACETAMINOPHEN 1000 MG: 500 TABLET, FILM COATED ORAL at 08:11

## 2024-11-18 RX ADMIN — ROCURONIUM BROMIDE 40 MG: 10 INJECTION INTRAVENOUS at 09:11

## 2024-11-18 RX ADMIN — ALBUMIN (HUMAN) 500 ML/HR: 12.5 SOLUTION INTRAVENOUS at 12:11

## 2024-11-18 RX ADMIN — ROCURONIUM BROMIDE 20 MG: 10 INJECTION INTRAVENOUS at 10:11

## 2024-11-18 RX ADMIN — PROPOFOL 150 MG: 10 INJECTION, EMULSION INTRAVENOUS at 09:11

## 2024-11-18 RX ADMIN — MIDAZOLAM HYDROCHLORIDE 2 MG: 1 INJECTION, SOLUTION INTRAMUSCULAR; INTRAVENOUS at 09:11

## 2024-11-18 RX ADMIN — PHENYLEPHRINE HYDROCHLORIDE 100 MCG: 10 INJECTION INTRAVENOUS at 10:11

## 2024-11-18 RX ADMIN — SODIUM CHLORIDE, SODIUM LACTATE, POTASSIUM CHLORIDE, AND CALCIUM CHLORIDE: 600; 310; 30; 20 INJECTION, SOLUTION INTRAVENOUS at 02:11

## 2024-11-18 RX ADMIN — EPHEDRINE SULFATE 5 MG: 50 INJECTION, SOLUTION INTRAVENOUS at 02:11

## 2024-11-18 RX ADMIN — PREGABALIN 75 MG: 75 CAPSULE ORAL at 08:11

## 2024-11-18 RX ADMIN — ACETAMINOPHEN 1000 MG: 500 TABLET, FILM COATED ORAL at 05:11

## 2024-11-18 RX ADMIN — CEFAZOLIN 2 G: 2 INJECTION, POWDER, FOR SOLUTION INTRAMUSCULAR; INTRAVENOUS at 10:11

## 2024-11-18 RX ADMIN — ONDANSETRON HYDROCHLORIDE 4 MG: 2 INJECTION INTRAMUSCULAR; INTRAVENOUS at 10:11

## 2024-11-18 RX ADMIN — FENTANYL CITRATE 25 MCG: 50 INJECTION, SOLUTION INTRAMUSCULAR; INTRAVENOUS at 10:11

## 2024-11-18 RX ADMIN — VECURONIUM BROMIDE FOR INJECTION 2 MG: 1 INJECTION, POWDER, LYOPHILIZED, FOR SOLUTION INTRAVENOUS at 01:11

## 2024-11-18 RX ADMIN — VECURONIUM BROMIDE FOR INJECTION 2 MG: 1 INJECTION, POWDER, LYOPHILIZED, FOR SOLUTION INTRAVENOUS at 12:11

## 2024-11-18 RX ADMIN — Medication 10 MG: at 10:11

## 2024-11-18 RX ADMIN — SODIUM CHLORIDE, POTASSIUM CHLORIDE, SODIUM LACTATE AND CALCIUM CHLORIDE: 600; 310; 30; 20 INJECTION, SOLUTION INTRAVENOUS at 07:11

## 2024-11-18 RX ADMIN — OXYCODONE HYDROCHLORIDE 5 MG: 5 TABLET ORAL at 04:11

## 2024-11-18 RX ADMIN — LIDOCAINE HYDROCHLORIDE 80 MG: 20 INJECTION, SOLUTION INTRAVENOUS at 09:11

## 2024-11-18 RX ADMIN — VECURONIUM BROMIDE FOR INJECTION 3 MG: 1 INJECTION, POWDER, LYOPHILIZED, FOR SOLUTION INTRAVENOUS at 12:11

## 2024-11-18 RX ADMIN — CYCLOBENZAPRINE HYDROCHLORIDE 10 MG: 5 TABLET, FILM COATED ORAL at 08:11

## 2024-11-18 RX ADMIN — FENTANYL CITRATE 50 MCG: 50 INJECTION, SOLUTION INTRAMUSCULAR; INTRAVENOUS at 01:11

## 2024-11-18 RX ADMIN — KETOROLAC TROMETHAMINE 15 MG: 30 INJECTION, SOLUTION INTRAMUSCULAR; INTRAVENOUS at 05:11

## 2024-11-18 RX ADMIN — ONDANSETRON HYDROCHLORIDE 4 MG: 2 INJECTION INTRAMUSCULAR; INTRAVENOUS at 02:11

## 2024-11-18 RX ADMIN — HEPARIN SODIUM 5000 UNITS: 5000 INJECTION INTRAVENOUS; SUBCUTANEOUS at 08:11

## 2024-11-18 RX ADMIN — EPHEDRINE SULFATE 25 MG: 50 INJECTION INTRAVENOUS at 10:11

## 2024-11-18 RX ADMIN — ALBUMIN (HUMAN) 200 ML/HR: 12.5 SOLUTION INTRAVENOUS at 11:11

## 2024-11-18 RX ADMIN — FENTANYL CITRATE 50 MCG: 50 INJECTION, SOLUTION INTRAMUSCULAR; INTRAVENOUS at 11:11

## 2024-11-18 RX ADMIN — VECURONIUM BROMIDE FOR INJECTION 3 MG: 1 INJECTION, POWDER, LYOPHILIZED, FOR SOLUTION INTRAVENOUS at 01:11

## 2024-11-18 RX ADMIN — Medication 10 MG: at 11:11

## 2024-11-18 RX ADMIN — DOCUSATE SODIUM 100 MG: 100 CAPSULE, LIQUID FILLED ORAL at 08:11

## 2024-11-18 RX ADMIN — ROCURONIUM BROMIDE 20 MG: 10 INJECTION INTRAVENOUS at 11:11

## 2024-11-18 RX ADMIN — CEFAZOLIN 2 G: 2 INJECTION, POWDER, FOR SOLUTION INTRAMUSCULAR; INTRAVENOUS at 02:11

## 2024-11-18 RX ADMIN — SUGAMMADEX 200 MG: 100 INJECTION, SOLUTION INTRAVENOUS at 03:11

## 2024-11-18 RX ADMIN — Medication 30 MG: at 10:11

## 2024-11-18 RX ADMIN — FENTANYL CITRATE 75 MCG: 50 INJECTION, SOLUTION INTRAMUSCULAR; INTRAVENOUS at 09:11

## 2024-11-18 RX ADMIN — OXYCODONE HYDROCHLORIDE 10 MG: 10 TABLET ORAL at 08:11

## 2024-11-18 RX ADMIN — DEXAMETHASONE SODIUM PHOSPHATE 8 MG: 4 INJECTION, SOLUTION INTRAMUSCULAR; INTRAVENOUS at 10:11

## 2024-11-18 RX ADMIN — VECURONIUM BROMIDE FOR INJECTION 2 MG: 1 INJECTION, POWDER, LYOPHILIZED, FOR SOLUTION INTRAVENOUS at 02:11

## 2024-11-18 RX ADMIN — FENTANYL CITRATE 50 MCG: 50 INJECTION, SOLUTION INTRAMUSCULAR; INTRAVENOUS at 10:11

## 2024-11-18 RX ADMIN — SODIUM CHLORIDE, SODIUM LACTATE, POTASSIUM CHLORIDE, AND CALCIUM CHLORIDE: 600; 310; 30; 20 INJECTION, SOLUTION INTRAVENOUS at 09:11

## 2024-11-18 NOTE — OR NURSING
Dr. Dale at the bedside to assess JOSE flap.  Flap is soft to touch, pink and warm at this time.  Doppler sounds were difficult  to  obtain but strong once the proper spot was located. Will continue to monitor pt.

## 2024-11-18 NOTE — ANESTHESIA PROCEDURE NOTES
Intubation    Date/Time: 11/18/2024 10:02 AM    Performed by: Fatou Monique  Authorized by: Minh Arellano MD    Intubation:     Induction:  Intravenous    Intubated:  Postinduction    Mask Ventilation:  Easy mask    Attempts:  1    Attempted By:  CRNA    Method of Intubation:  Video laryngoscopy    Blade:  Chong 3    Laryngeal View Grade: Grade I - full view of cords      Difficult Airway Encountered?: No      Complications:  None    Airway Device:  Oral endotracheal tube    Airway Device Size:  7.5    Style/Cuff Inflation:  Cuffed (inflated to minimal occlusive pressure)    Tube secured:  21    Secured at:  The lips    Placement Verified By:  Capnometry    Complicating Factors:  None    Findings Post-Intubation:  BS equal bilateral and atraumatic/condition of teeth unchanged

## 2024-11-18 NOTE — ANESTHESIA POSTPROCEDURE EVALUATION
Anesthesia Post Evaluation    Patient: Elham Rodas    Procedure(s) Performed: Procedure(s) (LRB):  RECONSTRUCTION, BREAST, USING JOSE FREE FLAP / RIGHT BREAST RECONSTRUCTION WITH JOSE ABDOMINAL FLAP (Right)    Final Anesthesia Type: general      Patient location during evaluation: PACU  Patient participation: Yes- Able to Participate  Level of consciousness: awake and alert  Post-procedure vital signs: reviewed and stable  Pain management: adequate  Airway patency: patent    PONV status at discharge: No PONV  Anesthetic complications: no      Cardiovascular status: blood pressure returned to baseline  Respiratory status: unassisted  Hydration status: euvolemic  Follow-up not needed.              Vitals Value Taken Time   /63 11/18/24 1617   Temp 36.4 °C (97.6 °F) 11/18/24 1525   Pulse 107 11/18/24 1621   Resp 14 11/18/24 1615   SpO2 93 % 11/18/24 1621   Vitals shown include unfiled device data.      No case tracking events are documented in the log.      Pain/Solis Score: Pain Rating Prior to Med Admin: 0 (preop) (11/18/2024  8:02 AM)  Solis Score: 8 (11/18/2024  4:00 PM)

## 2024-11-18 NOTE — BRIEF OP NOTE
[facility] ([facility]  Brief Operative Note    SUMMARY     Surgery Date: 11/18/2024     Surgeons and Role:     * Nicholas Mccray MD - Primary    Assisting Surgeon: None    Pre-op Diagnosis:  History of breast cancer [Z85.3]    Post-op Diagnosis:  Post-Op Diagnosis Codes:     * History of breast cancer [Z85.3]    Procedure:  Right JOSE breast reconstruction  Sharp excisionaldebridement of nonviable breast tissue, scar, and fat necrosis 20 x 1 cm  Ventral hernia repair    Anesthesia: General    Implants:  Implant Name Type Inv. Item Serial No.  Lot No. LRB No. Used Action   COLLAGEN CELLERATE ACTIVATED 5GM   [id number]   Right 1 Implanted    MICROVAS ANSTMS 3.0MM - [id number]   MICROVAS ANSTMS 3.0MM  [employer] [id number] Right 1 Implanted       Operative Findings:   Implants:   1. Right DIEV-to-right IMV venous anastomotic : 3.0 mm      Findings:   Micro Information  First flap  Donor site: right vandana-abdomen  Recipient site: Right breast  Weight: 429 grams  Number of perforators: 2 JOSE  Name arterial pedicle: right DIEA  Recipient artery: Right PINO  Estimate size of arterial anastomosis: 2.5 mm  Pedicle Vein  Donor: Right DIEV  Recipient: Right IMV  Size: 3.0 mm  Additional veins (0)  none    Estimated Blood Loss: 100 ccs         Specimens:   Specimen (24h ago, onward)       Start     Ordered    Pending  Specimen to Pathology, Surgery  Once        Comments: Pre-op Diagnosis: History of breast cancer [Z85.3]Procedure(s):RECONSTRUCTION, BREAST, USING JOSE FREE FLAP / RIGHT BREAST RECONSTRUCTION WITH JOSE ABDOMINAL FLAP Number of specimens: 2Name of specimens: 1. RIGHT BREAST FAT NECROSIS (perm)2. Ventral hernia sac (perm)     References:    Click here for ordering Quick Tip   Question Answer Comment   Specimen Class: Routine/Screening    Release to patient Immediate        Pending                    [id number]    Rommel Dale MD  [facility]  11/18/2024

## 2024-11-18 NOTE — TRANSFER OF CARE
"Anesthesia Transfer of Care Note    Patient: Elham Rodas    Procedure(s) Performed: Procedure(s) (LRB):  RECONSTRUCTION, BREAST, USING JOSE FREE FLAP / RIGHT BREAST RECONSTRUCTION WITH JOSE ABDOMINAL FLAP (Right)    Patient location: PACU    Anesthesia Type: general    Transport from OR: Transported from OR on 6-10 L/min O2 by face mask with adequate spontaneous ventilation    Post pain: adequate analgesia    Post assessment: no apparent anesthetic complications and tolerated procedure well    Post vital signs: stable    Level of consciousness: awake    Nausea/Vomiting: no nausea/vomiting    Complications: none    Transfer of care protocol was followed      Last vitals: Visit Vitals  /86 (BP Location: Left arm, Patient Position: Sitting)   Pulse 75   Temp 37 °C (98.6 °F) (Oral)   Resp 18   Ht 5' 4" (1.626 m)   Wt 65.8 kg (145 lb)   LMP 03/01/2019 (Approximate)   SpO2 99%   Breastfeeding No   BMI 24.89 kg/m²     "

## 2024-11-18 NOTE — OP NOTE
[facility] - Surgery ([place])  Operative Note      Date of Procedure: 11/18/2024     Procedure: Procedure(s) (LRB):  RECONSTRUCTION, BREAST, USING JOSE FREE FLAP / RIGHT BREAST RECONSTRUCTION WITH JOSE ABDOMINAL FLAP (Right)   Incisional debridement of non viable breast tissue, scar and fat necrosis [id number]  Ventral Hernia Repair      Surgeons and Role:     * Nicholas Mccray MD - Primary    Assisting Surgeon: None  Resident: Rommel Dale MD    Pre-Operative Diagnosis: History of breast cancer [Z85.3]    Post-Operative Diagnosis: Post-Op Diagnosis Codes:     * History of breast cancer [Z85.3]    Anesthesia: General    Operative Findings (including complications, if any): Right ipsilateral JOSE flap to right inferior pole breast for reconstruction. Flap weight: 492gm    Description of Technical Procedures:   Informed consent the patient was marked in the preoperative area taken to the operating room.  She was placed under general anesthesia was intubated.  A Mondragon catheter was placed the patient was given IV antibiotics and she was prepped and draped in usual sterile fashion.  A time-out was performed.  Preoperative markings were rechecked and injected with local anesthetic with epinephrine based off preoperative CTA findings it was decided to dissect the right abdominal flap for the ipsilateral reconstruction.  The incision was opened with a 10 blade and electrocautery.  A small superficial vein was encountered and dissected for approximately 4-5 cm.  The breast incisions were made down to the level of the abdominal wall and from lateral to medial the subcutaneous fat was removed from the underlying fascia until perforators were encountered.  There were a well but perforating perforators at the level of the umbilicus across the rectus traveling on top of the muscle.  This was dissected by opening the fascia around these perforators and dissecting the perforators from its surrounding fascia as well as dissecting  the  through the muscle to the underlying pedicle.  All small branches were ligated and divided throughout the portion of the procedure.  Care was taken not to injure nerves were muscle.  The pedicle was dissected down into the level of the pelvis.  Hemostasis was obtained throughout.  The chest vessels were exposed by making the previous wise pattern incision, debriding with electrocautery and sharp debridement any nonviable fat necrosis scar or breast tissue.  All this tissue was sent for pathology.  A 15 Vietnamese drain was placed and Exparel was injected into the nerve planes.  Incision was made in the pectoralis muscle exposing the underlying rib.  Periosteum was removed from the rib and the rib cartilage was removed with a rongeur.  Under loupe dissection using bipolar cautery the IM vessels were exposed and circumferentially dissected.  Small branches were ligated with clips and divided.  Adequate length on the PINO vessels were dissected.  Once this was completed the flap was harvested from the abdominal wall by ligating the vessels proximally and dividing with scissors.  Flap was weighed and then brought up to the chest and secured to the chest wall..  Artery was reanastomosed with a 9 0 nylon.  Flow was returned into the flap and remained throughout.  The flap was then sutured onto the chest wall with deep 2-0 Vicryl.  The nonviable excess skin from the previous reconstruction was removed with a 10 blade and electrocautery.  This was sent for pathology.  The flap was then closed the skin using deep 3-0 Monocryl for dermis.  Followed by running subcuticular 2-0 Quill.  The other hemiabdomen was removed from the underlying fascia and discarded.  Dissection occurred up to the level of the xiphoid.  The fascial defect on the right was closed with 2-0 Vicryl in a 0 PDS in running and mattress formation.  Prior to closing the diastasis was a midline ventral hernia proximally 1-1/2-2 cm.  This hernia was  dissected and the distal aspects of it was ligated with electrocautery.  The rest of it was then reduced back into the hernia defect.  The hernia was then closed with a 2-0 Vicryl in a figure-of-eight fashion.  As well as closed with the diastasis repair.   The diastasis was then repaired with a 2. Quill PDO suture.  This was done in running as well as in mattress formation.  The umbilicus was tacked down to the underlying fascia.  The patient was then flexed and a 15 Prydeinig drain was placed and secured with a nylon.  Exparel was injected into the nerve planes.  The skin was then stapled together.  The cleft Farooq's was closed with 2-0 Vicryl in interrupted fashion.  The dermis was closed with 2-0 Quill in running fashion.  The subcuticular closure was performed with 2-0 Quill as well.  The umbilicus was brought out through a semicircular incision in the midline and sutured down circumferentially with deep 3-0 Monocryl followed by running subcuticular 4-0 Monocryl.  The patient was allergic to adhesives therefore Xeroform was placed over all the incisions.  To signals were found with pencil Doppler on the flap and was marked with 4-0 Prolene sutures.  The signals remained strong throughout the remainder of the case.  The patient was awoken from anesthesia and taken to recovery all counts were correct.    Significant Surgical Tasks Conducted by the Assistant(s), if Applicable: Assist in exposure and closure    Estimated Blood Loss (EBL): * No values recorded between 11/18/2024 10:34 AM and 11/18/2024  2:53 PM *           Implants:   Implant Name Type Inv. Item Serial No.  Lot No. LRB No. Used Action   COLLAGEN CELLERATE ACTIVATED 5GM   [id number]   Right 1 Implanted    [employer] ANSTMS 3.0MM - [id number]   MICROVAS ANSTMS 3.0MM  [employer] [id number] Right 1 Implanted       Specimens:   Specimen (24h ago, onward)       Start     Ordered    Pending  Specimen to  Pathology, Surgery  Once        Comments: Pre-op Diagnosis: History of breast cancer [Z85.3]Procedure(s):RECONSTRUCTION, BREAST, USING JOSE FREE FLAP / RIGHT BREAST RECONSTRUCTION WITH JOSE ABDOMINAL FLAP Number of specimens: 2Name of specimens: 1. RIGHT BREAST FAT NECROSIS (perm)2. Ventral hernia sac (perm)     References:    Click here for ordering Quick Tip   Question Answer Comment   Specimen Class: Routine/Screening    Release to patient Immediate        Pending                            Condition: Good    Disposition: PACU - hemodynamically stable.    Attestation: I was present and scrubbed for the entire procedure.

## 2024-11-19 LAB
POCT GLUCOSE: 123 MG/DL (ref 70–110)
POCT GLUCOSE: 124 MG/DL (ref 70–110)
POCT GLUCOSE: 212 MG/DL (ref 70–110)
POCT GLUCOSE: 98 MG/DL (ref 70–110)

## 2024-11-19 PROCEDURE — 25000003 PHARM REV CODE 250: Performed by: STUDENT IN AN ORGANIZED HEALTH CARE EDUCATION/TRAINING PROGRAM

## 2024-11-19 PROCEDURE — 97530 THERAPEUTIC ACTIVITIES: CPT

## 2024-11-19 PROCEDURE — 63600175 PHARM REV CODE 636 W HCPCS: Performed by: STUDENT IN AN ORGANIZED HEALTH CARE EDUCATION/TRAINING PROGRAM

## 2024-11-19 PROCEDURE — 97162 PT EVAL MOD COMPLEX 30 MIN: CPT

## 2024-11-19 PROCEDURE — 94799 UNLISTED PULMONARY SVC/PX: CPT

## 2024-11-19 PROCEDURE — 97116 GAIT TRAINING THERAPY: CPT

## 2024-11-19 PROCEDURE — 94761 N-INVAS EAR/PLS OXIMETRY MLT: CPT

## 2024-11-19 PROCEDURE — 11000001 HC ACUTE MED/SURG PRIVATE ROOM

## 2024-11-19 PROCEDURE — 97165 OT EVAL LOW COMPLEX 30 MIN: CPT

## 2024-11-19 RX ADMIN — LEVOTHYROXINE SODIUM 75 MCG: 75 TABLET ORAL at 05:11

## 2024-11-19 RX ADMIN — OXYCODONE HYDROCHLORIDE 5 MG: 5 TABLET ORAL at 08:11

## 2024-11-19 RX ADMIN — THERA TABS 1 TABLET: TAB at 08:11

## 2024-11-19 RX ADMIN — CEFAZOLIN 1 G: 330 INJECTION, POWDER, FOR SOLUTION INTRAMUSCULAR; INTRAVENOUS at 03:11

## 2024-11-19 RX ADMIN — ACETAMINOPHEN 1000 MG: 500 TABLET, FILM COATED ORAL at 05:11

## 2024-11-19 RX ADMIN — OXYCODONE HYDROCHLORIDE 10 MG: 10 TABLET ORAL at 12:11

## 2024-11-19 RX ADMIN — ACETAMINOPHEN 1000 MG: 500 TABLET, FILM COATED ORAL at 12:11

## 2024-11-19 RX ADMIN — INSULIN ASPART 2 UNITS: 100 INJECTION, SOLUTION INTRAVENOUS; SUBCUTANEOUS at 09:11

## 2024-11-19 RX ADMIN — ATORVASTATIN CALCIUM 80 MG: 20 TABLET, FILM COATED ORAL at 08:11

## 2024-11-19 RX ADMIN — KETOROLAC TROMETHAMINE 15 MG: 30 INJECTION, SOLUTION INTRAMUSCULAR; INTRAVENOUS at 05:11

## 2024-11-19 RX ADMIN — OXYCODONE HYDROCHLORIDE 10 MG: 10 TABLET ORAL at 09:11

## 2024-11-19 RX ADMIN — DOCUSATE SODIUM 100 MG: 100 CAPSULE, LIQUID FILLED ORAL at 09:11

## 2024-11-19 RX ADMIN — KETOROLAC TROMETHAMINE 15 MG: 30 INJECTION, SOLUTION INTRAMUSCULAR; INTRAVENOUS at 06:11

## 2024-11-19 RX ADMIN — CEFAZOLIN 1 G: 330 INJECTION, POWDER, FOR SOLUTION INTRAMUSCULAR; INTRAVENOUS at 06:11

## 2024-11-19 RX ADMIN — MUPIROCIN: 20 OINTMENT TOPICAL at 09:11

## 2024-11-19 RX ADMIN — Medication 5000 UNITS: at 08:11

## 2024-11-19 RX ADMIN — CALCIUM CARBONATE (ANTACID) CHEW TAB 500 MG 500 MG: 500 CHEW TAB at 08:11

## 2024-11-19 RX ADMIN — CYCLOBENZAPRINE HYDROCHLORIDE 10 MG: 5 TABLET, FILM COATED ORAL at 08:11

## 2024-11-19 RX ADMIN — ANASTROZOLE 1 MG: 1 TABLET, COATED ORAL at 08:11

## 2024-11-19 RX ADMIN — CEFAZOLIN 1 G: 330 INJECTION, POWDER, FOR SOLUTION INTRAMUSCULAR; INTRAVENOUS at 09:11

## 2024-11-19 RX ADMIN — OXYCODONE HYDROCHLORIDE 10 MG: 10 TABLET ORAL at 03:11

## 2024-11-19 RX ADMIN — CYCLOBENZAPRINE HYDROCHLORIDE 10 MG: 5 TABLET, FILM COATED ORAL at 03:11

## 2024-11-19 RX ADMIN — KETOROLAC TROMETHAMINE 15 MG: 30 INJECTION, SOLUTION INTRAMUSCULAR; INTRAVENOUS at 12:11

## 2024-11-19 RX ADMIN — DOCUSATE SODIUM 100 MG: 100 CAPSULE, LIQUID FILLED ORAL at 08:11

## 2024-11-19 RX ADMIN — CYCLOBENZAPRINE HYDROCHLORIDE 10 MG: 5 TABLET, FILM COATED ORAL at 09:11

## 2024-11-19 RX ADMIN — ACETAMINOPHEN 1000 MG: 500 TABLET, FILM COATED ORAL at 06:11

## 2024-11-19 RX ADMIN — OXYCODONE HYDROCHLORIDE 10 MG: 10 TABLET ORAL at 05:11

## 2024-11-19 NOTE — PT/OT/SLP EVAL
Occupational Therapy   Evaluation & Treatment    Name: Elham Rodas  MRN: [id number]  Admitting Diagnosis: <principal problem not specified>  Recent Surgery: Procedure(s) (LRB):  RECONSTRUCTION, BREAST, USING JOSE FREE FLAP (Right) 1 Day Post-Op    Recommendations:     Discharge Recommendations: Low Intensity Therapy  Discharge Equipment Recommendations:   (TBD pending progress)  Barriers to discharge:   none    Assessment:     Elham Rodas is a 44 y.o. female with a medical diagnosis of <principal problem not specified>.  She presents with pain near surgical sites. Performance deficits affecting function: impaired balance, impaired self care skills, impaired functional mobility, gait instability, decreased upper extremity function, decreased ROM.  Pt agreeable to participating in therapy upon arrival to room.  Pt demonstrates strength and ROM in (B) UE needed for ADLs that is WFL while adhering to post surgical precautions.    After moving from supine <> seated position pt reported feeling dizzy so increased mobility deferred.  Pt performed sit <> stand transfer and took 2 side steps to left towards HOB with CGA/HHA.  PTA pt reports being (I) with ADLs and functional mobility.  Pt would benefit from skilled OT services to address problems listed above and increase independence with ADLs.  Low intensity therapy is recommended upon d/c from acute care to further address deficits and help pt improve overall functional independence.         Rehab Prognosis: Good; patient would benefit from acute skilled OT services to address these deficits and reach maximum level of function.       Plan:     Patient to be seen 3 x/week to address the above listed problems via self-care/home management, therapeutic activities, therapeutic exercises  Plan of Care Expires:    Plan of Care Reviewed with: patient    Subjective     Chief Complaint: dizziness, pain near surgical site  Patient/Family Comments/goals: return home, resume  regular routine    Occupational Profile:  Living Environment: Pt lives with boyfriend in 2SH, 4 LEXY, B HR present.  Bedroom is on 1st floor.  Bathroom has tub/shower.  Pt reports bed is high  Previous level of function: Independent with ADLs and functional mobility   Roles and Routines: Mother, girlfriend  Equipment Used at Home: none  Assistance upon Discharge: boyfriend able to provide some assist, but works 12-14 hour shifts, and goes out of the state this Monday    Pain/Comfort:  Pain Rating 1:  (pt reported significant pain near surgical site in abdomen and chest)  Pain Rating Post-Intervention 1:  (pain near surgical site remained consistent)    Patients cultural, spiritual, Church conflicts given the current situation: no    Objective:     Communicated with: RN (Debora) prior to session.  Patient found HOB elevated with telemetry, ISABELA drain, SCD (abdominal binder, surgical bra) upon OT entry to room.    General Precautions: Standard, fall, on R side: avoid overhead movements, avoid excessive pushing/pulling, avoid heavy lifting, maintain slight flexion in trunk to avoid strain on abdomen when ambulating   Orthopedic Precautions: N/A  Braces: N/A  Respiratory Status: Room air    Occupational Performance:    Bed Mobility:    Supine <> sit:  Min A  Increased time required 2* pain  Scooting: SBA seated towards EOB    Functional Mobility/Transfers:  Sit <> stand: CGA/HHA x 1 trial from EOB  Functional Mobility: Pt took 2 side steps to left towards HOB with CGA/HHA.  Pt reported feeling dizzy so further mobility deferred     Activities of Daily Living:  Upper Body Dressing: minimum assistance for adjusting gown and holding up while seated and standing.    Cognitive/Visual Perceptual:  Cognitive/Psychosocial Skills:    -       Oriented to: Person, Place, Time, and Situation   -       Follows Commands/attention: Follows 100% of one step commands  -       Communication: clear/fluent  -       Memory: No Deficits  noted  -       Safety awareness/insight to disability: intact   -       Mood/Affect/Coping skills/emotional control: Cooperative and Pleasant, but tired from medication and experiencing dizziness while seated at EOB    Physical Exam:  Postural examination/scapula alignment: No postural abnormalities identified  Skin integrity: Visible skin intact  Edema:  None noted  Sensation: Intact  Motor Planning: WNL  Dominant hand: Right  Upper Extremity Range of Motion: Adhering to post surgical precautions   -       Right Upper Extremity: WFL  -       Left Upper Extremity: WFL  Upper Extremity Strength: Adhering to post surgical precautions  -       Right Upper Extremity: WFL  -       Left Upper Extremity: WFL   Strength: WNL   Fine Motor Coordination: Intact  Gross motor coordination: WFL  Balance: Sitting- Independent; Standing- CGA    AMPAC 6 Click ADL:  AMPAC Total Score: 20    Treatment & Education:  *Pt educated on:  -role of OT in acute care setting  -post surgical precautions  -log rolling technique  -ADL technique while adhering to precautions  -use of BSC  *Pt performed supine <> sit transfer: Increased time required; cues provided for technique  *Pt performed sit <> stand transfer from EOB; cues for safety provided  *POC reviewed with pt    Patient left HOB elevated with all lines intact, call button in reach, and RN (Debora) and PT (Carissa) notified    GOALS:   Multidisciplinary Problems       Occupational Therapy Goals          Problem: Occupational Therapy    Goal Priority Disciplines Outcome Interventions   Occupational Therapy Goal     OT, PT/OT Progressing    Description: Goals to be met by: 11/30/2024     Patient will increase functional independence with ADLs by performing:    UE Dressing with Minimal Assistance.  LE Dressing with Minimal Assistance.  Grooming while standing at sink with Stand-by Assistance.  Toileting from toilet with Stand-by Assistance for hygiene and clothing management.   Toilet  transfer to toilet with Stand-by Assistance.                         History:     Past Medical History:   Diagnosis Date    Abnormal Pap smear of cervix 05/2016    ASCUS, - HPV    Anxiety     Breast cancer 10/2018    right - RUE extremity limb restriction    Depression     Diabetes mellitus     type 2    Fatigue     Hyperlipidemia     Hypothyroidism     Neuropathy     Palpitations     Prediabetes     Psychiatric problem     Sleep difficulties     Syncope and collapse     7-10 years ago    Thyroid disease     Vitamin D deficiency disease          Past Surgical History:   Procedure Laterality Date    AXILLARY NODE DISSECTION Right 11/21/2018    Procedure: LYMPHADENECTOMY, AXILLARY;  Surgeon: Jann Ayala MD;  Location: [facility] OR [address] [place] [address];  Service: General;  Laterality: Right;    BREAST LUMPECTOMY Right     BREAST RECONSTRUCTION Bilateral 05/23/2023    2nd reconstructive surgery    CHOLECYSTECTOMY      HYSTERECTOMY      TLH, BSO--( breast cancer)    INJECTION FOR SENTINEL NODE IDENTIFICATION Right 11/21/2018    Procedure: INJECTION, FOR SENTINEL NODE IDENTIFICATION;  Surgeon: Jann Ayala MD;  Location: [facility] OR [address] [place] [address];  Service: General;  Laterality: Right;    INSERTION OF TUNNELED CENTRAL VENOUS CATHETER (CVC) WITH SUBCUTANEOUS PORT Left 01/02/2019    Procedure: [id number];  Surgeon: Jann Ayala MD;  Location: [address];  Service: General;  Laterality: Left;    LAPAROSCOPIC SALPINGO-OOPHORECTOMY Bilateral 11/04/2019    Procedure: SALPINGO-OOPHORECTOMY, LAPAROSCOPIC;  Surgeon: Yoni Oscar MD;  Location: [facility];  Service: OB/GYN;  Laterality: Bilateral;    LAPAROSCOPIC TOTAL HYSTERECTOMY N/A 11/04/2019    Procedure: HYSTERECTOMY, TOTAL, LAPAROSCOPIC;  Surgeon: Yoni Oscar MD;  Location: [facility];  Service: OB/GYN;  Laterality: N/A;    MASTECTOMY, PARTIAL Right 11/21/2018    Procedure: MASTECTOMY, PARTIAL - seed localized;  Surgeon: Jann Ayala MD;  Location: [facility]  OR 2ND FLR;  Service: General;  Laterality: Right;  seed placement 11/14    OOPHORECTOMY      RECONSTRUCTION OF BREAST WITH DEEP INFERIOR EPIGASTRIC ARTERY  (JOSE) FREE FLAP Right 11/18/2024    Procedure: RECONSTRUCTION, BREAST, USING JOSE FREE FLAP;  Surgeon: Nicholas Mccray MD;  Location: [facility];  Service: Plastics;  Laterality: Right;  / RIGHT BREAST RECONSTRUCTION WITH JOSE ABDOMINAL FLAP    RECONSTRUCTION OF BREAST WITH LATISSIMUS DORSI MYOCUTANEOUS FLAP Right 01/11/2022    Procedure: RECONSTRUCTION, BREAST, WITH LATISSIMUS DORSI MYOCUTANEOUS FLAP;  Surgeon: Linus Echavarria MD;  Location: [facility] OR 2ND FLR;  Service: Plastics;  Laterality: Right;  R lat flap vs. TDAP    REVISION OF FLAP RECONSTRUCTION OF BREAST Bilateral 05/2023    SENTINEL LYMPH NODE BIOPSY Right 11/21/2018    Procedure: BIOPSY, LYMPH NODE, SENTINEL;  Surgeon: Jann Ayala MD;  [facility];  Service: General;  Laterality: Right;    THYROIDECTOMY      TOTAL REDUCTION MAMMOPLASTY Left 01/11/2022    Procedure: MAMMOPLASTY, REDUCTION;  Surgeon: Linus Echavarria MD;  [facility];  Service: Plastics;  Laterality: Left;    TUBAL LIGATION         Time Tracking:     OT Date of Treatment: 11/19/24  OT Start Time: 0945  OT Stop Time: 1010  OT Total Time (min): 25 min    Billable Minutes:Evaluation 10  Therapeutic Activity 15    MATT Nuñez  11/19/2024

## 2024-11-19 NOTE — PLAN OF CARE
LMSW met with the patient at the bedside. Patient is alert and oriented with no communication barriers. Prior to admission, the patient is independent. Patient denies the use of HH or DME. Patients PCP is correct on the face sheet. Patient choice pharmacy is bedside. Patient's family will transport the patient home at discharge.     [facility] - Med Surg ([address])  Initial Discharge Assessment       Primary Care Provider: Cassia Ramsey DNP    Admission Diagnosis: History of breast cancer [Z85.3]  Personal history of breast cancer [Z85.3]    Admission Date: 11/18/2024  Expected Discharge Date: 11/21/2024    Transition of Care Barriers: (P) None    Payor: [employer] / Plan: [employer] ([employer]) / Product Type: Commercial /     Extended Emergency Contact Information  Primary Emergency Contact: Ramez Parnell  Mobile Phone: [phone]  Relation: Son  Secondary Emergency Contact: Parris Parnell   [place]  Mobile Phone: [phone]  Relation: Daughter    Discharge Plan A: (P) Home with family, Home  Discharge Plan B: (P) Home      [id number] - [place], [place] - [address] AT [facility] [address]  [address]  [place] [address]  Phone: [phone] Fax: [phone]      Initial Assessment (most recent)       Adult Discharge Assessment - 11/19/24 1242          Discharge Assessment    Assessment Type Discharge Planning Assessment (P)      Confirmed/corrected address, phone number and insurance Yes (P)      Confirmed Demographics Correct on Facesheet (P)      Source of Information patient;health record (P)      People in Home significant other (P)      Do you expect to return to your current living situation? Yes (P)      Do you have help at home or someone to help you manage your care at home? Yes (P)      Prior to hospitilization cognitive status: Alert/Oriented;No Deficits (P)      Current cognitive status: Alert/Oriented;No  Deficits (P)      Equipment Currently Used at Home none (P)      Readmission within 30 days? No (P)      Patient currently being followed by outpatient case management? No (P)      Do you currently have service(s) that help you manage your care at home? No (P)      Do you take prescription medications? Yes (P)      Do you have prescription coverage? Yes (P)      Do you have any problems affording any of your prescribed medications? No (P)      Is the patient taking medications as prescribed? yes (P)      How do you get to doctors appointments? family or friend will provide (P)      Are you on dialysis? No (P)    Patient is diabetic    Do you take coumadin? No (P)      Discharge Plan A Home with family;Home (P)      Discharge Plan B Home (P)      Discharge Plan discussed with: Patient (P)      Transition of Care Barriers None (P)

## 2024-11-19 NOTE — NURSING
7:00 AM  Flap assessment: Strong external doppler, warm, soft.     8:12 AM  Xeroform and abdominal pads replaced on R breast and abdomen incision. Mondragon removed. Due to void by 2 PM.

## 2024-11-19 NOTE — PT/OT/SLP EVAL
Physical Therapy Evaluation    Patient Name:  Elham Rodas   MRN:  [id number]    Recommendations:     Discharge Recommendations: Low Intensity Therapy   Discharge Equipment Recommendations: other (see comments) (TBD pending progress; possibly RW)   Barriers to discharge: Inaccessible home and Decreased caregiver support    Assessment:     Elham Rodas is a 44 y.o. female admitted with a medical diagnosis of <principal problem not specified>.  She presents with the following impairments/functional limitations: weakness, impaired endurance, impaired self care skills, impaired functional mobility, gait instability, impaired balance, pain, decreased ROM.    Patient evaluated by PT and goals established. Pt with moderate abdominal pain from incision site throughout session. Completed bed mobility with increased assistance due to abdominal pain and shoulder pain from previous surgeries. Amb x 100 ft using RW due to feelings of dizziness and unsteadiness when walking. PT will continue to follow and progress as tolerated. Rec for dc to Low Intensity Therapy.    Rehab Prognosis: Good; patient would benefit from acute skilled PT services to address these deficits and reach maximum level of function.    Recent Surgery: Procedure(s) (LRB):  RECONSTRUCTION, BREAST, USING JOSE FREE FLAP (Right) 1 Day Post-Op    Plan:     During this hospitalization, patient to be seen 4 x/week to address the identified rehab impairments via gait training, therapeutic activities, therapeutic exercises, neuromuscular re-education and progress toward the following goals:    Plan of Care Expires:  12/03/24    Subjective     Chief Complaint: Pt reports abdominal pain and dizziness upon standing.   Patient/Family Comments/goals: Patient agreeable to evaluation.   Pain/Comfort:  Pain Rating 1: other (see comments) (unrated, reports abdominal pain)  Location - Side 1: Bilateral  Location - Orientation 1: anterior  Location 1: abdomen  Pain Addressed  1: Pre-medicate for activity, Reposition, Distraction, Cessation of Activity  Pain Rating Post-Intervention 1: other (see comments) (unrated, increased pain with movement)    Patients cultural, spiritual, Roman Catholic conflicts given the current situation: no    Living Environment:  Patient lives with boyfriend in 2SH with 4 steps to enter with bilateral handrails.   Prior to admission, patients level of function was independent.  Equipment used at home: none. Upon discharge, patient will have assistance from boyfriend; however, he is not able to be home .    Objective:     Communicated with PETEY Cazares prior to session.  Patient found HOB elevated with ISABELA drain, SCD (abdominal binder, surgical bra)  upon PT entry to room.    General Precautions: Standard, fall (R breast reconstruction and JOSE flap precautions)  Orthopedic Precautions:N/A   Braces: N/A  Respiratory Status: Room air    Patient donned non slip socks for OOB mobility.    Exams:  Cognition:   Patient is oriented to person, place, , time.  Pt follows approximately 100% of one and two step commands.    Mood: Pleasant and cooperative.  Musculoskeletal:  Posture:    Flexed forward posture with standing  LE ROM/Strength:   R ROM: WFL  L ROM: WFL  R Strength:   Grossly WFL, formal MMT deferred due to pain  L Strength:   Grossly WFL, formal MMT deferred due to pain  Neuromuscular:  Sensation: No impairments identified with functional mobility. No formal testing performed  Tone/Reflexes/Coordination: No impairments identified with functional mobility. No formal testing performed.   Balance:   Static sitting: Intact  Static standing: Intact  Visual-vestibular: No impairments identified with functional mobility. No formal testing performed.  Integument:   Visible skin intact   Abdominal binder and surgical bra donned  Giovana pouch donned with 2 ISABELA drains intact  Cardiopulmonary:  Vital signs:      24 1637   Vital Signs   Pulse 91   SpO2 96 %   /72    MAP (mmHg) 91   BP Location Left arm   Patient Position Sitting     Edema: No visible edema observed.      Functional Mobility:  Bed Mobility:     Supine to Sit: minimum assistance due to abdominal pain and shoulder pain   Instructed pt to use logrolling technique   Transfers:     Sit to Stand:  stand by assistance with no AD  Gait: Pt able to amb x 100 ft with RW and CGA.  C/o dizziness throughout; however, resolved when seated   Pt reports that she feels more stable when using RW   No LOB observed       AM-PAC 6 CLICK MOBILITY  Total Score:18       Treatment & Education:  PT educated patient and daughter in law re:   PT plan of care/role of PT  Safety with OOB mobility  Use of greg pouch  Bed mobility (logrolling) and positioning in chair  R Mastectomy/reconstruction precautions (no UE flexion >90 deg, avoiding heavy lifting, avoiding tension to abdomen)  Discharge disposition    Pt and daughter in law verbalized understanding       Patient left up in chair with all lines intact, call button in reach, daughter in law present, and SCD applied .    GOALS:   Multidisciplinary Problems       Physical Therapy Goals          Problem: Physical Therapy    Goal Priority Disciplines Outcome Interventions   Physical Therapy Goal     PT, PT/OT Progressing    Description: Goals to be met by: 12/3/2024    Patient will increase functional independence with mobility by performin. Bed mobility with supervision without use of HB features.    2. Sit<>stand with supervision with LRAD.  3. Gait x 150 feet with supervision with LRAD.  4. Ascend/descend 4 steps with least restrictive assistive device and SBA.                        History:     Past Medical History:   Diagnosis Date    Abnormal Pap smear of cervix 2016    ASCUS, - HPV    Anxiety     Breast cancer 10/2018    right - RUE extremity limb restriction    Depression     Diabetes mellitus     type 2    Fatigue     Hyperlipidemia     Hypothyroidism     Neuropathy      Palpitations     Prediabetes     Psychiatric problem     Sleep difficulties     Syncope and collapse     7-10 years ago    Thyroid disease     Vitamin D deficiency disease        Past Surgical History:   Procedure Laterality Date    AXILLARY NODE DISSECTION Right 11/21/2018    Procedure: LYMPHADENECTOMY, AXILLARY;  Surgeon: Jann Aylaa MD;  Location: [facility] OR [address] [place] [address];  Service: General;  Laterality: Right;    BREAST LUMPECTOMY Right     BREAST RECONSTRUCTION Bilateral 05/23/2023    2nd reconstructive surgery    CHOLECYSTECTOMY      HYSTERECTOMY      TLH, BSO--( breast cancer)    INJECTION FOR SENTINEL NODE IDENTIFICATION Right 11/21/2018    Procedure: INJECTION, FOR SENTINEL NODE IDENTIFICATION;  Surgeon: Jann Ayala MD;  Location: [facility] OR [address] [place] [address];  Service: General;  Laterality: Right;    INSERTION OF TUNNELED CENTRAL VENOUS CATHETER (CVC) WITH SUBCUTANEOUS PORT Left 01/02/2019    Procedure: [id number];  Surgeon: Jann Ayala MD;  Location: [facility] OR [address] [place] [address];  Service: General;  Laterality: Left;    LAPAROSCOPIC SALPINGO-OOPHORECTOMY Bilateral 11/04/2019    Procedure: SALPINGO-OOPHORECTOMY, LAPAROSCOPIC;  Surgeon: Yoni Oscar MD;  Location: [facility];  Service: OB/GYN;  Laterality: Bilateral;    LAPAROSCOPIC TOTAL HYSTERECTOMY N/A 11/04/2019    Procedure: HYSTERECTOMY, TOTAL, LAPAROSCOPIC;  Surgeon: Yoni Oscar MD;  Location: [facility];  Service: OB/GYN;  Laterality: N/A;    MASTECTOMY, PARTIAL Right 11/21/2018    Procedure: MASTECTOMY, PARTIAL - seed localized;  Surgeon: Jann Ayala MD;  Location: [facility] OR [address] [place] [address];  Service: General;  Laterality: Right;  seed placement 11/14    OOPHORECTOMY      RECONSTRUCTION OF BREAST WITH DEEP INFERIOR EPIGASTRIC ARTERY  (JOSE) FREE FLAP Right 11/18/2024    Procedure: RECONSTRUCTION, BREAST, USING JOSE FREE FLAP;  Surgeon: Nicholas Mccray MD;  Location: [facility];  Service: Plastics;  Laterality: Right;  / RIGHT BREAST  RECONSTRUCTION WITH JOSE ABDOMINAL FLAP    RECONSTRUCTION OF BREAST WITH LATISSIMUS DORSI MYOCUTANEOUS FLAP Right 01/11/2022    Procedure: RECONSTRUCTION, BREAST, WITH LATISSIMUS DORSI MYOCUTANEOUS FLAP;  Surgeon: Linus Echavarria MD;  Location: [address];  Service: Plastics;  Laterality: Right;  R lat flap vs. TDAP    REVISION OF FLAP RECONSTRUCTION OF BREAST Bilateral 05/2023    SENTINEL LYMPH NODE BIOPSY Right 11/21/2018    Procedure: BIOPSY, LYMPH NODE, SENTINEL;  Surgeon: Jann Ayala MD;  Location: [address];  Service: General;  Laterality: Right;    THYROIDECTOMY      TOTAL REDUCTION MAMMOPLASTY Left 01/11/2022    Procedure: MAMMOPLASTY, REDUCTION;  Surgeon: Linus Echavarria MD;  Location: [address];  Service: Plastics;  Laterality: Left;    TUBAL LIGATION         Time Tracking:     PT Received On: 11/19/24  PT Start Time: 1625     PT Stop Time: 1650  PT Total Time (min): 25 min     Billable Minutes: Evaluation 15 and Gait Training 10      11/19/2024

## 2024-11-19 NOTE — NURSING
6:29 PM  Admitted to the floor from Recovery. Scant drainage noted to R breast incision and mid abdominal  incision. External dopplers strong.

## 2024-11-19 NOTE — PLAN OF CARE
Problem: Occupational Therapy  Goal: Occupational Therapy Goal  Description: Goals to be met by: 11/30/2024     Patient will increase functional independence with ADLs by performing:    UE Dressing with Minimal Assistance.  LE Dressing with Minimal Assistance.  Grooming while standing at sink with Stand-by Assistance.  Toileting from toilet with Stand-by Assistance for hygiene and clothing management.   Toilet transfer to toilet with Stand-by Assistance.    Outcome: Progressing     OT evaluation complete and POC established.  Low intensity therapy (OP PT with cancer specialist once cleared by MD) is recommended upon d/c from acute care to further address deficits and help pt improve overall functional independence.     MATT Nuñez  11/19/2024

## 2024-11-19 NOTE — OR NURSING
Pt transported to room on 3s.  VSS. Denies pain at this time.  Right breast flap sort, warm to touch, and pink.  Doppler sounds to sites x2 strong.  Bedsdie report and handoff goven to Aileen ANGELO

## 2024-11-19 NOTE — PROGRESS NOTES
Plastic Surgery Progress Note    Subjective:  NAEON  AFVSS   Pain tolerable  Tolerating PO intake without N/V.   Denies f/c, CP, SOB    Objective:  Vitals:    11/19/24 0510   BP: 103/62   Pulse:    Resp: 16   Temp:        PE:   Gen: NAD, Aox3  CV: RR  Pulm: NWOB  Right breasts: flap soft warm with good cap refill. No signs of infection, hematoma, seroma, dehiscence. Strong external doppler pulses on both stitches. ISABELA drain with s/s output    Abdomen: soft, appropriately tender, nondistended with low transverse incision c/d/I. Umbilicus viable. ISABELA drain with s/s output    Assessment:  44 y.o. female with breast cancer s/p right lumpectomy with radiation and right TDAP having fat necrosis and retraction of lower pole most recently s/p right breast JOSE flap 11/18/24    Plan:  - payton out  - q4 flap checks  - PT/OT  - drain care  - Incentive spirometer  - pain control: Jenniffer Tylenol, flexeril, and Toradol, PRN oxy   - Bowel regimen: Colace   - Wound care: change xeroform to surgical incisions daily  - Diet: Advance to diabetic diet   - Abx: Ancef  - DVT ppx: Lovenox, SCDs, ambulation   - Activity/Restrictions: OK to ambulate  - Dispo: Likely discharge tomorrow vs POD3. Do not anticipate any post hospitalization needs      Rommel Dale MD  [facility], PGY-V

## 2024-11-19 NOTE — PLAN OF CARE
Problem: Physical Therapy  Goal: Physical Therapy Goal  Description: Goals to be met by: 12/3/2024    Patient will increase functional independence with mobility by performin. Bed mobility with supervision without use of HB features.    2. Sit<>stand with supervision with LRAD.  3. Gait x 150 feet with supervision with LRAD.  4. Ascend/descend 4 steps with least restrictive assistive device and SBA.   Outcome: Progressing

## 2024-11-19 NOTE — PLAN OF CARE
Problem: Adult Inpatient Plan of Care  Goal: Plan of Care Review  Outcome: Progressing  Goal: Patient-Specific Goal (Individualized)  Outcome: Progressing  Goal: Absence of Hospital-Acquired Illness or Injury  Outcome: Progressing  Goal: Optimal Comfort and Wellbeing  Outcome: Progressing  Goal: Readiness for Transition of Care  Outcome: Progressing     Problem: Pneumonia  Goal: Fluid Balance  Outcome: Progressing  Goal: Resolution of Infection Signs and Symptoms  Outcome: Progressing  Goal: Effective Oxygenation and Ventilation  Outcome: Progressing     Problem: Infection  Goal: Absence of Infection Signs and Symptoms  Outcome: Progressing     Problem: Wound  Goal: Optimal Coping  Outcome: Progressing  Goal: Optimal Functional Ability  Outcome: Progressing  Goal: Absence of Infection Signs and Symptoms  Outcome: Progressing  Goal: Improved Oral Intake  Outcome: Progressing  Goal: Optimal Pain Control and Function  Outcome: Progressing  Goal: Skin Health and Integrity  Outcome: Progressing  Goal: Optimal Wound Healing  Outcome: Progressing     Problem: Fall Injury Risk  Goal: Absence of Fall and Fall-Related Injury  Outcome: Progressing    POC reviewed with patient. Patient verbalized understanding. AAOX4. VSS. Pain managed per prn and scheduled medication orders. LR IV fluids infusing at 75 mL/hr. Call bell left within reach. Bed lowered and wheels locked. Patient free of falls and injury

## 2024-11-20 LAB
FINAL PATHOLOGIC DIAGNOSIS: NORMAL
GROSS: NORMAL
Lab: NORMAL
POCT GLUCOSE: 107 MG/DL (ref 70–110)
POCT GLUCOSE: 112 MG/DL (ref 70–110)
POCT GLUCOSE: 141 MG/DL (ref 70–110)

## 2024-11-20 PROCEDURE — 25000003 PHARM REV CODE 250: Performed by: STUDENT IN AN ORGANIZED HEALTH CARE EDUCATION/TRAINING PROGRAM

## 2024-11-20 PROCEDURE — 97530 THERAPEUTIC ACTIVITIES: CPT | Mod: CQ

## 2024-11-20 PROCEDURE — 99900035 HC TECH TIME PER 15 MIN (STAT)

## 2024-11-20 PROCEDURE — 94761 N-INVAS EAR/PLS OXIMETRY MLT: CPT

## 2024-11-20 PROCEDURE — 97116 GAIT TRAINING THERAPY: CPT | Mod: CQ

## 2024-11-20 PROCEDURE — 63600175 PHARM REV CODE 636 W HCPCS: Performed by: STUDENT IN AN ORGANIZED HEALTH CARE EDUCATION/TRAINING PROGRAM

## 2024-11-20 PROCEDURE — 11000001 HC ACUTE MED/SURG PRIVATE ROOM

## 2024-11-20 RX ADMIN — CEFAZOLIN 1 G: 330 INJECTION, POWDER, FOR SOLUTION INTRAMUSCULAR; INTRAVENOUS at 02:11

## 2024-11-20 RX ADMIN — OXYCODONE HYDROCHLORIDE 5 MG: 5 TABLET ORAL at 02:11

## 2024-11-20 RX ADMIN — ACETAMINOPHEN 1000 MG: 500 TABLET, FILM COATED ORAL at 11:11

## 2024-11-20 RX ADMIN — KETOROLAC TROMETHAMINE 15 MG: 30 INJECTION, SOLUTION INTRAMUSCULAR; INTRAVENOUS at 05:11

## 2024-11-20 RX ADMIN — MUPIROCIN: 20 OINTMENT TOPICAL at 09:11

## 2024-11-20 RX ADMIN — CYCLOBENZAPRINE HYDROCHLORIDE 10 MG: 5 TABLET, FILM COATED ORAL at 09:11

## 2024-11-20 RX ADMIN — OXYCODONE HYDROCHLORIDE 10 MG: 10 TABLET ORAL at 10:11

## 2024-11-20 RX ADMIN — KETOROLAC TROMETHAMINE 45 MG: 30 INJECTION, SOLUTION INTRAMUSCULAR; INTRAVENOUS at 11:11

## 2024-11-20 RX ADMIN — ACETAMINOPHEN 1000 MG: 500 TABLET, FILM COATED ORAL at 12:11

## 2024-11-20 RX ADMIN — MUPIROCIN: 20 OINTMENT TOPICAL at 08:11

## 2024-11-20 RX ADMIN — CYCLOBENZAPRINE HYDROCHLORIDE 10 MG: 5 TABLET, FILM COATED ORAL at 02:11

## 2024-11-20 RX ADMIN — ATORVASTATIN CALCIUM 80 MG: 20 TABLET, FILM COATED ORAL at 08:11

## 2024-11-20 RX ADMIN — DOCUSATE SODIUM 100 MG: 100 CAPSULE, LIQUID FILLED ORAL at 08:11

## 2024-11-20 RX ADMIN — KETOROLAC TROMETHAMINE 15 MG: 30 INJECTION, SOLUTION INTRAMUSCULAR; INTRAVENOUS at 12:11

## 2024-11-20 RX ADMIN — ANASTROZOLE 1 MG: 1 TABLET, COATED ORAL at 08:11

## 2024-11-20 RX ADMIN — CEFAZOLIN 1 G: 330 INJECTION, POWDER, FOR SOLUTION INTRAMUSCULAR; INTRAVENOUS at 06:11

## 2024-11-20 RX ADMIN — KETOROLAC TROMETHAMINE 15 MG: 30 INJECTION, SOLUTION INTRAMUSCULAR; INTRAVENOUS at 06:11

## 2024-11-20 RX ADMIN — CEFAZOLIN 1 G: 330 INJECTION, POWDER, FOR SOLUTION INTRAMUSCULAR; INTRAVENOUS at 09:11

## 2024-11-20 RX ADMIN — OXYCODONE HYDROCHLORIDE 5 MG: 5 TABLET ORAL at 06:11

## 2024-11-20 RX ADMIN — KETOROLAC TROMETHAMINE 15 MG: 30 INJECTION, SOLUTION INTRAMUSCULAR; INTRAVENOUS at 11:11

## 2024-11-20 RX ADMIN — DOCUSATE SODIUM 100 MG: 100 CAPSULE, LIQUID FILLED ORAL at 09:11

## 2024-11-20 RX ADMIN — ACETAMINOPHEN 1000 MG: 500 TABLET, FILM COATED ORAL at 06:11

## 2024-11-20 RX ADMIN — OXYCODONE HYDROCHLORIDE 10 MG: 10 TABLET ORAL at 07:11

## 2024-11-20 RX ADMIN — CALCIUM CARBONATE (ANTACID) CHEW TAB 500 MG 500 MG: 500 CHEW TAB at 08:11

## 2024-11-20 RX ADMIN — THERA TABS 1 TABLET: TAB at 08:11

## 2024-11-20 RX ADMIN — CYCLOBENZAPRINE HYDROCHLORIDE 10 MG: 5 TABLET, FILM COATED ORAL at 08:11

## 2024-11-20 RX ADMIN — Medication 5000 UNITS: at 08:11

## 2024-11-20 RX ADMIN — LEVOTHYROXINE SODIUM 75 MCG: 75 TABLET ORAL at 06:11

## 2024-11-20 NOTE — PROGRESS NOTES
Plastic Surgery Progress Note    Subjective:  No issues overnight, dizziness with therapy resolved with use of rolling walker    Objective:  Vitals:    11/20/24 0604   BP:    Pulse:    Resp: 16   Temp:        PE:   Gen: NAD, Aox3  CV: RR  Pulm: NWOB  Right breasts: flap soft warm with good cap refill. No signs of infection, hematoma, seroma, dehiscence. Strong external doppler pulses on both stitches. ISABELA drain with s/s output    Abdomen: soft, appropriately tender, nondistended with low transverse incision c/d/I. Umbilicus viable. ISABELA drain with s/s output    Assessment:  44 y.o. female with breast cancer s/p right lumpectomy with radiation and right TDAP having fat necrosis and retraction of lower pole most recently s/p right breast JOSE flap 11/18/24    Plan:  - q4 flap checks  - PT/OT  - drain care  - Incentive spirometer  - pain control: Jenniffer Tylenol, flexeril, and Toradol, PRN oxy   - Bowel regimen: Colace   - Wound care: change xeroform to surgical incisions daily  - Diet: diabetic diet   - Abx: Ancef  - DVT ppx: Lovenox, SCDs, ambulation   - Activity/Restrictions: OK to ambulate  - Dispo: Likely discharge today vs POD3. Do not anticipate any post hospitalization needs. Will evaluate after therapy today    Rommel Dale MD  [facility], PGY-V

## 2024-11-20 NOTE — CHAPLAIN
11/20/24 1546   Clinical Encounter Type   Visit Type Initial Visit   Visit Category General Rounding   Visited With Patient   Length of Visit 15 Minutes   Lutheran Encounters   Spiritual Resources Requested Prayer   Patient Spiritual Encounters   Care Provided Reflective listening;Compassionate presence;Prayer support   Patient Coping Accepting   Comments - Patient this patient presented in a quiet mood and was neither forthcoming nor talkative but she did request prayer for her health which I provided and for which she expressed gratitude

## 2024-11-20 NOTE — PLAN OF CARE
Problem: Adult Inpatient Plan of Care  Goal: Plan of Care Review  Outcome: Progressing  Goal: Patient-Specific Goal (Individualized)  Outcome: Progressing  Goal: Absence of Hospital-Acquired Illness or Injury  Outcome: Progressing  Goal: Optimal Comfort and Wellbeing  Outcome: Progressing  Goal: Readiness for Transition of Care  Outcome: Progressing     Problem: Pneumonia  Goal: Fluid Balance  Outcome: Progressing  Goal: Resolution of Infection Signs and Symptoms  Outcome: Progressing  Goal: Effective Oxygenation and Ventilation  Outcome: Progressing     Problem: Infection  Goal: Absence of Infection Signs and Symptoms  Outcome: Progressing     Problem: Wound  Goal: Optimal Coping  Outcome: Progressing  Goal: Optimal Functional Ability  Outcome: Progressing  Goal: Absence of Infection Signs and Symptoms  Outcome: Progressing  Goal: Improved Oral Intake  Outcome: Progressing  Goal: Optimal Pain Control and Function  Outcome: Progressing  Goal: Skin Health and Integrity  Outcome: Progressing  Goal: Optimal Wound Healing  Outcome: Progressing     Problem: Fall Injury Risk  Goal: Absence of Fall and Fall-Related Injury  Outcome: Progressing       POC reviewed with patient. Patient verbalized understanding. AAOX4. VSS. Pain managed per prn and scheduled medication orders.  Call bell left within reach. Bed lowered and wheels locked. Patient free of falls and injury

## 2024-11-20 NOTE — PT/OT/SLP PROGRESS
Occupational Therapy      Patient Name:  Elham Rodas   MRN:  [id number]    Attempted to see pt at 11:31.  Upon arrival to room pt resting comfortably in chair in reclined position.  Pt reported feeling dizzy again this date and requested to hold off until later 2* she had just gotten comfortable after having been experiencing pain.  OT reviewed post surgical precautions and log rolling technique with pt; pt verbalized understanding.  Will follow-up at next scheduled session.    MATT Nuñez  11/20/2024

## 2024-11-20 NOTE — PLAN OF CARE
Sw met with patient at bedside to discuss PT recommendation. Patient would prefer OP PT. Sw informed provider.

## 2024-11-20 NOTE — PLAN OF CARE
Problem: Adult Inpatient Plan of Care  Goal: Plan of Care Review  Outcome: Progressing  Flowsheets (Taken 11/20/2024 1055)  Plan of Care Reviewed With: patient  Goal: Optimal Comfort and Wellbeing  Outcome: Progressing  Intervention: Monitor Pain and Promote Comfort  Flowsheets (Taken 11/20/2024 1055)  Pain Management Interventions:   around-the-clock dosing utilized   care clustered   pain management plan reviewed with patient/caregiver   relaxation techniques promoted  Intervention: Provide Person-Centered Care  Flowsheets (Taken 11/20/2024 1055)  Trust Relationship/Rapport:   care explained   choices provided   questions answered   questions encouraged   thoughts/feelings acknowledged     Problem: Infection  Goal: Absence of Infection Signs and Symptoms  Outcome: Progressing  Intervention: Prevent or Manage Infection  Flowsheets (Taken 11/20/2024 1055)  Fever Reduction/Comfort Measures: fluid intake increased  Infection Management: aseptic technique maintained     Problem: Wound  Goal: Optimal Pain Control and Function  Outcome: Progressing  Goal: Skin Health and Integrity  Outcome: Progressing  Intervention: Optimize Skin Protection  Flowsheets (Taken 11/20/2024 1055)  Pressure Reduction Techniques: frequent weight shift encouraged  Skin Protection: incontinence pads utilized  Activity Management:   Arm raise - L1   Up in chair - L3     Problem: Fall Injury Risk  Goal: Absence of Fall and Fall-Related Injury  Outcome: Progressing

## 2024-11-20 NOTE — PT/OT/SLP PROGRESS
"Physical Therapy Treatment    Patient Name:  Elham Rodas   MRN:  [id number]    Recommendations:     Discharge Recommendations: Low Intensity Therapy  Discharge Equipment Recommendations: other (see comments) (TBD pending progress; possibly RW)  Barriers to discharge: Inaccessible home and Decreased caregiver support    Assessment:     Elham Rodas is a 44 y.o. female admitted with a medical diagnosis of <principal problem not specified>.  She presents with the following impairments/functional limitations: weakness, gait instability, pain, impaired balance, impaired endurance, impaired functional mobility, impaired self care skills, decreased ROM.    Supine>sit with SBA, HOB elevated and pt pivoting on buttocks, use of BUE for support  Sit>stand with RW and SBA  Amb 135' with RW and SBA, decreased gait speed, pt feeling "foggy"  /64 sitting in chair pre-gait; 113/69 standing  Pt with some lightheadedness and pain, but agreeable to therapy and [facility] Low Intensity Therapy    Rehab Prognosis: Good; patient would benefit from acute skilled PT services to address these deficits and reach maximum level of function.    Recent Surgery: Procedure(s) (LRB):  RECONSTRUCTION, BREAST, USING JOSE FREE FLAP (Right) 2 Days Post-Op    Plan:     During this hospitalization, patient to be seen 4 x/week to address the identified rehab impairments via gait training, therapeutic activities, therapeutic exercises, neuromuscular re-education and progress toward the following goals:    Plan of Care Expires:  12/03/24    Subjective     Chief Complaint: pain, feeling foggy  Patient/Family Comments/goals: I'll probably sleep in my recliner at home, since my bed is high and the frame sticks out quite a bit past the mattress  Pain/Comfort:  Pain Rating 1: 8/10  Location - Side 1: Bilateral  Location - Orientation 1: anterior  Location 1: abdomen  Pain Addressed 1: Reposition, Distraction, Cessation of Activity, Nurse notified  Pain " "Rating Post-Intervention 1: 8/10      Objective:     Communicated with nurse Gallagher prior to session.  Patient found HOB elevated with ISABELA drain, peripheral IV, SCD (SCD's donned but not operating; abdominal binder, surgical bra) upon PT entry to room.     General Precautions: Standard, fall (R breast reconstruction and JOSE flap precautions)  Orthopedic Precautions: N/A  Braces: N/A  Respiratory Status: Room air     Functional Mobility:  Bed Mobility:     Supine to Sit: stand by assistance  Transfers:     Sit to Stand:  stand by assistance with rolling walker  Gait: 135' with RW and SBA, decreased gait speed, pt feeling "foggy"      AM-PAC 6 CLICK MOBILITY  Turning over in bed (including adjusting bedclothes, sheets and blankets)?: 3  Sitting down on and standing up from a chair with arms (e.g., wheelchair, bedside commode, etc.): 3  Moving from lying on back to sitting on the side of the bed?: 3  Moving to and from a bed to a chair (including a wheelchair)?: 3  Need to walk in hospital room?: 3  Climbing 3-5 steps with a railing?: 3  Basic Mobility Total Score: 18       Treatment & Education:  BP as noted  Gait training as noted    Patient left up in chair with all lines intact, call button in reach, and nurse Aileen notified..    GOALS:   Multidisciplinary Problems       Physical Therapy Goals          Problem: Physical Therapy    Goal Priority Disciplines Outcome Interventions   Physical Therapy Goal     PT, PT/OT Progressing    Description: Goals to be met by: 12/3/2024    Patient will increase functional independence with mobility by performin. Bed mobility with supervision without use of HB features.    2. Sit<>stand with supervision with LRAD.  3. Gait x 150 feet with supervision with LRAD.  4. Ascend/descend 4 steps with least restrictive assistive device and SBA.                        Time Tracking:     PT Received On: 24  PT Start Time: 854     PT Stop Time: 922  PT Total Time (min): 28 " min     Billable Minutes: Gait Training 17 and Therapeutic Activity 11    Treatment Type: Treatment  PT/PTA: PTA     Number of PTA visits since last PT visit: 1 11/20/2024

## 2024-11-21 VITALS
RESPIRATION RATE: 22 BRPM | WEIGHT: 162.25 LBS | OXYGEN SATURATION: 95 % | DIASTOLIC BLOOD PRESSURE: 66 MMHG | HEART RATE: 75 BPM | TEMPERATURE: 98 F | HEIGHT: 64 IN | SYSTOLIC BLOOD PRESSURE: 115 MMHG | BODY MASS INDEX: 27.7 KG/M2

## 2024-11-21 LAB
POCT GLUCOSE: 112 MG/DL (ref 70–110)
POCT GLUCOSE: 158 MG/DL (ref 70–110)
POCT GLUCOSE: 88 MG/DL (ref 70–110)
POCT GLUCOSE: 94 MG/DL (ref 70–110)

## 2024-11-21 PROCEDURE — 97116 GAIT TRAINING THERAPY: CPT | Mod: CQ

## 2024-11-21 PROCEDURE — 63600175 PHARM REV CODE 636 W HCPCS: Performed by: STUDENT IN AN ORGANIZED HEALTH CARE EDUCATION/TRAINING PROGRAM

## 2024-11-21 PROCEDURE — 25000003 PHARM REV CODE 250: Performed by: STUDENT IN AN ORGANIZED HEALTH CARE EDUCATION/TRAINING PROGRAM

## 2024-11-21 PROCEDURE — 99900035 HC TECH TIME PER 15 MIN (STAT)

## 2024-11-21 RX ORDER — ONDANSETRON 4 MG/1
4 TABLET, ORALLY DISINTEGRATING ORAL 2 TIMES DAILY
Qty: 20 TABLET | Refills: 0 | Status: SHIPPED | OUTPATIENT
Start: 2024-11-21

## 2024-11-21 RX ORDER — OXYCODONE AND ACETAMINOPHEN 10; 325 MG/1; MG/1
1 TABLET ORAL EVERY 4 HOURS PRN
Qty: 25 TABLET | Refills: 0 | Status: SHIPPED | OUTPATIENT
Start: 2024-11-21

## 2024-11-21 RX ORDER — CYCLOBENZAPRINE HCL 10 MG
10 TABLET ORAL 3 TIMES DAILY PRN
Qty: 30 TABLET | Refills: 0 | Status: SHIPPED | OUTPATIENT
Start: 2024-11-21 | End: 2024-12-01

## 2024-11-21 RX ORDER — CEPHALEXIN 500 MG/1
500 CAPSULE ORAL EVERY 8 HOURS
Qty: 21 CAPSULE | Refills: 0 | Status: SHIPPED | OUTPATIENT
Start: 2024-11-21 | End: 2024-11-28

## 2024-11-21 RX ADMIN — CALCIUM CARBONATE (ANTACID) CHEW TAB 500 MG 500 MG: 500 CHEW TAB at 08:11

## 2024-11-21 RX ADMIN — CEFAZOLIN 1 G: 330 INJECTION, POWDER, FOR SOLUTION INTRAMUSCULAR; INTRAVENOUS at 05:11

## 2024-11-21 RX ADMIN — OXYCODONE HYDROCHLORIDE 10 MG: 10 TABLET ORAL at 01:11

## 2024-11-21 RX ADMIN — ACETAMINOPHEN 1000 MG: 500 TABLET, FILM COATED ORAL at 11:11

## 2024-11-21 RX ADMIN — KETOROLAC TROMETHAMINE 15 MG: 30 INJECTION, SOLUTION INTRAMUSCULAR; INTRAVENOUS at 05:11

## 2024-11-21 RX ADMIN — ACETAMINOPHEN 1000 MG: 500 TABLET, FILM COATED ORAL at 05:11

## 2024-11-21 RX ADMIN — ACETAMINOPHEN 1000 MG: 500 TABLET, FILM COATED ORAL at 06:11

## 2024-11-21 RX ADMIN — CYCLOBENZAPRINE HYDROCHLORIDE 10 MG: 5 TABLET, FILM COATED ORAL at 08:11

## 2024-11-21 RX ADMIN — ANASTROZOLE 1 MG: 1 TABLET, COATED ORAL at 08:11

## 2024-11-21 RX ADMIN — DOCUSATE SODIUM 100 MG: 100 CAPSULE, LIQUID FILLED ORAL at 08:11

## 2024-11-21 RX ADMIN — MUPIROCIN: 20 OINTMENT TOPICAL at 08:11

## 2024-11-21 RX ADMIN — Medication 5000 UNITS: at 08:11

## 2024-11-21 RX ADMIN — CYCLOBENZAPRINE HYDROCHLORIDE 10 MG: 5 TABLET, FILM COATED ORAL at 02:11

## 2024-11-21 RX ADMIN — OXYCODONE HYDROCHLORIDE 10 MG: 10 TABLET ORAL at 09:11

## 2024-11-21 RX ADMIN — THERA TABS 1 TABLET: TAB at 08:11

## 2024-11-21 RX ADMIN — OXYCODONE HYDROCHLORIDE 5 MG: 5 TABLET ORAL at 06:11

## 2024-11-21 RX ADMIN — LEVOTHYROXINE SODIUM 75 MCG: 75 TABLET ORAL at 05:11

## 2024-11-21 RX ADMIN — ATORVASTATIN CALCIUM 80 MG: 20 TABLET, FILM COATED ORAL at 08:11

## 2024-11-21 NOTE — DISCHARGE SUMMARY
"[facility] ([address])  General Surgery  Discharge Summary      Patient Name: Elham Rodas  MRN: [id number]  Admission Date: 11/18/2024  Hospital Length of Stay: 3 days  Discharge Date and Time: 11/21/24  Attending Physician: Nicholas Mccray MD   Discharging Provider: Rommel Dale MD  Primary Care Provider: Cassia Ramsey DNP     HPI: 44 y.o. female with breast cancer s/p right lumpectomy with radiation and right TDAP having fat necrosis and retraction of lower pole most recently s/p right breast JOSE flap 11/18/24     Procedure(s) (LRB):  RECONSTRUCTION, BREAST, USING JOSE FREE FLAP (Right)     Hospital Course: Pt to OR for JOSE flap for right breast reconstruction. Pt tolerated procedure well. She was admitted for flap monitoring. Was given a CLD immediately post op. Mondragon removed POD1 and was allowed to get out of bed. She did well overnight and advanced to regular diet. Flap checks went well, she was HDS, pain was controlled, and she was ambulating POD1 and POD2. POD3 she felt ready to go home and she was deemed appropriate for discharge.     Consults: PT/OT    Significant Diagnostic Studies: N/A    Pending Diagnostic Studies:       None          Final Active Diagnoses:    Diagnosis Date Noted POA    PRINCIPAL PROBLEM:  Malignant neoplasm of right female breast [C50.911] 11/05/2018 Yes      Problems Resolved During this Admission:      Discharged Condition: good    Disposition: Home or Self Care    Follow Up:   Follow-up Information       Nicholas Mccray MD Follow up in 1 week(s).    Specialty: Plastic Surgery  Contact information:  [address] [place] [id number] [phone]                           Patient Instructions:      WALKER FOR HOME USE     Order Specific Question Answer Comments   Type of Walker: Adult (5'4"-6'6")    With wheels? Yes    Height: 5' 4" (1.626 m)    Weight: 73.6 kg (162 lb 4.1 oz)    Length of need (1-99 months): 3 months   Does patient have " medical equipment at home? none    Please check all that apply: Patient's condition impairs ambulation.    Please check all that apply: Patient is unable to safely ambulate without equipment.      Diet diabetic     Notify your health care provider if you experience any of the following:  temperature >100.4     Notify your health care provider if you experience any of the following:  persistent nausea and vomiting or diarrhea     Notify your health care provider if you experience any of the following:  severe uncontrolled pain     Notify your health care provider if you experience any of the following:  redness, tenderness, or signs of infection (pain, swelling, redness, odor or green/yellow discharge around incision site)     Leave dressing on - Keep it clean, dry, and intact until clinic visit   Order Comments: Surgical bra and abdominal binder padded with ABDs     Weight bearing restrictions (specify):   Order Comments: No heavy lifting pushing or pulling     Medications:  Reconciled Home Medications:      Medication List        START taking these medications      cephALEXin 500 MG capsule  Commonly known as: KEFLEX  Take 1 capsule (500 mg total) by mouth every 8 (eight) hours. for 7 days     cyclobenzaprine 10 MG tablet  Commonly known as: FLEXERIL  Take 1 tablet (10 mg total) by mouth 3 (three) times daily as needed.     ondansetron 4 MG Tbdl  Commonly known as: ZOFRAN-ODT  Take 1 tablet (4 mg total) by mouth 2 (two) times daily.     oxyCODONE-acetaminophen  mg per tablet  Commonly known as: PERCOCET  Take 1 tablet by mouth every 4 (four) hours as needed.            CONTINUE taking these medications      anastrozole 1 mg Tab  Commonly known as: ARIMIDEX  TAKE 1 TABLET BY MOUTH EVERY DAY     calcium carbonate 200 mg calcium (500 mg) chewable tablet  Commonly known as: TUMS  Take 1 tablet by mouth once daily.     cholecalciferol (vitamin D3) 125 mcg (5,000 unit) Tab  Vitamin D3 125 mcg (5,000 unit) tablet   Take 1 tablet every day by oral route.     INTRAROSA 6.5 mg Inst  Generic drug: prasterone (dhea)  Place 6.5 mg vaginally nightly.     * lancets 33 gauge Misc  TRUEplus Lancets 33 gauge     * lancets Misc  Commonly known as: ULTRA THIN LANCETS  Inject 1 each into the skin Daily.     levothyroxine 75 MCG tablet  Commonly known as: SYNTHROID  Take 1 tablet (75 mcg total) by mouth before breakfast. Take on an empty stomach. Wait 4 hours after taking LT4 to take any multivitamins or nutritional supplements and wait 1 hour to take other medications.     metFORMIN 500 MG ER 24hr tablet  Commonly known as: GLUCOPHAGE-XR  Take 2 tablets (1,000 mg total) by mouth 2 (two) times a day.     multivitamin with minerals tablet  Take 1 tablet by mouth once daily.     omega-3 fatty acids 1,000 mg Cap  Take 2 capsules (2,000 mg total) by mouth 2 (two) times daily. Take as directed for triglycerides and to decrease risk of heart disease and stroke.     OZEMPIC 1 mg/dose (4 mg/3 mL)  Generic drug: semaglutide  Inject 1 mg into the skin every 7 days. Stay well hydrated while taking this medication, focus on protein and fiber intake and take an OTC MVI.   Advise the patient to contact office in 3 weeks to determine if the dose of medication can be titrated up.     rosuvastatin 20 MG tablet  Commonly known as: CRESTOR  Take 1 tablet (20 mg total) by mouth once daily.     TRUE METRIX GLUCOSE METER Misc  Generic drug: blood-glucose meter  USE TO TEST BLOOD SUGAR AS DIRECTED     TRUE METRIX GLUCOSE TEST STRIP Strp  Generic drug: blood sugar diagnostic  TEST BLOOD SUGAR ONCE DAILY. Dx Code: R73.02     VITAMIN B-12 1000 MCG tablet  Generic drug: cyanocobalamin  Take 100 mcg by mouth once daily.           * This list has 2 medication(s) that are the same as other medications prescribed for you. Read the directions carefully, and ask your doctor or other care provider to review them with you.                  Rommel Dale MD  General  Surgery  [facility] ([address])

## 2024-11-21 NOTE — PROGRESS NOTES
Plastic Surgery Progress Note    Subjective:  No issues overnight, plan to work with therapy today    Objective:  Vitals:    11/21/24 0540   BP: (!) 127/59   Pulse: 97   Resp: 18   Temp: 99.3 °F (37.4 °C)       PE:   Gen: NAD, Aox3  CV: RR  Pulm: NWOB  Right breasts: flap soft warm with good cap refill. No signs of infection, hematoma, seroma, dehiscence. Strong external doppler pulses on both stitches. ISABELA drain with s/s output    Abdomen: soft, appropriately tender, nondistended with low transverse incision c/d/I. Umbilicus viable. ISABELA drain with s/s output    Assessment:  44 y.o. female with breast cancer s/p right lumpectomy with radiation and right TDAP having fat necrosis and retraction of lower pole most recently s/p right breast JOSE flap 11/18/24    Plan:  - q4 flap checks  - PT/OT  - drain care  - Incentive spirometer  - pain control: Jenniffer Tylenol, flexeril, and Toradol, PRN oxy   - Bowel regimen: Colace   - Wound care: change xeroform to surgical incisions daily  - Diet: diabetic diet   - Abx: Ancef  - DVT ppx: Lovenox, SCDs, ambulation   - Activity/Restrictions: OK to ambulate  - Dispo: Likely discharge today after therapy. Will order genny Dale MD  [facility], PGY-V

## 2024-11-21 NOTE — PLAN OF CARE
Problem: Adult Inpatient Plan of Care  Goal: Plan of Care Review  Outcome: Progressing  Goal: Optimal Comfort and Wellbeing  Outcome: Progressing  Intervention: Monitor Pain and Promote Comfort  Flowsheets (Taken 11/21/2024 1028)  Pain Management Interventions:   around-the-clock dosing utilized   care clustered   pain management plan reviewed with patient/caregiver   pillow support provided   position adjusted   quiet environment facilitated   relaxation techniques promoted  Intervention: Provide Person-Centered Care  Flowsheets (Taken 11/21/2024 1028)  Trust Relationship/Rapport:   care explained   choices provided   questions answered   questions encouraged   thoughts/feelings acknowledged     Problem: Infection  Goal: Absence of Infection Signs and Symptoms  Outcome: Progressing  Intervention: Prevent or Manage Infection  Flowsheets (Taken 11/21/2024 1028)  Fever Reduction/Comfort Measures: fluid intake increased  Infection Management: aseptic technique maintained     Problem: Wound  Goal: Optimal Pain Control and Function  Outcome: Progressing  Goal: Skin Health and Integrity  Outcome: Progressing     Problem: Fall Injury Risk  Goal: Absence of Fall and Fall-Related Injury  Outcome: Progressing

## 2024-11-21 NOTE — PLAN OF CARE
Case Management Final Discharge Note      Discharge Disposition: home with Family     New DME ordered / company name: Patient refused RW.     Relevant SDOH / Transition of Care Barriers:  None     Primary Caretaker and contact information: Self     Scheduled followup appointment: 1 week follow up with Dr. Mccray     Referrals placed: PT/OT in [facility]     Transportation: Family member will be here after 5 to pick her up     Patient educated on discharge services and updated on DC plan. Bedside RN notified, patient clear to discharge from Case Management Perspective.     [facility] ([address])  Discharge Final Note    Primary Care Provider: Cassia Ramsey DNP    Expected Discharge Date: 11/21/2024    Final Discharge Note (most recent)       Final Note - 11/21/24 1243          Final Note    Assessment Type Final Discharge Note (P)      Anticipated Discharge Disposition Home or Self Care (P)      Hospital Resources/Appts/Education Provided Provided patient/caregiver with written discharge plan information;Appointments scheduled and added to AVS (P)         Post-Acute Status    Post-Acute Authorization Other (P)      Other Status No Post-Acute Service Needs (P)      Discharge Delays None known at this time (P)                      Contact Info       Nicholas Mccray MD   Specialty: Plastic Surgery    [address] [facility] [address]   Phone: [phone]       Next Steps: Follow up in 1 week(s)

## 2024-11-21 NOTE — NURSING
Patient discharged home. IV (x1) removed. Patient tolerated well. All patient belongings packed in bags to be taken home with patient. Prescriptions delivered to bedside. Discharge teaching and education provided to patient. Patient verbalized understanding. Patient awaiting daughter's arrival for transport home. Patient denies chest pain or SOB at this time. Patient instructed to call RN/Nursing station when daughtert arrives for wheelchair to be requested to escort patient off unit to car for DC. Transport ETA 1730.

## 2024-11-21 NOTE — PT/OT/SLP PROGRESS
Physical Therapy Treatment    Patient Name:  Elham Rodas   MRN:  [id number]    Recommendations:     Discharge Recommendations: Low Intensity Therapy  Discharge Equipment Recommendations: walker, rolling  Barriers to discharge: Inaccessible home and Decreased caregiver support    Assessment:     Elham Rodas is a 44 y.o. female admitted with a medical diagnosis of <principal problem not specified>.  She presents with the following impairments/functional limitations: weakness, gait instability, pain, impaired balance, impaired endurance, impaired functional mobility, impaired self care skills, decreased ROM.    Supine>sit with SBA, HOB elevated, pt pivoting on buttocks with BUE for support  Sit>stand with RW and SBA, from EOB, chair  Bed>Chair with RW and SBA, step t/f  Amb 2 x 45' with RW and SBA, slight forward flexion  Ascended/descended 4 steps with B HR and CGA  Pt with good mobility, limited by pain, and still reports some dizziness  Rec Low Intensity Therapy    The mobility limitation cannot be sufficiently resolved by the use of a cane.   Patient's functional mobility deficit can be sufficiently resolved with the use of a rolling walker. Patient's mobility limitation significantly impairs their ability to participate in one of more activities of daily living. The use of a rolling walker will significantly improve the patient's ability to participate in MRADLS and the patient will use it on regular basis in the home.       Rehab Prognosis: Good; patient would benefit from acute skilled PT services to address these deficits and reach maximum level of function.    Recent Surgery: Procedure(s) (LRB):  RECONSTRUCTION, BREAST, USING JOSE FREE FLAP (Right) 3 Days Post-Op    Plan:     During this hospitalization, patient to be seen 4 x/week to address the identified rehab impairments via gait training, therapeutic activities, therapeutic exercises, neuromuscular re-education and progress toward the following  goals:    Plan of Care Expires:  12/03/24    Subjective     Chief Complaint: pain  Patient/Family Comments/goals: pt agreeable to therapy  Pain/Comfort:  Pain Rating 1: 7/10  Location - Side 1: Bilateral  Location - Orientation 1: anterior  Location 1: abdomen  Pain Addressed 1: Reposition, Distraction, Cessation of Activity, Nurse notified  Pain Rating Post-Intervention 1: 7/10      Objective:     Communicated with nurse Gallagher prior to session.  Patient found HOB elevated with ISABELA drain, peripheral IV, SCD (abdominal binder, surgical bra) upon PT entry to room.     General Precautions: Standard, fall (R breast reconstruction and JOSE flap precautions)  Orthopedic Precautions: N/A  Braces: N/A  Respiratory Status: Room air     Functional Mobility:  Bed Mobility:     Supine to Sit: stand by assistance  Transfers:     Sit to Stand:  stand by assistance with rolling walker  Bed to Chair: stand by assistance with  rolling walker  using  Step Transfer  Gait: 2 x 45' with RW and SBA, slight forward flexion  Stairs:  Pt ascended/descended 4 stair(s) with No Assistive Device with bilateral handrails with Contact Guard Assistance.       AM-PAC 6 CLICK MOBILITY  Turning over in bed (including adjusting bedclothes, sheets and blankets)?: 3  Sitting down on and standing up from a chair with arms (e.g., wheelchair, bedside commode, etc.): 3  Moving from lying on back to sitting on the side of the bed?: 3  Moving to and from a bed to a chair (including a wheelchair)?: 3  Need to walk in hospital room?: 3  Climbing 3-5 steps with a railing?: 3  Basic Mobility Total Score: 18       Treatment & Education:  Gait and stair training as noted    Patient left up in chair with all lines intact, call button in reach, and nurse Gallagher notified..    GOALS:   Multidisciplinary Problems       Physical Therapy Goals          Problem: Physical Therapy    Goal Priority Disciplines Outcome Interventions   Physical Therapy Goal     PT, PT/OT  Progressing    Description: Goals to be met by: 12/3/2024    Patient will increase functional independence with mobility by performin. Bed mobility with supervision without use of HB features.    2. Sit<>stand with supervision with LRAD.  3. Gait x 150 feet with supervision with LRAD.  4. Ascend/descend 4 steps with least restrictive assistive device and SBA.                        Time Tracking:     PT Received On: 24  PT Start Time: 922     PT Stop Time: 940  PT Total Time (min): 18 min     Billable Minutes: Gait Training 18    Treatment Type: Treatment  PT/PTA: PTA     Number of PTA visits since last PT visit: 2     2024

## 2024-11-24 ENCOUNTER — HOSPITAL ENCOUNTER (EMERGENCY)
Facility: OTHER | Age: 44
Discharge: HOME | End: 2024-11-24
Payer: COMMERCIAL

## 2024-11-24 VITALS
HEART RATE: 102 BPM | RESPIRATION RATE: 16 BRPM | DIASTOLIC BLOOD PRESSURE: 84 MMHG | WEIGHT: 145 LBS | OXYGEN SATURATION: 99 % | HEIGHT: 64 IN | BODY MASS INDEX: 24.75 KG/M2 | TEMPERATURE: 98 F | SYSTOLIC BLOOD PRESSURE: 134 MMHG

## 2024-11-24 DIAGNOSIS — K59.00 CONSTIPATION: ICD-10-CM

## 2024-11-24 DIAGNOSIS — K59.03 OPIOID-INDUCED CONSTIPATION: Primary | ICD-10-CM

## 2024-11-24 DIAGNOSIS — T40.2X5A OPIOID-INDUCED CONSTIPATION: Primary | ICD-10-CM

## 2024-11-24 PROCEDURE — 99283 EMERGENCY DEPT VISIT LOW MDM: CPT

## 2024-11-24 PROCEDURE — 25000003 PHARM REV CODE 250: Performed by: NURSE PRACTITIONER

## 2024-11-24 RX ORDER — SYRING-NEEDL,DISP,INSUL,0.3 ML 29 G X1/2"
296 SYRINGE, EMPTY DISPOSABLE MISCELLANEOUS
Status: COMPLETED | OUTPATIENT
Start: 2024-11-24 | End: 2024-11-24

## 2024-11-24 RX ORDER — DOCUSATE SODIUM 100 MG/1
100 CAPSULE, LIQUID FILLED ORAL 2 TIMES DAILY
Qty: 28 CAPSULE | Refills: 0 | Status: SHIPPED | OUTPATIENT
Start: 2024-11-24 | End: 2024-12-08

## 2024-11-24 RX ORDER — PSEUDOEPHEDRINE/ACETAMINOPHEN 30MG-500MG
100 TABLET ORAL
Status: COMPLETED | OUTPATIENT
Start: 2024-11-24 | End: 2024-11-24

## 2024-11-24 RX ADMIN — SODIUM CHLORIDE 500 ML: 9 INJECTION, SOLUTION INTRAVENOUS at 06:11

## 2024-11-24 RX ADMIN — MAGNESIUM CITRATE 296 ML: 1.75 LIQUID ORAL at 06:11

## 2024-11-24 RX ADMIN — Medication 100 ML: at 06:11

## 2024-11-25 NOTE — ED PROVIDER NOTES
Source of History:  Patient     Chief complaint:  Constipation (Pt states that she had cosmetic surgery on Monday hasn't hasn't had a BM since Sunday last week and now having abdominal pain.)      HPI:  Elham Rodas is a 44 y.o. female presenting to the emergency department reporting constipation, no bowel movement since last Sunday.  She had abdominal surgery on Monday.  Reports straining to use the restroom and having pressure around her rectal area.  She was not taking any stool softener since discharge.  Denies any abdominal pain or any vomiting.    This is the extent to the patients complaints today here in the emergency department.    PMH:  As per HPI and below:  Past Medical History:   Diagnosis Date    Abnormal Pap smear of cervix 05/2016    ASCUS, - HPV    Anxiety     Breast cancer 10/2018    right - RUE extremity limb restriction    Depression     Diabetes mellitus     type 2    Fatigue     Hyperlipidemia     Hypothyroidism     Neuropathy     Palpitations     Prediabetes     Psychiatric problem     Sleep difficulties     Syncope and collapse     7-10 years ago    Thyroid disease     Vitamin D deficiency disease      Past Surgical History:   Procedure Laterality Date    AXILLARY NODE DISSECTION Right 11/21/2018    Procedure: LYMPHADENECTOMY, AXILLARY;  Surgeon: Jann Ayala MD;  Location: [facility] OR [address] [place] [address];  Service: General;  Laterality: Right;    BREAST LUMPECTOMY Right     BREAST RECONSTRUCTION Bilateral 05/23/2023    2nd reconstructive surgery    CHOLECYSTECTOMY      HYSTERECTOMY      TLH, BSO--( breast cancer)    INJECTION FOR SENTINEL NODE IDENTIFICATION Right 11/21/2018    Procedure: INJECTION, FOR SENTINEL NODE IDENTIFICATION;  Surgeon: Jann Ayala MD;  Location: [facility] OR [address] [place] [address];  Service: General;  Laterality: Right;    INSERTION OF TUNNELED CENTRAL VENOUS CATHETER (CVC) WITH SUBCUTANEOUS PORT Left 01/02/2019    Procedure: [id number];  Surgeon: Jann Ayala MD;   [address];  Service: General;  Laterality: Left;    LAPAROSCOPIC SALPINGO-OOPHORECTOMY Bilateral 11/04/2019    Procedure: SALPINGO-OOPHORECTOMY, LAPAROSCOPIC;  Surgeon: Yoni Oscar MD;  [facility];  Service: OB/GYN;  Laterality: Bilateral;    LAPAROSCOPIC TOTAL HYSTERECTOMY N/A 11/04/2019    Procedure: HYSTERECTOMY, TOTAL, LAPAROSCOPIC;  Surgeon: Yoni Oscar MD;  Location: [facility];  Service: OB/GYN;  Laterality: N/A;    MASTECTOMY, PARTIAL Right 11/21/2018    Procedure: MASTECTOMY, PARTIAL - seed localized;  Surgeon: Jann Ayala MD;  [facility];  Service: General;  Laterality: Right;  seed placement 11/14    OOPHORECTOMY      RECONSTRUCTION OF BREAST WITH DEEP INFERIOR EPIGASTRIC ARTERY  (JOSE) FREE FLAP Right 11/18/2024    Procedure: RECONSTRUCTION, BREAST, USING JOSE FREE FLAP;  Surgeon: Nicholas Mccray MD;  Location: [facility];  Service: Plastics;  Laterality: Right;  / RIGHT BREAST RECONSTRUCTION WITH JOSE ABDOMINAL FLAP    RECONSTRUCTION OF BREAST WITH LATISSIMUS DORSI MYOCUTANEOUS FLAP Right 01/11/2022    Procedure: RECONSTRUCTION, BREAST, WITH LATISSIMUS DORSI MYOCUTANEOUS FLAP;  Surgeon: Linus Echvaarria MD;  Location: [address];  Service: Plastics;  Laterality: Right;  R lat flap vs. TDAP    REVISION OF FLAP RECONSTRUCTION OF BREAST Bilateral 05/2023    SENTINEL LYMPH NODE BIOPSY Right 11/21/2018    Procedure: BIOPSY, LYMPH NODE, SENTINEL;  Surgeon: Jann Ayala MD;  Location: [address];  Service: General;  Laterality: Right;    THYROIDECTOMY      TOTAL REDUCTION MAMMOPLASTY Left 01/11/2022    Procedure: MAMMOPLASTY, REDUCTION;  Surgeon: Linus Echavarria MD;  Location: [address];  Service: Plastics;  Laterality: Left;    TUBAL LIGATION         Social History     Tobacco Use    Smoking status: Never     Passive exposure: Past    Smokeless tobacco: Never   Substance Use Topics    Alcohol use: Yes      "Alcohol/week: 2.0 standard drinks of alcohol     Types: 2 Cans of beer per week     Comment: occasionally    Drug use: No       Review of patient's allergies indicates:   Allergen Reactions    Adhesive Rash     PT STATES PAPER TAPE IS "OK"       ROS: As per HPI and below:  General: No  fever.  No chills.    ENT: No sore throat. No ear pain  Chest: No shortness of breath. No cough.    Cardiovascular: No chest pain.   Abdomen:  Constipation  Genito-Urinary: No abnormal urination.    Neurologic: No focal weakness.  No numbness.  No headache  MSK: no back pain.   Integument: No rashes or lesions.    Physical Exam:    /84 (BP Location: Left arm)   Pulse 102   Temp 98 °F (36.7 °C) (Oral)   Resp 16   Ht 5' 4" (1.626 m)   Wt 65.8 kg (145 lb)   LMP 03/01/2019 (Approximate)   SpO2 99%   Breastfeeding No   BMI 24.89 kg/m²   Vitals:    11/24/24 1736 11/24/24 1851   BP: 134/84    Pulse: (!) 113 102   Resp: 18 16   Temp: 98 °F (36.7 °C) 98 °F (36.7 °C)   TempSrc: Oral Oral   SpO2: 99% 99%   Weight: 65.8 kg (145 lb)    Height: 5' 4" (1.626 m)        Nursing note and vital signs reviewed.  Appearance: No acute distress. Well-appearing. Non-toxic.    Eyes: No conjunctival injection.  Extraocular muscles are intact.  ENT:  Mucous membranes are pink and moist  Chest/ Respiratory: Clear to auscultation bilaterally.  Good air movement.  No wheezes.  No rhonchi. No rales. No accessory muscle use.  Cardiovascular: Regular rate and rhythm.  No murmurs. No gallops. No rubs.  Abdomen: Soft.  Generalized abdominal tenderness due to recent surgery.  Large lower abdominal surgical incision is well healing without any surrounding erythema or dehiscence.  No drainage noted.  Musculoskeletal: Good range of motion all joints.  No deformities.  Neck supple.  No meningismus.  Skin: No rashes seen.  Good turgor.  No abrasions.  No ecchymoses.  Neuro: alert and oriented x3,  no focal neurological deficits.  Psych: Appropriate, " conversant    Abnormal Labs Reviewed - No data to display    No orders to display         Initial Impression/ Differential Dx:  Differential diagnosis includes but not limited to: GERD, intestinal spasm, gastroenteritis, gastritis, ulcer, cholecystitis, cholelithiasis, gallstones, pancreatitis, ileus, small bowel obstruction, appendicitis, diverticulitis, colitis, constipation, intestinal gas pain      MDM:    Medical Decision Making  44-year-old female reporting constipation since last Sunday, she had surgery on Monday and has not had a bowel movement since.  Now having large amount of rectal pressure.  No relief with the home enema or digital disimpaction.  She was treated with a brown bomber in an a in the emergency department and had a complete relief of symptoms.  Patient will be discharged home with Colace discussed getting MiraLax.    Risk  OTC drugs.             Diagnostic Impression:    1. Opioid-induced constipation    2. Constipation         ED Disposition Condition    Discharge Stable            ED Prescriptions       Medication Sig Dispense Start Date End Date Auth. Provider    docusate sodium (COLACE) 100 MG capsule Take 1 capsule (100 mg total) by mouth 2 (two) times daily. for 14 days 28 capsule 11/24/2024 12/8/2024 Rowan Ayala, LATRICE          Follow-up Information       Follow up With Specialties Details Why Contact Info    Cassia Ramsey, KARIN Family Medicine Schedule an appointment as soon as possible for a visit in 3 days  [address]  [address]  [facility] [address]  [phone]      [facility], [place], operated by [facility] Emergency Dept Emergency Medicine Go to  If symptoms worsen [address] [id number] [phone]               Rowan Ayala FNP  11/25/24 1905

## 2024-11-25 NOTE — ED TRIAGE NOTES
45 yo female reports to ed with co constipation after having cosmetic surgery on Monday. Last BM Sunday. Reports abd and rectal pain. Reports attempts at enemas and digital removal at home without relief of symptoms.

## 2024-11-26 ENCOUNTER — PATIENT MESSAGE (OUTPATIENT)
Dept: SURGERY | Facility: CLINIC | Age: 44
End: 2024-11-26
Payer: COMMERCIAL

## 2024-11-29 ENCOUNTER — OFFICE VISIT (OUTPATIENT)
Dept: HEMATOLOGY/ONCOLOGY | Facility: CLINIC | Age: 44
End: 2024-11-29
Payer: COMMERCIAL

## 2024-11-29 VITALS
DIASTOLIC BLOOD PRESSURE: 79 MMHG | RESPIRATION RATE: 17 BRPM | SYSTOLIC BLOOD PRESSURE: 124 MMHG | WEIGHT: 146.38 LBS | HEART RATE: 78 BPM | OXYGEN SATURATION: 98 % | BODY MASS INDEX: 24.99 KG/M2 | HEIGHT: 64 IN | TEMPERATURE: 98 F

## 2024-11-29 DIAGNOSIS — Z79.811 USE OF AROMATASE INHIBITORS: ICD-10-CM

## 2024-11-29 DIAGNOSIS — C50.111 CARCINOMA OF CENTRAL PORTION OF RIGHT BREAST IN FEMALE, ESTROGEN RECEPTOR POSITIVE: Primary | ICD-10-CM

## 2024-11-29 DIAGNOSIS — Z17.0 CARCINOMA OF CENTRAL PORTION OF RIGHT BREAST IN FEMALE, ESTROGEN RECEPTOR POSITIVE: Primary | ICD-10-CM

## 2024-11-29 PROCEDURE — 99999 PR PBB SHADOW E&M-EST. PATIENT-LVL V: CPT | Mod: PBBFAC,,, | Performed by: INTERNAL MEDICINE

## 2024-11-29 NOTE — PROGRESS NOTES
Subjective     Patient ID: Elham Rodas is a 44 y.o. female.    Chief Complaint: Follow-up (1 month)    HPI  Mrs. Rodas returns today for follow-up.  I had last seen in late October, and we decided to run the BCI test.  However, the test was not run since she does not know what her out-of-pocket expense will be.  She is supposed to call and find out 3 days from now.    She is still on anastrozole.  Of note, she had completed 5 years in August of 2024.  A mammogram in early November 20, 2024 was read as BI-RADS 1.    In mid November 20, 2024 she underwent a resection of an area of fat necrosis with a JOSE reconstruction of the right breast.     Briefly, she is a 44-year-old premenopausal female who was diagnosed in 2020 with an invasive mammary carcinoma that was ER positive, FL positive, and HER-2 negative.  On 11/21/2018, she underwent a right lumpectomy with sentinel lymph node biopsy.  The pathology report from the procedure indicates that she had a positive sentinel lymph node with a metastatic deposit of 6 mm.  There was no extranodal extension.  On the right partial mastectomy specimen, she had an invasive lobular carcinoma that was 14 mm in maximum diameter.  It was grade II and resection margins were clear.     She completed four cycles of dose dense AC followed by four doses of DD adjuvant taxol, received XRT, and had been started on zoladex plus anastrazole.  After she underwent a laparoscopic hysterectomy and bilateral oophorectomies zoladex was discontinued, and she remained on anastrozole.         Review of Systems  Overall she feels fair.  She is still sore from the recent operation.  She is still taking anastrozole and she has tolerated it well.  She denies any anxiety, depression, easy bruising, fevers, chills, night  sweats, weight loss, nausea, vomiting, diarrhea, constipation, diplopia,     blurred vision, headache, chest pain, palpitations, shortness of breath, leg breast pain,  extremity pain,  or difficulty ambulating.  The remainder of the ten-point ROS, including general, skin, lymph, H/N, cardiorespiratory, GI, , Neuro, Endocrine, and psychiatric is negative.     Physical Exam       She is alert, oriented to time, place, person, pleasant, well      nourished, in no acute physical distress.                               VITAL SIGNS:  Reviewed                                      HEENT:  Normal.  There are no nasal, oral, lip, gingival, auricular, lid,    or conjunctival lesions.  Mucosae are moist and pink, and there is no        thrush.  Pupils are equal, reactive to light and accommodation.              Extraocular muscle movements are intact.  Dentition is good.  There is no frontal or maxillary tenderness.                                     NECK:  Supple without JVD, adenopathy, or thyromegaly.                       LUNGS:  Clear to auscultation without wheezing, rales, or ronchi.           CARDIOVASCULAR:  Reveals an S1, S2, no murmurs, no rubs, no gallops.         ABDOMEN:  Soft, slightly tender, without organomegaly. Three scars from her laparoscopic procedure are seen.  Bowel sounds are present.  The abdominal incision is healing nicely.                                                                    EXTREMITIES:  No cyanosis, clubbing, or edema.                               BREASTS:  She is status post right lumpectomy now with a JOSE flap reconstruction of the lower part of her breast   There are no masses in either breast.       A sentinel node biopsy scar is seen in the right axilla.  It is also well healed.     She is status post left reduction mammoplasty.  No masses are palpated.    LYMPHATIC:  There is no cervical, axillary, or supraclavicular adenopathy.   SKIN:  Warm and moist, without petechiae, rashes, induration, or ecchymoses.           NEUROLOGIC:  DTRs are 0-1+ bilaterally, symmetrical, motor function is 5/5,  and cranial nerves are  within normal  limits.    ASSESSMENT  Remote history of breast cancer, status post lumpectomy, radiation therapy, chemotherapy and 5 years of adjuvant hormonal therapy  BI-RADS 3 mammogram in August of 2023.  No follow-up studies seen  Adjuvant use of anastrozole  Recent resection of a wide area of fat necrosis with a JOSE flap reconstruction, right breast      PLAN  I had a long discussion with her.  She had completed the 5 years of adjuvant hormonal therapy in August 20, 2024.  She will send me a message through the portal on Monday and let me know whether she can afford the test.  If she cannot afford the BCI test I will instruct her to discontinue the anastrozole.  If she can afford it, we will wait for the results.  I will see her in 2 months for follow-up.  I spent 30 minutes reviewing the available records and evaluating the patient, out of which over 50% of the time was spent face to face with the patient in counseling and coordinating this patient's care.  Visit today included increased complexity associated with the care of this patient with a remote history of breast cancer and a more recent history of a BI-RADS 3 mammogram who had been lost to follow-up.    Route Chart for Scheduling  Med Onc Route Chart for Scheduling

## 2025-01-17 ENCOUNTER — PATIENT MESSAGE (OUTPATIENT)
Dept: RESEARCH | Facility: OTHER | Age: 45
End: 2025-01-17
Payer: COMMERCIAL

## 2025-01-19 ENCOUNTER — OFFICE VISIT (OUTPATIENT)
Dept: URGENT CARE | Facility: CLINIC | Age: 45
End: 2025-01-19
Payer: COMMERCIAL

## 2025-01-19 VITALS
HEART RATE: 92 BPM | TEMPERATURE: 99 F | OXYGEN SATURATION: 96 % | BODY MASS INDEX: 26.27 KG/M2 | RESPIRATION RATE: 18 BRPM | SYSTOLIC BLOOD PRESSURE: 118 MMHG | DIASTOLIC BLOOD PRESSURE: 69 MMHG | WEIGHT: 153.88 LBS | HEIGHT: 64 IN

## 2025-01-19 DIAGNOSIS — R05.9 COUGH, UNSPECIFIED TYPE: ICD-10-CM

## 2025-01-19 DIAGNOSIS — J10.1 INFLUENZA A: Primary | ICD-10-CM

## 2025-01-19 LAB
CTP QC/QA: YES
CTP QC/QA: YES
POC MOLECULAR INFLUENZA A AGN: POSITIVE
POC MOLECULAR INFLUENZA B AGN: NEGATIVE
SARS-COV-2 AG RESP QL IA.RAPID: NEGATIVE

## 2025-01-19 PROCEDURE — 87502 INFLUENZA DNA AMP PROBE: CPT | Mod: QW,S$GLB,, | Performed by: NURSE PRACTITIONER

## 2025-01-19 PROCEDURE — 87811 SARS-COV-2 COVID19 W/OPTIC: CPT | Mod: QW,S$GLB,, | Performed by: NURSE PRACTITIONER

## 2025-01-19 PROCEDURE — 99214 OFFICE O/P EST MOD 30 MIN: CPT | Mod: S$GLB,,, | Performed by: NURSE PRACTITIONER

## 2025-01-19 RX ORDER — PROMETHAZINE HYDROCHLORIDE AND DEXTROMETHORPHAN HYDROBROMIDE 6.25; 15 MG/5ML; MG/5ML
5 SYRUP ORAL EVERY 4 HOURS PRN
Qty: 120 ML | Refills: 0 | Status: SHIPPED | OUTPATIENT
Start: 2025-01-19

## 2025-01-19 RX ORDER — OSELTAMIVIR PHOSPHATE 75 MG/1
75 CAPSULE ORAL 2 TIMES DAILY
Qty: 10 CAPSULE | Refills: 0 | Status: SHIPPED | OUTPATIENT
Start: 2025-01-19 | End: 2025-01-24

## 2025-01-19 NOTE — PROGRESS NOTES
"Subjective:      Patient ID: Elham Rodas is a 44 y.o. female.    Vitals:  height is 5' 4" (1.626 m) and weight is 69.8 kg (153 lb 14.1 oz). Her tympanic temperature is 99 °F (37.2 °C). Her blood pressure is 118/69 and her pulse is 92. Her respiration is 18 and oxygen saturation is 96%.     Chief Complaint: Cough    Presents with body aches, nasal congestion, runny nose, dizziness, loss of appetite, diarrhea, fever of 100.7, cough, sore throat and headache. Rates pain 7/10. Symptoms started  approximately three days ago. Patient has  taken Theraflu and OTC cold and cough medication. No relief.    Cough  The problem occurs every few minutes. The cough is Non-productive. Associated symptoms include a fever, headaches, nasal congestion and a sore throat. Nothing aggravates the symptoms. Treatments tried: theraflu and cough and cold medication.       Constitution: Positive for fever.   HENT:  Positive for sore throat.    Respiratory:  Positive for cough.    Neurological:  Positive for headaches.      Objective:     Physical Exam   Constitutional: She is oriented to person, place, and time. She appears well-developed. She is cooperative.   HENT:   Head: Normocephalic and atraumatic.   Ears:   Right Ear: Hearing, tympanic membrane, external ear and ear canal normal.   Left Ear: Hearing, tympanic membrane, external ear and ear canal normal.   Nose: Congestion present. No mucosal edema or nasal deformity. No epistaxis. Right sinus exhibits no maxillary sinus tenderness and no frontal sinus tenderness. Left sinus exhibits no maxillary sinus tenderness and no frontal sinus tenderness.   Mouth/Throat: Uvula is midline, oropharynx is clear and moist and mucous membranes are normal. Mucous membranes are moist. No trismus in the jaw. Normal dentition. No uvula swelling. Oropharynx is clear.   Eyes: Conjunctivae and lids are normal.   Neck: Trachea normal and phonation normal. Neck supple.   Cardiovascular: Normal rate, regular " rhythm, normal heart sounds and normal pulses.   Pulmonary/Chest: Effort normal and breath sounds normal.   Abdominal: Normal appearance and bowel sounds are normal. Soft.   Musculoskeletal: Normal range of motion.         General: Normal range of motion.   Neurological: no focal deficit. She is alert, oriented to person, place, and time and at baseline. She exhibits normal muscle tone.   Skin: Skin is warm, dry and intact. Capillary refill takes less than 2 seconds.   Psychiatric: Her speech is normal and behavior is normal. Mood, judgment and thought content normal.   Nursing note and vitals reviewed.    Results for orders placed or performed in visit on 01/19/25   POCT Influenza A/B MOLECULAR    Collection Time: 01/19/25  3:15 PM   Result Value Ref Range    POC Molecular Influenza A Ag Positive (A) Negative    POC Molecular Influenza B Ag Negative Negative     Acceptable Yes    SARS Coronavirus 2 Antigen, POCT Manual Read    Collection Time: 01/19/25  3:28 PM   Result Value Ref Range    SARS Coronavirus 2 Antigen Negative Negative     Acceptable Yes        Assessment:     1. Influenza A    2. Cough, unspecified type        Plan:       Influenza A  -     oseltamivir (TAMIFLU) 75 MG capsule; Take 1 capsule (75 mg total) by mouth 2 (two) times daily. for 5 days  Dispense: 10 capsule; Refill: 0  -     promethazine-dextromethorphan (PROMETHAZINE-DM) 6.25-15 mg/5 mL Syrp; Take 5 mLs by mouth every 4 (four) hours as needed (cough).  Dispense: 120 mL; Refill: 0    Cough, unspecified type  -     POCT Influenza A/B MOLECULAR  -     SARS Coronavirus 2 Antigen, POCT Manual Read          Medical Decision Making:   Urgent Care Management:  Previous encounters and labs were independently reviewed. Discussed with patient  all pertinent information and results. Discussed patient diagnosis and plan of treatment. Additional plan of care as outlined above. Patient  was given all follow up and return  instructions. All questions and concerns were addressed at this time. Patient  expresses understanding of information and instructions, and is comfortable with plan.    Patient was instructed to follow up with her Primary Care Provider if no improvement in symptoms in 3-5 DAYS:  or go to ED immediately for any worsening or change in current symptoms. Treatment plan as well as options and alternatives reviewed and discussed with patient. All of the patients questions and concerns were addressed.The patient verbalized understanding and agrees with the discussed plan of care. Patient remained stable and was discharged in no acute distress.                 Patient Instructions   Flu Discharge Instructions  Please follow up with your PCP if there is any uncertainty as it pertains to medications you can take.   You have been diagnosed with Influenza. Which is viral in nature. Influenza may take 5-7 days to run its course.   You are contagious until you are fever free for 24 hours without any antipyretic medications such as tylenol or ibuprofen.   Please drink plenty of fluids.  Please get plenty of rest.    Tamiflu prescription has been discussed and if prescribed, please take to completion unless you cannot tolerate the side effects.   If you were prescribed a narcotic medication, do not drive or operate heavy equipment or machinery while taking these medications.  If not contraindicated, Take tylenol (acetominophen) for fever, chills or body aches every 4 hours. do not exceed 4000 mg/ day. Avoid tylenol (acetaminophen) if you  have any liver issues.  If not contraindicated, Take Motrin (Ibuprofen) every 4 hours for fever, chills, pain or inflammation. Avoid NSAIDS motrin(ibuprofen), aleve, naproxen ETC if you are taking blood thinners , have a history of GI bleeds or Kidney issues.   Use an antihistmine such as claritin or zyrtec to dry you out. Use pseudoephedrine (behind the counter) to decongest (beware this can  raise your blood pressure). Use mucinex (guaifenisin) to break up mucus.  Promethazine DM as needed for cough. This medication may cause drowsiness so please avoid operating heavy machinery or driving while taking this medication.       Please arrange follow up with your primary medical clinic as soon as possible. You must understand that you've received an Urgent Care treatment only and that you may be released before all of your medical problems are known or treated. You, the patient, will arrange for follow up as instructed. If your symptoms worsen or fail to improve you should go to the Emergency Room.

## 2025-04-01 DIAGNOSIS — E89.0 POSTSURGICAL HYPOTHYROIDISM: ICD-10-CM

## 2025-04-02 RX ORDER — LEVOTHYROXINE SODIUM 75 UG/1
TABLET ORAL
Qty: 90 TABLET | Refills: 3 | Status: SHIPPED | OUTPATIENT
Start: 2025-04-02

## 2025-04-10 ENCOUNTER — PATIENT MESSAGE (OUTPATIENT)
Dept: HEMATOLOGY/ONCOLOGY | Facility: CLINIC | Age: 45
End: 2025-04-10
Payer: COMMERCIAL

## 2025-04-15 ENCOUNTER — TELEPHONE (OUTPATIENT)
Dept: HEMATOLOGY/ONCOLOGY | Facility: CLINIC | Age: 45
End: 2025-04-15
Payer: COMMERCIAL

## 2025-04-15 ENCOUNTER — PATIENT MESSAGE (OUTPATIENT)
Dept: HEMATOLOGY/ONCOLOGY | Facility: CLINIC | Age: 45
End: 2025-04-15
Payer: COMMERCIAL

## 2025-04-15 NOTE — TELEPHONE ENCOUNTER
Called and spoke to patient to set up a virtual genetic appointment and told to complete the questionnaire.

## 2025-04-29 ENCOUNTER — PATIENT MESSAGE (OUTPATIENT)
Dept: PREADMISSION TESTING | Facility: OTHER | Age: 45
End: 2025-04-29
Payer: COMMERCIAL

## 2025-04-30 ENCOUNTER — OFFICE VISIT (OUTPATIENT)
Dept: HEMATOLOGY/ONCOLOGY | Facility: CLINIC | Age: 45
End: 2025-04-30
Payer: COMMERCIAL

## 2025-04-30 VITALS
OXYGEN SATURATION: 97 % | DIASTOLIC BLOOD PRESSURE: 84 MMHG | HEART RATE: 92 BPM | WEIGHT: 154.13 LBS | TEMPERATURE: 98 F | HEIGHT: 64 IN | RESPIRATION RATE: 16 BRPM | BODY MASS INDEX: 26.31 KG/M2 | SYSTOLIC BLOOD PRESSURE: 128 MMHG

## 2025-04-30 DIAGNOSIS — Z79.811 USE OF AROMATASE INHIBITORS: ICD-10-CM

## 2025-04-30 DIAGNOSIS — Z17.0 CARCINOMA OF UPPER-OUTER QUADRANT OF RIGHT BREAST IN FEMALE, ESTROGEN RECEPTOR POSITIVE: Primary | ICD-10-CM

## 2025-04-30 DIAGNOSIS — C50.411 CARCINOMA OF UPPER-OUTER QUADRANT OF RIGHT BREAST IN FEMALE, ESTROGEN RECEPTOR POSITIVE: Primary | ICD-10-CM

## 2025-04-30 PROCEDURE — 99214 OFFICE O/P EST MOD 30 MIN: CPT | Mod: S$GLB,,, | Performed by: INTERNAL MEDICINE

## 2025-04-30 PROCEDURE — 99999 PR PBB SHADOW E&M-EST. PATIENT-LVL IV: CPT | Mod: PBBFAC,,, | Performed by: INTERNAL MEDICINE

## 2025-04-30 NOTE — PROGRESS NOTES
Subjective     Patient ID: Elham Rodas is a 44 y.o. female.    Chief Complaint: No chief complaint on file.    HPI  Mrs. Rodas returns today for follow-up.  I had last seen in late November 2024, and we had decided to run the BCI test.    Her BCI test is run in December 2024 and had indicated that there is no benefit from extending the treatment.  Of note, she is still taking anastrozole on a daily basis    A mammogram in early November 20, 2024 was read as BI-RADS 1.    In mid November 20, 2024 she underwent a resection of an area of fat necrosis with a JOSE reconstruction of the right breast.     Briefly, she is a 44-year-old premenopausal female who was diagnosed in 2020 with an invasive mammary carcinoma that was ER positive, VT positive, and HER-2 negative.  On 11/21/2018, she underwent a right lumpectomy with sentinel lymph node biopsy.  The pathology report from the procedure indicates that she had a positive sentinel lymph node with a metastatic deposit of 6 mm.  There was no extranodal extension.  On the right partial mastectomy specimen, she had an invasive lobular carcinoma that was 14 mm in maximum diameter.  It was grade II and resection margins were clear.     She completed four cycles of dose dense AC followed by four doses of DD adjuvant taxol, received XRT, and had been started on zoladex plus anastrazole.  After she underwent a laparoscopic hysterectomy and bilateral oophorectomies zoladex was discontinued, and she remained on anastrozole.         Review of Systems  Overall she feels well and she has no complaints today.  She is still taking anastrozole and she has tolerated it well.  Her ECOG PS is 1.   She denies any anxiety, depression, easy bruising, fevers, chills, night  sweats, weight loss, nausea, vomiting, diarrhea, constipation, diplopia, blurred vision, headache, chest pain, palpitations, shortness of breath, leg breast pain,  extremity pain, or difficulty ambulating.  The remainder  of the ten-point ROS, including general, skin, lymph, H/N, cardiorespiratory, GI, , Neuro, Endocrine, and psychiatric is negative.     Physical Exam       She is alert, oriented to time, place, person, pleasant, well      nourished, in no acute physical distress.                               VITAL SIGNS:  Reviewed                                      HEENT:  Normal.  There are no nasal, oral, lip, gingival, auricular, lid,    or conjunctival lesions.  Mucosae are moist and pink, and there is no        thrush.  Pupils are equal, reactive to light and accommodation.              Extraocular muscle movements are intact.  Dentition is good.  There is no frontal or maxillary tenderness.                                     NECK:  Supple without JVD, adenopathy, or thyromegaly.                       LUNGS:  Clear to auscultation without wheezing, rales, or ronchi.           CARDIOVASCULAR:  Reveals an S1, S2, no murmurs, no rubs, no gallops.         ABDOMEN:  Soft, slightly tender, without organomegaly. Three scars from her laparoscopic procedure are seen.  Bowel sounds are present.  The abdominal incision is healing nicely.                                                                    EXTREMITIES:  No cyanosis, clubbing, or edema.                               BREASTS:  She is status post right lumpectomy now with a JOSE flap reconstruction of the lower part of her breast   There are no masses in either breast.       A sentinel node biopsy scar is seen in the right axilla.  It is also well healed.     She is status post left reduction mammoplasty.  No masses are palpated.    LYMPHATIC:  There is no cervical, axillary, or supraclavicular adenopathy.   SKIN:  Warm and moist, without petechiae, rashes, induration, or ecchymoses.           NEUROLOGIC:  DTRs are 0-1+ bilaterally, symmetrical, motor function is 5/5,  and cranial nerves are  within normal limits.    ASSESSMENT  Remote history of breast cancer, status  post lumpectomy, radiation therapy, chemotherapy and 5 years of adjuvant hormonal therapy  Adjuvant use of anastrozole  Recent resection of a wide area of fat necrosis with a JOSE flap reconstruction, right breast      PLAN  I had a long discussion with her.  She had completed the 5 years of adjuvant hormonal therapy in August 20, 2024.  I explained to her that based on the BCI results, there is no additional benefit by extending her treatment, hence my recommendation for discontinuation of anastrozole.  I will see her in November when she is due for her next mammogram.   I spent 30 minutes reviewing the available records and evaluating the patient, out of which over 50% of the time was spent face to face with the patient in counseling and coordinating this patient's care.  Visit today included increased complexity associated with the care of this patient with a remote history of breast cancer and a more recent history of a BI-RADS 3 mammogram who had been lost to follow-up.    Route Chart for Scheduling    Med Onc Chart Routing      Follow up with physician . See me in late November with a mammogram   Follow up with YELITZA    Infusion scheduling note    Injection scheduling note    Labs    Imaging    Pharmacy appointment    Other referrals

## 2025-05-14 ENCOUNTER — HOSPITAL ENCOUNTER (OUTPATIENT)
Dept: PREADMISSION TESTING | Facility: OTHER | Age: 45
Discharge: HOME | End: 2025-05-14
Payer: COMMERCIAL

## 2025-06-02 ENCOUNTER — OFFICE VISIT (OUTPATIENT)
Dept: URGENT CARE | Facility: CLINIC | Age: 45
End: 2025-06-02
Payer: COMMERCIAL

## 2025-06-02 VITALS
SYSTOLIC BLOOD PRESSURE: 140 MMHG | HEIGHT: 64 IN | HEART RATE: 76 BPM | OXYGEN SATURATION: 97 % | BODY MASS INDEX: 27.55 KG/M2 | TEMPERATURE: 98 F | DIASTOLIC BLOOD PRESSURE: 86 MMHG | WEIGHT: 161.38 LBS | RESPIRATION RATE: 16 BRPM

## 2025-06-02 DIAGNOSIS — R51.9 NONINTRACTABLE EPISODIC HEADACHE, UNSPECIFIED HEADACHE TYPE: Primary | ICD-10-CM

## 2025-06-02 PROCEDURE — 96372 THER/PROPH/DIAG INJ SC/IM: CPT | Mod: S$GLB,,, | Performed by: SURGERY

## 2025-06-02 PROCEDURE — 99213 OFFICE O/P EST LOW 20 MIN: CPT | Mod: 25,S$GLB,, | Performed by: SURGERY

## 2025-06-02 RX ORDER — BUTALBITAL, ACETAMINOPHEN AND CAFFEINE 300; 40; 50 MG/1; MG/1; MG/1
1 CAPSULE ORAL 3 TIMES DAILY PRN
Qty: 9 CAPSULE | Refills: 0 | Status: SHIPPED | OUTPATIENT
Start: 2025-06-02 | End: 2025-06-02 | Stop reason: CLARIF

## 2025-06-02 RX ORDER — BUTALBITAL, ACETAMINOPHEN AND CAFFEINE 300; 40; 50 MG/1; MG/1; MG/1
1 CAPSULE ORAL 3 TIMES DAILY PRN
Qty: 9 CAPSULE | Refills: 0 | Status: SHIPPED | OUTPATIENT
Start: 2025-06-02 | End: 2025-06-05

## 2025-06-02 RX ORDER — BUTALBITAL, ACETAMINOPHEN AND CAFFEINE 300; 40; 50 MG/1; MG/1; MG/1
1 CAPSULE ORAL 3 TIMES DAILY PRN
Qty: 9 CAPSULE | Refills: 0 | Status: SHIPPED | OUTPATIENT
Start: 2025-06-02 | End: 2025-06-02

## 2025-06-02 RX ORDER — KETOROLAC TROMETHAMINE 30 MG/ML
30 INJECTION, SOLUTION INTRAMUSCULAR; INTRAVENOUS
Status: COMPLETED | OUTPATIENT
Start: 2025-06-02 | End: 2025-06-02

## 2025-06-02 RX ORDER — KETOROLAC TROMETHAMINE 10 MG/1
10 TABLET, FILM COATED ORAL EVERY 6 HOURS PRN
Qty: 20 TABLET | Refills: 0 | Status: SHIPPED | OUTPATIENT
Start: 2025-06-02 | End: 2025-06-07

## 2025-06-02 RX ADMIN — KETOROLAC TROMETHAMINE 30 MG: 30 INJECTION, SOLUTION INTRAMUSCULAR; INTRAVENOUS at 11:06

## 2025-06-04 ENCOUNTER — OFFICE VISIT (OUTPATIENT)
Dept: GENETICS | Facility: CLINIC | Age: 45
End: 2025-06-04
Payer: COMMERCIAL

## 2025-06-04 ENCOUNTER — PATIENT MESSAGE (OUTPATIENT)
Dept: GENETICS | Facility: CLINIC | Age: 45
End: 2025-06-04

## 2025-06-04 ENCOUNTER — TELEPHONE (OUTPATIENT)
Dept: URGENT CARE | Facility: CLINIC | Age: 45
End: 2025-06-04
Payer: COMMERCIAL

## 2025-06-04 DIAGNOSIS — Z17.0 MALIGNANT NEOPLASM OF RIGHT BREAST IN FEMALE, ESTROGEN RECEPTOR POSITIVE, UNSPECIFIED SITE OF BREAST: ICD-10-CM

## 2025-06-04 DIAGNOSIS — Z80.41 FAMILY HISTORY OF OVARIAN CANCER: ICD-10-CM

## 2025-06-04 DIAGNOSIS — C50.911 MALIGNANT NEOPLASM OF RIGHT BREAST IN FEMALE, ESTROGEN RECEPTOR POSITIVE, UNSPECIFIED SITE OF BREAST: ICD-10-CM

## 2025-06-04 DIAGNOSIS — Z71.83 ENCOUNTER FOR NONPROCREATIVE GENETIC COUNSELING: Primary | ICD-10-CM

## 2025-06-04 PROCEDURE — 99499 UNLISTED E&M SERVICE: CPT | Mod: 95,,,

## 2025-06-04 PROCEDURE — 96041 GENETIC COUNSELING SVC EA 30: CPT | Mod: 95,,,

## 2025-06-06 ENCOUNTER — PATIENT MESSAGE (OUTPATIENT)
Dept: CARDIOLOGY | Facility: CLINIC | Age: 45
End: 2025-06-06
Payer: COMMERCIAL

## 2025-06-06 DIAGNOSIS — E78.2 MIXED HYPERLIPIDEMIA: Chronic | ICD-10-CM

## 2025-06-06 RX ORDER — ROSUVASTATIN CALCIUM 20 MG/1
20 TABLET, COATED ORAL DAILY
Qty: 90 TABLET | Refills: 3 | Status: SHIPPED | OUTPATIENT
Start: 2025-06-06 | End: 2026-06-06

## 2025-06-09 RX ORDER — CALCIUM CITRATE/VITAMIN D3 200MG-6.25
TABLET ORAL
Qty: 100 STRIP | Refills: 3 | Status: SHIPPED | OUTPATIENT
Start: 2025-06-09

## 2025-06-23 ENCOUNTER — TELEPHONE (OUTPATIENT)
Dept: CARDIOLOGY | Facility: CLINIC | Age: 45
End: 2025-06-23
Payer: COMMERCIAL

## 2025-06-23 NOTE — TELEPHONE ENCOUNTER
Left vm for call back to get pt scheduled for 2y f/u to see Aileen Olivo PA-C. Expected date is 10/18/25 with an EKG before

## 2025-06-24 DIAGNOSIS — R00.2 PALPITATIONS: Primary | ICD-10-CM

## 2025-06-24 NOTE — TELEPHONE ENCOUNTER
Pt returned call, Please see appt desk.   Future Appointments   Date Time Provider Department Center   10/1/2025  8:50 AM LABORATORY, [facility]   10/8/2025  8:20 AM Nati Moulton MD [facility] ENDOCRN [facility]   10/20/2025  8:00 AM EKG, APPT [facility] EKG [facility]   10/20/2025  8:30 AM Aileen Olivo, PA-C [facility] CARDIO [facility]   11/20/2025  3:30 PM Artemio Restrepo MD [facility] BHEM [facility]   4/7/2026  8:30 AM MARKIE Vieira IV, MD [facility]

## 2025-07-15 ENCOUNTER — PATIENT MESSAGE (OUTPATIENT)
Dept: PREADMISSION TESTING | Facility: OTHER | Age: 45
End: 2025-07-15
Payer: COMMERCIAL

## 2025-07-17 NOTE — H&P
HPI: This is a 44 year old female patient s/p JOSE breast reconstruction ready for revision surgery.     Past Medical History:   Diagnosis Date    Abnormal Pap smear of cervix 05/2016    ASCUS, - HPV    Anxiety     Breast cancer 10/2018    right - RUE extremity limb restriction    Depression     Diabetes mellitus     type 2    Fatigue     Hyperlipidemia     Hypothyroidism     Neuropathy     Palpitations     Prediabetes     Psychiatric problem     Sleep difficulties     Syncope and collapse     7-10 years ago    Thyroid disease     Vitamin D deficiency disease      Medications Ordered Prior to Encounter[1]  Past Surgical History:   Procedure Laterality Date    AXILLARY NODE DISSECTION Right 11/21/2018    Procedure: LYMPHADENECTOMY, AXILLARY;  Surgeon: Jann Ayala MD;  [facility];  Service: General;  Laterality: Right;    BREAST LUMPECTOMY Right     BREAST RECONSTRUCTION Bilateral 05/23/2023    2nd reconstructive surgery    CHOLECYSTECTOMY      HYSTERECTOMY      TLH, BSO--( breast cancer)    INJECTION FOR SENTINEL NODE IDENTIFICATION Right 11/21/2018    Procedure: INJECTION, FOR SENTINEL NODE IDENTIFICATION;  Surgeon: Jann Ayala MD;  [facility];  Service: General;  Laterality: Right;    INSERTION OF TUNNELED CENTRAL VENOUS CATHETER (CVC) WITH SUBCUTANEOUS PORT Left 01/02/2019    Procedure: [id number]-CATH;  Surgeon: Jann Ayala MD;  Location: [facility] OR [address] [place] [address];  Service: General;  Laterality: Left;    LAPAROSCOPIC SALPINGO-OOPHORECTOMY Bilateral 11/04/2019    Procedure: SALPINGO-OOPHORECTOMY, LAPAROSCOPIC;  Surgeon: Yoni Oscar MD;  Location: [facility];  Service: OB/GYN;  Laterality: Bilateral;    LAPAROSCOPIC TOTAL HYSTERECTOMY N/A 11/04/2019    Procedure: HYSTERECTOMY, TOTAL, LAPAROSCOPIC;  Surgeon: Yoni Oscar MD;  Location: [facility];  Service: OB/GYN;  Laterality: N/A;    MASTECTOMY, PARTIAL Right 11/21/2018    Procedure: MASTECTOMY, PARTIAL - seed  localized;  Surgeon: Jann Ayala MD;  [facility]: [facility] OR [facility];  Service: General;  Laterality: Right;  seed placement 11/14    OOPHORECTOMY      RECONSTRUCTION OF BREAST WITH DEEP INFERIOR EPIGASTRIC ARTERY  (JOSE) FREE FLAP Right 11/18/2024    Procedure: RECONSTRUCTION, BREAST, USING JOSE FREE FLAP;  Surgeon: Nicholas Mccray MD;  Location: [facility] OR;  Service: Plastics;  Laterality: Right;  / RIGHT BREAST RECONSTRUCTION WITH JOSE ABDOMINAL FLAP    RECONSTRUCTION OF BREAST WITH LATISSIMUS DORSI MYOCUTANEOUS FLAP Right 01/11/2022    Procedure: RECONSTRUCTION, BREAST, WITH LATISSIMUS DORSI MYOCUTANEOUS FLAP;  Surgeon: Linus Echavarria MD;  [facility];  Service: Plastics;  Laterality: Right;  R lat flap vs. TDAP    REVISION OF FLAP RECONSTRUCTION OF BREAST Bilateral 05/2023    SENTINEL LYMPH NODE BIOPSY Right 11/21/2018    Procedure: BIOPSY, LYMPH NODE, SENTINEL;  Surgeon: Jann Ayala MD;  [facility];  Service: General;  Laterality: Right;    THYROIDECTOMY      TOTAL REDUCTION MAMMOPLASTY Left 01/11/2022    Procedure: MAMMOPLASTY, REDUCTION;  Surgeon: Linus Echavarria MD;  Location: [facility] OR [address] [place] [address];  Service: Plastics;  Laterality: Left;    TUBAL LIGATION       Social History[2]    Physical Exam:   General: alert and oriented x3  Heart: regular rate and rhythm  Lungs: clear to auscultation bilaterally   Breasts: the flap is healthy and soft. The left breast is in good shape. THe right breast is larger and slightly wider with the nipple position higher.     Plan: The patient needs revision breast reconstruction to the right breast with fat grafting. We will do a small dog ear excision on the left breast as well with complex closure.        [1]   Current Facility-Administered Medications on File Prior to Encounter   Medication Dose Route Frequency Provider Last Rate Last Admin    lidocaine (PF) 10 mg/ml (1%) injection 10 mg  1 mL Intradermal Once  Cain Mcgarry MD         Current Outpatient Medications on File Prior to Encounter   Medication Sig Dispense Refill    anastrozole (ARIMIDEX) 1 mg Tab TAKE 1 TABLET BY MOUTH EVERY DAY 90 tablet 2    calcium carbonate (TUMS) 200 mg calcium (500 mg) chewable tablet Take 1 tablet by mouth once daily.      cholecalciferol, vitamin D3, 125 mcg (5,000 unit) Tab Vitamin D3 125 mcg (5,000 unit) tablet  Take 1 tablet every day by oral route. 30 tablet 5    cyanocobalamin (VITAMIN B-12) 1000 MCG tablet Take 100 mcg by mouth once daily.      lancets 33 gauge Misc TRUEplus Lancets 33 gauge      levothyroxine (SYNTHROID) 75 MCG tablet TAKE 1 TABLET BY MOUTH EVERY MORNING BEFORE BREAKFAST ON EMPTY STOMACH. 90 tablet 3    levothyroxine (SYNTHROID) 75 MCG tablet Take 1 tablet (75 mcg total) by mouth before breakfast. Take on an empty stomach. Wait 4 hours after taking LT4 to take any multivitamins or nutritional supplements and wait 1 hour to take other medications. 90 tablet 3    metFORMIN (GLUCOPHAGE-XR) 500 MG ER 24hr tablet Take 2 tablets (1,000 mg total) by mouth 2 (two) times a day. 120 tablet 11    multivitamin with minerals tablet Take 1 tablet by mouth once daily. (Patient not taking: Reported on 6/2/2025)      omega-3 fatty acids 1,000 mg Cap Take 2 capsules (2,000 mg total) by mouth 2 (two) times daily. Take as directed for triglycerides and to decrease risk of heart disease and stroke.      prasterone, dhea, (INTRAROSA) 6.5 mg Inst Place 6.5 mg vaginally nightly. 28 each 1    rosuvastatin (CRESTOR) 20 MG tablet Take 1 tablet (20 mg total) by mouth once daily. 90 tablet 1    rosuvastatin (CRESTOR) 20 MG tablet Take 1 tablet (20 mg total) by mouth once daily. 90 tablet 3    semaglutide (OZEMPIC) 0.25 mg or 0.5 mg (2 mg/3 mL) pen injector Inject 0.25 mg into the skin every 7 days for 14 days, THEN 0.5 mg every 7 days. Stay well hydrated while taking this medication, focus on protein and fiber intake and take  an OTC MVI.   Advise the patient to contact office in 6 weeks to determine if the dose of medication can be titrated up. 3 mL 11    TRUE METRIX GLUCOSE METER Misc USE TO TEST BLOOD SUGAR AS DIRECTED      TRUE METRIX GLUCOSE TEST STRIP Strp TEST BLOOD SUGAR ONCE DAILY. Dx Code: R73.02 100 strip 3   [2]   Social History  Socioeconomic History    Marital status:     Number of children: 4   Tobacco Use    Smoking status: Never     Passive exposure: Past    Smokeless tobacco: Never   Vaping Use    Vaping status: Never Used   Substance and Sexual Activity    Alcohol use: Not Currently     Alcohol/week: 2.0 standard drinks of alcohol     Types: 2 Cans of beer per week     Comment: occasionally    Drug use: No    Sexual activity: Not Currently     Partners: Male     Birth control/protection: See Surgical Hx     Comment:      Social Drivers of Health     Financial Resource Strain: Low Risk  (4/9/2025)    Overall Financial Resource Strain (CARDIA)     Difficulty of Paying Living Expenses: Not hard at all   Food Insecurity: No Food Insecurity (4/9/2025)    Hunger Vital Sign     Worried About Running Out of Food in the Last Year: Never true     Ran Out of Food in the Last Year: Never true   Transportation Needs: No Transportation Needs (4/9/2025)    PRAPARE - Transportation     Lack of Transportation (Medical): No     Lack of Transportation (Non-Medical): No   Physical Activity: Sufficiently Active (4/9/2025)    Exercise Vital Sign     Days of Exercise per Week: 7 days     Minutes of Exercise per Session: 30 min   Stress: No Stress Concern Present (4/9/2025)    [employer] - Occupational Stress Questionnaire     Feeling of Stress : Not at all   Housing Stability: Low Risk  (4/9/2025)    Housing Stability Vital Sign     Unable to Pay for Housing in the Last Year: No     Homeless in the Last Year: No

## 2025-07-23 ENCOUNTER — ANESTHESIA EVENT (OUTPATIENT)
Dept: SURGERY | Facility: OTHER | Age: 45
End: 2025-07-23
Payer: COMMERCIAL

## 2025-07-25 ENCOUNTER — PATIENT MESSAGE (OUTPATIENT)
Dept: PREADMISSION TESTING | Facility: OTHER | Age: 45
End: 2025-07-25
Payer: COMMERCIAL

## 2025-07-25 NOTE — PRE-PROCEDURE INSTRUCTIONS
Information to Prepare you for your Surgery    PRE-ADMIT TESTING & SURGERY  [address] [facility] BUILDING  ENTRANCE 2       Your surgery has been scheduled at [facility]. We are pleased to have the opportunity to serve you. For Further Information please call [phone].    On the day of surgery please report to the Registration Desk on the 1st floor of the [facility]. This is a 4 story red brick building on the corner of [address]. Turn onto [address] to access the parking lot behind the [facility] at the corner of [facility].    CONTACT YOUR PHYSICIAN'S OFFICE THE DAY PRIOR TO YOUR SURGERY TO OBTAIN YOUR ARRIVAL TIME.     The evening before surgery do not eat anything after 9 p.m. ( this includes hard candy, chewing gum and mints).  You may only have GATORADE, POWERADE AND WATER  from 9 p.m. until you leave your home.   DO NOT DRINK ANY LIQUIDS ON THE WAY TO THE HOSPITAL.      Why does your anesthesiologist allow you to drink Gatorade/Powerade before surgery?  Gatorade/Powerade helps to increase your comfort before surgery and to decrease your nausea after surgery.   The carbohydrates in Gatorade/Powerade help reduce your body's stress response to surgery.    If you are a diabetic-drink only water prior to surgery.    Outpatient Surgery- May allow 2 adults (18 and older)/ Support Persons (1 being the designated ) for all surgical/procedural patients. A breastfeeding mother will be allowed her infant and 2 adult Support Persons. No one under the age of 18 will be allowed in the building.    MEDICATION INSTRUCTIONS: Hold ozempic 7 days prior surgery.  Take synthroid the day of surgery.  Take rosuvastatin the day of surgery.  Take metformin the day before surgery.       Surgery Patients:  If you take ASPIRIN - Your PHYSICIAN/SURGEON will need to inform you IF/OR when you need to stop taking aspirin prior to your surgery.     Starting the week prior  to surgery, do not take any medications containing IBUPROFEN or NSAIDS (Advil, Aleve, BC, Goody's, Ketorolac, Meloxicam, Mobic, Motrin, Naproxen, Toradol, etc).  If you are not sure if you should take a medicine, please call your surgeon's office.  You may take Tylenol.    Do Not Wear any make-up (especially eye make-up) to surgery. Please remove any false eyelashes or eyelash extensions. If you arrive the day of surgery with makeup/eyelashes on you will be required to remove prior to surgery. (There is a risk of corneal abrasions if eye makeup/eyelash extensions are not removed)    Leave all valuables at home.   Do Not wear any jewelry or watches, including any metal in body piercings. Jewelry must be removed prior to coming to the hospital.  There is a possibility that rings that are unable to be removed may be cut off if they are on the surgical extremity.    Please remove all hair extensions, wigs, clips and any other metal accessories/ ornaments from your hair.  These items may pose a flammable/fire risk in Surgery and must be removed.    Do not shave your surgical area at least 5 days prior to your surgery. The surgical prep will be performed at the hospital according to Infection Control regulations.    Contact Lens must be removed before surgery. Either do not wear the contact lens or bring a case and solution for storage.  Please bring a container for eyeglasses or dentures as required.  Bring any paperwork your physician has provided, such as consent forms,  history and physicals, doctor's orders, etc.   Bring comfortable clothes that are loose fitting to wear upon discharge. Take into consideration the type of surgery being performed.  Maintain your diet as advised per your physician the day prior to surgery.    Adequate rest the night before surgery is advised.   Park in the Parking lot behind the hospital or in the [employer] [facility] across the street from the parking lot. Parking is  complimentary.  If you will be discharged the same day as your procedure, please arrange for a responsible adult to drive you home or to accompany you if traveling by taxi.   YOU WILL NOT BE PERMITTED TO DRIVE OR TO LEAVE THE HOSPITAL ALONE AFTER SURGERY.   If you are being discharged the same day, it is strongly recommended that you arrange for someone to remain with you for the first 24 hrs following your surgery.    The Surgeon will speak to your family/visitor after your surgery regarding the outcome of your surgery and post op care.  The Surgeon may speak to you after your surgery, but there is a possibility you may not remember the details.  Please check with your family members regarding the conversation with the Surgeon.    We strongly recommend whoever is bringing you home be present for discharge instructions.  This will ensure a thorough understanding for your post op home care.    If the patient has fever, cough, or signs/symptoms of Flu or Covid please do not come in for your surgery.   Contact your surgeon and your primary care physician for further instructions.               Bathing Instructions  Shower (no bath) the evening before and the morning of your procedure with antibacterial soap.  Wash your face with water and your regular face wash/soap  Use your regular shampoo  Dry off as usual Do not use any deodorant, powder, body lotions, perfume, after shave or cologne.

## 2025-07-29 ENCOUNTER — HOSPITAL ENCOUNTER (OUTPATIENT)
Dept: PREADMISSION TESTING | Facility: OTHER | Age: 45
Discharge: HOME | End: 2025-07-29
Payer: COMMERCIAL

## 2025-07-29 DIAGNOSIS — Z01.818 PREOP TESTING: Primary | ICD-10-CM

## 2025-08-04 ENCOUNTER — ANESTHESIA (OUTPATIENT)
Dept: SURGERY | Facility: OTHER | Age: 45
End: 2025-08-04
Payer: COMMERCIAL

## 2025-08-04 ENCOUNTER — HOSPITAL ENCOUNTER (OUTPATIENT)
Facility: OTHER | Age: 45
Discharge: HOME | End: 2025-08-04
Attending: PLASTIC SURGERY | Admitting: PLASTIC SURGERY
Payer: COMMERCIAL

## 2025-08-04 VITALS
RESPIRATION RATE: 16 BRPM | OXYGEN SATURATION: 99 % | HEIGHT: 64 IN | HEART RATE: 83 BPM | TEMPERATURE: 98 F | DIASTOLIC BLOOD PRESSURE: 62 MMHG | SYSTOLIC BLOOD PRESSURE: 103 MMHG | WEIGHT: 150 LBS | BODY MASS INDEX: 25.61 KG/M2

## 2025-08-04 DIAGNOSIS — Z85.3 PERSONAL HISTORY OF BREAST CANCER: Primary | ICD-10-CM

## 2025-08-04 DIAGNOSIS — Z01.818 PREOP TESTING: ICD-10-CM

## 2025-08-04 DIAGNOSIS — N64.89 BREAST ASYMMETRY: ICD-10-CM

## 2025-08-04 LAB
ABSOLUTE EOSINOPHIL (OHS): 0.05 K/UL
ABSOLUTE MONOCYTE (OHS): 0.21 K/UL (ref 0.3–1)
ABSOLUTE NEUTROPHIL COUNT (OHS): 3.63 K/UL (ref 1.8–7.7)
BASOPHILS # BLD AUTO: 0.04 K/UL
BASOPHILS NFR BLD AUTO: 0.6 %
ERYTHROCYTE [DISTWIDTH] IN BLOOD BY AUTOMATED COUNT: 13.7 % (ref 11.5–14.5)
HCT VFR BLD AUTO: 38.1 % (ref 37–48.5)
HGB BLD-MCNC: 12.9 GM/DL (ref 12–16)
IMM GRANULOCYTES # BLD AUTO: 0.02 K/UL (ref 0–0.04)
IMM GRANULOCYTES NFR BLD AUTO: 0.3 % (ref 0–0.5)
LYMPHOCYTES # BLD AUTO: 2.6 K/UL (ref 1–4.8)
MCH RBC QN AUTO: 29.8 PG (ref 27–31)
MCHC RBC AUTO-ENTMCNC: 33.9 G/DL (ref 32–36)
MCV RBC AUTO: 88 FL (ref 82–98)
NUCLEATED RBC (/100WBC) (OHS): 0 /100 WBC
PLATELET # BLD AUTO: 190 K/UL (ref 150–450)
PMV BLD AUTO: 11.6 FL (ref 9.2–12.9)
POCT GLUCOSE: 95 MG/DL (ref 70–110)
RBC # BLD AUTO: 4.33 M/UL (ref 4–5.4)
RELATIVE EOSINOPHIL (OHS): 0.8 %
RELATIVE LYMPHOCYTE (OHS): 39.7 % (ref 18–48)
RELATIVE MONOCYTE (OHS): 3.2 % (ref 4–15)
RELATIVE NEUTROPHIL (OHS): 55.4 % (ref 38–73)
WBC # BLD AUTO: 6.55 K/UL (ref 3.9–12.7)

## 2025-08-04 PROCEDURE — 36415 COLL VENOUS BLD VENIPUNCTURE: CPT | Performed by: PLASTIC SURGERY

## 2025-08-04 PROCEDURE — 25000003 PHARM REV CODE 250: Performed by: NURSE ANESTHETIST, CERTIFIED REGISTERED

## 2025-08-04 PROCEDURE — 25000003 PHARM REV CODE 250: Performed by: PLASTIC SURGERY

## 2025-08-04 PROCEDURE — 37000008 HC ANESTHESIA 1ST 15 MINUTES: Performed by: PLASTIC SURGERY

## 2025-08-04 PROCEDURE — 25000003 PHARM REV CODE 250: Performed by: ANESTHESIOLOGY

## 2025-08-04 PROCEDURE — 63600175 PHARM REV CODE 636 W HCPCS: Performed by: PLASTIC SURGERY

## 2025-08-04 PROCEDURE — 36000707: Performed by: PLASTIC SURGERY

## 2025-08-04 PROCEDURE — 71000015 HC POSTOP RECOV 1ST HR: Performed by: PLASTIC SURGERY

## 2025-08-04 PROCEDURE — 27201423 OPTIME MED/SURG SUP & DEVICES STERILE SUPPLY: Performed by: PLASTIC SURGERY

## 2025-08-04 PROCEDURE — 63600175 PHARM REV CODE 636 W HCPCS

## 2025-08-04 PROCEDURE — 85025 COMPLETE CBC W/AUTO DIFF WBC: CPT | Performed by: PLASTIC SURGERY

## 2025-08-04 PROCEDURE — 36000706: Performed by: PLASTIC SURGERY

## 2025-08-04 PROCEDURE — A6010 COLLAGEN BASED WOUND FILLER: HCPCS | Performed by: PLASTIC SURGERY

## 2025-08-04 PROCEDURE — 71000016 HC POSTOP RECOV ADDL HR: Performed by: PLASTIC SURGERY

## 2025-08-04 PROCEDURE — 71000033 HC RECOVERY, INTIAL HOUR: Performed by: PLASTIC SURGERY

## 2025-08-04 PROCEDURE — 63600175 PHARM REV CODE 636 W HCPCS: Performed by: ANESTHESIOLOGY

## 2025-08-04 PROCEDURE — 37000009 HC ANESTHESIA EA ADD 15 MINS: Performed by: PLASTIC SURGERY

## 2025-08-04 PROCEDURE — 63600175 PHARM REV CODE 636 W HCPCS: Performed by: NURSE ANESTHETIST, CERTIFIED REGISTERED

## 2025-08-04 PROCEDURE — 71000039 HC RECOVERY, EACH ADD'L HOUR: Performed by: PLASTIC SURGERY

## 2025-08-04 DEVICE — COLLAGEN CELLERATE ACTIVATED 1GM: Type: IMPLANTABLE DEVICE | Site: ABDOMEN | Status: FUNCTIONAL

## 2025-08-04 RX ORDER — SODIUM CHLORIDE 0.9 % (FLUSH) 0.9 %
3 SYRINGE (ML) INJECTION
Status: DISCONTINUED | OUTPATIENT
Start: 2025-08-04 | End: 2025-08-04 | Stop reason: HOSPADM

## 2025-08-04 RX ORDER — PHENYLEPHRINE HYDROCHLORIDE 10 MG/ML
INJECTION INTRAVENOUS
Status: DISCONTINUED | OUTPATIENT
Start: 2025-08-04 | End: 2025-08-04

## 2025-08-04 RX ORDER — FENTANYL CITRATE 50 UG/ML
INJECTION, SOLUTION INTRAMUSCULAR; INTRAVENOUS
Status: DISCONTINUED | OUTPATIENT
Start: 2025-08-04 | End: 2025-08-04

## 2025-08-04 RX ORDER — CEPHALEXIN 500 MG/1
500 CAPSULE ORAL EVERY 8 HOURS
Qty: 27 CAPSULE | Refills: 0 | Status: SHIPPED | OUTPATIENT
Start: 2025-08-04 | End: 2025-08-13

## 2025-08-04 RX ORDER — CYCLOBENZAPRINE HCL 5 MG
5 TABLET ORAL ONCE
Status: COMPLETED | OUTPATIENT
Start: 2025-08-04 | End: 2025-08-04

## 2025-08-04 RX ORDER — CEFAZOLIN 2 G/1
2 INJECTION, POWDER, FOR SOLUTION INTRAMUSCULAR; INTRAVENOUS
Status: COMPLETED | OUTPATIENT
Start: 2025-08-04 | End: 2025-08-04

## 2025-08-04 RX ORDER — PROPOFOL 10 MG/ML
VIAL (ML) INTRAVENOUS
Status: DISCONTINUED | OUTPATIENT
Start: 2025-08-04 | End: 2025-08-04

## 2025-08-04 RX ORDER — CYCLOBENZAPRINE HCL 5 MG
5 TABLET ORAL 3 TIMES DAILY PRN
Status: DISCONTINUED | OUTPATIENT
Start: 2025-08-04 | End: 2025-08-04

## 2025-08-04 RX ORDER — OXYCODONE AND ACETAMINOPHEN 5; 325 MG/1; MG/1
1 TABLET ORAL EVERY 6 HOURS PRN
Qty: 15 TABLET | Refills: 0 | Status: SHIPPED | OUTPATIENT
Start: 2025-08-04 | End: 2025-08-11

## 2025-08-04 RX ORDER — ONDANSETRON 4 MG/1
4 TABLET, ORALLY DISINTEGRATING ORAL EVERY 6 HOURS PRN
Qty: 14 TABLET | Refills: 0 | Status: SHIPPED | OUTPATIENT
Start: 2025-08-04 | End: 2025-08-11

## 2025-08-04 RX ORDER — PROCHLORPERAZINE EDISYLATE 5 MG/ML
5 INJECTION INTRAMUSCULAR; INTRAVENOUS EVERY 30 MIN PRN
Status: DISCONTINUED | OUTPATIENT
Start: 2025-08-04 | End: 2025-08-04 | Stop reason: HOSPADM

## 2025-08-04 RX ORDER — ROCURONIUM BROMIDE 10 MG/ML
INJECTION, SOLUTION INTRAVENOUS
Status: DISCONTINUED | OUTPATIENT
Start: 2025-08-04 | End: 2025-08-04

## 2025-08-04 RX ORDER — LIDOCAINE HYDROCHLORIDE 10 MG/ML
0.5 INJECTION, SOLUTION EPIDURAL; INFILTRATION; INTRACAUDAL; PERINEURAL ONCE
Status: DISCONTINUED | OUTPATIENT
Start: 2025-08-04 | End: 2025-08-04 | Stop reason: HOSPADM

## 2025-08-04 RX ORDER — SODIUM CHLORIDE, SODIUM LACTATE, POTASSIUM CHLORIDE, CALCIUM CHLORIDE 600; 310; 30; 20 MG/100ML; MG/100ML; MG/100ML; MG/100ML
INJECTION, SOLUTION INTRAVENOUS CONTINUOUS
Status: DISCONTINUED | OUTPATIENT
Start: 2025-08-04 | End: 2025-08-04 | Stop reason: HOSPADM

## 2025-08-04 RX ORDER — HYDROMORPHONE HYDROCHLORIDE 2 MG/ML
0.4 INJECTION, SOLUTION INTRAMUSCULAR; INTRAVENOUS; SUBCUTANEOUS EVERY 5 MIN PRN
Status: DISCONTINUED | OUTPATIENT
Start: 2025-08-04 | End: 2025-08-04 | Stop reason: HOSPADM

## 2025-08-04 RX ORDER — ONDANSETRON HYDROCHLORIDE 2 MG/ML
INJECTION, SOLUTION INTRAVENOUS
Status: DISCONTINUED | OUTPATIENT
Start: 2025-08-04 | End: 2025-08-04

## 2025-08-04 RX ORDER — SODIUM CHLORIDE 0.9 % (FLUSH) 0.9 %
10 SYRINGE (ML) INJECTION
Status: DISCONTINUED | OUTPATIENT
Start: 2025-08-04 | End: 2025-08-04 | Stop reason: HOSPADM

## 2025-08-04 RX ORDER — LIDOCAINE HYDROCHLORIDE AND EPINEPHRINE 10; 10 UG/ML; MG/ML
INJECTION, SOLUTION INFILTRATION; PERINEURAL
Status: DISCONTINUED | OUTPATIENT
Start: 2025-08-04 | End: 2025-08-04 | Stop reason: HOSPADM

## 2025-08-04 RX ORDER — OXYCODONE HYDROCHLORIDE 5 MG/1
5 TABLET ORAL ONCE
Refills: 0 | Status: COMPLETED | OUTPATIENT
Start: 2025-08-04 | End: 2025-08-04

## 2025-08-04 RX ORDER — MIDAZOLAM HYDROCHLORIDE 1 MG/ML
INJECTION INTRAMUSCULAR; INTRAVENOUS
Status: DISCONTINUED | OUTPATIENT
Start: 2025-08-04 | End: 2025-08-04

## 2025-08-04 RX ORDER — LIDOCAINE HYDROCHLORIDE 20 MG/ML
INJECTION INTRAVENOUS
Status: DISCONTINUED | OUTPATIENT
Start: 2025-08-04 | End: 2025-08-04

## 2025-08-04 RX ORDER — OXYCODONE HYDROCHLORIDE 5 MG/1
5 TABLET ORAL
Status: DISCONTINUED | OUTPATIENT
Start: 2025-08-04 | End: 2025-08-04

## 2025-08-04 RX ORDER — IBUPROFEN 400 MG/1
400 TABLET, FILM COATED ORAL EVERY 8 HOURS PRN
Qty: 7 TABLET | Refills: 0 | Status: SHIPPED | OUTPATIENT
Start: 2025-08-04 | End: 2025-08-11

## 2025-08-04 RX ORDER — MUPIROCIN 20 MG/G
OINTMENT TOPICAL
Status: DISCONTINUED | OUTPATIENT
Start: 2025-08-04 | End: 2025-08-04 | Stop reason: HOSPADM

## 2025-08-04 RX ORDER — SENNOSIDES 8.6 MG/1
1 TABLET ORAL DAILY
Qty: 7 TABLET | Refills: 0 | Status: SHIPPED | OUTPATIENT
Start: 2025-08-04

## 2025-08-04 RX ORDER — GLUCAGON 1 MG
1 KIT INJECTION
Status: DISCONTINUED | OUTPATIENT
Start: 2025-08-04 | End: 2025-08-04 | Stop reason: HOSPADM

## 2025-08-04 RX ORDER — LIDOCAINE HYDROCHLORIDE 10 MG/ML
1 INJECTION, SOLUTION EPIDURAL; INFILTRATION; INTRACAUDAL; PERINEURAL ONCE
Status: DISCONTINUED | OUTPATIENT
Start: 2025-08-04 | End: 2025-08-04 | Stop reason: HOSPADM

## 2025-08-04 RX ORDER — DEXAMETHASONE SODIUM PHOSPHATE 4 MG/ML
INJECTION, SOLUTION INTRA-ARTICULAR; INTRALESIONAL; INTRAMUSCULAR; INTRAVENOUS; SOFT TISSUE
Status: DISCONTINUED | OUTPATIENT
Start: 2025-08-04 | End: 2025-08-04

## 2025-08-04 RX ADMIN — FENTANYL CITRATE 100 MCG: 50 INJECTION, SOLUTION INTRAMUSCULAR; INTRAVENOUS at 10:08

## 2025-08-04 RX ADMIN — HYDROMORPHONE HYDROCHLORIDE 0.4 MG: 2 INJECTION INTRAMUSCULAR; INTRAVENOUS; SUBCUTANEOUS at 12:08

## 2025-08-04 RX ADMIN — SUGAMMADEX 200 MG: 100 INJECTION, SOLUTION INTRAVENOUS at 11:08

## 2025-08-04 RX ADMIN — SODIUM CHLORIDE, SODIUM LACTATE, POTASSIUM CHLORIDE, AND CALCIUM CHLORIDE: 600; 310; 30; 20 INJECTION, SOLUTION INTRAVENOUS at 10:08

## 2025-08-04 RX ADMIN — ONDANSETRON HYDROCHLORIDE 4 MG: 2 INJECTION INTRAMUSCULAR; INTRAVENOUS at 10:08

## 2025-08-04 RX ADMIN — DEXAMETHASONE SODIUM PHOSPHATE 8 MG: 4 INJECTION, SOLUTION INTRAMUSCULAR; INTRAVENOUS at 10:08

## 2025-08-04 RX ADMIN — MUPIROCIN: 20 OINTMENT TOPICAL at 09:08

## 2025-08-04 RX ADMIN — MIDAZOLAM HYDROCHLORIDE 2 MG: 1 INJECTION, SOLUTION INTRAMUSCULAR; INTRAVENOUS at 10:08

## 2025-08-04 RX ADMIN — PROPOFOL 170 MG: 10 INJECTION, EMULSION INTRAVENOUS at 10:08

## 2025-08-04 RX ADMIN — OXYCODONE HYDROCHLORIDE 5 MG: 5 TABLET ORAL at 01:08

## 2025-08-04 RX ADMIN — FENTANYL CITRATE 50 MCG: 50 INJECTION, SOLUTION INTRAMUSCULAR; INTRAVENOUS at 11:08

## 2025-08-04 RX ADMIN — CYCLOBENZAPRINE HYDROCHLORIDE 5 MG: 5 TABLET, FILM COATED ORAL at 04:08

## 2025-08-04 RX ADMIN — LIDOCAINE HYDROCHLORIDE 80 MG: 20 INJECTION, SOLUTION INTRAVENOUS at 10:08

## 2025-08-04 RX ADMIN — OXYCODONE HYDROCHLORIDE 5 MG: 5 TABLET ORAL at 12:08

## 2025-08-04 RX ADMIN — ROCURONIUM BROMIDE 50 MG: 10 SOLUTION INTRAVENOUS at 10:08

## 2025-08-04 RX ADMIN — CEFAZOLIN 2 G: 2 INJECTION, POWDER, FOR SOLUTION INTRAMUSCULAR; INTRAVENOUS at 10:08

## 2025-08-04 RX ADMIN — PHENYLEPHRINE HYDROCHLORIDE 100 MCG: 10 INJECTION INTRAVENOUS at 11:08

## 2025-08-04 RX ADMIN — HYDROMORPHONE HYDROCHLORIDE 0.4 MG: 2 INJECTION INTRAMUSCULAR; INTRAVENOUS; SUBCUTANEOUS at 01:08

## 2025-08-04 NOTE — INTERVAL H&P NOTE
The patient has been examined and the H&P has been reviewed:    I concur with the findings and no changes have occurred since H&P was written.    Surgery risks, benefits and alternative options discussed and understood by patient/family.        Abdi Rivero MD  [facility], -VI  8/4/2025  9:51 AM

## 2025-08-04 NOTE — PROGRESS NOTES
Called placed to Dr. Jacinto, anesthesia, informed pt is still c/o 6/10 incisional pain, requesting additional pain medication.  He gave a v.o for flexaril 5 mg po x 1 dose now; verbal order read back and verified.

## 2025-08-04 NOTE — PLAN OF CARE
Elham Rodas has met all discharge criteria from Phase II. Vital Signs are stable, ambulating  without difficulty. Discharge instructions given, patient verbalized understanding. Discharged from facility via wheelchair in stable condition.

## 2025-08-04 NOTE — TRANSFER OF CARE
"Anesthesia Transfer of Care Note    Patient: Elham Rodas    Procedure(s) Performed: Procedure(s) (LRB):  BREAST REVISION SURGERY / RIGHT BREAST RECONSTRACTION REVISION WITH FAT GRAFTING / LEFT BREAST SCAR REVISION WITH CLOSURE (Right)    Patient location: PACU    Anesthesia Type: general    Transport from OR: Transported from OR on 2-3 L/min O2 by NC with adequate spontaneous ventilation    Post pain: adequate analgesia    Post assessment: no apparent anesthetic complications and tolerated procedure well    Post vital signs: stable    Level of consciousness: awake    Nausea/Vomiting: no nausea/vomiting    Complications: none    Transfer of care protocol was followed      Last vitals: Visit Vitals  /76 (BP Location: Left arm, Patient Position: Lying)   Pulse 88   Temp 36.4 °C (97.5 °F) (Skin)   Resp 18   Ht 5' 4" (1.626 m)   Wt 68 kg (150 lb)   LMP 03/01/2019 (Approximate)   SpO2 98%   Breastfeeding No   BMI 25.75 kg/m²     "

## 2025-08-04 NOTE — ANESTHESIA POSTPROCEDURE EVALUATION
Anesthesia Post Evaluation    Patient: Elham Rodas    Procedure(s) Performed: Procedure(s) (LRB):  BREAST REVISION SURGERY / RIGHT BREAST RECONSTRACTION REVISION WITH FAT GRAFTING / LEFT BREAST SCAR REVISION WITH CLOSURE (Right)    Final Anesthesia Type: general      Patient location during evaluation: PACU  Patient participation: Yes- Able to Participate  Level of consciousness: awake and alert  Post-procedure vital signs: reviewed and stable  Pain management: adequate  Airway patency: patent    PONV status at discharge: No PONV  Anesthetic complications: no      Cardiovascular status: blood pressure returned to baseline  Respiratory status: spontaneous ventilation  Hydration status: euvolemic  Follow-up not needed.          Vitals Value Taken Time   /61 08/04/25 13:46   Temp 36.5 °C (97.7 °F) 08/04/25 13:30   Pulse 75 08/04/25 13:52   Resp 16 08/04/25 13:45   SpO2 92 % 08/04/25 13:52   Vitals shown include unfiled device data.      Event Time   Out of Recovery 13:52:55         Pain/Solis Score: Pain Rating Prior to Med Admin: 6 (8/4/2025  2:00 PM)  Pain Rating Post Med Admin: 6 (8/4/2025  2:00 PM)  Solis Score: 9 (8/4/2025  2:00 PM)

## 2025-08-04 NOTE — ANESTHESIA PREPROCEDURE EVALUATION
08/04/2025  Elham Rodas is a 44 y.o., female.      Pre-op Assessment    I have reviewed the Patient Summary Reports.     I have reviewed the Nursing Notes. I have reviewed the NPO Status.   I have reviewed the Medications.     Review of Systems  Anesthesia Hx:  No problems with previous Anesthesia   History of prior surgery of interest to airway management or planning:  Previous anesthesia: General 2022 procedure at [facility] with general anesthesia.  Procedure performed at an [facility].       Denies Family Hx of Anesthesia complications.    Denies Personal Hx of Anesthesia complications.                    Social:  Non-Smoker       Hematology/Oncology:  Hematology Normal                     Current/Recent Cancer.  Breast    right          EENT/Dental:  EENT/Dental Normal           Cardiovascular:  Cardiovascular Normal     Denies Hypertension.                                          Pulmonary:  Pulmonary Normal                       Renal/:  Renal/ Normal                 Hepatic/GI:  Hepatic/GI Normal       Off Ozempic for 3 weeks Taking GLP-1 Agonists            Musculoskeletal:  Musculoskeletal Normal                Neurological:  Neurology Normal                                      Endocrine:  Diabetes, type 2 Hypothyroidism          Dermatological:  Skin Normal    Psych:  Psychiatric History              Physical Exam  General: Well nourished, Cooperative, Alert and Oriented    Airway:  Mallampati: II   Mouth Opening: Normal  Neck ROM: Normal ROM    Dental:  Intact    Anesthesia Plan  Type of Anesthesia, risks & benefits discussed:    Anesthesia Type: Gen ETT  Intra-op Monitoring Plan: Standard ASA Monitors  Post Op Pain Control Plan: multimodal analgesia  Induction:  IV  Airway Plan: Video  Informed Consent: Informed consent signed with the Patient and all parties understand the risks and  agree with anesthesia plan.  All questions answered.   ASA Score: 2    Ready For Surgery From Anesthesia Perspective.   .

## 2025-08-04 NOTE — BRIEF OP NOTE
[facility] - Surgery ([place])  Brief Operative Note    Surgery Date: 8/4/2025     Surgeons and Role:     * Nicholas Mccray MD - Primary    Assisting Surgeon: None    Pre-op Diagnosis:  Hx of breast cancer [Z85.3]    Post-op Diagnosis:  Post-Op Diagnosis Codes:     * Hx of breast cancer [Z85.3]    Procedure(s) (LRB):  BREAST REVISION SURGERY / RIGHT BREAST RECONSTRACTION REVISION WITH FAT GRAFTING / LEFT BREAST SCAR REVISION WITH CLOSURE (Right)    Anesthesia: General    Operative Findings: bilateral breast revision and abdominal scar revision with fat grafting to bilateral breasts    Estimated Blood Loss: * No values recorded between 8/4/2025 10:10 AM and 8/4/2025 12:06 PM *         Specimens:   Specimen (24h ago, onward)      None            * No specimens in log *        Discharge Note    OUTCOME: Patient tolerated treatment/procedure well without complication and is now ready for discharge.    DISPOSITION: Home or Self Care    FINAL DIAGNOSIS:  <principal problem not specified>    FOLLOWUP: In clinic    DISCHARGE INSTRUCTIONS:    Discharge Procedure Orders   Diet Adult Regular     Notify your health care provider if you experience any of the following:  temperature >100.4     Notify your health care provider if you experience any of the following:  persistent nausea and vomiting or diarrhea     Notify your health care provider if you experience any of the following:  severe uncontrolled pain     Notify your health care provider if you experience any of the following:  redness, tenderness, or signs of infection (pain, swelling, redness, odor or green/yellow discharge around incision site)     Notify your health care provider if you experience any of the following:  difficulty breathing or increased cough     Notify your health care provider if you experience any of the following:  severe persistent headache     Notify your health care provider if you experience any of the following:     Notify your health care  provider if you experience any of the following:  increased confusion or weakness     Notify your health care provider if you experience any of the following:  persistent dizziness, light-headedness, or visual disturbances     Notify your health care provider if you experience any of the following:  worsening rash     Activity as tolerated   Order Comments: No strenuous activities or exercise  No lifting more than a gallon of milk  Keep abdominal binder and surgical bra in place  Sponge bath only, keep incisions/tape dry.

## 2025-08-04 NOTE — PROGRESS NOTES
Contacted Dr. Rivero via [place] regarding no discharge medication ordered, patient states she did not get any medication rx's preoperatively.  New orders placed for discharge medications, I instructed the pt's daughter to contact the pharmacy to arrange for bedside delivery.

## 2025-08-04 NOTE — ANESTHESIA PROCEDURE NOTES
Intubation    Date/Time: 8/4/2025 10:24 AM    Performed by: Fatou Monique  Authorized by: Fatou Monique    Intubation:     Induction:  Intravenous    Intubated:  Postinduction    Mask Ventilation:  Easy mask    Attempts:  1    Attempted By:  CRNA    Method of Intubation:  Video laryngoscopy    Blade:  Chong 3    Laryngeal View Grade: Grade I - full view of cords      Difficult Airway Encountered?: No      Complications:  None    Airway Device:  Oral endotracheal tube    Airway Device Size:  7.0    Style/Cuff Inflation:  Cuffed (inflated to minimal occlusive pressure)    Tube secured:  20    Secured at:  The lips    Placement Verified By:  Capnometry    Complicating Factors:  None    Findings Post-Intubation:  BS equal bilateral and atraumatic/condition of teeth unchanged

## 2025-08-04 NOTE — OP NOTE
[facility] - Surgery ([place])  Operative Note      Date of Procedure: 8/4/2025     Procedure: Procedure(s) (LRB):  BREAST REVISION SURGERY / RIGHT BREAST RECONSTRACTION REVISION WITH FAT GRAFTING / LEFT BREAST SCAR REVISION WITH CLOSURE (Right)     Surgeons and Role:     * Nicholas Mccray MD - Primary    Assisting Surgeon: None    Pre-Operative Diagnosis: Hx of breast cancer [Z85.3]    Post-Operative Diagnosis: Post-Op Diagnosis Codes:     * Hx of breast cancer [Z85.3]    Anesthesia: General    Operative Findings (including complications, if any):   Revison of right reconstructed breast  Scar excison and complex closure right chest wall, 15cm  Abdominal scar excision and complex closure 20cm  Bilateral breast fat grafting 110cc total (70Lt and 40 Rt)    Description of Technical Procedures:  After informed consent patient was marked in the preoperative area taken to the operating room.  The patient was placed under general anesthesia and intubated.  The patient was prepped and draped in usual sterile fashion a time-out was performed a Mondragon catheter had been placed.  The preoperative markings were injected with local with epinephrine.  Stab incisions were made in the abdomen and tumescent was injected into bilateral flanks.  Liposuction was performed with the pal machine using the 4 mm cannula and the revolve fat harvesting system.  The right breast was revised by excising the lower portion of the scar and creating an IMF with a 2-0 PDS.  The excess scar was discarded.  In the incision was closed with deep 2-0 Vicryl followed by 2-0 0 Vicryl interrupted dermis closure and then a subcuticular Quill closure of the skin.  The left chest wall scar was excised with a proximally 12 cm and was removed with a 10 blade electrocautery.  Hemostasis was obtained and the wound was then closed in 3 layers 2-0 Vicryl for the deep layer followed by 3-0 Vicryl for the dermis and a running Quill for the skin.  The abdominal scar was  then excised at a proximally 20 cm in length the incision was removed with a 10 blade electrocautery.  Dissection occurred up to the umbilicus and then the skin was then closed in 3 layers.  The Farooq's was closed with 2-0 Vicryl the skin was closed with 3-0 Vicryl in a running 2-0 Quill.  After the fat was harvested and placed in the 10 cc syringes was injected into both breasts and a Latisse type formation.  70 cc was injected into the left and 40 cc into the right.  This incisions were then closed with 4-0 Monocryl.  The patient was then placed in a surgical bra and compression garment.  She was taken to recovery all counts were correct.    Significant Surgical Tasks Conducted by the Assistant(s), if Applicable: Assist and close under direct supervision    Estimated Blood Loss (EBL): * No values recorded between 8/4/2025 10:10 AM and 8/4/2025 11:48 AM *           Implants:   Implant Name Type Inv. Item Serial No.  Lot No. LRB No. Used Action   COLLAGEN CELLERATE ACTIVATED 1GM - [id number]  COLLAGEN CELLERATE ACTIVATED 1GM  TORIA.  Right 1 Implanted       Specimens:   Specimen (24h ago, onward)      None           * No specimens in log *           Condition: Good    Disposition: PACU - hemodynamically stable.    Attestation: I was present and scrubbed for the entire procedure.

## (undated) DEVICE — SUT MONOCRYL 4-0 PS-2

## (undated) DEVICE — SYR 10CC LUER LOCK

## (undated) DEVICE — GOWN AERO CHROME W/ TOWEL XL

## (undated) DEVICE — BINDER ABDOMINAL 9 30-45

## (undated) DEVICE — UNDERGLOVE BIOGEL PI SZ 6.5 LF

## (undated) DEVICE — SHEARS HARMONIC CRVD 9 CM

## (undated) DEVICE — STRATAFIX

## (undated) DEVICE — TRAY MINOR GEN SURG

## (undated) DEVICE — DISSECTOR SONICISION CRV 39CM

## (undated) DEVICE — EVACUATOR WOUND BULB 100CC

## (undated) DEVICE — Device

## (undated) DEVICE — TIP YANKAUERS BULB NO VENT

## (undated) DEVICE — NDL SPINAL SPINOCAN 22GX3.5

## (undated) DEVICE — DRAIN CHANNEL ROUND 15FR

## (undated) DEVICE — ELECTRODE BLADE INSULATED 1 IN

## (undated) DEVICE — SPONGE LAP 18X18 PREWASHED

## (undated) DEVICE — SOL NS 1000CC

## (undated) DEVICE — SPONGE DERMACEA GAUZE 4X4

## (undated) DEVICE — GLOVE BIOGEL SKINSENSE PI 7.0

## (undated) DEVICE — REMOVER STAPLE SKIN STERILE

## (undated) DEVICE — MARGIN MARKER STANDARD 6 COLOR

## (undated) DEVICE — SUT MCRYL PLUS 4-0 PS2 27IN

## (undated) DEVICE — ELECTRODE REM PLYHSV RETURN 9

## (undated) DEVICE — EVACUATOR KIT SMOKE PLUME AWAY

## (undated) DEVICE — NEOGUARD COVER 4X30CM STERILE

## (undated) DEVICE — COVERS PROBE NR-48 STERILE

## (undated) DEVICE — TOWEL OR DISP STRL BLUE 4/PK

## (undated) DEVICE — SYR 30CC LUER LOCK

## (undated) DEVICE — SUT MONOCRYL 3-0 PS-2 UND

## (undated) DEVICE — GLOVE BIOGEL SKINSENSE PI 7.5

## (undated) DEVICE — PENCIL ELECTROSURG HOLST W/BLD

## (undated) DEVICE — DRAIN CHANNEL ROUND 19FR

## (undated) DEVICE — UNDERGLOVES BIOGEL PI SZ 7 LF

## (undated) DEVICE — APPLIER CLIP LIAGCLIP 9.375IN

## (undated) DEVICE — BANDAGE ROLL COTTN 4.5INX4.1YD

## (undated) DEVICE — SUT 9/0 5IN ETHILON BLK MON

## (undated) DEVICE — SEE MEDLINE ITEM 157131

## (undated) DEVICE — PACK UNIVERSAL SPLIT II

## (undated) DEVICE — NDL 18GA X1 1/2 REG BEVEL

## (undated) DEVICE — NDL ECLIPSE SAFETY 23G 1.5IN

## (undated) DEVICE — POSITIONER IV ARMBOARD FOAM

## (undated) DEVICE — TUBING SUC UNIV W/CONN 12FT

## (undated) DEVICE — NDL INSUF ULTRA VERESS 120MM

## (undated) DEVICE — BLADE SURG #15 CARBON STEEL

## (undated) DEVICE — TRAY URETHRAL CATH 14FR KC3410

## (undated) DEVICE — SCRUB HIBICLENS 4% CHG 4OZ

## (undated) DEVICE — SUT PROLENE 4-0 MONO 18IN

## (undated) DEVICE — SUT MCRYL PLUS 3-0 PS2 27IN

## (undated) DEVICE — PACK LAPAROSCOPY

## (undated) DEVICE — PACK UNIV PROCEDURE

## (undated) DEVICE — SUT VICRYL PLUS 2-0 CT1 18

## (undated) DEVICE — TUBING LIPOSUCTION 10X3

## (undated) DEVICE — NDL HYPO REG 25G X 1 1/2

## (undated) DEVICE — TROCAR KII FIOS 11MM X 100MM

## (undated) DEVICE — SYR 50CC LL

## (undated) DEVICE — DRAPE THYROID WITH ARMBOARD

## (undated) DEVICE — POSITIONER HEAD DONUT 9IN FOAM

## (undated) DEVICE — STAPLER SKIN ROTATING HEAD

## (undated) DEVICE — SYS REVOLVE FAT PROCESSING

## (undated) DEVICE — SOL 0.9% NACL IRRI.IN STERIL

## (undated) DEVICE — SEE MEDLINE ITEM 152622

## (undated) DEVICE — SCISSOR 5MMX35CM DIRECT DRIVE

## (undated) DEVICE — SUT VICRYL 3-0 27 SH

## (undated) DEVICE — SEE MEDLINE ITEM 157117

## (undated) DEVICE — LUBRICANT SURGILUBE 2 OZ

## (undated) DEVICE — APPLICATOR CHLORAPREP ORN 26ML

## (undated) DEVICE — ELECTRODE MEGADYNE RETURN DUAL

## (undated) DEVICE — DRESSING XEROFORM 5X9IN

## (undated) DEVICE — KITTNER ENDOSCOPIC SINGLE TIP

## (undated) DEVICE — HOOK STAY ELAS 5MM 8EA/PK

## (undated) DEVICE — SUT QUILL 2T11 36IN

## (undated) DEVICE — DRESSING XEROFORM FOIL PK 1X8

## (undated) DEVICE — TUBING INFILTRATION STRL DISP

## (undated) DEVICE — GOWN SURGICAL X-LARGE

## (undated) DEVICE — ELECTRODE EXTENDED BLADE

## (undated) DEVICE — TUBING LAPARSCOPIC INSUFFLATIN

## (undated) DEVICE — SUT SILK 2-0 PS 18IN BLACK

## (undated) DEVICE — GOWN NONREINF SET-IN SLV XL

## (undated) DEVICE — SUT 2/0 30IN SILK BLK BRAI

## (undated) DEVICE — SUT 3-0 ETHILON 18 FS-1

## (undated) DEVICE — BLADE SURG CARBON STEEL #10

## (undated) DEVICE — DRAPE MEDI-SLUSH XL 44X66IN

## (undated) DEVICE — KII SLEEVE

## (undated) DEVICE — SUT STRATAFIX PGAPCL 3 FS-1

## (undated) DEVICE — SPONGE COTTON TRAY 4X4IN

## (undated) DEVICE — SKINMARKER & RULER REGULAR X-F

## (undated) DEVICE — TRAY CATH 1-LYR URIMTR 16FR

## (undated) DEVICE — DRESSING XEROFORM NONADH 1X8IN

## (undated) DEVICE — STOCKINET 4INX48

## (undated) DEVICE — BOWL STERILE LARGE 32OZ

## (undated) DEVICE — SEE MEDLINE ITEM 154981

## (undated) DEVICE — SEE MEDLINE ITEM 146417

## (undated) DEVICE — CONTAINER SPECIMEN STRL 4OZ

## (undated) DEVICE — SOL NACL 0.9% INJ PF/50151

## (undated) DEVICE — GAUGE FLUFF X-SUPER 36X36 2PLY

## (undated) DEVICE — SEE MEDLINE ITEM 157128

## (undated) DEVICE — GAUZE FLUFF XXLG 36X36 2 PLY

## (undated) DEVICE — BOVIE SUCTION

## (undated) DEVICE — MANIFOLD 4 PORT

## (undated) DEVICE — PAD ABDOMINAL STERILE 8X10IN

## (undated) DEVICE — SYR DISP LL 5CC

## (undated) DEVICE — HOLDER DRAIN POUCH PINK

## (undated) DEVICE — SUT ETHILON 2-0 PSLX 30IN

## (undated) DEVICE — BANDAGE KERLIX P/P 2.25IN STER

## (undated) DEVICE — SOL CLEARIFY VISUALIZATION LAP

## (undated) DEVICE — PAD ABD 8X10 STERILE

## (undated) DEVICE — SOL NORMAL USPCA 0.9%

## (undated) DEVICE — GAUZE SPONGE 4X4 12PLY

## (undated) DEVICE — COVER PROBE NL STRL 3.6X96IN

## (undated) DEVICE — ADHESIVE DERMABOND ADVANCED

## (undated) DEVICE — TRAY DRY SKIN SCRUB PREP

## (undated) DEVICE — BRA SURGICAL LG 38-40

## (undated) DEVICE — SUT 2-0 VICRYL / SH (J417)

## (undated) DEVICE — SYR SALINE FLSH PRFL STRL 10ML

## (undated) DEVICE — GLOVE PROTEXIS LTX MICRO  7.5

## (undated) DEVICE — NDL HYPO STD REG BVL 18GX1.5IN

## (undated) DEVICE — DRAPE STERI INSTRUMENT 1018

## (undated) DEVICE — SUT 2-0 VICRYL / CT-1

## (undated) DEVICE — TRAY FOLEY 16FR INFECTION CONT

## (undated) DEVICE — BRA CLASSIC COMFORT 40 BLACK

## (undated) DEVICE — ELECTRODE BLD EXT INSUL 1

## (undated) DEVICE — SYS LABEL CORRECT MED

## (undated) DEVICE — IRRIGATOR ENDOSCOPY DISP.

## (undated) DEVICE — DRAPE C ARM 42 X 120 10/BX

## (undated) DEVICE — FORCEP DISSECTING LYONS PKS

## (undated) DEVICE — SUT PDS II 0 CT VIL MONO 36